# Patient Record
Sex: MALE | Race: WHITE | Employment: OTHER | URBAN - METROPOLITAN AREA
[De-identification: names, ages, dates, MRNs, and addresses within clinical notes are randomized per-mention and may not be internally consistent; named-entity substitution may affect disease eponyms.]

---

## 2017-05-14 ENCOUNTER — HOSPITAL ENCOUNTER (INPATIENT)
Age: 67
LOS: 2 days | Discharge: HOME | DRG: 181 | End: 2017-05-16
Attending: EMERGENCY MEDICINE | Admitting: INTERNAL MEDICINE
Payer: MEDICARE

## 2017-05-14 ENCOUNTER — APPOINTMENT (OUTPATIENT)
Dept: CT IMAGING | Age: 67
DRG: 181 | End: 2017-05-14
Attending: EMERGENCY MEDICINE
Payer: MEDICARE

## 2017-05-14 DIAGNOSIS — C79.51 BONE METASTASIS (HCC): ICD-10-CM

## 2017-05-14 DIAGNOSIS — C34.90 NON-SMALL CELL LUNG CANCER (NSCLC) (HCC): ICD-10-CM

## 2017-05-14 DIAGNOSIS — R91.1 LESION OF RIGHT LUNG: ICD-10-CM

## 2017-05-14 DIAGNOSIS — R91.8 MASS OF RIGHT LUNG: Primary | ICD-10-CM

## 2017-05-14 DIAGNOSIS — J90 PLEURAL EFFUSION: ICD-10-CM

## 2017-05-14 DIAGNOSIS — J90 PLEURAL EFFUSION, RIGHT: ICD-10-CM

## 2017-05-14 DIAGNOSIS — M89.9 RIB LESION: ICD-10-CM

## 2017-05-14 LAB
ALBUMIN SERPL BCP-MCNC: 4 G/DL (ref 3.5–5)
ALBUMIN/GLOB SERPL: 0.9 {RATIO} (ref 1.1–2.2)
ALP SERPL-CCNC: 125 U/L (ref 45–117)
ALT SERPL-CCNC: 30 U/L (ref 12–78)
ANION GAP BLD CALC-SCNC: 5 MMOL/L (ref 5–15)
AST SERPL W P-5'-P-CCNC: 27 U/L (ref 15–37)
BILIRUB SERPL-MCNC: 0.9 MG/DL (ref 0.2–1)
BUN SERPL-MCNC: 18 MG/DL (ref 6–20)
BUN/CREAT SERPL: 22 (ref 12–20)
CALCIUM SERPL-MCNC: 9.7 MG/DL (ref 8.5–10.1)
CHLORIDE SERPL-SCNC: 101 MMOL/L (ref 97–108)
CO2 SERPL-SCNC: 33 MMOL/L (ref 21–32)
CREAT SERPL-MCNC: 0.82 MG/DL (ref 0.7–1.3)
GLOBULIN SER CALC-MCNC: 4.3 G/DL (ref 2–4)
GLUCOSE SERPL-MCNC: 86 MG/DL (ref 65–100)
POTASSIUM SERPL-SCNC: 3.7 MMOL/L (ref 3.5–5.1)
PROT SERPL-MCNC: 8.3 G/DL (ref 6.4–8.2)
SODIUM SERPL-SCNC: 139 MMOL/L (ref 136–145)

## 2017-05-14 PROCEDURE — 74011000258 HC RX REV CODE- 258: Performed by: EMERGENCY MEDICINE

## 2017-05-14 PROCEDURE — 71260 CT THORAX DX C+: CPT

## 2017-05-14 PROCEDURE — 36415 COLL VENOUS BLD VENIPUNCTURE: CPT | Performed by: EMERGENCY MEDICINE

## 2017-05-14 PROCEDURE — 99284 EMERGENCY DEPT VISIT MOD MDM: CPT

## 2017-05-14 PROCEDURE — 93005 ELECTROCARDIOGRAM TRACING: CPT

## 2017-05-14 PROCEDURE — 74011636320 HC RX REV CODE- 636/320: Performed by: EMERGENCY MEDICINE

## 2017-05-14 PROCEDURE — 80053 COMPREHEN METABOLIC PANEL: CPT | Performed by: EMERGENCY MEDICINE

## 2017-05-14 PROCEDURE — 74011250636 HC RX REV CODE- 250/636: Performed by: INTERNAL MEDICINE

## 2017-05-14 PROCEDURE — 65660000000 HC RM CCU STEPDOWN

## 2017-05-14 RX ORDER — MELATONIN
1000 DAILY
COMMUNITY
End: 2019-01-01

## 2017-05-14 RX ORDER — IPRATROPIUM BROMIDE AND ALBUTEROL SULFATE 2.5; .5 MG/3ML; MG/3ML
3 SOLUTION RESPIRATORY (INHALATION)
Status: DISCONTINUED | OUTPATIENT
Start: 2017-05-14 | End: 2017-05-16 | Stop reason: HOSPADM

## 2017-05-14 RX ORDER — HEPARIN SODIUM 5000 [USP'U]/ML
5000 INJECTION, SOLUTION INTRAVENOUS; SUBCUTANEOUS EVERY 8 HOURS
Status: DISCONTINUED | OUTPATIENT
Start: 2017-05-14 | End: 2017-05-16 | Stop reason: HOSPADM

## 2017-05-14 RX ORDER — DOCUSATE SODIUM 100 MG/1
100 CAPSULE, LIQUID FILLED ORAL 2 TIMES DAILY
Status: DISCONTINUED | OUTPATIENT
Start: 2017-05-14 | End: 2017-05-16 | Stop reason: HOSPADM

## 2017-05-14 RX ORDER — LEVOTHYROXINE SODIUM 25 UG/1
25 TABLET ORAL
Status: DISCONTINUED | OUTPATIENT
Start: 2017-05-15 | End: 2017-05-15

## 2017-05-14 RX ORDER — LOSARTAN POTASSIUM 50 MG/1
50 TABLET ORAL DAILY
Status: DISCONTINUED | OUTPATIENT
Start: 2017-05-15 | End: 2017-05-16 | Stop reason: HOSPADM

## 2017-05-14 RX ORDER — ACETAMINOPHEN 325 MG/1
650 TABLET ORAL
Status: DISCONTINUED | OUTPATIENT
Start: 2017-05-14 | End: 2017-05-16 | Stop reason: HOSPADM

## 2017-05-14 RX ORDER — HYDROMORPHONE HYDROCHLORIDE 1 MG/ML
1 INJECTION, SOLUTION INTRAMUSCULAR; INTRAVENOUS; SUBCUTANEOUS
Status: DISCONTINUED | OUTPATIENT
Start: 2017-05-14 | End: 2017-05-16 | Stop reason: HOSPADM

## 2017-05-14 RX ORDER — SODIUM CHLORIDE 0.9 % (FLUSH) 0.9 %
10 SYRINGE (ML) INJECTION
Status: COMPLETED | OUTPATIENT
Start: 2017-05-14 | End: 2017-05-14

## 2017-05-14 RX ORDER — SODIUM CHLORIDE 9 MG/ML
75 INJECTION, SOLUTION INTRAVENOUS CONTINUOUS
Status: DISCONTINUED | OUTPATIENT
Start: 2017-05-14 | End: 2017-05-16 | Stop reason: HOSPADM

## 2017-05-14 RX ORDER — HYDROCODONE BITARTRATE AND ACETAMINOPHEN 5; 325 MG/1; MG/1
1 TABLET ORAL
Status: DISCONTINUED | OUTPATIENT
Start: 2017-05-14 | End: 2017-05-16 | Stop reason: HOSPADM

## 2017-05-14 RX ORDER — ONDANSETRON 2 MG/ML
4 INJECTION INTRAMUSCULAR; INTRAVENOUS
Status: DISCONTINUED | OUTPATIENT
Start: 2017-05-14 | End: 2017-05-16 | Stop reason: HOSPADM

## 2017-05-14 RX ADMIN — SODIUM CHLORIDE 75 ML/HR: 900 INJECTION, SOLUTION INTRAVENOUS at 22:29

## 2017-05-14 RX ADMIN — SODIUM CHLORIDE 100 ML: 900 INJECTION, SOLUTION INTRAVENOUS at 13:12

## 2017-05-14 RX ADMIN — Medication 10 ML: at 13:12

## 2017-05-14 RX ADMIN — IOPAMIDOL 100 ML: 612 INJECTION, SOLUTION INTRAVENOUS at 13:12

## 2017-05-14 NOTE — ED PROVIDER NOTES
HPI Comments: 78 y.o. male with past medical history significant for HTN who presents from [facility] with chief complaint of cough. Pt reports he had a CXR at [facility] which showed \"neoplasm of uncertain behavior of trachea, bronchus, and lung\", and he was recommended ED evaluation. He states he has been having an ongoing cough for 2 months accompanied by chest tightness. In addition, he states he had a fever this morning which has since resolved. Pt notes he was dx w/ bronchitis 2 months ago and was prescribed a 5 day course of antibiotics, but his cough from that time never completely resolved. Pt's WBC count from [facility] today was 6.6, and his last thyroid was checked 5 months ago. Pt denies previous hx of kidney problems and FmHx of blood clots. Pt denies weight change, chest pain, SOB, hemoptysis, and leg swelling. There are no other acute medical concerns at this time. Social hx: denies smoking    Significant FMHx: HTN    PCP: Luca Aleman DO    Note written by Roopa Tinoco, as dictated by Zamzam Anaya MD 11:35 AM    The history is provided by the patient. Past Medical History:   Diagnosis Date    Hypertension        Past Surgical History:   Procedure Laterality Date    HX ORTHOPAEDIC      reconstruction of left little finger         Family History:   Problem Relation Age of Onset    Cancer Mother      leukemia    Stroke Father     Cancer Brother      bladder       Social History     Social History    Marital status:      Spouse name: N/A    Number of children: N/A    Years of education: N/A     Occupational History    Not on file.      Social History Main Topics    Smoking status: Never Smoker    Smokeless tobacco: Never Used    Alcohol use 6.0 oz/week     10 Glasses of wine per week      Comment: regularly    Drug use: No    Sexual activity: Yes     Partners: Female     Other Topics Concern    Not on file     Social History Narrative ALLERGIES: Review of patient's allergies indicates no known allergies. Review of Systems   Constitutional: Positive for fever. Negative for appetite change, chills and unexpected weight change. HENT: Negative for congestion. Eyes: Negative for visual disturbance. Respiratory: Positive for cough and chest tightness. Negative for shortness of breath and wheezing. Cardiovascular: Negative for chest pain and leg swelling. Gastrointestinal: Negative for abdominal pain, diarrhea and vomiting. Genitourinary: Negative for dysuria and frequency. Musculoskeletal: Negative for joint swelling. Skin: Negative for rash. Neurological: Negative for speech difficulty and headaches. All other systems reviewed and are negative. Vitals:    05/14/17 0955 05/14/17 1230 05/14/17 1300   BP: (!) 185/114 (!) 187/91 (!) 170/92   Pulse: 70 60 65   Resp: 19 22 20   Temp: 97.5 °F (36.4 °C)     SpO2: 97% 97% 95%   Weight: 81 kg (178 lb 8 oz)     Height: 6' 2\" (1.88 m)              Physical Exam   Constitutional: He is oriented to person, place, and time. He appears well-developed and well-nourished. No distress. HENT:   Head: Normocephalic and atraumatic. Nose: Nose normal.   Eyes: Conjunctivae are normal. Pupils are equal, round, and reactive to light. No scleral icterus. Neck: Normal range of motion. Neck supple. No JVD present. No tracheal deviation present. No thyromegaly present. Cardiovascular: Normal rate, regular rhythm and normal heart sounds. No murmur heard. Pulmonary/Chest: Effort normal. No respiratory distress. He has decreased breath sounds (on R). He has no wheezes. He has no rales. Mild squeaks similar to early wheezes heard. Abdominal: Soft. Bowel sounds are normal. He exhibits no mass. There is no tenderness. There is no rebound and no guarding. Musculoskeletal: Normal range of motion. He exhibits no edema.    Neurological: He is alert and oriented to person, place, and time. No cranial nerve deficit. Coordination normal.   Skin: Skin is warm and dry. No rash noted. He is not diaphoretic. No erythema. Psychiatric: He has a normal mood and affect. His behavior is normal.   Nursing note and vitals reviewed. Note written by Roopa Brownlee, as dictated by Natalya Kathleen MD 11:35 AM    MDM  Number of Diagnoses or Management Options  Mass of right lung: new and requires workup  Pleural effusion, right: new and requires workup  Rib lesion: new and requires workup     Amount and/or Complexity of Data Reviewed  Clinical lab tests: ordered and reviewed  Tests in the radiology section of CPT®: ordered and reviewed  Tests in the medicine section of CPT®: ordered and reviewed  Discussion of test results with the performing providers: yes  Decide to obtain previous medical records or to obtain history from someone other than the patient: yes  Obtain history from someone other than the patient: yes ([facility])  Review and summarize past medical records: yes  Discuss the patient with other providers: yes  Independent visualization of images, tracings, or specimens: yes    Risk of Complications, Morbidity, and/or Mortality  Presenting problems: high  Diagnostic procedures: high  Management options: high  General comments:  Total critical care time was 35 min    Critical Care  Total time providing critical care: 30-74 minutes    Patient Progress  Patient progress: stable    ED Course       Procedures

## 2017-05-14 NOTE — ED TRIAGE NOTES
Pt sent from [facility] for further evaluation of cough and chest congestion that has been going on for several months. Pt reports dry cough along with \"slight fever this morning. \"

## 2017-05-14 NOTE — PROGRESS NOTES
Admission Medication Reconciliation:    Information obtained from:  Rx Query + patient report    Comments/Recommendations:  Mr. Jake Herring last took his home medications this morning. He reports taking \"more than 1000 units of Vit D daily, but 1000 is what was prescribed\". Prior to Admission Medications:   Prior to Admission Medications   Prescriptions Last Dose Informant Patient Reported? Taking? cholecalciferol (VITAMIN D3) 1,000 unit tablet 5/14/2017 at am  Yes Yes   Sig: Take 1,000 Units by mouth daily. levothyroxine (SYNTHROID) 25 mcg tablet 5/14/2017 at am  No Yes   Sig: TAKE 1 TABLET BY MOUTH EVERY DAY BEFORE BREAKFAST   losartan (COZAAR) 50 mg tablet 5/14/2017 at am  No Yes   Sig: Take 1 Tab by mouth daily.       Facility-Administered Medications: None

## 2017-05-14 NOTE — IP AVS SNAPSHOT
Current Discharge Medication List  
  
START taking these medications Dose & Instructions Dispensing Information Comments Morning Noon Evening Bedtime HYDROcodone-acetaminophen 5-325 mg per tablet Commonly known as:  Talita Vallecillo Your last dose was: Your next dose is:    
   
   
 Dose:  1 Tab Take 1 Tab by mouth every four (4) hours as needed. Max Daily Amount: 6 Tabs. Quantity:  10 Tab Refills:  0 CONTINUE these medications which have CHANGED Dose & Instructions Dispensing Information Comments Morning Noon Evening Bedtime  
 cholecalciferol 1,000 unit tablet Commonly known as:  VITAMIN D3 What changed:  Another medication with the same name was removed. Continue taking this medication, and follow the directions you see here. Your last dose was: Your next dose is:    
   
   
 Dose:  1000 Units Take 1,000 Units by mouth daily. Refills:  0  
     
   
   
   
  
 levothyroxine 75 mcg tablet Commonly known as:  SYNTHROID What changed:   
- medication strength 
- how much to take 
- how to take this - when to take this 
- additional instructions Your last dose was: Your next dose is:    
   
   
 Dose:  37.5 mcg Take 0.5 Tabs by mouth Daily (before breakfast). Quantity:  30 Tab Refills:  1 CONTINUE these medications which have NOT CHANGED Dose & Instructions Dispensing Information Comments Morning Noon Evening Bedtime  
 losartan 50 mg tablet Commonly known as:  COZAAR Your last dose was: Your next dose is:    
   
   
 Dose:  50 mg Take 1 Tab by mouth daily. Quantity:  30 Tab Refills:  5 Where to Get Your Medications Information on where to get these meds will be given to you by the nurse or doctor. ! Ask your nurse or doctor about these medications HYDROcodone-acetaminophen 5-325 mg per tablet levothyroxine 75 mcg tablet

## 2017-05-14 NOTE — IP AVS SNAPSHOT
[address] [facility] [address] [place] [address] 
[phone] Patient: Tessy Miramontes MRN: [id number] :1950 You are allergic to the following No active allergies Recent Documentation Height Weight BMI Smoking Status 1.88 m 77.7 kg 21.99 kg/m2 Never Smoker Unresulted Labs Order Current Status AFB CULTURE + SMEAR W/RFLX ID FROM CULTURE In process CHYLOMICRON SCREEN, FLUID In process CULTURE, ANAEROBIC In process CULTURE, FUNGUS In process CULTURE, BODY FLUID W GRAM STAIN Preliminary result Emergency Contacts Name Discharge Info Relation Home Work Mobile Gustabo Cline CAREGIVER [3] Spouse [3] [phone] About your hospitalization You were admitted on:  May 14, 2017 You last received care in the:  [facility] 4 SURG/BARIATRICS You were discharged on:  May 16, 2017 Unit phone number:  [phone] Why you were hospitalized Your primary diagnosis was:  Pleural Effusion, Right Your diagnoses also included:  Pleural Effusion Providers Seen During Your Hospitalizations Provider Role Specialty Primary office phone Ryland Baron MD Attending Provider Emergency Medicine [phone] Fili Luna MD Attending Provider Internal Medicine [phone] Ever Lee MD Attending Provider [facility] [phone] Fitz Hartman MD Attending Provider Internal Medicine [phone] Your Primary Care Physician (PCP) Primary Care Physician Office Phone Office Fax Lara Waddell [phone] Follow-up Information Follow up With Details Comments Contact Info Karmen Johnson DO In 1 week  [address] [facility] [address] 
[phone] Marcela Abebe MD In 3 days Our office will call you to coordinate [address] [facility] [address] 
[phone] Your Appointments Thursday May 18, 2017  1:30 PM EDT New Patient with Valerie Mcrae MD  
[facility] Oncology at [facility] [address] [facility]) [address] [facility] [address]  
[phone] Friday May 19, 2017  9:00 AM EDT ROUTINE CARE with Ruthann Berg DO [facility] Internal Medicine ([address]) [address] [facility] [address] [address] [facility] [address]  
[phone] Current Discharge Medication List  
  
START taking these medications Dose & Instructions Dispensing Information Comments Morning Noon Evening Bedtime HYDROcodone-acetaminophen 5-325 mg per tablet Commonly known as:  Talita Avel Your last dose was: Your next dose is:    
   
   
 Dose:  1 Tab Take 1 Tab by mouth every four (4) hours as needed. Max Daily Amount: 6 Tabs. Quantity:  10 Tab Refills:  0 CONTINUE these medications which have CHANGED Dose & Instructions Dispensing Information Comments Morning Noon Evening Bedtime  
 cholecalciferol 1,000 unit tablet Commonly known as:  VITAMIN D3 What changed:  Another medication with the same name was removed. Continue taking this medication, and follow the directions you see here. Your last dose was: Your next dose is:    
   
   
 Dose:  1000 Units Take 1,000 Units by mouth daily. Refills:  0  
     
   
   
   
  
 levothyroxine 75 mcg tablet Commonly known as:  SYNTHROID What changed:   
- medication strength 
- how much to take 
- how to take this - when to take this 
- additional instructions Your last dose was: Your next dose is:    
   
   
 Dose:  37.5 mcg Take 0.5 Tabs by mouth Daily (before breakfast). Quantity:  30 Tab Refills:  1 CONTINUE these medications which have NOT CHANGED Dose & Instructions Dispensing Information Comments Morning Noon Evening Bedtime losartan 50 mg tablet Commonly known as:  COZAAR Your last dose was: Your next dose is:    
   
   
 Dose:  50 mg Take 1 Tab by mouth daily. Quantity:  30 Tab Refills:  5 Where to Get Your Medications Information on where to get these meds will be given to you by the nurse or doctor. ! Ask your nurse or doctor about these medications HYDROcodone-acetaminophen 5-325 mg per tablet  
 levothyroxine 75 mcg tablet Discharge Instructions Discharge Instructions PATIENT ID: Luis Alberto Aragon MRN: [id number] YOB: 1950 DATE OF ADMISSION: 5/14/2017 11:46 AM   
DATE OF DISCHARGE: 5/16/2017 PRIMARY CARE PROVIDER: Amber Owens DO  
 
ATTENDING PHYSICIAN: Raudel Card MD 
DISCHARGING PROVIDER: Raudel Card MD   
To contact this individual call [phone] and ask the  to page. If unavailable ask to be transferred the Adult Hospitalist Department. DISCHARGE DIAGNOSES Lung mass Pleural effusion CONSULTATIONS: IP CONSULT TO HOSPITALIST 
IP CONSULT TO PULMONOLOGY 
IP CONSULT TO ONCOLOGY PROCEDURES/SURGERIES: * No surgery found * PENDING TEST RESULTS:  
At the time of discharge the following test results are still pending: pathology results FOLLOW UP APPOINTMENTS:  
Follow-up Information Follow up With Details Comments Contact Info Amber Owens DO In 1 week  [address] [facility] [address] [place] [address] 
[phone] Autumn Fowler MD In 3 days  [address] [facility] [address] 
[phone] ADDITIONAL CARE RECOMMENDATIONS:  
Follow up with Dr Clara Bernal in 3-4 days to discuss the pathology results DIET: Regular Diet ACTIVITY: Activity as tolerated DISCHARGE MEDICATIONS: 
 See Medication Reconciliation Form · It is important that you take the medication exactly as they are prescribed. · Keep your medication in the bottles provided by the pharmacist and keep a list of the medication names, dosages, and times to be taken in your wallet. · Do not take other medications without consulting your doctor. NOTIFY YOUR PHYSICIAN FOR ANY OF THE FOLLOWING:  
Fever over 101 degrees for 24 hours. Chest pain, shortness of breath, fever, chills, nausea, vomiting, diarrhea, change in mentation, falling, weakness, bleeding. Severe pain or pain not relieved by medications. Or, any other signs or symptoms that you may have questions about. DISPOSITION: 
x  Home With: 
 OT  PT  [place]  RN  
  
 SNF/Inpatient Rehab/LTAC Independent/assisted living Hospice Other: CDMP Checked:  
Yes x PROBLEM LIST Updated: 
Yes x Signed:  
Christine Bazzi MD 
5/16/2017 
12:16 PM 
 
Discharge Orders None ACO Transitions of Care Introducing [employer] [address] Suzanne Monroy offers a voluntary care coordination program to provide high quality service and care to [facility] fee-for-service beneficiaries. Nuno Ceja was designed to help you enhance your health and well-being through the following services: ? Transitions of Care  support for individuals who are transitioning from one care setting to another (example: Hospital to home). ? Chronic and Complex Care Coordination  support for individuals and caregivers of those with serious or chronic illnesses or with more than one chronic (ongoing) condition and those who take a number of different medications. If you meet specific medical criteria, a [address] [place] [address] Rd may call you directly to coordinate your care with your primary care physician and your other care providers. For questions about the [facility] programs, please, contact your physicians office. For general questions or additional information about Accountable Care Organizations: Please visit www.medicare.gov/acos. html or call 1-800-MEDICARE (2-917.829.5153) TTY users should call 7-972.181.4605. i[employer] Announcement We are excited to announce that we are making your provider's discharge notes available to you in iWeebo. You will see these notes when they are completed and signed by the physician that discharged you from your recent hospital stay. If you have any questions or concerns about any information you see in iWeebo, please call the Health Information Department where you were seen or reach out to your Primary Care Provider for more information about your plan of care. Introducing \[facility] [place] [facility]\"" & HEALTH SERVICES! Dear Paresh Browning: 
Thank you for requesting a iWeebo account. Our records indicate that you already have an active iWeebo account. You can access your account anytime at [email or web address] [email or web address]/[email or web address] Did you know that you can access your hospital and ER discharge instructions at any time in iWeebo? You can also review all of your test results from your hospital stay or ER visit. Additional Information If you have questions, please visit the Frequently Asked Questions section of the iWeebo website at [email or web address]/. Remember, iWeebo is NOT to be used for urgent needs. For medical emergencies, dial 911. Now available from your iPhone and Android! General Information Please provide this summary of care documentation to your next provider. Patient Signature:  ____________________________________________________________ Date:  ____________________________________________________________  
  
Christiana Burn Provider Signature:  ____________________________________________________________ Date:  ____________________________________________________________

## 2017-05-15 ENCOUNTER — APPOINTMENT (OUTPATIENT)
Dept: ULTRASOUND IMAGING | Age: 67
DRG: 181 | End: 2017-05-15
Attending: INTERNAL MEDICINE
Payer: MEDICARE

## 2017-05-15 ENCOUNTER — APPOINTMENT (OUTPATIENT)
Dept: GENERAL RADIOLOGY | Age: 67
DRG: 181 | End: 2017-05-15
Attending: RADIOLOGY
Payer: MEDICARE

## 2017-05-15 LAB
ALBUMIN SERPL BCP-MCNC: 2.9 G/DL (ref 3.5–5)
ALBUMIN/GLOB SERPL: 0.9 {RATIO} (ref 1.1–2.2)
ALP SERPL-CCNC: 99 U/L (ref 45–117)
ALT SERPL-CCNC: 22 U/L (ref 12–78)
ANION GAP BLD CALC-SCNC: 6 MMOL/L (ref 5–15)
AST SERPL W P-5'-P-CCNC: 17 U/L (ref 15–37)
ATRIAL RATE: 59 BPM
BASOPHILS # BLD AUTO: 0 K/UL (ref 0–0.1)
BASOPHILS # BLD: 0 % (ref 0–1)
BILIRUB SERPL-MCNC: 0.7 MG/DL (ref 0.2–1)
BODY FLD TYPE: NORMAL
BUN SERPL-MCNC: 14 MG/DL (ref 6–20)
BUN/CREAT SERPL: 19 (ref 12–20)
CALCIUM SERPL-MCNC: 8.8 MG/DL (ref 8.5–10.1)
CALCULATED P AXIS, ECG09: 51 DEGREES
CALCULATED R AXIS, ECG10: 49 DEGREES
CALCULATED T AXIS, ECG11: 51 DEGREES
CHLORIDE SERPL-SCNC: 104 MMOL/L (ref 97–108)
CO2 SERPL-SCNC: 29 MMOL/L (ref 21–32)
CREAT SERPL-MCNC: 0.73 MG/DL (ref 0.7–1.3)
DIAGNOSIS, 93000: NORMAL
EOSINOPHIL # BLD: 0.3 K/UL (ref 0–0.4)
EOSINOPHIL NFR BLD: 4 % (ref 0–7)
ERYTHROCYTE [DISTWIDTH] IN BLOOD BY AUTOMATED COUNT: 13.8 % (ref 11.5–14.5)
GLOBULIN SER CALC-MCNC: 3.4 G/DL (ref 2–4)
GLUCOSE BLD STRIP.AUTO-MCNC: 178 MG/DL (ref 65–100)
GLUCOSE FLD-MCNC: 80 MG/DL
GLUCOSE SERPL-MCNC: 88 MG/DL (ref 65–100)
HCT VFR BLD AUTO: 34.7 % (ref 36.6–50.3)
HGB BLD-MCNC: 11.1 G/DL (ref 12.1–17)
LDH FLD L TO P-CCNC: 311 U/L
LYMPHOCYTES # BLD AUTO: 23 % (ref 12–49)
LYMPHOCYTES # BLD: 1.6 K/UL (ref 0.8–3.5)
MAGNESIUM SERPL-MCNC: 2 MG/DL (ref 1.6–2.4)
MCH RBC QN AUTO: 28 PG (ref 26–34)
MCHC RBC AUTO-ENTMCNC: 32 G/DL (ref 30–36.5)
MCV RBC AUTO: 87.4 FL (ref 80–99)
MONOCYTES # BLD: 0.5 K/UL (ref 0–1)
MONOCYTES NFR BLD AUTO: 7 % (ref 5–13)
NEUTS SEG # BLD: 4.6 K/UL (ref 1.8–8)
NEUTS SEG NFR BLD AUTO: 66 % (ref 32–75)
P-R INTERVAL, ECG05: 148 MS
PH FLD: 7 [PH]
PHOSPHATE SERPL-MCNC: 2.8 MG/DL (ref 2.6–4.7)
PLATELET # BLD AUTO: 204 K/UL (ref 150–400)
POTASSIUM SERPL-SCNC: 3.8 MMOL/L (ref 3.5–5.1)
PROT FLD-MCNC: 4.2 G/DL
PROT SERPL-MCNC: 6.3 G/DL (ref 6.4–8.2)
Q-T INTERVAL, ECG07: 422 MS
QRS DURATION, ECG06: 96 MS
QTC CALCULATION (BEZET), ECG08: 417 MS
RBC # BLD AUTO: 3.97 M/UL (ref 4.1–5.7)
SERVICE CMNT-IMP: ABNORMAL
SODIUM SERPL-SCNC: 139 MMOL/L (ref 136–145)
SPECIMEN SOURCE FLD: NORMAL
TSH SERPL DL<=0.05 MIU/L-ACNC: 4.07 UIU/ML (ref 0.36–3.74)
VENTRICULAR RATE, ECG03: 59 BPM
WBC # BLD AUTO: 6.9 K/UL (ref 4.1–11.1)

## 2017-05-15 PROCEDURE — 80053 COMPREHEN METABOLIC PANEL: CPT | Performed by: INTERNAL MEDICINE

## 2017-05-15 PROCEDURE — 84443 ASSAY THYROID STIM HORMONE: CPT | Performed by: INTERNAL MEDICINE

## 2017-05-15 PROCEDURE — 82962 GLUCOSE BLOOD TEST: CPT

## 2017-05-15 PROCEDURE — 36415 COLL VENOUS BLD VENIPUNCTURE: CPT | Performed by: INTERNAL MEDICINE

## 2017-05-15 PROCEDURE — 65660000000 HC RM CCU STEPDOWN

## 2017-05-15 PROCEDURE — 82945 GLUCOSE OTHER FLUID: CPT | Performed by: INTERNAL MEDICINE

## 2017-05-15 PROCEDURE — 84100 ASSAY OF PHOSPHORUS: CPT | Performed by: INTERNAL MEDICINE

## 2017-05-15 PROCEDURE — 82664 ELECTROPHORETIC TEST: CPT | Performed by: INTERNAL MEDICINE

## 2017-05-15 PROCEDURE — 89050 BODY FLUID CELL COUNT: CPT | Performed by: INTERNAL MEDICINE

## 2017-05-15 PROCEDURE — 84157 ASSAY OF PROTEIN OTHER: CPT | Performed by: INTERNAL MEDICINE

## 2017-05-15 PROCEDURE — 87102 FUNGUS ISOLATION CULTURE: CPT | Performed by: INTERNAL MEDICINE

## 2017-05-15 PROCEDURE — 88112 CYTOPATH CELL ENHANCE TECH: CPT | Performed by: INTERNAL MEDICINE

## 2017-05-15 PROCEDURE — 83615 LACTATE (LD) (LDH) ENZYME: CPT | Performed by: INTERNAL MEDICINE

## 2017-05-15 PROCEDURE — 83735 ASSAY OF MAGNESIUM: CPT | Performed by: INTERNAL MEDICINE

## 2017-05-15 PROCEDURE — 87075 CULTR BACTERIA EXCEPT BLOOD: CPT | Performed by: INTERNAL MEDICINE

## 2017-05-15 PROCEDURE — 74011250636 HC RX REV CODE- 250/636: Performed by: INTERNAL MEDICINE

## 2017-05-15 PROCEDURE — 32555 ASPIRATE PLEURA W/ IMAGING: CPT

## 2017-05-15 PROCEDURE — 74011250636 HC RX REV CODE- 250/636: Performed by: FAMILY MEDICINE

## 2017-05-15 PROCEDURE — 87116 MYCOBACTERIA CULTURE: CPT | Performed by: INTERNAL MEDICINE

## 2017-05-15 PROCEDURE — 74011250637 HC RX REV CODE- 250/637: Performed by: INTERNAL MEDICINE

## 2017-05-15 PROCEDURE — 85025 COMPLETE CBC W/AUTO DIFF WBC: CPT | Performed by: INTERNAL MEDICINE

## 2017-05-15 PROCEDURE — 0W993ZX DRAINAGE OF RIGHT PLEURAL CAVITY, PERCUTANEOUS APPROACH, DIAGNOSTIC: ICD-10-PCS | Performed by: RADIOLOGY

## 2017-05-15 PROCEDURE — 71010 XR CHEST PORT: CPT

## 2017-05-15 PROCEDURE — 83986 ASSAY PH BODY FLUID NOS: CPT | Performed by: INTERNAL MEDICINE

## 2017-05-15 PROCEDURE — 87205 SMEAR GRAM STAIN: CPT | Performed by: INTERNAL MEDICINE

## 2017-05-15 RX ORDER — LEVOTHYROXINE SODIUM 75 UG/1
37.5 TABLET ORAL
Status: DISCONTINUED | OUTPATIENT
Start: 2017-05-16 | End: 2017-05-16 | Stop reason: HOSPADM

## 2017-05-15 RX ORDER — SODIUM CHLORIDE 0.9 % (FLUSH) 0.9 %
SYRINGE (ML) INJECTION
Status: COMPLETED
Start: 2017-05-15 | End: 2017-05-15

## 2017-05-15 RX ORDER — HYDRALAZINE HYDROCHLORIDE 20 MG/ML
10 INJECTION INTRAMUSCULAR; INTRAVENOUS
Status: DISCONTINUED | OUTPATIENT
Start: 2017-05-15 | End: 2017-05-16 | Stop reason: HOSPADM

## 2017-05-15 RX ADMIN — Medication 10 ML: at 16:04

## 2017-05-15 RX ADMIN — HYDRALAZINE HYDROCHLORIDE 10 MG: 20 INJECTION INTRAMUSCULAR; INTRAVENOUS at 09:37

## 2017-05-15 RX ADMIN — SODIUM CHLORIDE 75 ML/HR: 900 INJECTION, SOLUTION INTRAVENOUS at 11:17

## 2017-05-15 RX ADMIN — LEVOTHYROXINE SODIUM 25 MCG: 25 TABLET ORAL at 06:59

## 2017-05-15 RX ADMIN — LOSARTAN POTASSIUM 50 MG: 50 TABLET ORAL at 09:03

## 2017-05-15 NOTE — PROGRESS NOTES
Problem: Falls - Risk of  Goal: *Absence of falls  Outcome: Progressing Towards Goal  Pt encouraged to call for assistance. Pt reminded of safety concerns with equipment. Call bell, tray table, and telephone kept within reach while in bed. Problem: Pressure Injury - Risk of  Goal: *Prevention of pressure ulcer  Outcome: Progressing Towards Goal  Pt encouraged to be out of bed and to change position while in bed. Problem: General Medical Care Plan  Goal: *Vital signs within specified parameters  Outcome: Progressing Towards Goal  Vital signs stable. Goal: *Optimal pain control at patients stated goal  Outcome: Progressing Towards Goal  Pt without complaints of pain. Goal: *Optimize nutritional status  Outcome: Progressing Towards Goal  Pt tolerating regular cardiac diet.

## 2017-05-15 NOTE — ROUTINE PROCESS
TRANSFER - OUT REPORT:    Verbal report given to April (name) on Artie Ghotra  being transferred to [facility] (unit) for routine progression of care       Report consisted of patients Situation, Background, Assessment and   Recommendations(SBAR). Information from the following report(s) Procedure Summary was reviewed with the receiving nurse. Lines:   Peripheral IV 05/14/17 Left Antecubital (Active)   Site Assessment Clean, dry, & intact 5/15/2017  8:30 AM   Phlebitis Assessment 0 5/15/2017  8:30 AM   Infiltration Assessment 0 5/15/2017  8:30 AM   Dressing Status Clean, dry, & intact 5/15/2017  8:30 AM   Dressing Type Transparent;Tape 5/15/2017  8:30 AM   Hub Color/Line Status Pink; Infusing 5/15/2017  8:30 AM   Action Taken Open ports on tubing capped 5/15/2017  8:30 AM   Alcohol Cap Used Yes 5/15/2017  8:30 AM        Opportunity for questions and clarification was provided post thoracentesis in ER ultrasound.     Patient transported with:   O2 @ RA liters

## 2017-05-15 NOTE — PROGRESS NOTES
VISIT TYPE: INPATIENT PET-CT                                   PET-CT PREPARATION    DATE:  05/16/2017   TIME:  14:00    Materials for this procedure are ordered in advance and are time-sensitive. If this procedure needs to be canceled or rescheduled, you must call [phone] or [phone] at least 24 hours prior to the appointment time. DIET RESTRICTIONS:  Eat a low carb/high protein diet the evening before the procedure. Avoid food and drink that contains sugars the night before and ESPECIALLY the day of the test.   No food 4 hours prior to the procedure. Patient may drink all the water up until the time of their appointment. Coffee is ok, as long as an artificial sweetener is used instead of sugar. Hydrate well the night before the exam.  MEDS & IVs:  If the patient takes insulin, it must be taken at least 4 hours before the scheduled time of the procedure.  If the blood sugar reaches levels that would require administration of insulin within 4 hours of the scheduled procedure, please notify the PET/CT department at [phone], [address] [phone] staff should obtain blood glucose results on patient one hour prior to procedure and call the results to [phone]. The serum glucose must be less than 200 mg/dL at the time of the exam.  For patient requiring medications for pain, anxiety, and claustrophobia, please consult with physician to determine what may be appropriate. NO IV fluid or oral meds containing glucose or dextrose for 6 hours prior to the exam.  PO medications may be taken with water only. Type 2 diabetic patients taking oral medications only may take these medications with a low carb/high protein meal 4 hours prior to their scheduled PET-CT scan. AVOID:  No procedures requiring exercise for 24 hours prior to scheduled PET-CT scan, and other nuclear medicine procedures may not be scheduled on the same day.

## 2017-05-15 NOTE — PROGRESS NOTES
Primary Nurse Deidra Moyer RN and Oma Powers RN performed a dual skin assessment on this patient No impairment noted  Suresh score is 22

## 2017-05-15 NOTE — PROGRESS NOTES
Hospitalist Progress Note          Britany Morrow MD  Please call  and page for questions. Call physician on-call through the  7pm-7am    Daily Progress Note: 5/15/2017    Primary care provider:Declan Wilburn DO    Date of admission: 5/14/2017 11:46 AM    Admission summery and hospital course:  78-year-old man with a past medical history significant for hypertension, hypothyroidism, who was in his usual state of health until about 2 months ago, and patient developed a cough. The cough is nonproductive, sometime associated   with chest tightness. The CT scan done at the ED showed right pleural effusion as well as a lung mass and evidence of metastatic disease. Subjective:   Patient said she/he is feeling better today. Assessment/Plan:   Lung mass:   Probably malignancy. We will await further recommendation from the pulmonologist. Right pleural effusion probably due to malignant process. Hypertension: BP is elevated. Will add Hydralazine as PRN in addition to his current medicines. Hypothyroidism: Continue with Synthroid. See orders for other plans. VTE prophylaxis: Heparin  Code status: Full  Discussed plan of care with Patient/Family and Nurse. Pre-admission lived at home. Discharge planning: pending.         Review of Systems:     Review of Systems:  Symptom  Y/N  Comments   Symptom  Y/N  Comments    Fever/Chills  n    Chest Pain  n    Poor Appetite  n    Edema  n     Cough  y   Abdominal Pain  n     Sputum  y   Joint Pain      SOB/AVENDANO  n   Pruritis/Rash      Nausea/vomit  n   Tolerating PT/OT      Diarrhea     Tolerating Diet      Constipation     Other      Could not obtain due to:         Objective:   Physical Exam:     Visit Vitals    BP (!) 170/95 (BP 1 Location: Right arm, BP Patient Position: At rest)    Pulse 64    Temp 98.3 °F (36.8 °C)    Resp 18    Ht 6' 2\" (1.88 m)    Wt 80.3 kg (177 lb 0.5 oz)    SpO2 94%    BMI 22.73 kg/m2      O2 Device: Room air    Temp (24hrs), Av.2 °F (36.8 °C), Min:97.5 °F (36.4 °C), Max:98.6 °F (37 °C)         190 - 05/15 0700  In: 387.5 [I.V.:387.5]  Out: 600 [Urine:600]      General:  Alert, cooperative, no distress, appears stated age. Lungs:   Clear to auscultation bilaterally. Chest wall:  No tenderness or deformity. Heart:  Regular rate and rhythm, S1, S2 normal, no murmur, click, rub or gallop. Abdomen:   Soft, non-tender. Bowel sounds normal.    Extremities: Extremities normal, atraumatic, no cyanosis or edema. Skin: Skin color, texture, turgor normal. No rashes or lesions   Neurologic: CNII-XII intact. Data Review:       Recent Days:  Recent Labs      05/15/17   0346   WBC  6.9   HGB  11.1*   HCT  34.7*   PLT  204     Recent Labs      05/15/17   0346  17   1202   NA  139  139   K  3.8  3.7   CL  104  101   CO2  29  33*   GLU  88  86   BUN  14  18   CREA  0.73  0.82   CA  8.8  9.7   MG  2.0   --    PHOS  2.8   --    ALB  2.9*  4.0   SGOT  17  27   ALT  22  30     No results for input(s): PH, PCO2, PO2, HCO3, FIO2 in the last 72 hours. 24 Hour Results:  Recent Results (from the past 24 hour(s))   METABOLIC PANEL, COMPREHENSIVE    Collection Time: 17 12:02 PM   Result Value Ref Range    Sodium 139 136 - 145 mmol/L    Potassium 3.7 3.5 - 5.1 mmol/L    Chloride 101 97 - 108 mmol/L    CO2 33 (H) 21 - 32 mmol/L    Anion gap 5 5 - 15 mmol/L    Glucose 86 65 - 100 mg/dL    BUN 18 6 - 20 MG/DL    Creatinine 0.82 0.70 - 1.30 MG/DL    BUN/Creatinine ratio 22 (H) 12 - 20      GFR est AA >60 >60 ml/min/1.73m2    GFR est non-AA >60 >60 ml/min/1.73m2    Calcium 9.7 8.5 - 10.1 MG/DL    Bilirubin, total 0.9 0.2 - 1.0 MG/DL    ALT (SGPT) 30 12 - 78 U/L    AST (SGOT) 27 15 - 37 U/L    Alk.  phosphatase 125 (H) 45 - 117 U/L    Protein, total 8.3 (H) 6.4 - 8.2 g/dL    Albumin 4.0 3.5 - 5.0 g/dL    Globulin 4.3 (H) 2.0 - 4.0 g/dL    A-G Ratio 0.9 (L) 1.1 - 2.2     EKG, 12 LEAD, INITIAL    Collection Time: 05/14/17  7:36 PM   Result Value Ref Range    Ventricular Rate 59 BPM    Atrial Rate 59 BPM    P-R Interval 148 ms    QRS Duration 96 ms    Q-T Interval 422 ms    QTC Calculation (Bezet) 417 ms    Calculated P Axis 51 degrees    Calculated R Axis 49 degrees    Calculated T Axis 51 degrees    Diagnosis       Sinus bradycardia  Voltage criteria for left ventricular hypertrophy  No previous ECGs available  Confirmed by Lance Alberto M.D., [facility] ([id number]) on 5/15/2017 1:43:28 AM     METABOLIC PANEL, COMPREHENSIVE    Collection Time: 05/15/17  3:46 AM   Result Value Ref Range    Sodium 139 136 - 145 mmol/L    Potassium 3.8 3.5 - 5.1 mmol/L    Chloride 104 97 - 108 mmol/L    CO2 29 21 - 32 mmol/L    Anion gap 6 5 - 15 mmol/L    Glucose 88 65 - 100 mg/dL    BUN 14 6 - 20 MG/DL    Creatinine 0.73 0.70 - 1.30 MG/DL    BUN/Creatinine ratio 19 12 - 20      GFR est AA >60 >60 ml/min/1.73m2    GFR est non-AA >60 >60 ml/min/1.73m2    Calcium 8.8 8.5 - 10.1 MG/DL    Bilirubin, total 0.7 0.2 - 1.0 MG/DL    ALT (SGPT) 22 12 - 78 U/L    AST (SGOT) 17 15 - 37 U/L    Alk. phosphatase 99 45 - 117 U/L    Protein, total 6.3 (L) 6.4 - 8.2 g/dL    Albumin 2.9 (L) 3.5 - 5.0 g/dL    Globulin 3.4 2.0 - 4.0 g/dL    A-G Ratio 0.9 (L) 1.1 - 2.2     CBC WITH AUTOMATED DIFF    Collection Time: 05/15/17  3:46 AM   Result Value Ref Range    WBC 6.9 4.1 - 11.1 K/uL    RBC 3.97 (L) 4.10 - 5.70 M/uL    HGB 11.1 (L) 12.1 - 17.0 g/dL    HCT 34.7 (L) 36.6 - 50.3 %    MCV 87.4 80.0 - 99.0 FL    MCH 28.0 26.0 - 34.0 PG    MCHC 32.0 30.0 - 36.5 g/dL    RDW 13.8 11.5 - 14.5 %    PLATELET 346 958 - 491 K/uL    NEUTROPHILS 66 32 - 75 %    LYMPHOCYTES 23 12 - 49 %    MONOCYTES 7 5 - 13 %    EOSINOPHILS 4 0 - 7 %    BASOPHILS 0 0 - 1 %    ABS. NEUTROPHILS 4.6 1.8 - 8.0 K/UL    ABS. LYMPHOCYTES 1.6 0.8 - 3.5 K/UL    ABS. MONOCYTES 0.5 0.0 - 1.0 K/UL    ABS. EOSINOPHILS 0.3 0.0 - 0.4 K/UL    ABS.  BASOPHILS 0.0 0.0 - 0.1 K/UL PHOSPHORUS    Collection Time: 05/15/17  3:46 AM   Result Value Ref Range    Phosphorus 2.8 2.6 - 4.7 MG/DL   MAGNESIUM    Collection Time: 05/15/17  3:46 AM   Result Value Ref Range    Magnesium 2.0 1.6 - 2.4 mg/dL   TSH 3RD GENERATION    Collection Time: 05/15/17  3:46 AM   Result Value Ref Range    TSH 4.07 (H) 0.36 - 3.74 uIU/mL       Problem List:  Problem List as of 5/15/2017  Date Reviewed: 5/14/2017          Codes Class Noted - Resolved    * (Principal)Pleural effusion, right ICD-10-CM: J90  ICD-9-CM: 511.9  5/14/2017 - Present        Pleural effusion ICD-10-CM: J90  ICD-9-CM: 511.9  5/14/2017 - Present        [facility] ICD-10-CM: C61  ICD-9-CM: 185  3/11/2016 - Present        Acquired hypothyroidism ICD-10-CM: E03.9  ICD-9-CM: 244.9  1/19/2016 - Present        Vitamin D deficiency ICD-10-CM: E55.9  ICD-9-CM: 268.9  1/19/2016 - Present        Elevated PSA ICD-10-CM: R97.20  ICD-9-CM: 790.93  1/19/2016 - Present        Lung nodules ICD-10-CM: R91.8  ICD-9-CM: 793.19  12/22/2015 - Present        Essential hypertension ICD-10-CM: I10  ICD-9-CM: 401.9  12/22/2015 - Present        Thoracic scoliosis ICD-10-CM: M41.9  ICD-9-CM: 737.30  12/22/2015 - Present        Lesion of right lung ICD-10-CM: R91.1  ICD-9-CM: 518.89  3/17/2015 - Present              Medications reviewed  Current Facility-Administered Medications   Medication Dose Route Frequency    hydrALAZINE (APRESOLINE) 20 mg/mL injection 10 mg  10 mg IntraVENous Q4H PRN    losartan (COZAAR) tablet 50 mg  50 mg Oral DAILY    levothyroxine (SYNTHROID) tablet 25 mcg  25 mcg Oral ACB    0.9% sodium chloride infusion  75 mL/hr IntraVENous CONTINUOUS    acetaminophen (TYLENOL) tablet 650 mg  650 mg Oral Q4H PRN    HYDROcodone-acetaminophen (NORCO) 5-325 mg per tablet 1 Tab  1 Tab Oral Q4H PRN    HYDROmorphone (PF) (DILAUDID) injection 1 mg  1 mg IntraVENous Q4H PRN    ondansetron (ZOFRAN) injection 4 mg  4 mg IntraVENous Q4H PRN    docusate sodium (COLACE) capsule 100 mg  100 mg Oral BID    heparin (porcine) injection 5,000 Units  5,000 Units SubCUTAneous Q8H    albuterol-ipratropium (DUO-NEB) 2.5 MG-0.5 MG/3 ML  3 mL Nebulization Q4H PRN       Care Plan discussed with: Patient/Family and Nurse    Total time spent with patient: 30 minutes.     Ever Lee MD

## 2017-05-15 NOTE — ROUTINE PROCESS
TRANSFER - OUT REPORT:    Verbal report given to Stella Morrow RN(name) on Harrington Boast  being transferred to [facility](unit) for routine progression of care       Report consisted of patients Situation, Background, Assessment and   Recommendations(SBAR). Information from the following report(s) SBAR was reviewed with the receiving nurse. Lines:   Peripheral IV 05/14/17 Left Antecubital (Active)   Site Assessment Clean, dry, & intact 5/14/2017 12:04 PM   Phlebitis Assessment 0 5/14/2017 12:04 PM   Infiltration Assessment 0 5/14/2017 12:04 PM   Dressing Status Clean, dry, & intact 5/14/2017 12:04 PM   Dressing Type Transparent 5/14/2017 12:04 PM   Hub Color/Line Status Pink;Flushed;Patent 5/14/2017 12:04 PM   Action Taken Blood drawn 5/14/2017 12:04 PM        Opportunity for questions and clarification was provided.       Patient transported with:   Monitor   RN

## 2017-05-15 NOTE — PROGRESS NOTES
Bedside shift change report given to Tobin Islas (oncoming nurse) by Chidi Lepe (offgoing nurse). Report included the following information SBAR, Kardex, Procedure Summary, Intake/Output, MAR and Recent Results.

## 2017-05-15 NOTE — PROGRESS NOTES
TRANSFER - IN REPORT:    Verbal report received from Ariane Kim (name) on Mckayla Hastings  being received from ED (unit) for routine progression of care      Report consisted of patients Situation, Background, Assessment and   Recommendations(SBAR). Information from the following report(s) SBAR, Kardex, Procedure Summary, Intake/Output, MAR and Recent Results was reviewed with the receiving nurse. Opportunity for questions and clarification was provided. Assessment completed upon patients arrival to unit and care assumed.

## 2017-05-15 NOTE — H&P
[address] [facility] [place] [address]   HISTORY AND PHYSICAL       Name:  Navneet Daniels   MR#:  [id number]   :  1950   Account #:  [de-identified]        Date of Adm:  2017       PRIMARY CARE PHYSICIAN: Graciela Crockett DO.      SOURCE OF INFORMATION: The patient. CHIEF COMPLAINT: Cough. HISTORY OF PRESENT ILLNESS: This is a 21-year-old man with a   past medical history significant for hypertension, hypothyroidism, who   was in his usual state of health until about 2 months ago, and patient   developed a cough. The cough is nonproductive, sometime associated   with chest tightness. He was seen [facility] at the onset of these   symptoms. The patient was diagnosed with bronchitis. He was   prescribed antibiotics. The patient completed the course of the   antibiotics without any significant improvement in his cough. The   patient also admitted to intermittent fever, went back to the [facility] today because of worsening symptoms. Chest x-ray was done   during this visit, which shows evidence of neoplasm. The patient was   sent to the emergency room at [facility] for further   evaluation. When the patient arrived at the emergency room, a CT   scan of the chest was obtained. The CT scan shows right pleural   effusion as well as a lung mass and evidence of metastatic disease. The patient was subsequently referred to the hospitalist service for   evaluation for admission. PAST MEDICAL HISTORY: Hypertension, hypothyroidism. ALLERGIES: NO KNOWN DRUG ALLERGIES. MEDICATIONS   1. Synthroid 25 mcg daily. 2. Losartan 50 mg daily. FAMILY HISTORY: This was reviewed. His mother had leukemia. His   father had a stroke, and his brother had bladder cancer. PAST SURGICAL HISTORY: Not significant. SOCIAL HISTORY: No history of tobacco abuse. The patient admits to   social consumption of alcohol.       REVIEW OF SYSTEMS   HEAD, EYES, EARS, NOSE AND THROAT: No headache, no   dizziness, no blurring of vision, no photophobia. RESPIRATORY: This is positive for cough and shortness of breath. No   hemoptysis. CARDIOVASCULAR: No chest pain, no orthopnea, no palpitation. GASTROINTESTINAL: No nausea and vomiting. No diarrhea, no   constipation. GENITOURINARY: No dysuria, no urgency, and no frequency. All other systems are reviewed risks and they are negative. PHYSICAL EXAMINATION   GENERAL APPEARANCE: The patient appeared ill, in moderate   distress. VITAL SIGNS: On arrival at the emergency room, temperature 97.5,   pulse 70, respiratory rate 19, blood pressure 185-114, oxygen   saturation 97% on room air. HEAD: Normocephalic, atraumatic. EYES: Normal eye movements. No redness, no drainage, no   discharge. EARS: Normal external ears with the obvious drainage. NOSE: No deformity and no drainage. MOUTH AND THROAT: No visible oral lesions. NECK: Supple. No JVD, no thyromegaly. CHEST: Reduced breath sounds, right lung field. Clear breath sounds,   left lung field. HEART: Normal S1 and S2, regular. No clinically appreciable murmur. ABDOMEN: Soft, nontender, normal bowel sounds. CENTRAL NERVOUS SYSTEM: Alert, oriented x3. No gross focal   neurological deficits. EXTREMITIES: No edema. Pulses 2+ bilaterally. MUSCULOSKELETAL: No obvious joint deformity or swelling. SKIN: No active skin lesions seen in the exposed parts of the body. PSYCHIATRIC: Normal mood and affect. LYMPHATIC: No cervical lymphadenopathy. DIAGNOSTIC DATA: CT scan of the chest shows new large right   pleural effusion with right middle lobe and right lower lobe collapse,   mass in the right lower lobe, new right posterior second rib lytic lesion   and new right hepatic hypodensity, compatible with metastatic disease.     LABORATORY DATA: Chemistry: Sodium 139, potassium 3.7,   chloride 101, CO2 of 33, glucose 86, BUN 18, creatinine 0.82, calcium   9.7, bilirubin total 0.9, ALT 30, AST 27, alkaline phosphatase 125, total   protein 8.3, albumin level 4.0. Hematology not available. ASSESSMENT   1. New right pleural effusion. 2. Lung mass. 3. Hypertension. 4. Hypothyroidism. PLAN   1. Right pleural effusion: Will admit the patient for further evaluation   and treatment. This could be a malignant pleural effusion. Pulmonary   consult will be requested to assist in evaluation and treatment of   pleural effusion. 2. Lung mass: This is worrisome for malignancy. We will await further   recommendation from the pulmonologist.   3. Hypertension: We will resume home medications and monitor the   patient's blood pressure closely. 4. Hypothyroidism: We will continue with Synthroid. We will check a   TSH level. OTHER ISSUES   1. CODE STATUS: The patient is a FULL CODE. 2. We will place the patient on heparin for DVT prophylaxis.         MD JL Walsh / VADIM   D:  05/14/2017   18:06   T:  05/14/2017   22:04   Job #:  [id number]

## 2017-05-15 NOTE — CONSULTS
[facility]  Pulmonary, Critical Care, and Sleep Medicine    Initial Patient Consult    Name: Bob Ridley MRN: [id number]   : 1950 Hospital: [place] [address]   Date: 5/15/2017        IMPRESSION:   · RLL mass and large right effusion with right rib lytic lesion- worrisome for maligancy  · Cough  · Hypothyroid  · HTN      RECOMMENDATIONS:   · Start with right thoracentesis  · Get PET  · Oncology consult  · SUP  · DVT proph     Subjective: This patient has been seen and evaluated at the request of Dr. Constance Saenz for abnl CT chest. Patient is a 78 y.o. male   With h/o RLL mass dx'd  for which he underwent a navigational bronch and bx showed non malignant cells. Given suspicion for neoplasia despite negative biopsy he was referred to Dr. Anisha Khalil with thoracic surgery who he saw in 3/15 . It was reccommended he have surgical resection by Dr. Anisha Khalil. Pt had next CT chest  that showed unchanged size RLL mass but it showed right basilar pleural thickening. Pt [facility] to [facility] with cough and large right effusion and persistent 3.8 cm RLL mass and new right rib lytic lesion. P denies fever chills or sweats. No weight loss. Past Medical History:   Diagnosis Date    Hypertension       Past Surgical History:   Procedure Laterality Date    HX ORTHOPAEDIC      reconstruction of left little finger      Prior to Admission medications    Medication Sig Start Date End Date Taking? Authorizing Provider   cholecalciferol (VITAMIN D3) 1,000 unit tablet Take 1,000 Units by mouth daily. Yes Historical Provider   levothyroxine (SYNTHROID) 25 mcg tablet TAKE 1 TABLET BY MOUTH EVERY DAY BEFORE BREAKFAST 10/31/16  Yes Declan Kinney DO   losartan (COZAAR) 50 mg tablet Take 1 Tab by mouth daily.  10/31/16  Yes Becky Hanley, DO     No Known Allergies   Social History   Substance Use Topics    Smoking status: Never Smoker    Smokeless tobacco: Never Used    Alcohol use 6.0 oz/week     10 Glasses of wine per week      Comment: regularly      Family History   Problem Relation Age of Onset    Cancer Mother      leukemia    Stroke Father     Cancer Brother      bladder        Current Facility-Administered Medications   Medication Dose Route Frequency    losartan (COZAAR) tablet 50 mg  50 mg Oral DAILY    levothyroxine (SYNTHROID) tablet 25 mcg  25 mcg Oral ACB    0.9% sodium chloride infusion  75 mL/hr IntraVENous CONTINUOUS    docusate sodium (COLACE) capsule 100 mg  100 mg Oral BID    heparin (porcine) injection 5,000 Units  5,000 Units SubCUTAneous Q8H       Review of Systems:  A comprehensive review of systems was negative except for that written in the HPI. Objective:   Vital Signs:    Visit Vitals    /83    Pulse 67    Temp 98.3 °F (36.8 °C)    Resp 18    Ht 6' 2\" (1.88 m)    Wt 80.3 kg (177 lb 0.5 oz)    SpO2 94%    BMI 22.73 kg/m2       O2 Device: Room air       Temp (24hrs), Av.3 °F (36.8 °C), Min:98.1 °F (36.7 °C), Max:98.6 °F (37 °C)       Intake/Output:   Last shift:         Last 3 shifts:  1901 - 05/15 0700  In: 387.5 [I.V.:387.5]  Out: 600 [Urine:600]    Intake/Output Summary (Last 24 hours) at 05/15/17 1104  Last data filed at 05/15/17 0630   Gross per 24 hour   Intake            387.5 ml   Output              600 ml   Net           -212.5 ml      Physical Exam:   General:  Alert, cooperative, no distress, appears stated age. Head:  Normocephalic, without obvious abnormality, atraumatic. Eyes:  Conjunctivae/corneas clear. PERRL, EOMs intact. Nose: Nares normal. Septum midline. Mucosa normal. No drainage or sinus tenderness. Throat: Lips, mucosa, and tongue normal. Teeth and gums normal.   Neck: Supple, symmetrical, trachea midline,        Lungs:   Clear to auscultation bilaterally. Heart:  Regular rate and rhythm, S1, S2 normal, no murmur, click, rub or gallop. Abdomen:   Soft, non-tender. Bowel sounds normal. No masses,  No organomegaly. Extremities: Extremities normal, atraumatic, no cyanosis or edema. Pulses: 2+ and symmetric all extremities. Skin: Skin color, texture, turgor normal. No rashes or lesions             Data review:     Recent Results (from the past 24 hour(s))   METABOLIC PANEL, COMPREHENSIVE    Collection Time: 05/14/17 12:02 PM   Result Value Ref Range    Sodium 139 136 - 145 mmol/L    Potassium 3.7 3.5 - 5.1 mmol/L    Chloride 101 97 - 108 mmol/L    CO2 33 (H) 21 - 32 mmol/L    Anion gap 5 5 - 15 mmol/L    Glucose 86 65 - 100 mg/dL    BUN 18 6 - 20 MG/DL    Creatinine 0.82 0.70 - 1.30 MG/DL    BUN/Creatinine ratio 22 (H) 12 - 20      GFR est AA >60 >60 ml/min/1.73m2    GFR est non-AA >60 >60 ml/min/1.73m2    Calcium 9.7 8.5 - 10.1 MG/DL    Bilirubin, total 0.9 0.2 - 1.0 MG/DL    ALT (SGPT) 30 12 - 78 U/L    AST (SGOT) 27 15 - 37 U/L    Alk.  phosphatase 125 (H) 45 - 117 U/L    Protein, total 8.3 (H) 6.4 - 8.2 g/dL    Albumin 4.0 3.5 - 5.0 g/dL    Globulin 4.3 (H) 2.0 - 4.0 g/dL    A-G Ratio 0.9 (L) 1.1 - 2.2     EKG, 12 LEAD, INITIAL    Collection Time: 05/14/17  7:36 PM   Result Value Ref Range    Ventricular Rate 59 BPM    Atrial Rate 59 BPM    P-R Interval 148 ms    QRS Duration 96 ms    Q-T Interval 422 ms    QTC Calculation (Bezet) 417 ms    Calculated P Axis 51 degrees    Calculated R Axis 49 degrees    Calculated T Axis 51 degrees    Diagnosis       Sinus bradycardia  Voltage criteria for left ventricular hypertrophy  No previous ECGs available  Confirmed by Anita Mcneill M.D., [facility] ([id number]) on 5/15/2017 7:54:52 AM     METABOLIC PANEL, COMPREHENSIVE    Collection Time: 05/15/17  3:46 AM   Result Value Ref Range    Sodium 139 136 - 145 mmol/L    Potassium 3.8 3.5 - 5.1 mmol/L    Chloride 104 97 - 108 mmol/L    CO2 29 21 - 32 mmol/L    Anion gap 6 5 - 15 mmol/L    Glucose 88 65 - 100 mg/dL    BUN 14 6 - 20 MG/DL    Creatinine 0.73 0.70 - 1.30 MG/DL    BUN/Creatinine ratio 19 12 - 20      GFR est AA >60 >60 ml/min/1.73m2 GFR est non-AA >60 >60 ml/min/1.73m2    Calcium 8.8 8.5 - 10.1 MG/DL    Bilirubin, total 0.7 0.2 - 1.0 MG/DL    ALT (SGPT) 22 12 - 78 U/L    AST (SGOT) 17 15 - 37 U/L    Alk. phosphatase 99 45 - 117 U/L    Protein, total 6.3 (L) 6.4 - 8.2 g/dL    Albumin 2.9 (L) 3.5 - 5.0 g/dL    Globulin 3.4 2.0 - 4.0 g/dL    A-G Ratio 0.9 (L) 1.1 - 2.2     CBC WITH AUTOMATED DIFF    Collection Time: 05/15/17  3:46 AM   Result Value Ref Range    WBC 6.9 4.1 - 11.1 K/uL    RBC 3.97 (L) 4.10 - 5.70 M/uL    HGB 11.1 (L) 12.1 - 17.0 g/dL    HCT 34.7 (L) 36.6 - 50.3 %    MCV 87.4 80.0 - 99.0 FL    MCH 28.0 26.0 - 34.0 PG    MCHC 32.0 30.0 - 36.5 g/dL    RDW 13.8 11.5 - 14.5 %    PLATELET 999 773 - 022 K/uL    NEUTROPHILS 66 32 - 75 %    LYMPHOCYTES 23 12 - 49 %    MONOCYTES 7 5 - 13 %    EOSINOPHILS 4 0 - 7 %    BASOPHILS 0 0 - 1 %    ABS. NEUTROPHILS 4.6 1.8 - 8.0 K/UL    ABS. LYMPHOCYTES 1.6 0.8 - 3.5 K/UL    ABS. MONOCYTES 0.5 0.0 - 1.0 K/UL    ABS. EOSINOPHILS 0.3 0.0 - 0.4 K/UL    ABS.  BASOPHILS 0.0 0.0 - 0.1 K/UL   PHOSPHORUS    Collection Time: 05/15/17  3:46 AM   Result Value Ref Range    Phosphorus 2.8 2.6 - 4.7 MG/DL   MAGNESIUM    Collection Time: 05/15/17  3:46 AM   Result Value Ref Range    Magnesium 2.0 1.6 - 2.4 mg/dL   TSH 3RD GENERATION    Collection Time: 05/15/17  3:46 AM   Result Value Ref Range    TSH 4.07 (H) 0.36 - 3.74 uIU/mL       Imaging:  I have personally reviewed the patients radiographs and have reviewed the reports:  CT chest with large right effusion 3.8 cm RLL mass and new right rib lytic lesion        Bonilla Celeste MD

## 2017-05-15 NOTE — CONSULTS
Hematology/Oncology Consult    REASON FOR CONSULT: Stage IV NSCLC  REQUESTED BY: Dr. Delilah Noyola: Mr. Claude Correa is a 78 y.o. male who we are asked to see in consultation for probable new advanced lung cancer. Mr. Claude Correa noticed a new cough and fevers back in March of this year. He went to Urgent Care and received antibiotics and cough medication. He states this worked for a while, but he began to notice his cough return. This was intermittent. He had some tightness across his anterior chest which he related to the infection. Denies any hemoptysis. He returned to Urgent Care yesterday for what he presumed was another lung infection. They performed a chest x-ray and he was told to proceed to the Emergency Department. Denies any shortness of breath, AVENDANO, unintentional weight loss, fevers since March, or night sweats. Dr. Alvin Mcduffie with Pulmonology was consulted yesterday. Mr. Claude Correa had been under surveillance for right lower lobe mass that was initially noted in 2015. Bronch at that time was negative for malignancy. He was evaluated by Dr. Jordin Serrano with Thoracic Surgery in 2015 for possible surgical resection. CT chest in November of 2016 with mass size unchanged but some right basilar pleural thickening. CT chest obtained yesterday with new large right pleural effusion with right middle lobe and right lower lobe collapse. Irregular spiculated right lower lobe mass 3.8cm and stable precarinal enlarged lymph nodes noted. New right posterior second rib lytic lesion and new right hepatic hypodensity consistent with mets. Mr. Claude Correa with no personal history of cancer. He does state that he had a prostate biopsy in February of 2016, merle score 6 and was under PSA monitoring with Urologist. His mother passed away from Leukemia at age 80 and his younger brother was diagnosed with bladder cancer. He drinks alcohol moderately. He does not smoke cigarettes and denies any past history of smoking. He denies illicit drug use. He lives in [place] with his wife. They have two daughters who are no longer local and three grandchildren. Retired writer, worked in [employer] for about 18 months back in [id number]. Today: Underwent thoracentesis. Breathing is better and so is the chest pain. Has no other pain. Cough unchanged.      Past Medical History:   Diagnosis Date    Hypertension        Past Surgical History:   Procedure Laterality Date    HX ORTHOPAEDIC      reconstruction of left little finger       No Known Allergies    Current Facility-Administered Medications   Medication Dose Route Frequency Provider Last Rate Last Dose    hydrALAZINE (APRESOLINE) 20 mg/mL injection 10 mg  10 mg IntraVENous Q4H PRN Jose Escobar MD   10 mg at 05/15/17 0937    [START ON 5/16/2017] levothyroxine (SYNTHROID) tablet 37.5 mcg  37.5 mcg Oral ACB Jose Escobar MD        losartan (COZAAR) tablet 50 mg  50 mg Oral DAILY Marlon Chaidez MD   50 mg at 05/15/17 6686    0.9% sodium chloride infusion  75 mL/hr IntraVENous CONTINUOUS Marlon Chaidez MD 75 mL/hr at 05/15/17 1117 75 mL/hr at 05/15/17 1117    acetaminophen (TYLENOL) tablet 650 mg  650 mg Oral Q4H PRN Marlon Chaidez MD        HYDROcodone-acetaminophen (NORCO) 5-325 mg per tablet 1 Tab  1 Tab Oral Q4H PRN Marlon Chaidez MD        HYDROmorphone (PF) (DILAUDID) injection 1 mg  1 mg IntraVENous Q4H PRN Marlon Chaidez MD        ondansetron (ZOFRAN) injection 4 mg  4 mg IntraVENous Q4H PRN Marlon Chaidez MD        docusate sodium (COLACE) capsule 100 mg  100 mg Oral BID Salome Antunez MD        heparin (porcine) injection 5,000 Units  5,000 Units SubCUTAneous Q8H Salome Kauffman MD        albuterol-ipratropium (DUO-NEB) 2.5 MG-0.5 MG/3 ML  3 mL Nebulization Q4H PRN Marlon Chaidez MD           Social History     Social History    Marital status:      Spouse name: N/A    Number of children: N/A    Years of education: N/A     Social History Main Topics    Smoking status: Never Smoker    Smokeless tobacco: Never Used    Alcohol use 6.0 oz/week     10 Glasses of wine per week      Comment: regularly    Drug use: No    Sexual activity: Yes     Partners: Female     Other Topics Concern    None     Social History Narrative       Family History   Problem Relation Age of Onset    Cancer Mother      leukemia    Stroke Father     Cancer Brother      bladder       ROS  As per the HPI, otherwise a comprehensive ROS is negative. ECOG PS is 0. Emotional well being addressed and patient is coping appropriately to new diagnosis. Physical Examination:   Visit Vitals    BP (!) 170/92 (BP 1 Location: Right arm, BP Patient Position: At rest)    Pulse 72    Temp 98.4 °F (36.9 °C)    Resp 18    Ht 6' 2\" (1.88 m)    Wt 177 lb 0.5 oz (80.3 kg)    SpO2 96%    BMI 22.73 kg/m2     General appearance - alert, pleasant, conversant, no physical distress  Mental status - oriented to person, place, and time  Mouth - mucous membranes moist, pharynx normal without lesions  Neck - supple, no significant adenopathy  Lymphatics - no palpable lymphadenopathy, no hepatosplenomegaly  Chest - clear to auscultation, no wheezes, rales or rhonchi, symmetric air entry  Heart - normal rate, regular rhythm, normal S1, S2, no murmurs, rubs, clicks or gallops  Abdomen - soft, nontender, nondistended, no masses or organomegaly, bowel sounds present  Neurological - normal speech, no focal findings or movement disorder noted  Extremities - peripheral pulses normal, no pedal edema, no clubbing or cyanosis  Skin - normal coloration and turgor, no rashes, no suspicious skin lesions noted    LABS  Lab Results   Component Value Date/Time    WBC 6.9 05/15/2017 03:46 AM    HGB 11.1 05/15/2017 03:46 AM    HCT 34.7 05/15/2017 03:46 AM    PLATELET 022 49/09/8426 03:46 AM    MCV 87.4 05/15/2017 03:46 AM    ABS.  NEUTROPHILS 4.6 05/15/2017 03:46 AM     Lab Results   Component Value Date/Time    Sodium 139 05/15/2017 03:46 AM    Potassium 3.8 05/15/2017 03:46 AM    Chloride 104 05/15/2017 03:46 AM    CO2 29 05/15/2017 03:46 AM    Glucose 88 05/15/2017 03:46 AM    BUN 14 05/15/2017 03:46 AM    Creatinine 0.73 05/15/2017 03:46 AM    GFR est AA >60 05/15/2017 03:46 AM    GFR est non-AA >60 05/15/2017 03:46 AM    Calcium 8.8 05/15/2017 03:46 AM     Lab Results   Component Value Date/Time    AST (SGOT) 17 05/15/2017 03:46 AM    Alk. phosphatase 99 05/15/2017 03:46 AM    Protein, total 6.3 05/15/2017 03:46 AM    Albumin 2.9 05/15/2017 03:46 AM    Globulin 3.4 05/15/2017 03:46 AM    A-G Ratio 0.9 05/15/2017 03:46 AM       PATHOLOGY Pleural fluid  Adenocarcinoma      IMAGING    PET 5/16/17 pending    CT Chest 5/24/17  IMPRESSION:  New large right pleural effusion with right middle lobe and right lower lobe  collapse  Persists an irregular spiculated right lower lobe 3.8 cm mass and stable  precarinal enlarged lymph nodes node  New right posterior second rib lytic lesion and new right hepatic hypodensity,  compatible with metastatic disease. Incidental mild left atrial enlargement    ASSESSMENT  Mr. Gillian Mondragon is a 78 y.o. male with history of a known right lower lobe lesion under surveillance who presented to the Emergency Department with persistent cough. CT revealed 3.8 cm spiculated mass in right lower lobe with new posterior second rib lytic lesion , new right hepatic hypodensity and a large pleural effusion. Thoracentesis with cytology positive for Adenocarcinoma. DISCUSSION/PLAN  1. Stage IV NSCLC with pleural effusion and osseous metastasis. :   Pathology from pleural fluid is positive. PET scan today. Discussed that this is stage IV NSCLC, unfortunately incurable. Palliative treatments may prolong survival and improve QOL. We would need to obtain additional tissue for molecular testing (EGFR, ALK, ROS-1, PD-L1).  Unless PET CT reveals another , easier site amenable to biopsy would likely obtain a CT guided biopsy of the R lung mass. If molecular tests negative, would discuss Carbo+ Pemetrexed + Pembrolizumab which is now approved for first line palliative management of NSCLC regardless of PD-L1 expression. 2. Anemia. Mild. Continue to monitor with CBC. 3. Cough. Stable. 4. Hypothyroidism. Continue synthroid. Appreciate consultation and care of Mr. Kristie Marrero. Please call with any questions. Seen in conjunction with Gary Cross NP. Pathology reviewed. Stage IV NSCLC. Will need additional tissue and MRI brain. Await PET CT to decide on the appropriate site to biopsy. Will arrange outpatient.  He will see me in clinic on 5/18/17 at 1.30 pm          Signed by: Keith Romo MD                     May 16, 2017

## 2017-05-16 ENCOUNTER — APPOINTMENT (OUTPATIENT)
Dept: PET IMAGING | Age: 67
DRG: 181 | End: 2017-05-16
Attending: INTERNAL MEDICINE
Payer: MEDICARE

## 2017-05-16 VITALS
DIASTOLIC BLOOD PRESSURE: 99 MMHG | OXYGEN SATURATION: 97 % | HEIGHT: 74 IN | BODY MASS INDEX: 21.98 KG/M2 | TEMPERATURE: 98.5 F | HEART RATE: 65 BPM | RESPIRATION RATE: 18 BRPM | SYSTOLIC BLOOD PRESSURE: 183 MMHG | WEIGHT: 171.3 LBS

## 2017-05-16 LAB
APPEARANCE FLD: ABNORMAL
COLOR FLD: ABNORMAL
EOSINOPHIL # FLD: 5 %
GLUCOSE BLD STRIP.AUTO-MCNC: 96 MG/DL (ref 65–100)
LYMPHOCYTES NFR FLD: 79 %
MONOS+MACROS NFR FLD: 7 %
NUC CELL # FLD: 941 /CU MM (ref 0–5)
OTHER CELL,FOTHC: 9 %
PATH REV BLD -IMP: ABNORMAL
RBC # FLD: >100 /CU MM
SERVICE CMNT-IMP: NORMAL
SPECIMEN SOURCE FLD: ABNORMAL

## 2017-05-16 PROCEDURE — 74011250637 HC RX REV CODE- 250/637: Performed by: INTERNAL MEDICINE

## 2017-05-16 PROCEDURE — 82962 GLUCOSE BLOOD TEST: CPT

## 2017-05-16 PROCEDURE — 74011250637 HC RX REV CODE- 250/637: Performed by: FAMILY MEDICINE

## 2017-05-16 PROCEDURE — 78815 PET IMAGE W/CT SKULL-THIGH: CPT

## 2017-05-16 RX ORDER — LEVOTHYROXINE SODIUM 75 UG/1
37.5 TABLET ORAL
Qty: 30 TAB | Refills: 1 | Status: SHIPPED | OUTPATIENT
Start: 2017-05-16 | End: 2017-09-28 | Stop reason: SDUPTHER

## 2017-05-16 RX ORDER — HYDROCODONE BITARTRATE AND ACETAMINOPHEN 5; 325 MG/1; MG/1
1 TABLET ORAL
Qty: 10 TAB | Refills: 0 | Status: SHIPPED | OUTPATIENT
Start: 2017-05-16 | End: 2017-11-17 | Stop reason: ALTCHOICE

## 2017-05-16 RX ORDER — SODIUM CHLORIDE 0.9 % (FLUSH) 0.9 %
10 SYRINGE (ML) INJECTION
Status: COMPLETED | OUTPATIENT
Start: 2017-05-16 | End: 2017-05-16

## 2017-05-16 RX ADMIN — LOSARTAN POTASSIUM 50 MG: 50 TABLET ORAL at 08:53

## 2017-05-16 RX ADMIN — LEVOTHYROXINE SODIUM 37.5 MCG: 75 TABLET ORAL at 06:28

## 2017-05-16 RX ADMIN — Medication 10 ML: at 13:05

## 2017-05-16 RX ADMIN — LEVOTHYROXINE SODIUM 37.5 MCG: 75 TABLET ORAL at 07:30

## 2017-05-16 NOTE — ROUTINE PROCESS
Bedside shift change report given to Mario Alberto Marcano RN (oncoming nurse) by Kike Mclain RN (offgoing nurse). Report included the following information SBAR, Kardex, Intake/Output, MAR, Recent Results and Cardiac Rhythm NSR.        Diego Schwab, RN  7:10 AM

## 2017-05-16 NOTE — PROGRESS NOTES
Hospitalist Progress Note          Dr Nunu Whitney  Cell: [phone]  Please call  and page for questions. Call physician on-call through the  7pm-7am    Daily Progress Note: 5/16/2017    Primary care provider:Declan Wilburn DO    Date of admission: 5/14/2017 11:46 AM    Admission summery and hospital course:  79-year-old man with a past medical history significant for hypertension, hypothyroidism, who was in his usual state of health until about 2 months ago, and patient developed a cough. The cough is nonproductive, sometime associated with chest tightness. The CT scan done at the ED showed right pleural effusion as well as a lung mass and evidence of metastatic disease. Subjective:   Patient said she/he is feeling better today. Assessment/Plan:   Right lower lobe Lung mass with right pleural effusion with new lytic right rib lesion and new right hepatic density:   -likely malignancy with mets. -CT chest 5/15 New large right pleural effusion with right middle lobe and right lower lobe  Collapse. Persists an irregular spiculated right lower lobe 3.8 cm mass and stable. Precarinal enlarged lymph nodes node. New right posterior second rib lytic lesion and new right hepatic hypodensity, compatible with metastatic disease  -Appreciate Pulmonology/Oncology  -Diagnostic and therapeutic thoracentesis done 5/15, follow  -PET scan today, will consider discharging the patient today after it  -? MRI brain inpatient vs outpatient, can be done as an outpatient  -Spoke to Dr Nadege Lopez, the patient can be discharged with outpatient follow up    Hypertension: BP is elevated.   -On Losartan, will add HCTZ     Hypothyroidism:   -TSH slightly elevated, outpatient follow up  -Continue with Synthroid. Cardiac diet    Plan: Will talk to Oncology. ? Discharge after PET scan    See orders for other plans.    VTE prophylaxis: Heparin  Code status: Full  Discussed plan of care with Patient/Family and Nurse. Pre-admission lived at home. Discharge planning: pending. Review of Systems:     Review of Systems:  Symptom  Y/N  Comments   Symptom  Y/N  Comments    Fever/Chills  n    Chest Pain  n    Poor Appetite  n    Edema  n     Cough  y   Abdominal Pain  n     Sputum  y   Joint Pain      SOB/AVENDANO  n   Pruritis/Rash      Nausea/vomit  n   Tolerating PT/OT      Diarrhea     Tolerating Diet      Constipation     Other      Could not obtain due to:         Objective:   Physical Exam:     Visit Vitals    /89 (BP 1 Location: Right arm, BP Patient Position: Sitting)    Pulse 62    Temp 98.1 °F (36.7 °C)    Resp 18    Ht 6' 2\" (1.88 m)    Wt 77.7 kg (171 lb 4.8 oz)    SpO2 96%    BMI 21.99 kg/m2      O2 Device: Room air    Temp (24hrs), Av.6 °F (37 °C), Min:98.1 °F (36.7 °C), Max:99.2 °F (37.3 °C)         1901 -  0700  In: 960 [I.V.:960]  Out: 2100 [Urine:600]      General:  Alert, cooperative, no distress, appears stated age. Lungs:   Clear to auscultation bilaterally. Chest wall:  No tenderness or deformity. Heart:  Regular rate and rhythm, S1, S2 normal, no murmur, click, rub or gallop. Abdomen:   Soft, non-tender. Bowel sounds normal.    Extremities: Extremities normal, atraumatic, no cyanosis or edema. Skin: Skin color, texture, turgor normal. No rashes or lesions   Neurologic: CNII-XII intact. Data Review:       Recent Days:  Recent Labs      05/15/17   0346   WBC  6.9   HGB  11.1*   HCT  34.7*   PLT  204     Recent Labs      05/15/17   0346  17   1202   NA  139  139   K  3.8  3.7   CL  104  101   CO2  29  33*   GLU  88  86   BUN  14  18   CREA  0.73  0.82   CA  8.8  9.7   MG  2.0   --    PHOS  2.8   --    ALB  2.9*  4.0   SGOT  17  27   ALT  22  30     No results for input(s): PH, PCO2, PO2, HCO3, FIO2 in the last 72 hours.     24 Hour Results:  Recent Results (from the past 24 hour(s))   GLUCOSE, FLUID    Collection Time: 05/15/17 2:32 PM   Result Value Ref Range    Fluid Type: PLEURAL FLUID      Glucose, body fld. 80 MG/DL   PH, FLUID    Collection Time: 05/15/17  2:32 PM   Result Value Ref Range    FLUID TYPE(15) PLEURAL FLUID      FLUID PH 7.0     CULTURE, BODY FLUID W GRAM STAIN    Collection Time: 05/15/17  2:32 PM   Result Value Ref Range    Special Requests: NO SPECIAL REQUESTS      GRAM STAIN FEW  WBCS SEEN        GRAM STAIN NO ORGANISMS SEEN      Culture result: PENDING    CELL COUNT, BODY FLUID    Collection Time: 05/15/17  2:32 PM   Result Value Ref Range    BODY FLUID TYPE PLEURAL FLUID      FLUID COLOR BHUPENDRA      FLUID APPEARANCE CLOUDY      FLUID RBC COUNT >100 /cu mm    FLD NUCLEATED CELLS 941 (H) 0 - 5 /cu mm   LDH, BODY FLUID    Collection Time: 05/15/17  2:32 PM   Result Value Ref Range    Fluid Type: PLEURAL FLUID      LD, body fld. 311 U/L   PROTEIN TOTAL, FLUID    Collection Time: 05/15/17  2:32 PM   Result Value Ref Range    Fluid Type: PLEURAL FLUID      Protein total, body fld.  4.2 g/dL   GLUCOSE, POC    Collection Time: 05/15/17  5:34 PM   Result Value Ref Range    Glucose (POC) 178 (H) 65 - 100 mg/dL    Performed by Franklin Leone        Problem List:  Problem List as of 5/16/2017  Date Reviewed: 5/14/2017          Codes Class Noted - Resolved    * (Principal)Pleural effusion, right ICD-10-CM: J90  ICD-9-CM: 511.9  5/14/2017 - Present        Pleural effusion ICD-10-CM: J90  ICD-9-CM: 511.9  5/14/2017 - Present        [facility] ([facility] ICD-10-CM: C61  ICD-9-CM: 368  3/11/2016 - Present        Acquired hypothyroidism ICD-10-CM: E03.9  ICD-9-CM: 244.9  1/19/2016 - Present        Vitamin D deficiency ICD-10-CM: E55.9  ICD-9-CM: 268.9  1/19/2016 - Present        Elevated PSA ICD-10-CM: R97.20  ICD-9-CM: 790.93  1/19/2016 - Present        Lung nodules ICD-10-CM: R91.8  ICD-9-CM: 793.19  12/22/2015 - Present        Essential hypertension ICD-10-CM: I10  ICD-9-CM: 401.9  12/22/2015 - Present        Thoracic scoliosis ICD-10-CM: M41.9  ICD-9-CM: 737.30  12/22/2015 - Present        Lesion of right lung ICD-10-CM: R91.1  ICD-9-CM: 518.89  3/17/2015 - Present              Medications reviewed  Current Facility-Administered Medications   Medication Dose Route Frequency    hydrALAZINE (APRESOLINE) 20 mg/mL injection 10 mg  10 mg IntraVENous Q4H PRN    levothyroxine (SYNTHROID) tablet 37.5 mcg  37.5 mcg Oral ACB    losartan (COZAAR) tablet 50 mg  50 mg Oral DAILY    0.9% sodium chloride infusion  75 mL/hr IntraVENous CONTINUOUS    acetaminophen (TYLENOL) tablet 650 mg  650 mg Oral Q4H PRN    HYDROcodone-acetaminophen (NORCO) 5-325 mg per tablet 1 Tab  1 Tab Oral Q4H PRN    HYDROmorphone (PF) (DILAUDID) injection 1 mg  1 mg IntraVENous Q4H PRN    ondansetron (ZOFRAN) injection 4 mg  4 mg IntraVENous Q4H PRN    docusate sodium (COLACE) capsule 100 mg  100 mg Oral BID    heparin (porcine) injection 5,000 Units  5,000 Units SubCUTAneous Q8H    albuterol-ipratropium (DUO-NEB) 2.5 MG-0.5 MG/3 ML  3 mL Nebulization Q4H PRN       Care Plan discussed with: Patient/Family and Nurse    Total time spent with patient: 30 minutes.     Marivel Burton MD

## 2017-05-16 NOTE — DISCHARGE INSTRUCTIONS
Discharge Instructions       PATIENT ID: Marcy Delgado  MRN: [id number]   YOB: 1950    DATE OF ADMISSION: 5/14/2017 11:46 AM    DATE OF DISCHARGE: 5/16/2017    PRIMARY CARE PROVIDER: Graciela Crockett DO     ATTENDING PHYSICIAN: Christy Gutierrez MD  DISCHARGING PROVIDER: Christy Gutierrez MD    To contact this individual call [phone] and ask the  to page. If unavailable ask to be transferred the Adult Hospitalist Department. DISCHARGE DIAGNOSES   Lung mass  Pleural effusion    CONSULTATIONS: IP CONSULT TO HOSPITALIST  IP CONSULT TO PULMONOLOGY  IP CONSULT TO ONCOLOGY    PROCEDURES/SURGERIES: * No surgery found *    PENDING TEST RESULTS:   At the time of discharge the following test results are still pending: pathology results    FOLLOW UP APPOINTMENTS:   Follow-up Information     Follow up With Details Comments Contact Info    Graciela Crockett DO In 1 week  [address] [facility]  [address] [facility] [address]      Ahsan Chaney MD In 3 days  [address] [facility]  [phone]             ADDITIONAL CARE RECOMMENDATIONS:   Follow up with Dr Thu Muller in 3-4 days to discuss the pathology results    DIET: Regular Diet    ACTIVITY: Activity as tolerated      DISCHARGE MEDICATIONS:   See Medication Reconciliation Form    · It is important that you take the medication exactly as they are prescribed. · Keep your medication in the bottles provided by the pharmacist and keep a list of the medication names, dosages, and times to be taken in your wallet. · Do not take other medications without consulting your doctor. NOTIFY YOUR PHYSICIAN FOR ANY OF THE FOLLOWING:   Fever over 101 degrees for 24 hours. Chest pain, shortness of breath, fever, chills, nausea, vomiting, diarrhea, change in mentation, falling, weakness, bleeding. Severe pain or pain not relieved by medications. Or, any other signs or symptoms that you may have questions about.       DISPOSITION:  x  Home With:   OT  PT    RN       SNF/Inpatient Rehab/LTAC    Independent/assisted living    Hospice    Other:     CDMP Checked:   Yes x     PROBLEM LIST Updated:  Yes x       Signed:   Chris Lesches, MD  5/16/2017  12:16 PM

## 2017-05-16 NOTE — PROGRESS NOTES
[facility] PROGRESS NOTE  Pulmonary, Critical Care, and Sleep Medicine    Name: Bob Ridley MRN: [id number]   : 1950 Hospital: [facility] [place]    Date: 2017  Admission Date: 2017     Chart and notes reviewed. Data reviewed. I review the patient's current medications in the medical record at each encounter. I have evaluated and examined the patient. Overnight events reviewed: Tolerated thoracentesis 5/15/17. Sats stable 96% on room air. ROS: Reports minimal chest tightness relieved after thoracentesis. Denies dyspnea, shortness of breath. Has minimal dry cough. Vital Signs:  Visit Vitals    BP (!) 175/94 (BP 1 Location: Right arm, BP Patient Position: At rest)    Pulse 60    Temp 97.6 °F (36.4 °C)    Resp (!) 1    Ht 6' 2\" (1.88 m)    Wt 77.7 kg (171 lb 4.8 oz)    SpO2 97%    BMI 21.99 kg/m2       O2 Device: Room air       Temp (24hrs), Av.5 °F (36.9 °C), Min:97.6 °F (36.4 °C), Max:99.2 °F (37.3 °C)       Intake/Output:   Last shift:         Last 3 shifts:  1901 -  0700  In: 960 [I.V.:960]  Out: 2100 [Urine:600]    Intake/Output Summary (Last 24 hours) at 17 1218  Last data filed at 05/15/17 1452   Gross per 24 hour   Intake                0 ml   Output             1500 ml   Net            -1500 ml        Telemetry:     Physical Exam:   General:  Alert, cooperative, no distress, appears stated age. Head:  Normocephalic, without obvious abnormality, atraumatic. Eyes:  Conjunctivae/corneas clear. Nose: Nares normal. Septum midline. Mucosa normal.   Throat: Lips, mucosa, and tongue normal.    Neck: Supple, symmetrical, trachea midline, no adenopathy. Lungs:   Clear to auscultation bilaterally. Chest wall:  No tenderness or deformity. Heart:  Regular rate and rhythm, S1, S2 normal, no murmur, click, rub or gallop. Abdomen:   Soft, non-tender. Bowel sounds normal. No masses,  No organomegaly.    Extremities: Extremities normal, atraumatic, no cyanosis or edema. Pulses: 2+ and symmetric all extremities. Skin: Skin color, texture, turgor normal. No rashes or lesions   Lymph nodes: Cervical, supraclavicular nodes normal.   Neurologic: Grossly nonfocal     DATA:  MAR reviewed and pertinent medications noted or modified as needed    Labs:  Recent Labs      05/15/17   0346   WBC  6.9   HGB  11.1*   HCT  34.7*   PLT  204     Recent Labs      05/15/17   0346  05/14/17   1202   NA  139  139   K  3.8  3.7   CL  104  101   CO2  29  33*   GLU  88  86   BUN  14  18   CREA  0.73  0.82   CA  8.8  9.7   MG  2.0   --    PHOS  2.8   --    ALB  2.9*  4.0   TBILI  0.7  0.9   SGOT  17  27   ALT  22  30       Imaging:  I have personally reviewed the patients radiographs and reports. IMPRESSION:   · RLL mass and large right effusion with right rib lytic lesion- worrisome for maligancy  · Status post thoracentesis 5/15/17, exudative per criteria  · Cough  · Hypothyroid  · HTN      PLAN:   · Follow up pleural studies  · Follow up PET scan results, scheduled for 1400  · Oncology consult noted  · SUP  · DVT ppx         Gadiel Chow, NP     Dr. Roosevelt Huynh available for consultation.

## 2017-05-16 NOTE — PROGRESS NOTES
Patient given discharge instructions. Verbalized understanding. IV and telemetry discontinued. All belongings gathered by patient and sent home. Patient refused to be taken to front entrance in wheelchair. Ambulatory and without problems at this time.

## 2017-05-16 NOTE — PROGRESS NOTES
CM reviewed chart. Patient was admitted with right pleural effusion as well as a lung mass and evidence of metastatic disease. He has a past medical history significant for hypertension, hypothyroidism. Met with patient, explained role and discussed discharge planning. Patient is independent without any assistive devices. He lives with his wife in their private residence. Patient saw his PCP in October 2016. His wife will provide transportation home and he did not voice any discharge needs. Patient will be discharged home in care of his wife. CM will continue to follow for any discharge needs. Quinten Joyner MSA, RN, CRM. Care Management Interventions  PCP Verified by CM: Yes (Dr. Josep Larry)  Mode of Transport at Discharge:  Other (see comment)  Transition of Care Consult (CM Consult): Discharge Planning  MyChart Signup: No  Discharge Durable Medical Equipment: No  Health Maintenance Reviewed: Yes  Physical Therapy Consult: No  Occupational Therapy Consult: No  Speech Therapy Consult: No  Current Support Network: Lives with Spouse  Confirm Follow Up Transport: Family  Plan discussed with Pt/Family/Caregiver: Yes  Discharge Location  Discharge Placement: Home with family assistance

## 2017-05-16 NOTE — PROGRESS NOTES
TELEMETRY ORDERS  TODAY-PLEASE ADVISE.  WE NEED TELEMETRY SURGICAL BEDS FOR 3 SURGERIES TODAY ON [facility]

## 2017-05-16 NOTE — DISCHARGE SUMMARY
Discharge Summary       PATIENT ID: Harrington Boast  MRN: [id number]   YOB: 1950    DATE OF ADMISSION: 5/14/2017 11:46 AM    DATE OF DISCHARGE: 5/16/2017   PRIMARY CARE PROVIDER: Burnadette Lennox, DO     ATTENDING PHYSICIAN: Dr Stuart Salas  DISCHARGING PROVIDER: Stuart Salas MD    To contact this individual call [phone] and ask the  to page. If unavailable ask to be transferred the Adult Hospitalist Department. CONSULTATIONS: IP CONSULT TO HOSPITALIST  IP CONSULT TO PULMONOLOGY  IP CONSULT TO ONCOLOGY    PROCEDURES/SURGERIES: * No surgery found *    ADMITTING DIAGNOSES & HOSPITAL COURSE:   Right lower lobe Lung mass with right pleural effusion with new lytic right rib lesion and new right hepatic density:   -likely malignancy with mets. -CT chest 5/15 New large right pleural effusion with right middle lobe and right lower lobe  Collapse. Persists an irregular spiculated right lower lobe 3.8 cm mass and stable. Precarinal enlarged lymph nodes node. New right posterior second rib lytic lesion and new right hepatic hypodensity, compatible with metastatic disease  -Appreciate Pulmonology/Oncology  -Diagnostic and therapeutic thoracentesis done 5/15, follow  -PET scan today, will consider discharging the patient today after it  -Spoke to Dr Sam Campos, the patient can be discharged with outpatient follow up  -Spoke to Oncology NP, ok to discharge after PET scan    Hypertension: BP is elevated.   -On Losartan, will add HCTZ     Hypothyroidism:   -TSH slightly elevated, outpatient follow up  -Continue with Synthroid. Cardiac diet        DISCHARGE DIAGNOSES / PLAN:      1. Right lower lobe lung mass  2.  Pleural effusion       PENDING TEST RESULTS:   At the time of discharge the following test results are still pending: Pathology results    FOLLOW UP APPOINTMENTS:    Follow-up Information     Follow up With Details Comments Contact Info    Burnadette Lennox, DO In 1 week  [address] [facility] [place]      Keith Romo MD In 3 days  [address] [place] [address]  [phone]             ADDITIONAL CARE RECOMMENDATIONS:   Follow up with Oncology in 3-4 days    DIET: Regular Diet    ACTIVITY: Activity as tolerated      DISCHARGE MEDICATIONS:  Current Discharge Medication List      START taking these medications    Details   HYDROcodone-acetaminophen (NORCO) 5-325 mg per tablet Take 1 Tab by mouth every four (4) hours as needed. Max Daily Amount: 6 Tabs. Qty: 10 Tab, Refills: 0         CONTINUE these medications which have CHANGED    Details   levothyroxine (SYNTHROID) 75 mcg tablet Take 0.5 Tabs by mouth Daily (before breakfast). Qty: 30 Tab, Refills: 1         CONTINUE these medications which have NOT CHANGED    Details   cholecalciferol (VITAMIN D3) 1,000 unit tablet Take 1,000 Units by mouth daily. losartan (COZAAR) 50 mg tablet Take 1 Tab by mouth daily. Qty: 30 Tab, Refills: 5               NOTIFY YOUR PHYSICIAN FOR ANY OF THE FOLLOWING:   Fever over 101 degrees for 24 hours. Chest pain, shortness of breath, fever, chills, nausea, vomiting, diarrhea, change in mentation, falling, weakness, bleeding. Severe pain or pain not relieved by medications. Or, any other signs or symptoms that you may have questions about.     DISPOSITION:   xx Home With:   OT  PT  HH  RN       Long term SNF/Inpatient Rehab    Independent/assisted living    Hospice    Other:       PATIENT CONDITION AT DISCHARGE:     Functional status    Poor     Deconditioned    x Independent      Cognition   x  Lucid     Forgetful     Dementia      Catheters/lines (plus indication)    Grijalva     PICC     PEG    x None      Code status    x Full code     DNR      PHYSICAL EXAMINATION AT DISCHARGE:  Please see progress note      CHRONIC MEDICAL DIAGNOSES:  Problem List as of 5/16/2017  Date Reviewed: 5/14/2017          Codes Class Noted - Resolved    * (Principal)Pleural effusion, right ICD-10-CM: J90  ICD-9-CM: 511.9  5/14/2017 - Present        Pleural effusion ICD-10-CM: J90  ICD-9-CM: 511.9  5/14/2017 - Present        Prostate cancer ([address] ICD-10-CM: C61  ICD-9-CM: 820  3/11/2016 - Present        Acquired hypothyroidism ICD-10-CM: E03.9  ICD-9-CM: 244.9  1/19/2016 - Present        Vitamin D deficiency ICD-10-CM: E55.9  ICD-9-CM: 268.9  1/19/2016 - Present        Elevated PSA ICD-10-CM: R97.20  ICD-9-CM: 790.93  1/19/2016 - Present        Lung nodules ICD-10-CM: R91.8  ICD-9-CM: 793.19  12/22/2015 - Present        Essential hypertension ICD-10-CM: I10  ICD-9-CM: 401.9  12/22/2015 - Present        Thoracic scoliosis ICD-10-CM: M41.9  ICD-9-CM: 737.30  12/22/2015 - Present        Lesion of right lung ICD-10-CM: R91.1  ICD-9-CM: 518.89  3/17/2015 - Present              Greater than 34 minutes were spent with the patient on counseling and coordination of care    Signed:   Tommy Eldridge MD  5/16/2017  12:17 PM

## 2017-05-17 ENCOUNTER — PATIENT OUTREACH (OUTPATIENT)
Dept: INTERNAL MEDICINE CLINIC | Age: 67
End: 2017-05-17

## 2017-05-17 NOTE — PROGRESS NOTES
Spiritual Care Partner Volunteer visited patient in [address] [place] [address] on 5/16/17. Documented by:  Rev. Deepali Chino M.Div, [facility]

## 2017-05-17 NOTE — PROGRESS NOTES
Patient noted on the [facility] report discharged from [facility] 5/14-5/16/2017 dx pleural effusion. Per patient's EMR patient past medical history includes HTN and hypothyroidism. Outgoing call placed spoke with patient identified utilizing 2 identifiers. Introduced self, role of ambulatory navigators and reason for the call. Patient verbalizes understanding. Patient reports he is feeling okay denies any pain of SOB. Medication reconciliation reviewed patient states he will fill his prescription for hydrocodone. Patient verbalizes understanding discharge instructions and red flags. Patient confirms he will be attending appointments with Dr. Ruba Lorenzo (oncology) new patient on 5/18/2017 and Anita Arredondo on 5/19/2017. Red flags-Fever over 101 degrees for 24 hours. Chest pain, shortness of breath, fever, chills, nausea, vomiting, diarrhea, change in mentation, falling, weakness, bleeding. Severe pain or pain not relieved by medications. Or, any other signs or symptoms that you may have questions about. RRAT score-15 (moderate)     ACP- not addressed at this encounter. Code status Full Code. Goals      Attends follow-up appointments as directed.  Prevent complications post hospitalization. Plan meet patient at office visit with PCP.  Will notify oncology nurse navigator if available

## 2017-05-17 NOTE — Clinical Note
Mr Vanessa Underwood was discharged from [facility] 5/14-5/16/2017 dx pleural effusion. Per discharge instructions patient will follow up with Dr. Ariana Garcia oncologist as new patient on Thursday 5/18/17. Patient can be a ROSA MARIA with you at Friday appointment.

## 2017-05-18 ENCOUNTER — PATIENT OUTREACH (OUTPATIENT)
Dept: ONCOLOGY | Age: 67
End: 2017-05-18

## 2017-05-18 ENCOUNTER — OFFICE VISIT (OUTPATIENT)
Dept: ONCOLOGY | Age: 67
End: 2017-05-18

## 2017-05-18 VITALS
HEART RATE: 77 BPM | DIASTOLIC BLOOD PRESSURE: 83 MMHG | WEIGHT: 172.2 LBS | RESPIRATION RATE: 20 BRPM | HEIGHT: 74 IN | TEMPERATURE: 98.4 F | SYSTOLIC BLOOD PRESSURE: 154 MMHG | OXYGEN SATURATION: 97 % | BODY MASS INDEX: 22.1 KG/M2

## 2017-05-18 DIAGNOSIS — C34.90 NON-SMALL CELL LUNG CANCER (NSCLC) (HCC): ICD-10-CM

## 2017-05-18 DIAGNOSIS — J90 PLEURAL EFFUSION, RIGHT: ICD-10-CM

## 2017-05-18 DIAGNOSIS — C34.91 MALIGNANT NEOPLASM OF RIGHT LUNG, UNSPECIFIED PART OF LUNG (HCC): Primary | ICD-10-CM

## 2017-05-18 DIAGNOSIS — C79.51 BONE METASTASES (HCC): ICD-10-CM

## 2017-05-18 RX ORDER — LEVOTHYROXINE SODIUM 25 UG/1
TABLET ORAL
Refills: 5 | COMMUNITY
Start: 2017-05-08 | End: 2017-09-28 | Stop reason: SDUPTHER

## 2017-05-18 RX ORDER — AZITHROMYCIN 250 MG/1
TABLET, FILM COATED ORAL
COMMUNITY
Start: 2017-03-12 | End: 2017-05-18 | Stop reason: ALTCHOICE

## 2017-05-18 NOTE — PROGRESS NOTES
[facility]  Medical Oncology at [facility]   Nurse Navigator Note      Met with patient for short time after OV with Dr. Giovani Oconnor. Patient is at his appointment alone. He is ---provider shared patient's wife is non-English speaking. Patient reports he is not working. He has 2 adult children who do no live in the area. NN introduced self and role. Contact information provided. New patient folder reviewed and short discussion surrounding supportive therapies available through [facility]. Patient is quiet and reserved during conversation. NN will follow up with patient at next appointment. Patient has a vacation scheduled 5/28/17-6/3/17. He will need an MRI of the brain (complete staging) and a CT guided biopsy to obtain more tissue for molecular testing. Once testing and biopsy resulted he return to discuss treatment options. AVS reviewed. MyChart uses discussed and encouraged activation. Patient provided orders and contact information for . Patient verbalized understanding and agrees to contact us with questions or concerns.

## 2017-05-18 NOTE — MR AVS SNAPSHOT
Visit Information Date & Time Provider Department Dept. Phone Encounter #  
 5/18/2017  1:30 PM Davide Malave  [facility] Oncology at [facility] [phone] [id number] Your Appointments 5/19/2017  9:00 AM  
ROUTINE CARE with Nimo Calles DO [facility] Internal Medicine ([address]) Appt Note: follow up [address] [facility] [address]  
[phone]  
  
   
 [address] Upcoming Health Maintenance Date Due Hepatitis C Screening 1950 DTaP/Tdap/Td series (1 - Tdap) 12/2/1970 ZOSTER VACCINE AGE 60> 12/2/2009 GLAUCOMA SCREENING Q2Y 12/2/2014 Pneumococcal 65+ High/Highest Risk (1 of 2 - PCV13) 12/2/2014 MEDICARE YEARLY EXAM 1/19/2017 INFLUENZA AGE 9 TO ADULT 8/1/2017 FOBT Q 1 YEAR AGE 50-75 10/31/2017 Allergies as of 5/18/2017  Review Complete On: 5/18/2017 By: Ernie Murphy No Known Allergies Current Immunizations  Reviewed on 10/31/2016 Name Date Influenza High Dose Vaccine PF 10/31/2016 Not reviewed this visit You Were Diagnosed With   
  
 Codes Comments Malignant neoplasm of right lung, unspecified part of lung ([facility] [address]    -  Primary ICD-10-CM: C34.91 
ICD-9-CM: 162.9 Vitals BP Pulse Temp Resp Height(growth percentile) Weight(growth percentile) 154/83 77 98.4 °F (36.9 °C) 20 6' 2\" (1.88 m) 172 lb 3.2 oz (78.1 kg) SpO2 BMI Smoking Status 97% 22.11 kg/m2 Never Smoker Vitals History BMI and BSA Data Body Mass Index Body Surface Area  
 22.11 kg/m 2 2.02 m 2 Preferred Pharmacy Pharmacy Name Phone [address] [place] [address] AT [facility]. [phone] Your Updated Medication List  
  
   
This list is accurate as of: 5/18/17  2:48 PM.  Always use your most recent med list.  
  
  
  
  
 cholecalciferol 1,000 unit tablet Commonly known as:  VITAMIN D3 Take 1,000 Units by mouth daily. HYDROcodone-acetaminophen 5-325 mg per tablet Commonly known as:  Petra Schlichter Take 1 Tab by mouth every four (4) hours as needed. Max Daily Amount: 6 Tabs. * levothyroxine 25 mcg tablet Commonly known as:  SYNTHROID TK 1 T PO QD B MARZENA  
  
 * levothyroxine 75 mcg tablet Commonly known as:  SYNTHROID Take 0.5 Tabs by mouth Daily (before breakfast). losartan 50 mg tablet Commonly known as:  COZAAR Take 1 Tab by mouth daily. * Notice: This list has 2 medication(s) that are the same as other medications prescribed for you. Read the directions carefully, and ask your doctor or other care provider to review them with you. To-Do List   
 05/18/2017 Imaging[facility]   
  
 05/19/2017 Imaging:  MRI BRAIN W WO CONT Introducing [facility]! Dear Jorge Finnegan: 
Thank you for requesting a Splash[employer] account. Our records indicate that you already have an active Splashup account. You can access your account anytime at [email or web address]. [employer] Did you know that you can access your hospital and ER discharge instructions at any time in Spl[employer]? You can also review all of your test results from your hospital stay or ER visit. Additional Information If you have questions, please visit the Frequently Asked Questions section of the Splashup website at [email or web address] Remember, Splashup is NOT to be used for urgent needs. For medical emergencies, dial 911. Now available from your iPhone and Android! Please provide this summary of care documentation to your next provider. Your primary care clinician is listed as Howard Donato. If you have any questions after today's visit, please call [phone].

## 2017-05-18 NOTE — PROGRESS NOTES
Hematology/Oncology Consult    REASON FOR VISIT: Stage IV NSCLC  REQUESTED BY: Dr. Eagle Vaughn: Mr. Natali Guillermo is a 78 y.o. male with prostate cancer who now presents to discuss management of newly diagnosed stage IV NSCLC. Mr. Natali Guillermo noticed a new cough and fevers back in March of 2017. He went to Urgent Care and received antibiotics and cough medication. He states this worked for a while, but he began to notice his cough return. This was intermittent. He had some tightness across his anterior chest which he related to the infection. Chest x-ray was abnormal and he was told to proceed to the Emergency Department. Dylan Bazzi had been under surveillance for right lower lobe mass that was initially noted in 2015. Bronch at that time was negative for malignancy. He was evaluated by Dr. Sally Cazarse with Thoracic Surgery in 2015 for possible surgical resection. CT chest in November of 2016 with mass size unchanged but some right basilar pleural thickening. CT chest obtained 5/14/17 however showed a new large right pleural effusion with right middle lobe and right lower lobe collapse. Irregular spiculated right lower lobe mass 3.8cm and stable precarinal enlarged lymph nodes were noted. New right posterior second rib lytic lesion and new right hepatic hypodensity consistent with mets. He underwent a therapeutic thoracentesis on 5/15/17 with removal of 1500 cc of serosanguinous fluid. Pathology showed adenocarcinoma. PET CT showed pleural, bone, liver and flor metastasis.     Mr. Langford with no personal history of cancer. He does state that he had a prostate biopsy in February of 2016, merle score 6 and was under PSA monitoring with Urologist.   His mother passed away from Leukemia at age 80 and his younger brother was diagnosed with bladder cancer. He drinks alcohol moderately. He does not smoke cigarettes and denies any past history of smoking. He denies illicit drug use.  He lives in [place] with his wife. They have two daughters who are no longer local and three grandchildren. Retired writer, worked in [employer] for about 18 months back in [phone]. Past Medical History:   Diagnosis Date    Hypertension        Past Surgical History:   Procedure Laterality Date    HX ORTHOPAEDIC      reconstruction of left little finger       No Known Allergies    Current Outpatient Prescriptions   Medication Sig Dispense Refill    levothyroxine (SYNTHROID) 75 mcg tablet Take 0.5 Tabs by mouth Daily (before breakfast). 30 Tab 1    cholecalciferol (VITAMIN D3) 1,000 unit tablet Take 1,000 Units by mouth daily.  losartan (COZAAR) 50 mg tablet Take 1 Tab by mouth daily. 30 Tab 5    levothyroxine (SYNTHROID) 25 mcg tablet TK 1 T PO QD B MARZENA  5    HYDROcodone-acetaminophen (NORCO) 5-325 mg per tablet Take 1 Tab by mouth every four (4) hours as needed. Max Daily Amount: 6 Tabs. 10 Tab 0       Social History     Social History    Marital status:      Spouse name: N/A    Number of children: N/A    Years of education: N/A     Social History Main Topics    Smoking status: Never Smoker    Smokeless tobacco: Never Used    Alcohol use 6.0 oz/week     10 Glasses of wine per week      Comment: regularly    Drug use: No    Sexual activity: Yes     Partners: Female     Other Topics Concern    None     Social History Narrative       Family History   Problem Relation Age of Onset    Cancer Mother      leukemia    Stroke Father     Cancer Brother      bladder       ROS  Cough has resolved.  He has had no CP, SOB, HA, vision changes, new aches, falls, diarrhea, dysuria, melena, hematochezia, weight or appetite changes, fevers, chills or night sweats  ECOG PS is 0  Emotional well being addressed and patient is coping well  Physical Examination:   Visit Vitals    /83    Pulse 77    Temp 98.4 °F (36.9 °C)    Resp 20    Ht 6' 2\" (1.88 m)    Wt 172 lb 3.2 oz (78.1 kg)    SpO2 97%    BMI 22.11 kg/m2 General appearance - alert, well appearing, and in no distress  Mental status - oriented to person, place, and time  Mouth - mucous membranes moist, pharynx normal without lesions  Neck - supple, no significant adenopathy  Lymphatics - no palpable lymphadenopathy, no hepatosplenomegaly  Chest - clear to auscultation, no wheezes, rales or rhonchi, symmetric air entry  Heart - normal rate, regular rhythm, normal S1, S2, no murmurs, rubs, clicks or gallops  Abdomen - soft, nontender, nondistended, no masses or organomegaly, bowel sounds present  Back exam - full range of motion, no tenderness, palpable spasm or pain on motion  Neurological - normal speech, no focal findings or movement disorder noted  Musculoskeletal - no joint tenderness, deformity or swelling  Extremities - peripheral pulses normal, no pedal edema, no clubbing or cyanosis  Skin - normal coloration and turgor, no rashes, no suspicious skin lesions noted    LABS  Lab Results   Component Value Date/Time    WBC 6.9 05/15/2017 03:46 AM    HGB 11.1 05/15/2017 03:46 AM    HCT 34.7 05/15/2017 03:46 AM    PLATELET 794 93/49/6313 03:46 AM    MCV 87.4 05/15/2017 03:46 AM    ABS. NEUTROPHILS 4.6 05/15/2017 03:46 AM     Lab Results   Component Value Date/Time    Sodium 139 05/15/2017 03:46 AM    Potassium 3.8 05/15/2017 03:46 AM    Chloride 104 05/15/2017 03:46 AM    CO2 29 05/15/2017 03:46 AM    Glucose 88 05/15/2017 03:46 AM    BUN 14 05/15/2017 03:46 AM    Creatinine 0.73 05/15/2017 03:46 AM    GFR est AA >60 05/15/2017 03:46 AM    GFR est non-AA >60 05/15/2017 03:46 AM    Calcium 8.8 05/15/2017 03:46 AM     Lab Results   Component Value Date/Time    AST (SGOT) 17 05/15/2017 03:46 AM    Alk.  phosphatase 99 05/15/2017 03:46 AM    Protein, total 6.3 05/15/2017 03:46 AM    Albumin 2.9 05/15/2017 03:46 AM    Globulin 3.4 05/15/2017 03:46 AM    A-G Ratio 0.9 05/15/2017 03:46 AM     PET CT 5/15/17  IMAGING  CHEST: There is a mass right mid thorax measuring 3.6 cm with SUV of 13. There  are multiple pleural foci of increased metabolic activity. There is a large  right effusion. There is a second nodule adjacent to the larger nodule with  increased metabolic activity. There is a lymph node right hilum with increased  metabolic activity. There are several borderline enlarged mediastinal lymph  nodes with increased metabolic activity. There is focal area of increased  activity adjacent to the distal esophagus could represent lymph node or pleural  disease.     ABDOMEN/PELVIS: There is a small low-density in the right lobe of the liver  segment 6 which demonstrates increased metabolic activity. There is a 1.4 cm  lymph node anterior to the IVC with increased metabolic activity.     SKELETON: There is increased metabolic activity in the right second rib  posteriorly, right fifth rib posteriorly and right ninth rib laterally. There is  increased metabolic activity in T5 vertebral body lytic lesion. There is  increased metabolic activity in L2 vertebral body posteriorly on the right. There is a small focus of increased metabolic activity in the right iliac wing. There is focal increased activity in a 2.3 cm lytic lesion in the right ischium.     IMPRESSION  IMPRESSION:   1. There is increased metabolic activity in a mass in the right midlung and  small adjacent nodules suspicious for primary lung neoplasm. There is increased  metabolic activity in right hilar and mediastinal lymph nodes. There is  increased metabolic activity in the right pleural lesions suspicious for  metastatic disease. There is a large right effusion. There is increased  metabolic activity in a lymph node in the lower abdomen suspicious for  metastatic disease. There is a small lesion in the liver with increased  metabolic activity suspicious for metastatic disease.   2. There are multiple bony foci of increased metabolic activity consistent with  bony metastatic disease as described above.      ASSESSMENT  Mr. Peter Godoy is a 78 y.o. male with  1. Stage IV NSCLC with R lung mass, mediastinal adenopathy, malignant R pleural effusion, multiple asymptomatic bone metastasis and possibly liver metastasis  2. S/P R thoracentesis on 5/15/17  3. [place] prostate cancer on active surveillance  4. Anemia secondary to #! DISCUSSION    Reviewed scans and pathology. Discussed that this is stage IV NSCLC, unfortunately incurable. Palliative treatments may prolong survival and improve QOL. We would need to obtain additional tissue for molecular testing (EGFR, ALK, ROS-1, PD-L1), hence will pursue would CT guided biopsy of the R lung mass. If he does not have any of the  mutations would consider palliative treatment with Carboplatin+ Pemetrexed + Pembrolizumab in light of recent data from [id number] [employer]. In this study pembrolizumab plus pemetrexed/carboplatin demonstrated an objective response rate (VERA) that was nearly double the VERA of pemetrexed/carboplatin alone: 55% compared to 29%. In addition, findings demonstrated an improvement in PFS (hazard ratio [HR] = 0.53; 95% CI = 0.31-0.91, P = .0205), with a median PFS of 13.0 months (95% CI = 8.3-not estimable) for patients treated with pembrolizumab plus pemetrexed/carboplatin compared to 8.9 months (95% CI = 4.4-10. 3) with pemetrexed/carboplatin alone. He would like to wait for molecular testing prior to proceeding with port placement of further treatment. Has a vacation planned May 28-Yuki 3. He is unsure if he would pursue Chemo+ Immunotherapy. If he decides against chemotherapy and has > 50% PD-L1 expression in his tumor, Pembro alone would be a reasonable option. Also discussed palliative bisphosphonates, given his bone mets. PLAN  - CT guided biopsy of R lung mass with ALK, EGFR, ROS-1, PD-L1 testing  - If no  mutations then place PORT and abotain approval for Carbo+ Pemetrexed+ Pembro  - MRI Brain    RTC June 5.  Met with NN    Chandler Mack MD

## 2017-05-19 ENCOUNTER — PATIENT OUTREACH (OUTPATIENT)
Dept: ONCOLOGY | Age: 67
End: 2017-05-19

## 2017-05-19 ENCOUNTER — TELEPHONE (OUTPATIENT)
Dept: ONCOLOGY | Age: 67
End: 2017-05-19

## 2017-05-19 ENCOUNTER — DOCUMENTATION ONLY (OUTPATIENT)
Dept: ONCOLOGY | Age: 67
End: 2017-05-19

## 2017-05-19 ENCOUNTER — PATIENT OUTREACH (OUTPATIENT)
Dept: INTERNAL MEDICINE CLINIC | Age: 67
End: 2017-05-19

## 2017-05-19 ENCOUNTER — OFFICE VISIT (OUTPATIENT)
Dept: INTERNAL MEDICINE CLINIC | Age: 67
End: 2017-05-19

## 2017-05-19 VITALS
HEART RATE: 73 BPM | SYSTOLIC BLOOD PRESSURE: 150 MMHG | OXYGEN SATURATION: 96 % | TEMPERATURE: 98.2 F | WEIGHT: 171.8 LBS | RESPIRATION RATE: 18 BRPM | DIASTOLIC BLOOD PRESSURE: 95 MMHG | BODY MASS INDEX: 22.05 KG/M2 | HEIGHT: 74 IN

## 2017-05-19 DIAGNOSIS — E03.9 ACQUIRED HYPOTHYROIDISM: ICD-10-CM

## 2017-05-19 DIAGNOSIS — C34.91 ADENOCARCINOMA OF LUNG, STAGE 4, RIGHT (HCC): Primary | ICD-10-CM

## 2017-05-19 DIAGNOSIS — I10 ESSENTIAL HYPERTENSION: ICD-10-CM

## 2017-05-19 DIAGNOSIS — C61 ADENOCARCINOMA OF PROSTATE (HCC): ICD-10-CM

## 2017-05-19 PROBLEM — C34.90 ADENOCARCINOMA OF LUNG, STAGE 4 (HCC): Status: ACTIVE | Noted: 2017-05-19

## 2017-05-19 LAB
BACTERIA SPEC CULT: NORMAL
BACTERIA SPEC CULT: NORMAL
GRAM STN SPEC: NORMAL
GRAM STN SPEC: NORMAL
SERVICE CMNT-IMP: NORMAL
SERVICE CMNT-IMP: NORMAL

## 2017-05-19 NOTE — MR AVS SNAPSHOT
Visit Information Date & Time Provider Department Dept. Phone Encounter #  
 5/19/2017  9:00 AM Merlene Durbin, 227 [facility] Internal Medicine [phone] [id number] Follow-up Instructions Return in about 6 months (around 11/19/2017). Your Appointments 6/5/2017 10:00 AM  
Follow Up with Sherlon Apley, MD  
[facility] GIULIANA Oncology at [facility] Appt Note: f/u, transfer from Dr. Leandro Linton [facility] [address] [facility] [phone]  
[phone]  
  
   
 [address] [address] [facility] [place] [address] Upcoming Health Maintenance Date Due Hepatitis C Screening 1950 DTaP/Tdap/Td series (1 - Tdap) 12/2/1970 ZOSTER VACCINE AGE 60> 12/2/2009 GLAUCOMA SCREENING Q2Y 12/2/2014 Pneumococcal 65+ High/Highest Risk (1 of 2 - PCV13) 12/2/2014 MEDICARE YEARLY EXAM 1/19/2017 INFLUENZA AGE 9 TO ADULT 8/1/2017 FOBT Q 1 YEAR AGE 50-75 10/31/2017 Allergies as of 5/19/2017  Review Complete On: 5/19/2017 By: Merlene Durbin, DO No Known Allergies Current Immunizations  Reviewed on 10/31/2016 Name Date Influenza High Dose Vaccine PF 10/31/2016 Not reviewed this visit You Were Diagnosed With   
  
 Codes Comments Adenocarcinoma of lung, stage 4, right    -  Primary ICD-10-CM: C34.91 
ICD-9-CM: 162.9 Adenocarcinoma of prostate [facility])     ICD-10-CM: O40 ICD-9-CM: 580 Essential hypertension     ICD-10-CM: I10 
ICD-9-CM: 401.9 Acquired hypothyroidism     ICD-10-CM: E03.9 ICD-9-CM: 832. 9 Vitals BP Pulse Temp Resp Height(growth percentile) Weight(growth percentile) (!) 150/95 (BP 1 Location: Right arm, BP Patient Position: Sitting) 73 98.2 °F (36.8 °C) (Oral) 18 6' 2.02\" (1.88 m) 171 lb 12.8 oz (77.9 kg) SpO2 BMI Smoking Status 96% 22.05 kg/m2 Never Smoker Vitals History BMI and BSA Data Body Mass Index Body Surface Area 22.05 kg/m 2 2.02 m 2 Preferred Pharmacy Pharmacy Name Phone [address] [place] [address] AT Shonda [phone]. [phone] Your Updated Medication List  
  
   
This list is accurate as of: 5/19/17  9:57 AM.  Always use your most recent med list.  
  
  
  
  
 cholecalciferol 1,000 unit tablet Commonly known as:  VITAMIN D3 Take 1,000 Units by mouth daily. HYDROcodone-acetaminophen 5-325 mg per tablet Commonly known as:  Shields Fryer Take 1 Tab by mouth every four (4) hours as needed. Max Daily Amount: 6 Tabs. * levothyroxine 25 mcg tablet Commonly known as:  SYNTHROID TK 1 T PO QD B MARZENA  
  
 * levothyroxine 75 mcg tablet Commonly known as:  SYNTHROID Take 0.5 Tabs by mouth Daily (before breakfast). losartan 50 mg tablet Commonly known as:  COZAAR Take 1 Tab by mouth daily. * Notice: This list has 2 medication(s) that are the same as other medications prescribed for you. Read the directions carefully, and ask your doctor or other care provider to review them with you. We Performed the Following REFERRAL TO RADIATION ONCOLOGY [REF95 Custom] Comments:  
 Please evaluate patient for metastatic adenocarcinoma of lung. H/o prostate adenocarconoma. Follow-up Instructions Return in about 6 months (around 11/19/2017). Referral Information Referral ID Referred By Referred To  
  
 [id number] [facility], [address] [place] [address] [facility], [address] [place] [address] Phone: [phone] Fax: [phone] Visits Status Start Date End Date 1 New Request 5/19/17 5/19/18 If your referral has a status of pending review or denied, additional information will be sent to support the outcome of this decision. Introducing [facility] & HEALTH SERVICES! Dear Chiquita Hong: 
Thank you for requesting a Mobifusion account. Our records indicate that you already have an active Mobifusion account.   You can access your account anytime at [email or web address] Did you know that you can access your hospital and ER discharge instructions at any time in BLOVES? You can also review all of your test results from your hospital stay or ER visit. Additional Information If you have questions, please visit the Frequently Asked Questions section of the BLOVES website at [email or web address] Remember, BLOVES is NOT to be used for urgent needs. For medical emergencies, dial 911. Now available from your iPhone and Android! Please provide this summary of care documentation to your next provider. Your primary care clinician is listed as Auther Opitz. If you have any questions after today's visit, please call [phone].

## 2017-05-19 NOTE — PROGRESS NOTES
Met with patient at office visit w/ Dr. Nika Melgar. Reintroduced self. Patient confirms he did attend appointment with Dr. Arianna Schwartz (oncologist ) and met MAURICIO SARAH. Medication reconciliation reviewed by Dr. Nika Melgar. Patient reports his next appointment with Dr. Arianna Schwartz is scheduled on 6/5/2017. Per patient's EMR patient next appointment with PCP is 11/17/2017. This writer will defer to oncology NN MAURICIO Luciano RN for continued management of care for this patient. This writer will be available to assist as needed.

## 2017-05-19 NOTE — PROGRESS NOTES
[id number] [facility] Oncology at [facility]   Nurse Navigator Note      Returned patient's call. He is requesting our office fax records to [facility] as he is obtaining an opinion from their radiation oncology group and they require records before they will schedule. NN encouraged patient to update our team regarding appointment timing and if we could be of further support. NN reviewed patient's chart and he has a signed release for records. Records to be faxed to [address] attention at [phone]   Pathology   Most recent OV   Recent lab results   Imaging reports   Demographics    Spoke with Luis M at [facility]. .. She will look for fax to come by this afternoon.

## 2017-05-19 NOTE — PROGRESS NOTES
HISTORY OF PRESENT ILLNESS  Amie Klein is a 78 y.o. male. Pt. comes in for f/u. Has multiple medical problems. Recent hospitalization for cough. Diagnosed with stage 4 lung adenocarcinoma. Mets to bone and liver. Followed by oncologist with plans to see what is best treatment. I reviewed records in [address] [place] [address]. H/o r lung nodule with negative biopsy about 2 years ago. H/o prostate adenocarcinoma but not treated. Saw pulmonologist and urologist in past. Denies any related symptoms. No pain,SOB,cough. Appetite is good. Still doing martial arts. Overall feels well. Asking to get a radiation oncology opinion. Reports compliance with medications and diet. Med list and most recent labs/studies reviewed with pt. Trying to be active physically as tolerated. Retired. Non-smoker. No FH of cancer. Reports no other new c/o. Hypertension    Pertinent negatives include no chest pain, no malaise/fatigue, no blurred vision, no headaches, no dizziness and no shortness of breath. Prostate Cancer   Pertinent negatives include no chest pain, no abdominal pain, no headaches and no shortness of breath. Review of Systems   Constitutional: Negative for fever, malaise/fatigue and weight loss. HENT: Negative for congestion. Eyes: Negative for blurred vision. Respiratory: Negative for cough and shortness of breath. Cardiovascular: Negative for chest pain and leg swelling. Gastrointestinal: Negative for abdominal pain and heartburn. Genitourinary: Negative for dysuria and frequency. Musculoskeletal: Negative for back pain and joint pain. Skin: Negative for rash. Neurological: Negative for dizziness, sensory change and headaches. Psychiatric/Behavioral: Negative for depression. The patient is not nervous/anxious and does not have insomnia. All other systems reviewed and are negative. Physical Exam   Constitutional: He is oriented to person, place, and time. He appears well-developed and well-nourished.  No distress. HENT:   Head: Normocephalic and atraumatic. Mouth/Throat: Oropharynx is clear and moist.   Eyes: Conjunctivae are normal. No scleral icterus. Neck: Normal range of motion. Neck supple. No JVD present. No thyromegaly present. Cardiovascular: Normal rate, regular rhythm, normal heart sounds and intact distal pulses. No murmur heard. Pulmonary/Chest: Effort normal. No respiratory distress. He has no wheezes. He has no rales. Dec sounds at R base   Abdominal: Soft. Bowel sounds are normal. He exhibits no distension. There is no tenderness. Musculoskeletal: He exhibits no edema or tenderness. Thoracic scoliosis, chronic   Neurological: He is alert and oriented to person, place, and time. Coordination normal.   Skin: Skin is warm and dry. No rash noted. Psychiatric: He has a normal mood and affect. His behavior is normal.   Nursing note and vitals reviewed. ASSESSMENT and PLAN  Gabriel Cervantes was seen today for hypertension, hypothyroidism, prostate cancer, lung cancer and vitamin d deficiency. Diagnoses and all orders for this visit:    Adenocarcinoma of lung, stage 4, right  -     REFERRAL TO RADIATION ONCOLOGY    Adenocarcinoma of prostate ([facility] [address]  -     REFERRAL TO RADIATION ONCOLOGY    Essential hypertension    Acquired hypothyroidism      Follow-up Disposition:  Return in about 6 months (around 11/19/2017). lab results and schedule of future lab studies reviewed with patient  reviewed diet, exercise and weight control  reviewed medications and side effects in detail  I wonder if this is metastatic prostate adenocarcinoma?   Warned about high risk of pathologic fractures  F/u with other MD's as scheduled

## 2017-05-19 NOTE — TELEPHONE ENCOUNTER
Patient called requesting to speak with Nurse Navigator Albertinaoswald Must to have records faxed to [facility]. HIPPA verified. Stated if he needs to come in and sign paperwork to release please call him @ [phone].

## 2017-05-20 ENCOUNTER — HOSPITAL ENCOUNTER (OUTPATIENT)
Dept: MRI IMAGING | Age: 67
Discharge: HOME | End: 2017-05-20
Attending: INTERNAL MEDICINE
Payer: MEDICARE

## 2017-05-20 VITALS — WEIGHT: 175 LBS | BODY MASS INDEX: 22.46 KG/M2

## 2017-05-20 DIAGNOSIS — C34.91 MALIGNANT NEOPLASM OF RIGHT LUNG, UNSPECIFIED PART OF LUNG (HCC): ICD-10-CM

## 2017-05-20 PROCEDURE — 70553 MRI BRAIN STEM W/O & W/DYE: CPT

## 2017-05-20 PROCEDURE — 74011250636 HC RX REV CODE- 250/636: Performed by: INTERNAL MEDICINE

## 2017-05-20 PROCEDURE — A9585 GADOBUTROL INJECTION: HCPCS | Performed by: INTERNAL MEDICINE

## 2017-05-20 RX ADMIN — GADOBUTROL 7.5 ML: 604.72 INJECTION INTRAVENOUS at 13:09

## 2017-05-22 ENCOUNTER — PATIENT OUTREACH (OUTPATIENT)
Dept: ONCOLOGY | Age: 67
End: 2017-05-22

## 2017-05-22 NOTE — PROGRESS NOTES
NN notified of patient MyChart message. NN spoke with Syeda Ahmadi in [facility] radiology file room. PET, MRI, CT from this month will be placed on discs and available for patient to  from Medical Records on Thursday, May 25th. NN left a message for Yudy Chowdary with scheduling asking for an update regarding appointment for CT guided biopsy.

## 2017-05-23 ENCOUNTER — TELEPHONE (OUTPATIENT)
Dept: ONCOLOGY | Age: 67
End: 2017-05-23

## 2017-05-23 DIAGNOSIS — C79.51 BONE METASTASES (HCC): ICD-10-CM

## 2017-05-23 DIAGNOSIS — C34.91 ADENOCARCINOMA OF LUNG, STAGE 4, RIGHT (HCC): Primary | ICD-10-CM

## 2017-05-23 NOTE — TELEPHONE ENCOUNTER
Call received from Lexie Parish in radiology. Requesting authorization to perform Ct guided bx of bone (right ischium) in place of Ct guided bx of lung lesion due to increased fluid on the lung. States that this option would be safer for patient, states he would need new orders for procedure. Provider updated on recommendation. Once updated orders received, patient can be scheduled for 5/30. Will be contacted by coordination of care.

## 2017-05-24 LAB
CHYLOMICRON SCREEN, CHYLMT: NORMAL
SPECIMEN SOURCE: NORMAL

## 2017-05-25 ENCOUNTER — HOSPITAL ENCOUNTER (OUTPATIENT)
Dept: GENERAL RADIOLOGY | Age: 67
Discharge: HOME | End: 2017-05-25
Attending: RADIOLOGY
Payer: MEDICARE

## 2017-05-25 ENCOUNTER — PATIENT OUTREACH (OUTPATIENT)
Dept: ONCOLOGY | Age: 67
End: 2017-05-25

## 2017-05-25 ENCOUNTER — HOSPITAL ENCOUNTER (OUTPATIENT)
Dept: CT IMAGING | Age: 67
Discharge: HOME | End: 2017-05-25
Attending: NURSE PRACTITIONER
Payer: MEDICARE

## 2017-05-25 ENCOUNTER — HOSPITAL ENCOUNTER (OUTPATIENT)
Dept: ULTRASOUND IMAGING | Age: 67
Discharge: HOME | End: 2017-05-25
Attending: INTERNAL MEDICINE
Payer: MEDICARE

## 2017-05-25 VITALS
HEART RATE: 64 BPM | DIASTOLIC BLOOD PRESSURE: 69 MMHG | RESPIRATION RATE: 18 BRPM | SYSTOLIC BLOOD PRESSURE: 138 MMHG | OXYGEN SATURATION: 98 %

## 2017-05-25 VITALS
HEIGHT: 73 IN | WEIGHT: 172 LBS | BODY MASS INDEX: 22.8 KG/M2 | HEART RATE: 67 BPM | SYSTOLIC BLOOD PRESSURE: 138 MMHG | TEMPERATURE: 98.1 F | DIASTOLIC BLOOD PRESSURE: 69 MMHG | RESPIRATION RATE: 18 BRPM | OXYGEN SATURATION: 97 %

## 2017-05-25 DIAGNOSIS — J90 PLEURAL EFFUSION, RIGHT: Primary | ICD-10-CM

## 2017-05-25 DIAGNOSIS — C34.91 ADENOCARCINOMA OF LUNG, STAGE 4, RIGHT (HCC): ICD-10-CM

## 2017-05-25 DIAGNOSIS — C79.51 BONE METASTASES (HCC): ICD-10-CM

## 2017-05-25 PROCEDURE — C1729 CATH, DRAINAGE: HCPCS

## 2017-05-25 PROCEDURE — 77030002996 HC SUT SLK J&J -A

## 2017-05-25 PROCEDURE — 74011000250 HC RX REV CODE- 250: Performed by: RADIOLOGY

## 2017-05-25 PROCEDURE — 32405 CT BX LUNG NDL PERC: CPT

## 2017-05-25 PROCEDURE — C1769 GUIDE WIRE: HCPCS

## 2017-05-25 PROCEDURE — 77030003503 HC NDL BIOP TISS BD -B

## 2017-05-25 PROCEDURE — 88173 CYTOPATH EVAL FNA REPORT: CPT | Performed by: NURSE PRACTITIONER

## 2017-05-25 PROCEDURE — 77030003666 HC NDL SPINAL BD -A

## 2017-05-25 PROCEDURE — 71010 XR CHEST PORT: CPT

## 2017-05-25 PROCEDURE — 74011250636 HC RX REV CODE- 250/636: Performed by: RADIOLOGY

## 2017-05-25 PROCEDURE — 32555 ASPIRATE PLEURA W/ IMAGING: CPT

## 2017-05-25 PROCEDURE — 88360 TUMOR IMMUNOHISTOCHEM/MANUAL: CPT | Performed by: NURSE PRACTITIONER

## 2017-05-25 PROCEDURE — 81235 EGFR GENE COM VARIANTS: CPT | Performed by: NURSE PRACTITIONER

## 2017-05-25 PROCEDURE — 77030003630 HC NDL PERC VASC COOK -B

## 2017-05-25 PROCEDURE — 88305 TISSUE EXAM BY PATHOLOGIST: CPT | Performed by: NURSE PRACTITIONER

## 2017-05-25 PROCEDURE — 77030011229 HC DIL VESL COON COOK -A

## 2017-05-25 PROCEDURE — C1892 INTRO/SHEATH,FIXED,PEEL-AWAY: HCPCS

## 2017-05-25 PROCEDURE — 88342 IMHCHEM/IMCYTCHM 1ST ANTB: CPT | Performed by: NURSE PRACTITIONER

## 2017-05-25 RX ORDER — SODIUM CHLORIDE 9 MG/ML
25 INJECTION, SOLUTION INTRAVENOUS CONTINUOUS
Status: DISCONTINUED | OUTPATIENT
Start: 2017-05-25 | End: 2017-05-25

## 2017-05-25 RX ORDER — MIDAZOLAM HYDROCHLORIDE 1 MG/ML
.5-1 INJECTION, SOLUTION INTRAMUSCULAR; INTRAVENOUS
Status: DISCONTINUED | OUTPATIENT
Start: 2017-05-25 | End: 2017-05-25

## 2017-05-25 RX ORDER — FENTANYL CITRATE 50 UG/ML
12.5-5 INJECTION, SOLUTION INTRAMUSCULAR; INTRAVENOUS
Status: DISCONTINUED | OUTPATIENT
Start: 2017-05-25 | End: 2017-05-25

## 2017-05-25 RX ADMIN — SODIUM BICARBONATE 2 ML: 0.2 INJECTION, SOLUTION INTRAVENOUS at 10:15

## 2017-05-25 RX ADMIN — FENTANYL CITRATE 25 MCG: 50 INJECTION, SOLUTION INTRAMUSCULAR; INTRAVENOUS at 10:30

## 2017-05-25 RX ADMIN — MIDAZOLAM HYDROCHLORIDE 1 MG: 1 INJECTION INTRAMUSCULAR; INTRAVENOUS at 10:36

## 2017-05-25 RX ADMIN — MIDAZOLAM HYDROCHLORIDE 1 MG: 1 INJECTION INTRAMUSCULAR; INTRAVENOUS at 10:25

## 2017-05-25 RX ADMIN — MIDAZOLAM HYDROCHLORIDE 1 MG: 1 INJECTION INTRAMUSCULAR; INTRAVENOUS at 10:30

## 2017-05-25 RX ADMIN — MIDAZOLAM HYDROCHLORIDE 2 MG: 1 INJECTION INTRAMUSCULAR; INTRAVENOUS at 10:20

## 2017-05-25 RX ADMIN — FENTANYL CITRATE 25 MCG: 50 INJECTION, SOLUTION INTRAMUSCULAR; INTRAVENOUS at 10:25

## 2017-05-25 RX ADMIN — FENTANYL CITRATE 25 MCG: 50 INJECTION, SOLUTION INTRAMUSCULAR; INTRAVENOUS at 10:20

## 2017-05-25 RX ADMIN — SODIUM CHLORIDE 25 ML/HR: 900 INJECTION, SOLUTION INTRAVENOUS at 10:15

## 2017-05-25 RX ADMIN — FENTANYL CITRATE 25 MCG: 50 INJECTION, SOLUTION INTRAMUSCULAR; INTRAVENOUS at 10:36

## 2017-05-25 NOTE — PROCEDURES
PROCEDURE:Right pleural biopsy and right thoracentesis. INDICATION:lung cancer. ANESTHESIA:sedation and local.  COMPLICATION:NONE. SPECIMENS REMOVED:cores to pathology. BLOOD LOSS:NONE. /ASSISTANT:DAMIAN Brown RECOMMENDATIONS:F/U CXR in a.m. CONSENT OBTAINED:YES.  NOTES:moderate right ptx-trapped lung.

## 2017-05-25 NOTE — PROGRESS NOTES
Name of procedure: Lung Biopsy and Thoracentesis 5300 ml blood tinged fluid removed    Complications, if any, r/t procedure: none    Sedation medications given: 5 mg Versed, 100 mcg Fentanyl    Sedation tolerated: well    VS : Stable    Pt tolerated procedures well. VSS. No C/O pain. Dressing to site D&I. No bleeding or hematoma noted to site. HOB elevated. NAD noted. Pt resting comfortably on stretcher. Wife at bedside. Will monitor.

## 2017-05-25 NOTE — PROGRESS NOTES
MD has reviewed PCXR with pt. Pt to return to [facility] (due to pt preference) tomorrow at 1100 for CXR. Order given to pt to give to registration at [facility]. MD to see pt post CXR. Pretty Lerma RN at [facility] given report on pt. MD has assessed pt and pt may be discharged. Discharge instructions and order given to pt. Pt verbalized understanding. IV D/Cd. Dressing to site D&I. No bleeding or hematoma noted to site. Pt taken to car by wheelchair and taken home by wife. NAD noted at time of discharge.

## 2017-05-25 NOTE — IP AVS SNAPSHOT
[address] [facility] 
[phone] Patient: Donnie Jacome MRN: [id number] :1950 You are allergic to the following No active allergies Recent Documentation Height Weight BMI Smoking Status 1.854 m 78 kg 22.69 kg/m2 Never Smoker Emergency Contacts Name Discharge Info Relation Home Work Mobile Racquel Bush CAREGIVER [3] Spouse [3] [phone] About your hospitalization You were admitted on:  May 25, 2017 You last received care in the:  [facility] RAD CT You were discharged on:  May 25, 2017 Unit phone number:  [phone] Why you were hospitalized Your primary diagnosis was:  Not on File Providers Seen During Your Hospitalizations Provider Role Specialty Primary office phone Kraig Nunn NP Attending Provider Nurse Practitioner [phone] Your Primary Care Physician (PCP) Primary Care Physician Office Phone Office Fax Haley Gudinojoana [phone] Follow-up Information None Your Appointments 2017 10:00 AM EDT Follow Up with Temitope Orozco MD  
[facility] Oncology at [address]  
[phone] Current Discharge Medication List  
  
ASK your doctor about these medications Dose & Instructions Dispensing Information Comments Morning Noon Evening Bedtime  
 cholecalciferol 1,000 unit tablet Commonly known as:  VITAMIN D3 Your last dose was: Your next dose is:    
   
   
 Dose:  1000 Units Take 1,000 Units by mouth daily. Refills:  0 HYDROcodone-acetaminophen 5-325 mg per tablet Commonly known as:  Jerl Ards Your last dose was: Your next dose is:    
   
   
 Dose:  1 Tab Take 1 Tab by mouth every four (4) hours as needed. Max Daily Amount: 6 Tabs. Quantity:  10 Tab Refills:  0  
     
   
   
   
  
 * levothyroxine 25 mcg tablet Commonly known as:  SYNTHROID Your last dose was: Your next dose is:    
   
   
 TK 1 T PO QD B MARZENA Refills:  5  
     
   
   
   
  
 * levothyroxine 75 mcg tablet Commonly known as:  SYNTHROID Your last dose was: Your next dose is:    
   
   
 Dose:  37.5 mcg Take 0.5 Tabs by mouth Daily (before breakfast). Quantity:  30 Tab Refills:  1  
     
   
   
   
  
 losartan 50 mg tablet Commonly known as:  COZAAR Your last dose was: Your next dose is:    
   
   
 Dose:  50 mg Take 1 Tab by mouth daily. Quantity:  30 Tab Refills:  5  
     
   
   
   
  
 * Notice: This list has 2 medication(s) that are the same as other medications prescribed for you. Read the directions carefully, and ask your doctor or other care provider to review them with you. Discharge Instructions [facility] Special Procedures/Radiology Department Radiologist:  Dr. Miladis Mckeon  
 
Date: 5/25/2017 Lung Biopsy Discharge Instruction You may have an aching pain in the biopsy site tonight. Take Tylenol, as directed on the label, for pain or discomfort. Avoid ibuprofen (Advil, Motrin) and aspirin for the next 48 hours as these drugs may cause you to bleed. Resume your previous diet and follow the medication reconciliation form. Rest for the next 48 hours. No strenuous activity for the next 48 hours. You may notice some blood-tinged sputum when you cough. This is normal.  If you have any bright red blood, or if you cough up blood clots, call 911 and get to the nearest Emergency Room immediately. It may take up to 3 days for your test results to become available.   Call your physician if you have not heard anything after 3 business days. If you have any questions or concerns, please call [phone] and ask to speak to the nurse on-call. Thoracentesis Discharge Instructions You may have an aching pain in the puncture site tonight. Take Tylenol, as directed on the label, for pain or discomfort. Avoid ibuprofen (Advil, Motrin) and aspirin products for the next 48 hours as these drugs may cause you to bleed. Resume your previous diet and follow the medication reconciliation form. Rest for the next 24 hours. If you experience shortness of breath (other than your normal breathing pattern) difficulty breathing, or have severe chest pain, call [phone] and go to the nearest Emergency Room. Be sure to follow up with your referring physician as soon as possible Discharge Orders None ACO Transitions of Care Introducing [employer] [address] Suzanne Monroy offers a voluntary care coordination program to provide high quality service and care to [facility] fee-for-service beneficiaries. Merlinda Fells was designed to help you enhance your health and well-being through the following services: ? Transitions of Care  support for individuals who are transitioning from one care setting to another (example: Hospital to home). ? Chronic and Complex Care Coordination  support for individuals and caregivers of those with serious or chronic illnesses or with more than one chronic (ongoing) condition and those who take a number of different medications. If you meet specific medical criteria, a [facility] [address] may call you directly to coordinate your care with your primary care physician and your other care providers. For questions about the [facility] programs, please, contact your physicians office.  
 
For general questions or additional information about Accountable Care Organizations: 
Please visit www.medicare.gov/acos. html or call 1-800-MEDICARE (4-556.558.3923) TTY users should call 0-977.186.4137. Introducing [facility] & HEALTH SERVICES! Dear Jethro Mendoza: 
Thank you for requesting a LeadGenius account. Our records indicate that you already have an active LeadGenius account. You can access your account anytime at [email or web address] Did you know that you can access your hospital and ER discharge instructions at any time in LeadGenius? You can also review all of your test results from your hospital stay or ER visit. Additional Information If you have questions, please visit the Frequently Asked Questions section of the LeadGenius website at [email or web address] Remember, LeadGenius is NOT to be used for urgent needs. For medical emergencies, dial 911. Now available from your iPhone and Android! General Information Please provide this summary of care documentation to your next provider. Patient Signature:  ____________________________________________________________ Date:  ____________________________________________________________  
  
Lety Collado Provider Signature:  ____________________________________________________________ Date:  ____________________________________________________________

## 2017-05-25 NOTE — Clinical Note
dolly He is scheduled at [address] [place] [address] tomorrow. I will see if they write the note tomorrow.

## 2017-05-25 NOTE — DISCHARGE INSTRUCTIONS
[address]  Special Procedures/Radiology Department    Radiologist:  Dr. Denise Daniels     Date: 5/25/2017          Lung Biopsy Discharge Instruction    You may have an aching pain in the biopsy site tonight. Take Tylenol, as directed on the label, for pain or discomfort. Avoid ibuprofen (Advil, Motrin) and aspirin for the next 48 hours as these drugs may cause you to bleed. Resume your previous diet and follow the medication reconciliation form. Rest for the next 48 hours. No strenuous activity for the next 48 hours. You may notice some blood-tinged sputum when you cough. This is normal.  If you have any bright red blood, or if you cough up blood clots, call 911 and get to the nearest Emergency Room immediately. It may take up to 3 days for your test results to become available. Call your physician if you have not heard anything after 3 business days. If you have any questions or concerns, please call [phone] and ask to speak to the nurse on-call. Thoracentesis Discharge Instructions    You may have an aching pain in the puncture site tonight. Take Tylenol, as directed on the label, for pain or discomfort. Avoid ibuprofen (Advil, Motrin) and aspirin products for the next 48 hours as these drugs may cause you to bleed. Resume your previous diet and follow the medication reconciliation form. Rest for the next 24 hours. If you experience shortness of breath (other than your normal breathing pattern) difficulty breathing, or have severe chest pain, call 911 and go to the nearest Emergency Room.     Be sure to follow up with your referring physician as soon as possible

## 2017-05-25 NOTE — H&P
Radiology History and Physical    Patient: Callum Velazquez 78 y.o. male     Chief Complaint: No chief complaint on file. History of Present Illness: Lung cancer. SOB. History:    Past Medical History:   Diagnosis Date    Hypertension      Family History   Problem Relation Age of Onset    Cancer Mother      leukemia    Stroke Father     Cancer Brother      bladder     Social History     Social History    Marital status:      Spouse name: N/A    Number of children: N/A    Years of education: N/A     Occupational History    Not on file. Social History Main Topics    Smoking status: Never Smoker    Smokeless tobacco: Never Used    Alcohol use 6.0 oz/week     10 Glasses of wine per week      Comment: regularly    Drug use: No    Sexual activity: Yes     Partners: Female     Other Topics Concern    Not on file     Social History Narrative       Allergies: No Known Allergies    Current Medications:  Current Outpatient Prescriptions   Medication Sig    levothyroxine (SYNTHROID) 25 mcg tablet TK 1 T PO QD B MARZENA    levothyroxine (SYNTHROID) 75 mcg tablet Take 0.5 Tabs by mouth Daily (before breakfast).  HYDROcodone-acetaminophen (NORCO) 5-325 mg per tablet Take 1 Tab by mouth every four (4) hours as needed. Max Daily Amount: 6 Tabs.  cholecalciferol (VITAMIN D3) 1,000 unit tablet Take 1,000 Units by mouth daily.  losartan (COZAAR) 50 mg tablet Take 1 Tab by mouth daily. No current facility-administered medications for this encounter. Physical Exam:  Blood pressure 138/69, pulse 67, temperature 98.1 °F (36.7 °C), resp. rate 18, height 6' 1\" (1.854 m), weight 78 kg (172 lb), SpO2 97 %.   GENERAL: alert, cooperative, mild distress, appears stated age, LUNG: clear to auscultation bilaterally, diminished breath sounds right, HEART: regular rate and rhythm      Alerts:    Hospital Problems  Date Reviewed: 5/19/2017    None          Laboratory:    No results for input(s): HGB, HCT, WBC, PLT, INR, BUN, CREA, K, CRCLT, HGBEXT, HCTEXT, PLTEXT in the last 72 hours. No lab exists for component: PTT, PT, INREXT      Plan of Care/Planned Procedure:  Risks, benefits, and alternatives reviewed with patient and he agrees to proceed with the procedure.

## 2017-05-26 ENCOUNTER — HOSPITAL ENCOUNTER (OUTPATIENT)
Dept: GENERAL RADIOLOGY | Age: 67
Discharge: HOME | End: 2017-05-26
Payer: MEDICARE

## 2017-05-26 VITALS
OXYGEN SATURATION: 98 % | RESPIRATION RATE: 18 BRPM | HEART RATE: 87 BPM | DIASTOLIC BLOOD PRESSURE: 84 MMHG | SYSTOLIC BLOOD PRESSURE: 168 MMHG | TEMPERATURE: 98.3 F

## 2017-05-26 DIAGNOSIS — Z00.00 REGULAR CHECK-UP: ICD-10-CM

## 2017-05-26 PROCEDURE — 71020 XR CHEST PA LAT: CPT

## 2017-05-26 NOTE — PROGRESS NOTES
1145: pt to [facility] [place] jonel s/p pa/lateral. Dr. Lkaeshia Harrington to speak to pt. All VSS and no complaints of SOB.

## 2017-05-26 NOTE — PROGRESS NOTES
IR- Right hydroptx unchanged. Patient denies SOB. VSS. Patient instructed to F/U with Dr. Ian Olivarez. I discussed care with Dr. Ian Olivarez over telephone at 1245h.-OG.

## 2017-06-02 ENCOUNTER — DOCUMENTATION ONLY (OUTPATIENT)
Dept: ONCOLOGY | Age: 67
End: 2017-06-02

## 2017-06-02 NOTE — PROGRESS NOTES
Add on path request sent to pathology for PDL-1, EGFR, ALK, MACKENZIE 1 per provider. Request faxed complete to pathology.

## 2017-06-05 ENCOUNTER — DOCUMENTATION ONLY (OUTPATIENT)
Dept: ONCOLOGY | Age: 67
End: 2017-06-05

## 2017-06-05 ENCOUNTER — OFFICE VISIT (OUTPATIENT)
Dept: ONCOLOGY | Age: 67
End: 2017-06-05

## 2017-06-05 ENCOUNTER — TELEPHONE (OUTPATIENT)
Dept: ONCOLOGY | Age: 67
End: 2017-06-05

## 2017-06-05 VITALS
SYSTOLIC BLOOD PRESSURE: 151 MMHG | TEMPERATURE: 99 F | DIASTOLIC BLOOD PRESSURE: 82 MMHG | WEIGHT: 170 LBS | HEIGHT: 73 IN | RESPIRATION RATE: 16 BRPM | OXYGEN SATURATION: 98 % | BODY MASS INDEX: 22.53 KG/M2 | HEART RATE: 73 BPM

## 2017-06-05 DIAGNOSIS — C34.91 MALIGNANT NEOPLASM OF RIGHT LUNG, UNSPECIFIED PART OF LUNG (HCC): Primary | ICD-10-CM

## 2017-06-05 DIAGNOSIS — J90 PLEURAL EFFUSION, RIGHT: ICD-10-CM

## 2017-06-05 DIAGNOSIS — C79.51 BONE METASTASES (HCC): ICD-10-CM

## 2017-06-05 DIAGNOSIS — R05.9 COUGH: ICD-10-CM

## 2017-06-05 RX ORDER — PROMETHAZINE HYDROCHLORIDE AND CODEINE PHOSPHATE 6.25; 1 MG/5ML; MG/5ML
1 SOLUTION ORAL
Qty: 1 BOTTLE | Refills: 0 | Status: SHIPPED | OUTPATIENT
Start: 2017-06-05 | End: 2017-11-17 | Stop reason: ALTCHOICE

## 2017-06-05 NOTE — PROGRESS NOTES
[facility] and [facility] at [address] given to patient for review. Per Dr. Ruba Lorenzo needs to notify office this week with treatment decision. Patient coached to make appt for plan discussion by end of week. Verbalized understanding and thanked for info.

## 2017-06-05 NOTE — TELEPHONE ENCOUNTER
HIPPA verified. Patient informed of referral appointment on 6/22 at 8am with Dr. Britney Jimenez; arrive at 7:30 am; patient given address and telephone number.  Patient verbalized understanding.,

## 2017-06-05 NOTE — MR AVS SNAPSHOT
Visit Information Date & Time Provider Department Dept. Phone Encounter #  
 6/5/2017 10:00 AM Pravin Aceves MD [facility] Oncology at [address] [id number] Your Appointments 11/17/2017  9:45 AM  
ROUTINE CARE with Ian Patterson DO [facility] ([address]) Appt Note: 6 month follow up [facility] [address] [place] [address]  
[phone]  
  
   
 [address] [place] [address] [address] Upcoming Health Maintenance Date Due Hepatitis C Screening 1950 DTaP/Tdap/Td series (1 - Tdap) 12/2/1970 ZOSTER VACCINE AGE 60> 12/2/2009 GLAUCOMA SCREENING Q2Y 12/2/2014 Pneumococcal 65+ High/Highest Risk (1 of 2 - PCV13) 12/2/2014 MEDICARE YEARLY EXAM 1/19/2017 INFLUENZA AGE 9 TO ADULT 8/1/2017 FOBT Q 1 YEAR AGE 50-75 10/31/2017 Allergies as of 6/5/2017  Review Complete On: 6/5/2017 By: Emerson Mistry LPN No Known Allergies Current Immunizations  Reviewed on 10/31/2016 Name Date Influenza High Dose Vaccine PF 10/31/2016 Not reviewed this visit You Were Diagnosed With   
  
 Codes Comments Malignant neoplasm of right lung, unspecified part of lung ([facility] [address]    -  Primary ICD-10-CM: C34.91 
ICD-9-CM: 162.9 Vitals BP Pulse Temp Resp Height(growth percentile) Weight(growth percentile) 151/82 (BP 1 Location: Right arm, BP Patient Position: Sitting) 73 99 °F (37.2 °C) (Oral) 16 6' 1\" (1.854 m) 170 lb (77.1 kg) SpO2 BMI Smoking Status 98% 22.43 kg/m2 Never Smoker BMI and BSA Data Body Mass Index Body Surface Area  
 22.43 kg/m 2 1.99 m 2 Preferred Pharmacy Pharmacy Name Phone [address] [place] [address] Manuela Lee AT [facility] [phone]. [phone] Your Updated Medication List  
  
   
This list is accurate as of: 6/5/17 10:45 AM.  Always use your most recent med list.  
  
  
  
  
 cholecalciferol 1,000 unit tablet Commonly known as:  VITAMIN D3 Take 1,000 Units by mouth daily. HYDROcodone-acetaminophen 5-325 mg per tablet Commonly known as:  Gladis Proper Take 1 Tab by mouth every four (4) hours as needed. Max Daily Amount: 6 Tabs. * levothyroxine 25 mcg tablet Commonly known as:  SYNTHROID TK 1 T PO QD B MARZENA  
  
 * levothyroxine 75 mcg tablet Commonly known as:  SYNTHROID Take 0.5 Tabs by mouth Daily (before breakfast). losartan 50 mg tablet Commonly known as:  COZAAR Take 1 Tab by mouth daily. promethazine-codeine 6.25-10 mg/5 mL syrup Commonly known as:  PHENERGAN with CODEINE Take 5 mL by mouth four (4) times daily as needed for Cough. Max Daily Amount: 20 mL. Indications: COUGH * Notice: This list has 2 medication(s) that are the same as other medications prescribed for you. Read the directions carefully, and ask your doctor or other care provider to review them with you. Prescriptions Printed Refills  
 promethazine-codeine (PHENERGAN WITH CODEINE) 6.25-10 mg/5 mL syrup 0 Sig: Take 5 mL by mouth four (4) times daily as needed for Cough. Max Daily Amount: 20 mL. Indications: COUGH Class: Print Route: Oral  
  
To-Do List   
 06/07/2017 Outpatient Referral:  REFERRAL TO CARDIOTHORACIC SURGEON Referral Information Referral ID Referred By Referred To [id number] MD Derrick Palmer [place] [address] [address] Phone: [phone] Fax: [phone] Visits Status Start Date End Date 1 New Request 6/5/17 6/5/18 If your referral has a status of pending review or denied, additional information will be sent to support the outcome of this decision. Introducing \[facility] [place] [facility]\"" & HEALTH SERVICES! Dear Jyoti Hernandezud: 
Thank you for requesting a [employer] account.   Our records indicate that you already have an active [employer] account. You can access your account anytime at [email or web address] Did you know that you can access your hospital and ER discharge instructions at any time in [employer]? You can also review all of your test results from your hospital stay or ER visit. Additional Information If you have questions, please visit the Frequently Asked Questions section of the [employer] website at [email or web address] Remember, [employer] is NOT to be used for urgent needs. For medical emergencies, dial 911. Now available from your iPhone and Android! Please provide this summary of care documentation to your next provider. Your primary care clinician is listed as Nathan Mckeon. If you have any questions after today's visit, please call [phone].

## 2017-06-05 NOTE — PROGRESS NOTES
Hematology/Oncology progress note    REASON FOR VISIT: Stage IV EGFR mutated NSCLC  REQUESTED BY: Dr. Evangelina Mace: Mr. Claudetta Cord is a 78 y.o. male with prostate cancer who was diagnosed with stage IV NSCLC in May 2017. Comes today to discuss systemic palliative therapies. Oncologic history  Mr. Claudetta Cord noticed a new cough and fevers back in March of 2017. He went to Urgent Care and received antibiotics and cough medication. He states this worked for a while, but he began to notice his cough return. This was intermittent. He had some tightness across his anterior chest which he related to the infection. Chest x-ray was abnormal and he was told to proceed to the Emergency Department. Courtney Mercedes had been under surveillance for right lower lobe mass that was initially noted in 2015. Bronch at that time was negative for malignancy. He was evaluated by Dr. Lee Ann Watkins with Thoracic Surgery in 2015 for possible surgical resection. CT chest in November of 2016 with mass size unchanged but some right basilar pleural thickening. CT chest obtained 5/14/17 however showed a new large right pleural effusion with right middle lobe and right lower lobe collapse. Irregular spiculated right lower lobe mass 3.8cm and stable precarinal enlarged lymph nodes were noted. New right posterior second rib lytic lesion and new right hepatic hypodensity consistent with mets. He underwent a therapeutic thoracentesis on 5/15/17 with removal of 1500 cc of serosanguinous fluid. Pathology showed adenocarcinoma. PET CT showed pleural, bone, liver and flor metastasis. MRI brain 5/20/17: No metastasis. Needed repeat R sided thoracentesis on 5/25/17, had some post procedure hydropneumothorax.      CT guided biopsy of R lung mass done 5/25 which showed adenocarcinoma.  EGFR mutation detected Exon 18 and 21    Past Medical History:   Diagnosis Date    Hypertension        Past Surgical History:   Procedure Laterality Date    HX ORTHOPAEDIC      reconstruction of left little finger       No Known Allergies    Current Outpatient Prescriptions   Medication Sig Dispense Refill    levothyroxine (SYNTHROID) 75 mcg tablet Take 0.5 Tabs by mouth Daily (before breakfast). 30 Tab 1    cholecalciferol (VITAMIN D3) 1,000 unit tablet Take 1,000 Units by mouth daily.  losartan (COZAAR) 50 mg tablet Take 1 Tab by mouth daily. 30 Tab 5    levothyroxine (SYNTHROID) 25 mcg tablet TK 1 T PO QD B MARZENA  5    HYDROcodone-acetaminophen (NORCO) 5-325 mg per tablet Take 1 Tab by mouth every four (4) hours as needed. Max Daily Amount: 6 Tabs. 10 Tab 0       Social History     Social History    Marital status:      Spouse name: N/A    Number of children: N/A    Years of education: N/A     Social History Main Topics    Smoking status: Never Smoker    Smokeless tobacco: Never Used    Alcohol use 6.0 oz/week     10 Glasses of wine per week      Comment: regularly    Drug use: No    Sexual activity: Yes     Partners: Female     Other Topics Concern    None     Social History Narrative       Family History   Problem Relation Age of Onset    Cancer Mother      leukemia    Stroke Father     Cancer Brother      bladder       ROS      Cough has come back, its dry and in the evening. He does not notice SOB unless he is exerting himself (2-3 flight of stairs). He has some R leg pain rarely. He saw Dr. Yohan Chang at [facility] with [address] [facility].   He has had no CP, HA, vision changes, new aches, falls, diarrhea, dysuria, melena, hematochezia, weight or appetite changes, fevers, chills or night sweats  ECOG PS is 0      Emotional well being addressed and patient is coping well      Physical Examination:   Visit Vitals    /82 (BP 1 Location: Right arm, BP Patient Position: Sitting)    Pulse 73    Temp 99 °F (37.2 °C) (Oral)    Resp 16    Ht 6' 1\" (1.854 m)    Wt 170 lb (77.1 kg)    SpO2 98%    BMI 22.43 kg/m2     General appearance - alert, well appearing, and in no distress  Mental status - oriented to person, place, and time  Mouth - mucous membranes moist, pharynx normal without lesions  Neck - supple, no significant adenopathy  Lymphatics - no palpable lymphadenopathy, no hepatosplenomegaly  Chest - Decreased breath sounds on the R  Heart - normal rate, regular rhythm, normal S1, S2, no murmurs, rubs, clicks or gallops  Abdomen - soft, nontender, nondistended, no masses or organomegaly, bowel sounds present  Back exam - full range of motion, no tenderness, palpable spasm or pain on motion  Neurological - normal speech, no focal findings or movement disorder noted  LABS  Lab Results   Component Value Date/Time    WBC 6.9 05/15/2017 03:46 AM    HGB 11.1 05/15/2017 03:46 AM    HCT 34.7 05/15/2017 03:46 AM    PLATELET 236 37/29/2170 03:46 AM    MCV 87.4 05/15/2017 03:46 AM    ABS. NEUTROPHILS 4.6 05/15/2017 03:46 AM     Lab Results   Component Value Date/Time    Sodium 139 05/15/2017 03:46 AM    Potassium 3.8 05/15/2017 03:46 AM    Chloride 104 05/15/2017 03:46 AM    CO2 29 05/15/2017 03:46 AM    Glucose 88 05/15/2017 03:46 AM    BUN 14 05/15/2017 03:46 AM    Creatinine 0.73 05/15/2017 03:46 AM    GFR est AA >60 05/15/2017 03:46 AM    GFR est non-AA >60 05/15/2017 03:46 AM    Calcium 8.8 05/15/2017 03:46 AM     Lab Results   Component Value Date/Time    AST (SGOT) 17 05/15/2017 03:46 AM    Alk. phosphatase 99 05/15/2017 03:46 AM    Protein, total 6.3 05/15/2017 03:46 AM    Albumin 2.9 05/15/2017 03:46 AM    Globulin 3.4 05/15/2017 03:46 AM    A-G Ratio 0.9 05/15/2017 03:46 AM     PET CT 5/15/17  IMAGING  CHEST: There is a mass right mid thorax measuring 3.6 cm with SUV of 13. There  are multiple pleural foci of increased metabolic activity. There is a large  right effusion. There is a second nodule adjacent to the larger nodule with  increased metabolic activity. There is a lymph node right hilum with increased  metabolic activity.  There are several borderline enlarged mediastinal lymph  nodes with increased metabolic activity. There is focal area of increased  activity adjacent to the distal esophagus could represent lymph node or pleural  disease.     ABDOMEN/PELVIS: There is a small low-density in the right lobe of the liver  segment 6 which demonstrates increased metabolic activity. There is a 1.4 cm  lymph node anterior to the IVC with increased metabolic activity.     SKELETON: There is increased metabolic activity in the right second rib  posteriorly, right fifth rib posteriorly and right ninth rib laterally. There is  increased metabolic activity in T5 vertebral body lytic lesion. There is  increased metabolic activity in L2 vertebral body posteriorly on the right. There is a small focus of increased metabolic activity in the right iliac wing. There is focal increased activity in a 2.3 cm lytic lesion in the right ischium.     IMPRESSION  IMPRESSION:   1. There is increased metabolic activity in a mass in the right midlung and  small adjacent nodules suspicious for primary lung neoplasm. There is increased  metabolic activity in right hilar and mediastinal lymph nodes. There is  increased metabolic activity in the right pleural lesions suspicious for  metastatic disease. There is a large right effusion. There is increased  metabolic activity in a lymph node in the lower abdomen suspicious for  metastatic disease. There is a small lesion in the liver with increased  metabolic activity suspicious for metastatic disease. 2. There are multiple bony foci of increased metabolic activity consistent with  bony metastatic disease as described above. MRI brain reviewed- no metastasis    ASSESSMENT  Mr. Sol Callejas is a 78 y.o. male with  1. Stage IV NSCLC with R lung mass, mediastinal adenopathy, malignant R pleural effusion, multiple asymptomatic bone metastasis and possibly liver metastasis. EGFR mutated, PD-L1 5%  2.  S/P R thoracentesis on 5/15/17 and again on 5/25  3. Kanwal 6 prostate cancer on active surveillance  4. Anemia secondary to 1    DISCUSSION    Reviewed scans and pathology. Discussed that this is stage IV NSCLC, unfortunately incurable. Palliative treatments may prolong survival and improve QOL. He does have a sensitizing EGFR mutation. Reviewed that multiple trials Tarceva has been found to improve PFS compared to [facility] for EGFR mutated NSCLC. Side effects were reviewed and includes rash, diarrhea, EKG changes, nausea. I also am concerned about rapidly accumulating and now possibly loculated R malignant effusion. Will need a pleural catheter but may need to be done by Cardiothoracic surgery. Discussed palliative bisphosphonates, given his bone mets. Tarceva teaching provided. PLAN    - Patient wants to think about Tarceva.  Urged to start systemic therapy timely  - Will start approval process once he consents  - Codeine Promethazine for cough, secondary to effusion  - Will need orders for Zometa  - Refer to [facility] at [facility]- Dr. Sangeetha Beal    RTC this week    Kyleigh Cordova MD

## 2017-06-05 NOTE — TELEPHONE ENCOUNTER
GRANTA verified.  Call paced to Dr. Britney Jimenez office to schedule patient for an appoint per provider's referral. Appointment scheduled for 6/22 at 8am.

## 2017-06-06 ENCOUNTER — TELEPHONE (OUTPATIENT)
Dept: ONCOLOGY | Age: 67
End: 2017-06-06

## 2017-06-06 DIAGNOSIS — C34.90 ADENOCARCINOMA OF LUNG, STAGE 4, UNSPECIFIED LATERALITY (HCC): Primary | ICD-10-CM

## 2017-06-06 RX ORDER — ERLOTINIB HYDROCHLORIDE 150 MG/1
150 TABLET, FILM COATED ORAL DAILY
Qty: 30 TAB | Refills: 11 | Status: SHIPPED | OUTPATIENT
Start: 2017-06-06 | End: 2017-07-06

## 2017-06-06 NOTE — TELEPHONE ENCOUNTER
Front - please call with appointment. Thanks. See below. Appointment Request From: Amie Klein     With Provider: Servando Thomas MD [facility]     Preferred Date Range: From 6/6/2017 To 6/12/2017     Preferred Times: Any     Reason for visit: Lab/Medication Follow Up     Comments: Follow-up regarding proposed course of treatment.

## 2017-06-07 ENCOUNTER — DOCUMENTATION ONLY (OUTPATIENT)
Dept: ONCOLOGY | Age: 67
End: 2017-06-07

## 2017-06-09 ENCOUNTER — DOCUMENTATION ONLY (OUTPATIENT)
Dept: ONCOLOGY | Age: 67
End: 2017-06-09

## 2017-06-12 ENCOUNTER — OFFICE VISIT (OUTPATIENT)
Dept: ONCOLOGY | Age: 67
End: 2017-06-12

## 2017-06-12 ENCOUNTER — DOCUMENTATION ONLY (OUTPATIENT)
Dept: ONCOLOGY | Age: 67
End: 2017-06-12

## 2017-06-12 VITALS
HEART RATE: 76 BPM | RESPIRATION RATE: 16 BRPM | WEIGHT: 168.8 LBS | OXYGEN SATURATION: 96 % | TEMPERATURE: 97 F | BODY MASS INDEX: 22.37 KG/M2 | SYSTOLIC BLOOD PRESSURE: 165 MMHG | DIASTOLIC BLOOD PRESSURE: 91 MMHG | HEIGHT: 73 IN

## 2017-06-12 DIAGNOSIS — J90 PLEURAL EFFUSION, RIGHT: ICD-10-CM

## 2017-06-12 DIAGNOSIS — C34.91 MALIGNANT NEOPLASM OF RIGHT LUNG, UNSPECIFIED PART OF LUNG (HCC): Primary | ICD-10-CM

## 2017-06-12 DIAGNOSIS — R05.9 COUGH: ICD-10-CM

## 2017-06-12 DIAGNOSIS — C79.51 BONE METASTASES (HCC): ICD-10-CM

## 2017-06-12 LAB
BACTERIA SPEC CULT: NORMAL
SERVICE CMNT-IMP: NORMAL

## 2017-06-12 NOTE — PROGRESS NOTES
Lisa Greene is a 78 y.o. male here today for Stage IV EGFR mutated NSCLC f/u. Patient's B/P elevated. Patient stated prior to B/P being taken that he forgot to take his B/P medication today. When given his B/P by nurse patient stated he must have taken his B/P medication today because his B/P would be higher.

## 2017-06-12 NOTE — MR AVS SNAPSHOT
Visit Information Date & Time Provider Department Dept. Phone Encounter #  
 6/12/2017  2:00 PM Hema Kinney  [facility] Oncology at [facility] [phone] [id number] Follow-up Instructions Return in about 2 weeks (around 6/26/2017). Routing History Follow-up and Disposition History Your Appointments 11/17/2017  9:45 AM  
ROUTINE CARE with Luca Aleman DO [facility] ([address]) Appt Note: 6 month follow up [address] [facility] [address]  
[phone]  
  
   
 [address] [facility] [address] Upcoming Health Maintenance Date Due Hepatitis C Screening 1950 DTaP/Tdap/Td series (1 - Tdap) 12/2/1970 ZOSTER VACCINE AGE 60> 12/2/2009 GLAUCOMA SCREENING Q2Y 12/2/2014 Pneumococcal 65+ High/Highest Risk (1 of 2 - PCV13) 12/2/2014 MEDICARE YEARLY EXAM 1/19/2017 INFLUENZA AGE 9 TO ADULT 8/1/2017 FOBT Q 1 YEAR AGE 50-75 10/31/2017 Allergies as of 6/12/2017  Review Complete On: 6/12/2017 By: Hema Kinney MD  
 No Known Allergies Current Immunizations  Reviewed on 10/31/2016 Name Date Influenza High Dose Vaccine PF 10/31/2016 Not reviewed this visit You Were Diagnosed With   
  
 Codes Comments Malignant neoplasm of right lung, unspecified part of lung ([facility] [address]    -  Primary ICD-10-CM: C34.91 
ICD-9-CM: 162.9 Bone metastases ([facility] [address]     ICD-10-CM: C79.51 
ICD-9-CM: 198.5 Pleural effusion, right     ICD-10-CM: J90 ICD-9-CM: 511.9 Cough     ICD-10-CM: R05 ICD-9-CM: 511. 2 Vitals BP Pulse Temp Resp Height(growth percentile) Weight(growth percentile) (!) 165/91 (BP 1 Location: Left arm, BP Patient Position: Sitting) 76 97 °F (36.1 °C) (Oral) 16 6' 1\" (1.854 m) 168 lb 12.8 oz (76.6 kg) SpO2 BMI Smoking Status 96% 22.27 kg/m2 Never Smoker BMI and BSA Data Body Mass Index Body Surface Area 22.27 kg/m 2 1.99 m 2 Preferred Pharmacy Pharmacy Name Phone [address] [place] [address] Latesha Carrizales AT [address]. [phone] Your Updated Medication List  
  
   
This list is accurate as of: 6/12/17  2:58 PM.  Always use your most recent med list.  
  
  
  
  
 cholecalciferol 1,000 unit tablet Commonly known as:  VITAMIN D3 Take 1,000 Units by mouth daily. erlotinib 150 mg tablet Commonly known as:  Paco Nones Take 1 Tab by mouth daily for 30 days. 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 Max Daily Amount: 6 Tabs. * levothyroxine 25 mcg tablet Commonly known as:  SYNTHROID TK 1 T PO QD B MARZENA  
  
 * levothyroxine 75 mcg tablet Commonly known as:  SYNTHROID Take 0.5 Tabs by mouth Daily (before breakfast). losartan 50 mg tablet Commonly known as:  COZAAR Take 1 Tab by mouth daily. promethazine-codeine 6.25-10 mg/5 mL syrup Commonly known as:  PHENERGAN with CODEINE Take 5 mL by mouth four (4) times daily as needed for Cough. Max Daily Amount: 20 mL. Indications: COUGH * Notice: This list has 2 medication(s) that are the same as other medications prescribed for you. Read the directions carefully, and ask your doctor or other care provider to review them with you. Follow-up Instructions Return in about 2 weeks (around 6/26/2017). Introducing [facility] & HEALTH SERVICES! Dear Hudson Naidu: 
Thank you for requesting a [employer] account. Our records indicate that you already have an active [employer] account. You can access your account anytime at [email or web address] Did you know that you can access your hospital and ER discharge instructions at any time in [employer]?   You can also review all of your test results from your hospital stay or ER visit. Additional Information If you have questions, please visit the Frequently Asked Questions section of the [employer] website at [email or web address] Remember, [employer] is NOT to be used for urgent needs. For medical emergencies, dial 911. Now available from your iPhone and Android! Please provide this summary of care documentation to your next provider. Your primary care clinician is listed as Nicholas Castro. If you have any questions after today's visit, please call [phone].

## 2017-06-12 NOTE — PROGRESS NOTES
Hematology/Oncology progress note    REASON FOR VISIT: Stage IV EGFR mutated NSCLC  REQUESTED BY: Dr. Clifton Aschoff: Mr. Parvin Angulo is a 78 y.o. male with prostate cancer who was diagnosed with stage IV NSCLC in May 2017. Comes today to discuss systemic palliative therapies. Oncologic history  Mr. Parvin Angulo noticed a new cough and fevers back in March of 2017. He went to Urgent Care and received antibiotics and cough medication. He states this worked for a while, but he began to notice his cough return. This was intermittent. He had some tightness across his anterior chest which he related to the infection. Chest x-ray was abnormal and he was told to proceed to the Emergency Department. Randy Heller had been under surveillance for right lower lobe mass that was initially noted in 2015. Bronch at that time was negative for malignancy. He was evaluated by Dr. Mary Lou Medrano with Thoracic Surgery in 2015 for possible surgical resection. CT chest in November of 2016 with mass size unchanged but some right basilar pleural thickening. CT chest obtained 5/14/17 however showed a new large right pleural effusion with right middle lobe and right lower lobe collapse. Irregular spiculated right lower lobe mass 3.8cm and stable precarinal enlarged lymph nodes were noted. New right posterior second rib lytic lesion and new right hepatic hypodensity consistent with mets. He underwent a therapeutic thoracentesis on 5/15/17 with removal of 1500 cc of serosanguinous fluid. Pathology showed adenocarcinoma. PET CT showed pleural, bone, liver and flor metastasis. MRI brain 5/20/17: No metastasis. Needed repeat R sided thoracentesis on 5/25/17, had some post procedure hydropneumothorax.      CT guided biopsy of R lung mass done 5/25 which showed adenocarcinoma.  EGFR mutation detected Exon 18 and 21    Past Medical History:   Diagnosis Date    Hypertension        Past Surgical History:   Procedure Laterality Date    HX ORTHOPAEDIC      reconstruction of left little finger       No Known Allergies    Current Outpatient Prescriptions   Medication Sig Dispense Refill    levothyroxine (SYNTHROID) 75 mcg tablet Take 0.5 Tabs by mouth Daily (before breakfast). 30 Tab 1    cholecalciferol (VITAMIN D3) 1,000 unit tablet Take 1,000 Units by mouth daily.  losartan (COZAAR) 50 mg tablet Take 1 Tab by mouth daily. 30 Tab 5    erlotinib (TARCEVA) 150 mg tablet Take 1 Tab by mouth daily for 30 days. Indications: NSCLC with EGFR exon 21 ([id number]) substitution mutation 30 Tab 11    promethazine-codeine (PHENERGAN WITH CODEINE) 6.25-10 mg/5 mL syrup Take 5 mL by mouth four (4) times daily as needed for Cough. Max Daily Amount: 20 mL. Indications: COUGH 1 Bottle 0    levothyroxine (SYNTHROID) 25 mcg tablet TK 1 T PO QD B MARZENA  5    HYDROcodone-acetaminophen (NORCO) 5-325 mg per tablet Take 1 Tab by mouth every four (4) hours as needed. Max Daily Amount: 6 Tabs. 10 Tab 0       Social History     Social History    Marital status:      Spouse name: N/A    Number of children: N/A    Years of education: N/A     Social History Main Topics    Smoking status: Never Smoker    Smokeless tobacco: Never Used    Alcohol use 6.0 oz/week     10 Glasses of wine per week      Comment: regularly    Drug use: No    Sexual activity: Yes     Partners: Female     Other Topics Concern    None     Social History Narrative       Family History   Problem Relation Age of Onset    Cancer Mother      leukemia    Stroke Father     Cancer Brother      bladder       ROS      Cough better today. He does not notice SOB unless he is exerting himself (2-3 flight of stairs). He has some R leg pain rarely. He has seen Dr. Tanja Valiente at [facility] with [address] [facility].   He has had no CP, HA, vision changes, new aches, falls, diarrhea, dysuria, melena, hematochezia, weight or appetite changes, fevers, chills or night sweats  ECOG PS is 0      Emotional well being addressed and patient is coping well      Physical Examination:   Visit Vitals    BP (!) 165/91 (BP 1 Location: Left arm, BP Patient Position: Sitting)    Pulse 76    Temp 97 °F (36.1 °C) (Oral)    Resp 16    Ht 6' 1\" (1.854 m)    Wt 168 lb 12.8 oz (76.6 kg)    SpO2 96%    BMI 22.27 kg/m2     General appearance - alert, well appearing, and in no distress  Mental status - oriented to person, place, and time  Mouth - mucous membranes moist, pharynx normal without lesions  Neck - supple, no significant adenopathy  Lymphatics - no palpable lymphadenopathy, no hepatosplenomegaly  Chest - Decreased breath sounds on the R  Heart - normal rate, regular rhythm, normal S1, S2, no murmurs, rubs, clicks or gallops  Abdomen - soft, nontender, nondistended, no masses or organomegaly, bowel sounds present  Back exam - full range of motion, no tenderness, palpable spasm or pain on motion  Neurological - normal speech, no focal findings or movement disorder noted  LABS  Lab Results   Component Value Date/Time    WBC 6.9 05/15/2017 03:46 AM    HGB 11.1 05/15/2017 03:46 AM    HCT 34.7 05/15/2017 03:46 AM    PLATELET 794 99/65/6175 03:46 AM    MCV 87.4 05/15/2017 03:46 AM    ABS. NEUTROPHILS 4.6 05/15/2017 03:46 AM     Lab Results   Component Value Date/Time    Sodium 139 05/15/2017 03:46 AM    Potassium 3.8 05/15/2017 03:46 AM    Chloride 104 05/15/2017 03:46 AM    CO2 29 05/15/2017 03:46 AM    Glucose 88 05/15/2017 03:46 AM    BUN 14 05/15/2017 03:46 AM    Creatinine 0.73 05/15/2017 03:46 AM    GFR est AA >60 05/15/2017 03:46 AM    GFR est non-AA >60 05/15/2017 03:46 AM    Calcium 8.8 05/15/2017 03:46 AM     Lab Results   Component Value Date/Time    AST (SGOT) 17 05/15/2017 03:46 AM    Alk.  phosphatase 99 05/15/2017 03:46 AM    Protein, total 6.3 05/15/2017 03:46 AM    Albumin 2.9 05/15/2017 03:46 AM    Globulin 3.4 05/15/2017 03:46 AM    A-G Ratio 0.9 05/15/2017 03:46 AM     PET CT 5/15/17  IMAGING  CHEST: There is a mass right mid thorax measuring 3.6 cm with SUV of 13. There  are multiple pleural foci of increased metabolic activity. There is a large  right effusion. There is a second nodule adjacent to the larger nodule with  increased metabolic activity. There is a lymph node right hilum with increased  metabolic activity. There are several borderline enlarged mediastinal lymph  nodes with increased metabolic activity. There is focal area of increased  activity adjacent to the distal esophagus could represent lymph node or pleural  disease.     ABDOMEN/PELVIS: There is a small low-density in the right lobe of the liver  segment 6 which demonstrates increased metabolic activity. There is a 1.4 cm  lymph node anterior to the IVC with increased metabolic activity.     SKELETON: There is increased metabolic activity in the right second rib  posteriorly, right fifth rib posteriorly and right ninth rib laterally. There is  increased metabolic activity in T5 vertebral body lytic lesion. There is  increased metabolic activity in L2 vertebral body posteriorly on the right. There is a small focus of increased metabolic activity in the right iliac wing. There is focal increased activity in a 2.3 cm lytic lesion in the right ischium.     IMPRESSION  IMPRESSION:   1. There is increased metabolic activity in a mass in the right midlung and  small adjacent nodules suspicious for primary lung neoplasm. There is increased  metabolic activity in right hilar and mediastinal lymph nodes. There is  increased metabolic activity in the right pleural lesions suspicious for  metastatic disease. There is a large right effusion. There is increased  metabolic activity in a lymph node in the lower abdomen suspicious for  metastatic disease. There is a small lesion in the liver with increased  metabolic activity suspicious for metastatic disease.   2. There are multiple bony foci of increased metabolic activity consistent with  bony metastatic disease as described above.    MRI brain reviewed- no metastasis    ASSESSMENT  Mr. Katty Smart is a 78 y.o. male with  1. Stage IV NSCLC with R lung mass, mediastinal adenopathy, malignant R pleural effusion, multiple asymptomatic bone metastasis and possibly liver metastasis. EGFR mutated, PD-L1 5%  2. S/P R thoracentesis on 5/15/17 and again on 5/25  3. [place] 6 prostate cancer on active surveillance  4. Anemia secondary to 1    DISCUSSION    Reviewed scans and pathology. Discussed that this is stage IV NSCLC, unfortunately incurable. Palliative treatments may prolong survival and improve QOL. He does have a sensitizing EGFR mutation. Reviewed that multiple trials Tarceva has been found to improve PFS compared to [facility] for EGFR mutated NSCLC. Side effects were reviewed and includes rash, diarrhea, EKG changes, nausea. I also am concerned about rapidly accumulating and now possibly loculated R malignant effusion. Will need a pleural catheter but may need to be done by Cardiothoracic surgery. I have reviewed this with Dr. Lauren Dos Santos at [facility] who would kindly see him on 6/21/17. Patient is interested in obtaining a foundation one test, would consider when he progresses on Tarceva    Discussed palliative bisphosphonates, given his bone mets. Tarceva teaching provided. Consent obtained      PLAN    - Proceed with palliative Tarceva 150 mg daily. To hold day before, day of and day after ? Pleurodesis at [facility]  - Codeine Promethazine for cough, secondary to effusion  - Will need orders for Zometa  - Follow up with Dr. Lauren Dos Santos at [facility] for malignant pleural effusion  RTC this week    RTC 2 weeks after therapy.     Montserrat Stallings MD

## 2017-06-12 NOTE — PROGRESS NOTES
[facility] info on Tarceva/Chemo Safe Handling reviewed with patient. Oral chemo journal explained. Questions answered. Consent obtained for palliative therapy. Advised of specialty pharmacy contact # and refill process. Requested that patient call office when medication recd to confirm directions. Advised of need for standing labs. Requested labs done at [address] will fax when order generated and mail copy to patient. Verbalized understanding of plan and is aware to hold med in conjunction with surgery as pre Dr. Nevaeh Sofia direction. Plan:  Follow here two weeks after starting Tarceva.

## 2017-06-13 RX ORDER — ZOLEDRONIC ACID 4 MG/100ML
4 SOLUTION INTRAVENOUS ONCE
Status: COMPLETED | OUTPATIENT
Start: 2017-06-15 | End: 2017-06-15

## 2017-06-14 ENCOUNTER — TELEPHONE (OUTPATIENT)
Dept: ONCOLOGY | Age: 67
End: 2017-06-14

## 2017-06-14 NOTE — TELEPHONE ENCOUNTER
Patient called to see if we received a fax from [place] [id number] with documents for patient to sign.  He can be reached at [phone] when fax is received/

## 2017-06-15 ENCOUNTER — HOSPITAL ENCOUNTER (OUTPATIENT)
Dept: INFUSION THERAPY | Age: 67
Discharge: HOME | End: 2017-06-15
Payer: MEDICARE

## 2017-06-15 ENCOUNTER — TELEPHONE (OUTPATIENT)
Dept: ONCOLOGY | Age: 67
End: 2017-06-15

## 2017-06-15 VITALS
SYSTOLIC BLOOD PRESSURE: 155 MMHG | WEIGHT: 169 LBS | BODY MASS INDEX: 22.3 KG/M2 | DIASTOLIC BLOOD PRESSURE: 82 MMHG | RESPIRATION RATE: 18 BRPM | HEART RATE: 56 BPM | TEMPERATURE: 97.7 F

## 2017-06-15 LAB
ALBUMIN SERPL BCP-MCNC: 3 G/DL (ref 3.5–5)
ANION GAP BLD CALC-SCNC: 8 MMOL/L (ref 5–15)
BASOPHILS # BLD AUTO: 0 K/UL (ref 0–0.1)
BASOPHILS # BLD: 0 % (ref 0–1)
BUN SERPL-MCNC: 21 MG/DL (ref 6–20)
BUN/CREAT SERPL: 28 (ref 12–20)
CALCIUM SERPL-MCNC: 8.7 MG/DL (ref 8.5–10.1)
CHLORIDE SERPL-SCNC: 103 MMOL/L (ref 97–108)
CO2 SERPL-SCNC: 27 MMOL/L (ref 21–32)
CREAT SERPL-MCNC: 0.74 MG/DL (ref 0.7–1.3)
EOSINOPHIL # BLD: 0.2 K/UL (ref 0–0.4)
EOSINOPHIL NFR BLD: 2 % (ref 0–7)
ERYTHROCYTE [DISTWIDTH] IN BLOOD BY AUTOMATED COUNT: 14 % (ref 11.5–14.5)
GLUCOSE SERPL-MCNC: 78 MG/DL (ref 65–100)
HCT VFR BLD AUTO: 34.3 % (ref 36.6–50.3)
HGB BLD-MCNC: 10.8 G/DL (ref 12.1–17)
LYMPHOCYTES # BLD AUTO: 19 % (ref 12–49)
LYMPHOCYTES # BLD: 1.4 K/UL (ref 0.8–3.5)
MCH RBC QN AUTO: 26.9 PG (ref 26–34)
MCHC RBC AUTO-ENTMCNC: 31.5 G/DL (ref 30–36.5)
MCV RBC AUTO: 85.3 FL (ref 80–99)
MONOCYTES # BLD: 0.6 K/UL (ref 0–1)
MONOCYTES NFR BLD AUTO: 8 % (ref 5–13)
NEUTS SEG # BLD: 5.4 K/UL (ref 1.8–8)
NEUTS SEG NFR BLD AUTO: 71 % (ref 32–75)
PHOSPHATE SERPL-MCNC: 2.9 MG/DL (ref 2.6–4.7)
PLATELET # BLD AUTO: 203 K/UL (ref 150–400)
POTASSIUM SERPL-SCNC: 4.2 MMOL/L (ref 3.5–5.1)
RBC # BLD AUTO: 4.02 M/UL (ref 4.1–5.7)
SODIUM SERPL-SCNC: 138 MMOL/L (ref 136–145)
WBC # BLD AUTO: 7.5 K/UL (ref 4.1–11.1)

## 2017-06-15 PROCEDURE — 36415 COLL VENOUS BLD VENIPUNCTURE: CPT | Performed by: NURSE PRACTITIONER

## 2017-06-15 PROCEDURE — 80069 RENAL FUNCTION PANEL: CPT | Performed by: NURSE PRACTITIONER

## 2017-06-15 PROCEDURE — 96365 THER/PROPH/DIAG IV INF INIT: CPT

## 2017-06-15 PROCEDURE — 74011250636 HC RX REV CODE- 250/636: Performed by: NURSE PRACTITIONER

## 2017-06-15 PROCEDURE — 85025 COMPLETE CBC W/AUTO DIFF WBC: CPT | Performed by: NURSE PRACTITIONER

## 2017-06-15 RX ORDER — SODIUM CHLORIDE 0.9 % (FLUSH) 0.9 %
5-10 SYRINGE (ML) INJECTION AS NEEDED
Status: ACTIVE | OUTPATIENT
Start: 2017-06-15 | End: 2017-06-16

## 2017-06-15 RX ORDER — SODIUM CHLORIDE 9 MG/ML
25 INJECTION, SOLUTION INTRAVENOUS AS NEEDED
Status: DISPENSED | OUTPATIENT
Start: 2017-06-15 | End: 2017-06-16

## 2017-06-15 RX ADMIN — ZOLEDRONIC ACID 4 MG: 0.04 INJECTION, SOLUTION INTRAVENOUS at 16:06

## 2017-06-15 RX ADMIN — Medication 10 ML: at 16:05

## 2017-06-15 RX ADMIN — SODIUM CHLORIDE 25 ML/HR: 900 INJECTION, SOLUTION INTRAVENOUS at 16:07

## 2017-06-15 NOTE — PROGRESS NOTES
Outpatient Infusion Center Progress Note    9446 Pt admit to [facility] for Zometa ambulatory in stable condition. Assessment completed. Low grade fever noted of 100.1 prior to receiving the medication, Nain De Los Santos NP made aware and orders given to draw a CBC prior to treatment. Carenoted provided and reviewed for Zometa. IV established in the right [facility] [place] with positive blood return. CBC and renal function panel drawn and sent for processing. IV attached to NS at [address] /86 (BP 1 Location: Left arm, BP Patient Position: At rest)    Pulse 61    Temp 100.1 °F (37.8 °C)    Resp 18    Wt 76.7 kg (169 lb)    BMI 22.3 kg/m2       Medications:  Zometa 4mg IVPB    1655 Pt tolerated treatment well. Pt stayed for 30 minutes post infusion without incidence. D/c home ambulatory in no distress. Encouraged him to call should his fever spike at home.   Pt aware of next appointment scheduled for 7/13/17    Recent Results (from the past 12 hour(s))   RENAL FUNCTION PANEL    Collection Time: 06/15/17  3:26 PM   Result Value Ref Range    Sodium 138 136 - 145 mmol/L    Potassium 4.2 3.5 - 5.1 mmol/L    Chloride 103 97 - 108 mmol/L    CO2 27 21 - 32 mmol/L    Anion gap 8 5 - 15 mmol/L    Glucose 78 65 - 100 mg/dL    BUN 21 (H) 6 - 20 MG/DL    Creatinine 0.74 0.70 - 1.30 MG/DL    BUN/Creatinine ratio 28 (H) 12 - 20      GFR est AA >60 >60 ml/min/1.73m2    GFR est non-AA >60 >60 ml/min/1.73m2    Calcium 8.7 8.5 - 10.1 MG/DL    Phosphorus 2.9 2.6 - 4.7 MG/DL    Albumin 3.0 (L) 3.5 - 5.0 g/dL   CBC WITH AUTOMATED DIFF    Collection Time: 06/15/17  3:26 PM   Result Value Ref Range    WBC 7.5 4.1 - 11.1 K/uL    RBC 4.02 (L) 4.10 - 5.70 M/uL    HGB 10.8 (L) 12.1 - 17.0 g/dL    HCT 34.3 (L) 36.6 - 50.3 %    MCV 85.3 80.0 - 99.0 FL    MCH 26.9 26.0 - 34.0 PG    MCHC 31.5 30.0 - 36.5 g/dL    RDW 14.0 11.5 - 14.5 %    PLATELET 229 581 - 035 K/uL    NEUTROPHILS 71 32 - 75 %    LYMPHOCYTES 19 12 - 49 %    MONOCYTES 8 5 - 13 % EOSINOPHILS 2 0 - 7 %    BASOPHILS 0 0 - 1 %    ABS. NEUTROPHILS 5.4 1.8 - 8.0 K/UL    ABS. LYMPHOCYTES 1.4 0.8 - 3.5 K/UL    ABS. MONOCYTES 0.6 0.0 - 1.0 K/UL    ABS. EOSINOPHILS 0.2 0.0 - 0.4 K/UL    ABS.  BASOPHILS 0.0 0.0 - 0.1 K/UL

## 2017-06-15 NOTE — PROGRESS NOTES
Problem: Knowledge Deficit  Goal: *Verbalizes understanding of procedures and medications  Outcome: Progressing Towards Goal  carenotes on zometa provided and reviewed

## 2017-06-15 NOTE — TELEPHONE ENCOUNTER
Call from infusion. Patient there for first Zometa. Temp 100.1, feels fine. No complaints. Will add CBC today to make sure WBC stable. Call if temp elevated >101. Okay to take Tylenol PRN. May have temp elevation following Zometa, would still like him to call.

## 2017-06-20 ENCOUNTER — TELEPHONE (OUTPATIENT)
Dept: ONCOLOGY | Age: 67
End: 2017-06-20

## 2017-06-20 NOTE — TELEPHONE ENCOUNTER
Call to \[facility] [place] [facility]\"". Case number [id number]  Spoke with  Anjel Hernandez. Approval letter to follow. APPROVED for Tarva until 6/20/18. Scheduled delivery with patient for Friday. 6/23/17. Additional lab orders to provider for signature.

## 2017-06-20 NOTE — TELEPHONE ENCOUNTER
Representative from [employer] called to check on status of application for financial services completed by the patient.      Please call [phone], case [id number]

## 2017-06-26 DIAGNOSIS — C79.51 BONE METASTASES (HCC): ICD-10-CM

## 2017-06-26 DIAGNOSIS — C34.91 MALIGNANT NEOPLASM OF RIGHT LUNG, UNSPECIFIED PART OF LUNG (HCC): Primary | ICD-10-CM

## 2017-06-29 LAB
ACID FAST STN SPEC: NEGATIVE
MYCOBACTERIUM SPEC QL CULT: NEGATIVE
SPECIMEN PREPARATION: NORMAL
SPECIMEN SOURCE: NORMAL

## 2017-06-30 ENCOUNTER — HOSPITAL ENCOUNTER (OUTPATIENT)
Dept: RADIATION THERAPY | Age: 67
Discharge: HOME | End: 2017-06-30

## 2017-07-05 ENCOUNTER — HOSPITAL ENCOUNTER (OUTPATIENT)
Dept: RADIATION THERAPY | Age: 67
Discharge: HOME | End: 2017-07-05
Payer: MEDICARE

## 2017-07-05 PROCEDURE — 77307 TELETHX ISODOSE PLAN CPLX: CPT

## 2017-07-05 PROCEDURE — 77334 RADIATION TREATMENT AID(S): CPT

## 2017-07-10 ENCOUNTER — HOSPITAL ENCOUNTER (OUTPATIENT)
Dept: RADIATION THERAPY | Age: 67
Discharge: HOME | End: 2017-07-10
Payer: MEDICARE

## 2017-07-10 ENCOUNTER — HOSPITAL ENCOUNTER (OUTPATIENT)
Dept: LAB | Age: 67
Discharge: HOME | End: 2017-07-10
Payer: MEDICARE

## 2017-07-10 PROCEDURE — 77412 RADIATION TX DELIVERY LVL 3: CPT

## 2017-07-10 PROCEDURE — 77417 THER RADIOLOGY PORT IMAGE(S): CPT

## 2017-07-11 ENCOUNTER — HOSPITAL ENCOUNTER (OUTPATIENT)
Dept: RADIATION THERAPY | Age: 67
Discharge: HOME | End: 2017-07-11
Payer: MEDICARE

## 2017-07-11 LAB
ALBUMIN SERPL-MCNC: 3.9 G/DL (ref 3.6–4.8)
ALBUMIN/GLOB SERPL: 1.3 {RATIO} (ref 1.2–2.2)
ALP SERPL-CCNC: 114 IU/L (ref 39–117)
ALT SERPL-CCNC: 10 IU/L (ref 0–44)
AST SERPL-CCNC: 16 IU/L (ref 0–40)
BASOPHILS # BLD AUTO: 0 X10E3/UL (ref 0–0.2)
BASOPHILS NFR BLD AUTO: 0 %
BILIRUB DIRECT SERPL-MCNC: 0.28 MG/DL (ref 0–0.4)
BILIRUB SERPL-MCNC: 1 MG/DL (ref 0–1.2)
BUN SERPL-MCNC: 21 MG/DL (ref 8–27)
BUN/CREAT SERPL: 24 (ref 10–24)
CALCIUM SERPL-MCNC: 9.3 MG/DL (ref 8.6–10.2)
CHLORIDE SERPL-SCNC: 101 MMOL/L (ref 96–106)
CO2 SERPL-SCNC: 28 MMOL/L (ref 18–29)
CREAT SERPL-MCNC: 0.88 MG/DL (ref 0.76–1.27)
EOSINOPHIL # BLD AUTO: 0.2 X10E3/UL (ref 0–0.4)
EOSINOPHIL NFR BLD AUTO: 3 %
ERYTHROCYTE [DISTWIDTH] IN BLOOD BY AUTOMATED COUNT: 14.6 % (ref 12.3–15.4)
GLOBULIN SER CALC-MCNC: 2.9 G/DL (ref 1.5–4.5)
GLUCOSE SERPL-MCNC: 96 MG/DL (ref 65–99)
HCT VFR BLD AUTO: 40.2 % (ref 37.5–51)
HGB BLD-MCNC: 12.2 G/DL (ref 12.6–17.7)
IMM GRANULOCYTES # BLD: 0 X10E3/UL (ref 0–0.1)
IMM GRANULOCYTES NFR BLD: 0 %
LYMPHOCYTES # BLD AUTO: 1.5 X10E3/UL (ref 0.7–3.1)
LYMPHOCYTES NFR BLD AUTO: 21 %
MCH RBC QN AUTO: 25.7 PG (ref 26.6–33)
MCHC RBC AUTO-ENTMCNC: 30.3 G/DL (ref 31.5–35.7)
MCV RBC AUTO: 85 FL (ref 79–97)
MONOCYTES # BLD AUTO: 0.5 X10E3/UL (ref 0.1–0.9)
MONOCYTES NFR BLD AUTO: 6 %
NEUTROPHILS # BLD AUTO: 4.9 X10E3/UL (ref 1.4–7)
NEUTROPHILS NFR BLD AUTO: 70 %
PLATELET # BLD AUTO: 259 X10E3/UL (ref 150–379)
POTASSIUM SERPL-SCNC: 4.9 MMOL/L (ref 3.5–5.2)
PROT SERPL-MCNC: 6.8 G/DL (ref 6–8.5)
RBC # BLD AUTO: 4.75 X10E6/UL (ref 4.14–5.8)
SODIUM SERPL-SCNC: 140 MMOL/L (ref 134–144)
WBC # BLD AUTO: 7 X10E3/UL (ref 3.4–10.8)

## 2017-07-11 PROCEDURE — 77412 RADIATION TX DELIVERY LVL 3: CPT

## 2017-07-11 RX ORDER — ZOLEDRONIC ACID 4 MG/100ML
4 SOLUTION INTRAVENOUS ONCE
Status: COMPLETED | OUTPATIENT
Start: 2017-07-13 | End: 2017-07-13

## 2017-07-12 ENCOUNTER — HOSPITAL ENCOUNTER (OUTPATIENT)
Dept: RADIATION THERAPY | Age: 67
Discharge: HOME | End: 2017-07-12
Payer: MEDICARE

## 2017-07-12 PROCEDURE — 77412 RADIATION TX DELIVERY LVL 3: CPT

## 2017-07-13 ENCOUNTER — HOSPITAL ENCOUNTER (OUTPATIENT)
Dept: RADIATION THERAPY | Age: 67
Discharge: HOME | End: 2017-07-13
Payer: MEDICARE

## 2017-07-13 ENCOUNTER — HOSPITAL ENCOUNTER (OUTPATIENT)
Dept: INFUSION THERAPY | Age: 67
Discharge: HOME | End: 2017-07-13
Payer: MEDICARE

## 2017-07-13 VITALS
DIASTOLIC BLOOD PRESSURE: 72 MMHG | SYSTOLIC BLOOD PRESSURE: 123 MMHG | RESPIRATION RATE: 18 BRPM | HEART RATE: 54 BPM | TEMPERATURE: 98 F

## 2017-07-13 LAB
ALBUMIN SERPL BCP-MCNC: 3 G/DL (ref 3.5–5)
ALBUMIN/GLOB SERPL: 0.9 {RATIO} (ref 1.1–2.2)
ALP SERPL-CCNC: 108 U/L (ref 45–117)
ALT SERPL-CCNC: 16 U/L (ref 12–78)
ANION GAP BLD CALC-SCNC: 5 MMOL/L (ref 5–15)
AST SERPL W P-5'-P-CCNC: 16 U/L (ref 15–37)
BILIRUB DIRECT SERPL-MCNC: 0.2 MG/DL (ref 0–0.2)
BILIRUB SERPL-MCNC: 1 MG/DL (ref 0.2–1)
BUN SERPL-MCNC: 27 MG/DL (ref 6–20)
BUN/CREAT SERPL: 33 (ref 12–20)
CALCIUM SERPL-MCNC: 8.7 MG/DL (ref 8.5–10.1)
CHLORIDE SERPL-SCNC: 105 MMOL/L (ref 97–108)
CO2 SERPL-SCNC: 28 MMOL/L (ref 21–32)
CREAT SERPL-MCNC: 0.83 MG/DL (ref 0.7–1.3)
GLOBULIN SER CALC-MCNC: 3.5 G/DL (ref 2–4)
GLUCOSE SERPL-MCNC: 109 MG/DL (ref 65–100)
MAGNESIUM SERPL-MCNC: 2.1 MG/DL (ref 1.6–2.4)
PHOSPHATE SERPL-MCNC: 2.3 MG/DL (ref 2.6–4.7)
POTASSIUM SERPL-SCNC: 4 MMOL/L (ref 3.5–5.1)
PROT SERPL-MCNC: 6.5 G/DL (ref 6.4–8.2)
SODIUM SERPL-SCNC: 138 MMOL/L (ref 136–145)

## 2017-07-13 PROCEDURE — 96374 THER/PROPH/DIAG INJ IV PUSH: CPT

## 2017-07-13 PROCEDURE — 77412 RADIATION TX DELIVERY LVL 3: CPT

## 2017-07-13 PROCEDURE — 84100 ASSAY OF PHOSPHORUS: CPT | Performed by: INTERNAL MEDICINE

## 2017-07-13 PROCEDURE — 80076 HEPATIC FUNCTION PANEL: CPT | Performed by: INTERNAL MEDICINE

## 2017-07-13 PROCEDURE — 83735 ASSAY OF MAGNESIUM: CPT | Performed by: INTERNAL MEDICINE

## 2017-07-13 PROCEDURE — 36415 COLL VENOUS BLD VENIPUNCTURE: CPT | Performed by: INTERNAL MEDICINE

## 2017-07-13 PROCEDURE — 74011250636 HC RX REV CODE- 250/636: Performed by: NURSE PRACTITIONER

## 2017-07-13 PROCEDURE — 80048 BASIC METABOLIC PNL TOTAL CA: CPT | Performed by: INTERNAL MEDICINE

## 2017-07-13 RX ADMIN — ZOLEDRONIC ACID 4 MG: 0.04 INJECTION, SOLUTION INTRAVENOUS at 16:27

## 2017-07-13 NOTE — PROGRESS NOTES
Outpatient Infusion Center Short Visit Progress Note    1500 Pt admit to [facility] for Zometa ambulatory in stable condition. Assessment completed. No new concerns voiced. Please review pending lab results in [address] [place] [address]. Patient Vitals for the past 12 hrs:   Temp Pulse Resp BP   07/13/17 1508 98 °F (36.7 °C) (!) 54 18 123/72       PIV with positive blood return. Lab drawn, flushed and de-accessed per protocol. Medications:  Zometa     1700 Pt tolerated treatment well. D/c home ambulatory in no distress. Pt aware of next appointment scheduled for 8/10/17. Recent Results (from the past 12 hour(s))   METABOLIC PANEL, BASIC    Collection Time: 07/13/17  3:12 PM   Result Value Ref Range    Sodium 138 136 - 145 mmol/L    Potassium 4.0 3.5 - 5.1 mmol/L    Chloride 105 97 - 108 mmol/L    CO2 28 21 - 32 mmol/L    Anion gap 5 5 - 15 mmol/L    Glucose 109 (H) 65 - 100 mg/dL    BUN 27 (H) 6 - 20 MG/DL    Creatinine 0.83 0.70 - 1.30 MG/DL    BUN/Creatinine ratio 33 (H) 12 - 20      GFR est AA >60 >60 ml/min/1.73m2    GFR est non-AA >60 >60 ml/min/1.73m2    Calcium 8.7 8.5 - 10.1 MG/DL   MAGNESIUM    Collection Time: 07/13/17  3:12 PM   Result Value Ref Range    Magnesium 2.1 1.6 - 2.4 mg/dL   PHOSPHORUS    Collection Time: 07/13/17  3:12 PM   Result Value Ref Range    Phosphorus 2.3 (L) 2.6 - 4.7 MG/DL   HEPATIC FUNCTION PANEL    Collection Time: 07/13/17  3:12 PM   Result Value Ref Range    Protein, total 6.5 6.4 - 8.2 g/dL    Albumin 3.0 (L) 3.5 - 5.0 g/dL    Globulin 3.5 2.0 - 4.0 g/dL    A-G Ratio 0.9 (L) 1.1 - 2.2      Bilirubin, total 1.0 0.2 - 1.0 MG/DL    Bilirubin, direct 0.2 0.0 - 0.2 MG/DL    Alk.  phosphatase 108 45 - 117 U/L    AST (SGOT) 16 15 - 37 U/L    ALT (SGPT) 16 12 - 78 U/L

## 2017-07-14 ENCOUNTER — HOSPITAL ENCOUNTER (OUTPATIENT)
Dept: RADIATION THERAPY | Age: 67
Discharge: HOME | End: 2017-07-14
Payer: MEDICARE

## 2017-07-14 PROCEDURE — 77336 RADIATION PHYSICS CONSULT: CPT

## 2017-07-14 PROCEDURE — 77412 RADIATION TX DELIVERY LVL 3: CPT

## 2017-07-18 ENCOUNTER — OFFICE VISIT (OUTPATIENT)
Dept: ONCOLOGY | Age: 67
End: 2017-07-18

## 2017-07-18 VITALS
WEIGHT: 157 LBS | TEMPERATURE: 97.1 F | RESPIRATION RATE: 20 BRPM | BODY MASS INDEX: 20.81 KG/M2 | HEIGHT: 73 IN | OXYGEN SATURATION: 95 % | DIASTOLIC BLOOD PRESSURE: 83 MMHG | HEART RATE: 56 BPM | SYSTOLIC BLOOD PRESSURE: 154 MMHG

## 2017-07-18 DIAGNOSIS — J90 PLEURAL EFFUSION, RIGHT: ICD-10-CM

## 2017-07-18 DIAGNOSIS — R21 RASH AND NONSPECIFIC SKIN ERUPTION: ICD-10-CM

## 2017-07-18 DIAGNOSIS — C34.91 MALIGNANT NEOPLASM OF RIGHT LUNG, UNSPECIFIED PART OF LUNG (HCC): Primary | ICD-10-CM

## 2017-07-18 DIAGNOSIS — C79.51 BONE METASTASES (HCC): ICD-10-CM

## 2017-07-18 NOTE — MR AVS SNAPSHOT
Visit Information Date & Time Provider Department Dept. Phone Encounter #  
 7/18/2017  9:00 AM Carmen Cordova MD [facility] Oncology at [address] [id number] Your Appointments 11/17/2017  9:45 AM  
ROUTINE CARE with Blossom Basilio DO [facility] Appt Note: 6 month follow up [address] [facility] [address] [place] [address]  
[phone]  
  
   
 [address] [facility] [address] Upcoming Health Maintenance Date Due Hepatitis C Screening 1950 DTaP/Tdap/Td series (1 - Tdap) 12/2/1970 ZOSTER VACCINE AGE 60> 12/2/2009 GLAUCOMA SCREENING Q2Y 12/2/2014 Pneumococcal 65+ High/Highest Risk (1 of 2 - PCV13) 12/2/2014 MEDICARE YEARLY EXAM 1/19/2017 INFLUENZA AGE 9 TO ADULT 8/1/2017 FOBT Q 1 YEAR AGE 50-75 10/31/2017 Allergies as of 7/18/2017  Review Complete On: 7/18/2017 By: Yuliana Pineda No Known Allergies Current Immunizations  Reviewed on 6/15/2017 Name Date Influenza High Dose Vaccine PF 10/31/2016 Not reviewed this visit You Were Diagnosed With   
  
 Codes Comments Malignant neoplasm of right lung, unspecified part of lung ([facility] [address]    -  Primary ICD-10-CM: C34.91 
ICD-9-CM: 162.9 Vitals BP Pulse Temp Resp Height(growth percentile) Weight(growth percentile) 154/83 (!) 56 97.1 °F (36.2 °C) 20 6' 1\" (1.854 m) 157 lb (71.2 kg) SpO2 BMI Smoking Status 95% 20.71 kg/m2 Never Smoker Vitals History BMI and BSA Data Body Mass Index Body Surface Area 20.71 kg/m 2 1.91 m 2 Preferred Pharmacy Pharmacy Name Phone [address] [facility] [address] AT [phone] Your Updated Medication List  
  
   
This list is accurate as of: 7/18/17  9:31 AM.  Always use your most recent med list.  
  
  
  
  
 cholecalciferol 1,000 unit tablet Commonly known as:  VITAMIN D3 Take 1,000 Units by mouth daily. HYDROcodone-acetaminophen 5-325 mg per tablet Commonly known as:  Florecita Nevarez Take 1 Tab by mouth every four (4) hours as needed. Max Daily Amount: 6 Tabs. * levothyroxine 25 mcg tablet Commonly known as:  SYNTHROID TK 1 T PO QD B MARZENA  
  
 * levothyroxine 75 mcg tablet Commonly known as:  SYNTHROID Take 0.5 Tabs by mouth Daily (before breakfast). losartan 50 mg tablet Commonly known as:  COZAAR Take 1 Tab by mouth daily. promethazine-codeine 6.25-10 mg/5 mL syrup Commonly known as:  PHENERGAN with CODEINE Take 5 mL by mouth four (4) times daily as needed for Cough. Max Daily Amount: 20 mL. Indications: COUGH * Notice: This list has 2 medication(s) that are the same as other medications prescribed for you. Read the directions carefully, and ask your doctor or other care provider to review them with you. To-Do List   
 08/10/2017 3:00 PM  
  Appointment with [address] [place] [address] ([phone])  
  
 08/16/2017 Lab:  CBC WITH AUTOMATED DIFF   
  
 08/16/2017 Lab:  METABOLIC PANEL, COMPREHENSIVE   
  
 09/07/2017 3:00 PM  
  Appointment with [address] [place] [address] ([phone]) 10/05/2017 3:00 PM  
  Appointment with [address] [place] [address] ([phone])  
  
 11/02/2017 3:00 PM  
  Appointment with [address] [place] [address] ([phone])  
  
 11/30/2017 3:00 PM  
  Appointment with [address] [place] [address] ([phone]) Introducing [facility] [place] [facility]! Dear Nia Rowe: 
Thank you for requesting a [employer] account. Our records indicate that you already have an active [employer] account. You can access your account anytime at [email or web address] Did you know that you can access your hospital and ER discharge instructions at any time in [employer]? You can also review all of your test results from your hospital stay or ER visit. Additional Information If you have questions, please visit the Frequently Asked Questions section of the [employer] website at [email or web address] Remember, [employer] is NOT to be used for urgent needs. For medical emergencies, dial 911. Now available from your iPhone and Android! Please provide this summary of care documentation to your next provider. Your primary care clinician is listed as Prema Eng. If you have any questions after today's visit, please call [phone].

## 2017-07-18 NOTE — PROGRESS NOTES
Hematology/Oncology progress note    REASON FOR VISIT: Stage IV EGFR mutated NSCLC  REQUESTED BY: Dr. Edyta Ayala: Mr. Kate Isaac is a 78 y.o. male with prostate cancer who was diagnosed with stage IV NSCLC- adenocarcinoma ( EGFR mutated , PD-L1 low) in May 2017. Follow up on palliative Tarceva- Initiated 6/26/17    Oncologic history  Mr. Kate Isaac noticed a new cough and fevers back in March of 2017. He went to Urgent Care and received antibiotics and cough medication. He states this worked for a while, but he began to notice his cough return. This was intermittent. He had some tightness across his anterior chest which he related to the infection. Chest x-ray was abnormal and he was told to proceed to the Emergency Department. Sandy Brennan had been under surveillance for right lower lobe mass that was initially noted in 2015. Bronch at that time was negative for malignancy. He was evaluated by Dr. Zachery Main with Thoracic Surgery in 2015 for possible surgical resection. CT chest in November of 2016 with mass size unchanged but some right basilar pleural thickening. CT chest obtained 5/14/17 however showed a new large right pleural effusion with right middle lobe and right lower lobe collapse. Irregular spiculated right lower lobe mass 3.8cm and stable precarinal enlarged lymph nodes were noted. New right posterior second rib lytic lesion and new right hepatic hypodensity consistent with mets. He underwent a therapeutic thoracentesis on 5/15/17 with removal of 1500 cc of serosanguinous fluid. Pathology showed adenocarcinoma. PET CT showed pleural, bone, liver and flor metastasis. MRI brain 5/20/17: No metastasis. Needed repeat R sided thoracentesis on 5/25/17, had some post procedure hydropneumothorax. He was seen by Dr. Yamila Felix at [facility] for Pleurx catheter.      CT guided biopsy of R lung mass done 5/25 which showed adenocarcinoma.  EGFR mutation detected Exon 18 and 21      Treatment  6/26/17: Tarceva. Monthly Xgeva. Palliative XRT to R hip    Past Medical History:   Diagnosis Date    Hypertension        Past Surgical History:   Procedure Laterality Date    HX ORTHOPAEDIC      reconstruction of left little finger       No Known Allergies    Current Outpatient Prescriptions   Medication Sig Dispense Refill    levothyroxine (SYNTHROID) 75 mcg tablet Take 0.5 Tabs by mouth Daily (before breakfast). 30 Tab 1    cholecalciferol (VITAMIN D3) 1,000 unit tablet Take 1,000 Units by mouth daily.  promethazine-codeine (PHENERGAN WITH CODEINE) 6.25-10 mg/5 mL syrup Take 5 mL by mouth four (4) times daily as needed for Cough. Max Daily Amount: 20 mL. Indications: COUGH 1 Bottle 0    levothyroxine (SYNTHROID) 25 mcg tablet TK 1 T PO QD B MARZENA  5    HYDROcodone-acetaminophen (NORCO) 5-325 mg per tablet Take 1 Tab by mouth every four (4) hours as needed. Max Daily Amount: 6 Tabs. 10 Tab 0    losartan (COZAAR) 50 mg tablet Take 1 Tab by mouth daily. 27 Tab 5       Social History     Social History    Marital status:      Spouse name: N/A    Number of children: N/A    Years of education: N/A     Social History Main Topics    Smoking status: Never Smoker    Smokeless tobacco: Never Used    Alcohol use 6.0 oz/week     10 Glasses of wine per week      Comment: regularly    Drug use: No    Sexual activity: Yes     Partners: Female     Other Topics Concern    None     Social History Narrative       Family History   Problem Relation Age of Onset    Cancer Mother      leukemia    Stroke Father     Cancer Brother      bladder       ROS    He has been tolerating therapy. He had R hip pain from known bone metastasis. He had XRT to that which was completed last week. Did improve initially but pain returned. No other pain. No CP, No SOB, no cough. He has a minor rash on his nose and temple. He has altered smell. No HA, vision issues or diarrhea. He has been eating well and maintaining his weight.    ECOG PS is 0      Emotional well being addressed and patient is coping well      Physical Examination:   Visit Vitals    /83    Pulse (!) 56    Temp 97.1 °F (36.2 °C)    Resp 20    Ht 6' 1\" (1.854 m)    Wt 157 lb (71.2 kg)    SpO2 95%    BMI 20.71 kg/m2     General appearance - alert, well appearing, and in no distress  Mental status - oriented to person, place, and time  Mouth - mucous membranes moist, pharynx normal without lesions. Aceniform rash on the bridge of nose  Neck - supple, no significant adenopathy  Lymphatics - no palpable lymphadenopathy, no hepatosplenomegaly  Chest - R pleurx. R basilar crackles  Heart - normal rate, regular rhythm, normal S1, S2, no murmurs, rubs, clicks or gallops  Abdomen - soft, nontender, nondistended, no masses or organomegaly, bowel sounds present  Neurological - normal speech, no focal findings or movement disorder noted    LABS  Lab Results   Component Value Date/Time    WBC 7.0 07/10/2017 08:22 AM    HGB 12.2 07/10/2017 08:22 AM    HCT 40.2 07/10/2017 08:22 AM    PLATELET 873 64/18/9605 08:22 AM    MCV 85 07/10/2017 08:22 AM    ABS. NEUTROPHILS 4.9 07/10/2017 08:22 AM     Lab Results   Component Value Date/Time    Sodium 138 07/13/2017 03:12 PM    Potassium 4.0 07/13/2017 03:12 PM    Chloride 105 07/13/2017 03:12 PM    CO2 28 07/13/2017 03:12 PM    Glucose 109 07/13/2017 03:12 PM    BUN 27 07/13/2017 03:12 PM    Creatinine 0.83 07/13/2017 03:12 PM    GFR est AA >60 07/13/2017 03:12 PM    GFR est non-AA >60 07/13/2017 03:12 PM    Calcium 8.7 07/13/2017 03:12 PM     Lab Results   Component Value Date/Time    AST (SGOT) 16 07/13/2017 03:12 PM    Alk. phosphatase 108 07/13/2017 03:12 PM    Protein, total 6.5 07/13/2017 03:12 PM    Albumin 3.0 07/13/2017 03:12 PM    Globulin 3.5 07/13/2017 03:12 PM    A-G Ratio 0.9 07/13/2017 03:12 PM     PET CT 5/15/17  IMAGING  CHEST: There is a mass right mid thorax measuring 3.6 cm with SUV of 13.  There  are multiple pleural foci of increased metabolic activity. There is a large  right effusion. There is a second nodule adjacent to the larger nodule with  increased metabolic activity. There is a lymph node right hilum with increased  metabolic activity. There are several borderline enlarged mediastinal lymph  nodes with increased metabolic activity. There is focal area of increased  activity adjacent to the distal esophagus could represent lymph node or pleural  disease.     ABDOMEN/PELVIS: There is a small low-density in the right lobe of the liver  segment 6 which demonstrates increased metabolic activity. There is a 1.4 cm  lymph node anterior to the IVC with increased metabolic activity.     SKELETON: There is increased metabolic activity in the right second rib  posteriorly, right fifth rib posteriorly and right ninth rib laterally. There is  increased metabolic activity in T5 vertebral body lytic lesion. There is  increased metabolic activity in L2 vertebral body posteriorly on the right. There is a small focus of increased metabolic activity in the right iliac wing. There is focal increased activity in a 2.3 cm lytic lesion in the right ischium.     IMPRESSION  IMPRESSION:   1. There is increased metabolic activity in a mass in the right midlung and  small adjacent nodules suspicious for primary lung neoplasm. There is increased  metabolic activity in right hilar and mediastinal lymph nodes. There is  increased metabolic activity in the right pleural lesions suspicious for  metastatic disease. There is a large right effusion. There is increased  metabolic activity in a lymph node in the lower abdomen suspicious for  metastatic disease. There is a small lesion in the liver with increased  metabolic activity suspicious for metastatic disease. 2. There are multiple bony foci of increased metabolic activity consistent with  bony metastatic disease as described above.     MRI brain reviewed- no metastasis    ASSESSMENT  Mr. Radha Hernandez is a 78 y.o. male with  1. Stage IV NSCLC with R lung mass, mediastinal adenopathy, malignant R pleural effusion, multiple asymptomatic bone metastasis and possibly liver metastasis. EGFR mutated, PD-L1 5%  2. S/P R thoracentesis on 5/15/17 and again on 5/25- s/p Pleurx by Dr. Hortensia Celeste  3. Kanwal 6 prostate cancer on active surveillance  4. Anemia secondary to 1-improved  5. R hip pain secondary to osseous metastasis s/p XRT  6. Tarceva induced grade 1 rash      On Palliative Tarceva       DISCUSSION      Discussed that this is stage IV NSCLC, unfortunately incurable. Palliative treatments may prolong survival and improve QOL. He does have a sensitizing EGFR mutation. Reviewed that multiple trials Tarceva has been found to improve PFS compared to [facility] for EGFR mutated NSCLC. Side effects were reviewed and includes rash, diarrhea, EKG changes, nausea. Started Tarceva 3 weeks go and tolerating it well. Patient is interested in obtaining a foundation one test, would consider when he progresses on Tarceva        PLAN    - Continue Tarceva 150 mg daily. To hold day of pleurx removal by Dr Hortensia Celeste  - Clindamycin for rash  - Declines any analgesics for R hip pain  - RTC 1 month with cbc, cmp.  Scans prior to cycle 3      Mary Lynn MD

## 2017-08-07 RX ORDER — ZOLEDRONIC ACID 4 MG/100ML
4 SOLUTION INTRAVENOUS ONCE
Status: COMPLETED | OUTPATIENT
Start: 2017-08-10 | End: 2017-08-10

## 2017-08-10 ENCOUNTER — HOSPITAL ENCOUNTER (OUTPATIENT)
Dept: INFUSION THERAPY | Age: 67
Discharge: HOME | End: 2017-08-10
Payer: MEDICARE

## 2017-08-10 VITALS
HEIGHT: 73 IN | BODY MASS INDEX: 21.1 KG/M2 | SYSTOLIC BLOOD PRESSURE: 166 MMHG | HEART RATE: 51 BPM | RESPIRATION RATE: 18 BRPM | WEIGHT: 159.2 LBS | OXYGEN SATURATION: 96 % | DIASTOLIC BLOOD PRESSURE: 84 MMHG | TEMPERATURE: 97.7 F

## 2017-08-10 LAB
ALBUMIN SERPL BCP-MCNC: 3.2 G/DL (ref 3.5–5)
ANION GAP BLD CALC-SCNC: 2 MMOL/L (ref 5–15)
BUN SERPL-MCNC: 21 MG/DL (ref 6–20)
BUN/CREAT SERPL: 33 (ref 12–20)
CALCIUM SERPL-MCNC: 8.2 MG/DL (ref 8.5–10.1)
CHLORIDE SERPL-SCNC: 103 MMOL/L (ref 97–108)
CO2 SERPL-SCNC: 35 MMOL/L (ref 21–32)
CREAT SERPL-MCNC: 0.63 MG/DL (ref 0.7–1.3)
GLUCOSE SERPL-MCNC: 79 MG/DL (ref 65–100)
PHOSPHATE SERPL-MCNC: 2.6 MG/DL (ref 2.6–4.7)
POTASSIUM SERPL-SCNC: 3.9 MMOL/L (ref 3.5–5.1)
SODIUM SERPL-SCNC: 140 MMOL/L (ref 136–145)

## 2017-08-10 PROCEDURE — 74011250636 HC RX REV CODE- 250/636: Performed by: NURSE PRACTITIONER

## 2017-08-10 PROCEDURE — 80069 RENAL FUNCTION PANEL: CPT | Performed by: NURSE PRACTITIONER

## 2017-08-10 PROCEDURE — 36415 COLL VENOUS BLD VENIPUNCTURE: CPT | Performed by: NURSE PRACTITIONER

## 2017-08-10 PROCEDURE — 96374 THER/PROPH/DIAG INJ IV PUSH: CPT

## 2017-08-10 RX ORDER — SODIUM CHLORIDE 0.9 % (FLUSH) 0.9 %
5-10 SYRINGE (ML) INJECTION AS NEEDED
Status: ACTIVE | OUTPATIENT
Start: 2017-08-10 | End: 2017-08-11

## 2017-08-10 RX ORDER — SODIUM CHLORIDE 9 MG/ML
25 INJECTION, SOLUTION INTRAVENOUS AS NEEDED
Status: DISPENSED | OUTPATIENT
Start: 2017-08-10 | End: 2017-08-11

## 2017-08-10 RX ADMIN — SODIUM CHLORIDE 25 ML/HR: 900 INJECTION, SOLUTION INTRAVENOUS at 15:20

## 2017-08-10 RX ADMIN — ZOLEDRONIC ACID 4 MG: 0.04 INJECTION, SOLUTION INTRAVENOUS at 16:56

## 2017-08-10 RX ADMIN — Medication 10 ML: at 15:15

## 2017-08-10 NOTE — PROGRESS NOTES
1500 Pt admit to [facility] for Q 4 week labs, Zometa ambulatory in stable condition. Assessment completed. No new concerns voiced. Peripheral IV access established with positive blood return. Labs drawn per order and sent. Normal Saline at [facility]. Visit Vitals    /85    Pulse (!) 48    Temp 97.7 °F (36.5 °C)    Resp 18    Ht 6' 1\" (1.854 m)    Wt 72.2 kg (159 lb 3.2 oz)    SpO2 96%    BMI 21 kg/m2       Medications:  Normal Saline  Zometa 4 MG    1720 Pt tolerated treatment well. Peripheral IV removed at discharge. D/c home ambulatory in no distress. Pt aware of next appointment scheduled for 9/7/17.     Recent Results (from the past 12 hour(s))   RENAL FUNCTION PANEL    Collection Time: 08/10/17  3:10 PM   Result Value Ref Range    Sodium 140 136 - 145 mmol/L    Potassium 3.9 3.5 - 5.1 mmol/L    Chloride 103 97 - 108 mmol/L    CO2 35 (H) 21 - 32 mmol/L    Anion gap 2 (L) 5 - 15 mmol/L    Glucose 79 65 - 100 mg/dL    BUN 21 (H) 6 - 20 MG/DL    Creatinine 0.63 (L) 0.70 - 1.30 MG/DL    BUN/Creatinine ratio 33 (H) 12 - 20      GFR est AA >60 >60 ml/min/1.73m2    GFR est non-AA >60 >60 ml/min/1.73m2    Calcium 8.2 (L) 8.5 - 10.1 MG/DL    Phosphorus 2.6 2.6 - 4.7 MG/DL    Albumin 3.2 (L) 3.5 - 5.0 g/dL

## 2017-08-16 ENCOUNTER — HOSPITAL ENCOUNTER (OUTPATIENT)
Dept: LAB | Age: 67
Discharge: HOME | End: 2017-08-16
Payer: MEDICARE

## 2017-08-16 DIAGNOSIS — C34.91 MALIGNANT NEOPLASM OF RIGHT LUNG, UNSPECIFIED PART OF LUNG (HCC): ICD-10-CM

## 2017-08-16 PROCEDURE — 85025 COMPLETE CBC W/AUTO DIFF WBC: CPT

## 2017-08-16 PROCEDURE — 36415 COLL VENOUS BLD VENIPUNCTURE: CPT

## 2017-08-16 PROCEDURE — 80053 COMPREHEN METABOLIC PANEL: CPT

## 2017-08-16 RX ORDER — LOSARTAN POTASSIUM 50 MG/1
TABLET ORAL
Qty: 30 TAB | Refills: 0 | Status: SHIPPED | OUTPATIENT
Start: 2017-08-16 | End: 2017-09-07 | Stop reason: SDUPTHER

## 2017-08-17 LAB
ALBUMIN SERPL-MCNC: 3.5 G/DL (ref 3.6–4.8)
ALBUMIN/GLOB SERPL: 1.3 {RATIO} (ref 1.2–2.2)
ALP SERPL-CCNC: 80 IU/L (ref 39–117)
ALT SERPL-CCNC: 17 IU/L (ref 0–44)
AST SERPL-CCNC: 20 IU/L (ref 0–40)
BASOPHILS # BLD AUTO: 0 X10E3/UL (ref 0–0.2)
BASOPHILS NFR BLD AUTO: 0 %
BILIRUB SERPL-MCNC: 0.9 MG/DL (ref 0–1.2)
BUN SERPL-MCNC: 18 MG/DL (ref 8–27)
BUN/CREAT SERPL: 23 (ref 10–24)
CALCIUM SERPL-MCNC: 8.7 MG/DL (ref 8.6–10.2)
CHLORIDE SERPL-SCNC: 100 MMOL/L (ref 96–106)
CO2 SERPL-SCNC: 26 MMOL/L (ref 18–29)
CREAT SERPL-MCNC: 0.8 MG/DL (ref 0.76–1.27)
EOSINOPHIL # BLD AUTO: 0.3 X10E3/UL (ref 0–0.4)
EOSINOPHIL NFR BLD AUTO: 5 %
ERYTHROCYTE [DISTWIDTH] IN BLOOD BY AUTOMATED COUNT: 16.9 % (ref 12.3–15.4)
GLOBULIN SER CALC-MCNC: 2.8 G/DL (ref 1.5–4.5)
GLUCOSE SERPL-MCNC: 77 MG/DL (ref 65–99)
HCT VFR BLD AUTO: 38 % (ref 37.5–51)
HGB BLD-MCNC: 11.8 G/DL (ref 12.6–17.7)
IMM GRANULOCYTES # BLD: 0 X10E3/UL (ref 0–0.1)
IMM GRANULOCYTES NFR BLD: 0 %
LYMPHOCYTES # BLD AUTO: 1.2 X10E3/UL (ref 0.7–3.1)
LYMPHOCYTES NFR BLD AUTO: 19 %
MCH RBC QN AUTO: 25.7 PG (ref 26.6–33)
MCHC RBC AUTO-ENTMCNC: 31.1 G/DL (ref 31.5–35.7)
MCV RBC AUTO: 83 FL (ref 79–97)
MONOCYTES # BLD AUTO: 0.4 X10E3/UL (ref 0.1–0.9)
MONOCYTES NFR BLD AUTO: 7 %
NEUTROPHILS # BLD AUTO: 4.2 X10E3/UL (ref 1.4–7)
NEUTROPHILS NFR BLD AUTO: 69 %
PLATELET # BLD AUTO: 190 X10E3/UL (ref 150–379)
POTASSIUM SERPL-SCNC: 4 MMOL/L (ref 3.5–5.2)
PROT SERPL-MCNC: 6.3 G/DL (ref 6–8.5)
RBC # BLD AUTO: 4.6 X10E6/UL (ref 4.14–5.8)
SODIUM SERPL-SCNC: 144 MMOL/L (ref 134–144)
WBC # BLD AUTO: 6.1 X10E3/UL (ref 3.4–10.8)

## 2017-08-18 ENCOUNTER — OFFICE VISIT (OUTPATIENT)
Dept: ONCOLOGY | Age: 67
End: 2017-08-18

## 2017-08-18 VITALS
HEART RATE: 59 BPM | SYSTOLIC BLOOD PRESSURE: 184 MMHG | WEIGHT: 157.2 LBS | DIASTOLIC BLOOD PRESSURE: 85 MMHG | OXYGEN SATURATION: 98 % | BODY MASS INDEX: 20.83 KG/M2 | HEIGHT: 73 IN | TEMPERATURE: 97.8 F | RESPIRATION RATE: 16 BRPM

## 2017-08-18 DIAGNOSIS — C79.51 BONE METASTASES (HCC): ICD-10-CM

## 2017-08-18 DIAGNOSIS — J90 PLEURAL EFFUSION, RIGHT: ICD-10-CM

## 2017-08-18 DIAGNOSIS — C34.91 MALIGNANT NEOPLASM OF RIGHT LUNG, UNSPECIFIED PART OF LUNG (HCC): Primary | ICD-10-CM

## 2017-08-18 NOTE — PROGRESS NOTES
Hematology/Oncology progress note    REASON FOR VISIT: Stage IV EGFR mutated NSCLC  REQUESTED BY: Dr. Lizeth Nevarez: Mr. Michelle Domínguez is a 78 y.o. male with prostate cancer who was diagnosed with stage IV NSCLC- adenocarcinoma ( EGFR mutated , PD-L1 low) in May 2017. Follow up on palliative Tarceva- Initiated 6/26/17    Oncologic history   Mr. Michelle Domínguez noticed a new cough and fevers back in March of 2017. He went to Urgent Care and received antibiotics and cough medication. He states this worked for a while, but he began to notice his cough return. This was intermittent. He had some tightness across his anterior chest which he related to the infection. Chest x-ray was abnormal and he was told to proceed to the Emergency Department. Yamila Mendez had been under surveillance for right lower lobe mass that was initially noted in 2015. Bronch at that time was negative for malignancy. He was evaluated by Dr. Magdaleno Angelo with Thoracic Surgery in 2015 for possible surgical resection. CT chest in November of 2016 with mass size unchanged but some right basilar pleural thickening. CT chest obtained 5/14/17 however showed a new large right pleural effusion with right middle lobe and right lower lobe collapse. Irregular spiculated right lower lobe mass 3.8cm and stable precarinal enlarged lymph nodes were noted. New right posterior second rib lytic lesion and new right hepatic hypodensity consistent with mets. He underwent a therapeutic thoracentesis on 5/15/17 with removal of 1500 cc of serosanguinous fluid. Pathology showed adenocarcinoma. PET CT showed pleural, bone, liver and flor metastasis. MRI brain 5/20/17: No metastasis. Needed repeat R sided thoracentesis on 5/25/17, had some post procedure hydropneumothorax. He was seen by Dr. Jay Mancilla at [facility] for Pleurx catheter.      CT guided biopsy of R lung mass done 5/25 which showed adenocarcinoma.  EGFR mutation detected Exon 18 and 21      Treatment  6/26/17: Tarceva. Monthly Xgeva. Palliative XRT to R hip    Past Medical History:   Diagnosis Date    Hypertension        Past Surgical History:   Procedure Laterality Date    HX ORTHOPAEDIC      reconstruction of left little finger       No Known Allergies    Current Outpatient Prescriptions   Medication Sig Dispense Refill    losartan (COZAAR) 50 mg tablet TAKE 1 TABLET BY MOUTH DAILY 30 Tab 0    promethazine-codeine (PHENERGAN WITH CODEINE) 6.25-10 mg/5 mL syrup Take 5 mL by mouth four (4) times daily as needed for Cough. Max Daily Amount: 20 mL. Indications: COUGH 1 Bottle 0    levothyroxine (SYNTHROID) 25 mcg tablet TK 1 T PO QD B MARZENA  5    levothyroxine (SYNTHROID) 75 mcg tablet Take 0.5 Tabs by mouth Daily (before breakfast). 30 Tab 1    HYDROcodone-acetaminophen (NORCO) 5-325 mg per tablet Take 1 Tab by mouth every four (4) hours as needed. Max Daily Amount: 6 Tabs. 10 Tab 0    cholecalciferol (VITAMIN D3) 1,000 unit tablet Take 1,000 Units by mouth daily. Social History     Social History    Marital status:      Spouse name: N/A    Number of children: N/A    Years of education: N/A     Social History Main Topics    Smoking status: Never Smoker    Smokeless tobacco: Never Used    Alcohol use 6.0 oz/week     10 Glasses of wine per week      Comment: regularly    Drug use: No    Sexual activity: Yes     Partners: Female     Other Topics Concern    Not on file     Social History Narrative       Family History   Problem Relation Age of Onset    Cancer Mother      leukemia    Stroke Father     Cancer Brother      bladder       ROS    He has been tolerating therapy. He had R hip pain from known bone metastasis. He had XRT , helped with pain some, but pain occurs when he is climbing stairs. No other pain. No CP, No SOB, no cough. He has a minor rash on his nose and templethat is stable. He has altered smell which he dislikes. No HA, vision issues or diarrhea. Has soft stools.  He has been eating well and maintaining his weight. He had a spell of 2 weeks when BP was in the 120s- He was asymptomatic so he stopped his Cozaar. His BP has since been climbing, restarted 1 week ago. Today his BP is high. ECOG PS is 0      Emotional well being addressed and patient is coping well      Physical Examination:   Visit Vitals    /85    Pulse (!) 59    Temp 97.8 °F (36.6 °C)    Resp 16    Ht 6' 1\" (1.854 m)    Wt 157 lb 3.2 oz (71.3 kg)    SpO2 98%    BMI 20.74 kg/m2     General appearance - alert, well appearing, and in no distress  Mental status - oriented to person, place, and time  Mouth - mucous membranes moist, pharynx normal without lesions. Aceniform rash on the bridge of nose better  Neck - supple, no significant adenopathy  Lymphatics - no palpable lymphadenopathy, no hepatosplenomegaly  Chest - R pleurx. R basilar crackles  Heart - normal rate, regular rhythm, normal S1, S2, no murmurs, rubs, clicks or gallops  Abdomen - soft, nontender, nondistended, no masses or organomegaly, bowel sounds present  Neurological - normal speech, no focal findings or movement disorder noted    LABS  Lab Results   Component Value Date/Time    WBC 6.1 08/16/2017 07:43 AM    HGB 11.8 08/16/2017 07:43 AM    HCT 38.0 08/16/2017 07:43 AM    PLATELET 696 59/91/5617 07:43 AM    MCV 83 08/16/2017 07:43 AM    ABS. NEUTROPHILS 4.2 08/16/2017 07:43 AM     Lab Results   Component Value Date/Time    Sodium 144 08/16/2017 07:43 AM    Potassium 4.0 08/16/2017 07:43 AM    Chloride 100 08/16/2017 07:43 AM    CO2 26 08/16/2017 07:43 AM    Glucose 77 08/16/2017 07:43 AM    BUN 18 08/16/2017 07:43 AM    Creatinine 0.80 08/16/2017 07:43 AM    GFR est  08/16/2017 07:43 AM    GFR est non-AA 92 08/16/2017 07:43 AM    Calcium 8.7 08/16/2017 07:43 AM     Lab Results   Component Value Date/Time    AST (SGOT) 20 08/16/2017 07:43 AM    Alk.  phosphatase 80 08/16/2017 07:43 AM    Protein, total 6.3 08/16/2017 07:43 AM    Albumin 3.5 08/16/2017 07:43 AM    Globulin 3.5 07/13/2017 03:12 PM    A-G Ratio 1.3 08/16/2017 07:43 AM     PET CT 5/15/17  IMAGING  CHEST: There is a mass right mid thorax measuring 3.6 cm with SUV of 13. There  are multiple pleural foci of increased metabolic activity. There is a large  right effusion. There is a second nodule adjacent to the larger nodule with  increased metabolic activity. There is a lymph node right hilum with increased  metabolic activity. There are several borderline enlarged mediastinal lymph  nodes with increased metabolic activity. There is focal area of increased  activity adjacent to the distal esophagus could represent lymph node or pleural  disease.     ABDOMEN/PELVIS: There is a small low-density in the right lobe of the liver  segment 6 which demonstrates increased metabolic activity. There is a 1.4 cm  lymph node anterior to the IVC with increased metabolic activity.     SKELETON: There is increased metabolic activity in the right second rib  posteriorly, right fifth rib posteriorly and right ninth rib laterally. There is  increased metabolic activity in T5 vertebral body lytic lesion. There is  increased metabolic activity in L2 vertebral body posteriorly on the right. There is a small focus of increased metabolic activity in the right iliac wing. There is focal increased activity in a 2.3 cm lytic lesion in the right ischium.     IMPRESSION  IMPRESSION:   1. There is increased metabolic activity in a mass in the right midlung and  small adjacent nodules suspicious for primary lung neoplasm. There is increased  metabolic activity in right hilar and mediastinal lymph nodes. There is  increased metabolic activity in the right pleural lesions suspicious for  metastatic disease. There is a large right effusion. There is increased  metabolic activity in a lymph node in the lower abdomen suspicious for  metastatic disease.  There is a small lesion in the liver with increased  metabolic activity suspicious for metastatic disease. 2. There are multiple bony foci of increased metabolic activity consistent with  bony metastatic disease as described above. MRI brain reviewed- no metastasis    ASSESSMENT  Mr. Camelia Martinez is a 78 y.o. male with  1. Stage IV NSCLC with R lung mass, mediastinal adenopathy, malignant R pleural effusion, multiple asymptomatic bone metastasis and possibly liver metastasis. EGFR mutated, PD-L1 5%  2. S/P R thoracentesis on 5/15/17 and again on 5/25- s/p Pleurx by Dr. Chuck Sapp  3. Kanwal 6 prostate cancer on active surveillance  4. Anemia secondary to 1-improved  5. R hip pain secondary to osseous metastasis s/p XRT  6. Tarceva induced grade 1 rash  7. HTN- Not likely related to Tarceva      On Palliative Tarceva since 6/26/17    DISCUSSION      Discussed that this is stage IV NSCLC, unfortunately incurable. Palliative treatments may prolong survival and improve QOL. He does have a sensitizing EGFR mutation. Reviewed that multiple trials Tarceva has been found to improve PFS compared to [facility] for EGFR mutated NSCLC. Side effects were reviewed and includes rash, diarrhea, EKG changes, nausea. Tolerating Tarceva. HTN likely unrelated. Discussed increasing dose of Losartan to 75 mg if SBP stays over 160 mm Hg in the next 2 days, if still uncontrolled will need to discuss addition of a diuretic with his PCP. Patient is interested in obtaining a foundation one test, would consider when he progresses on Tarceva        PLAN    - Continue Tarceva 150 mg daily.    - Clindamycin for rash  - Declines any analgesics for R hip pain  - RTC 6 weeks to Accommodate travels till Oct 2     He will have scans and labs prior to his visit        Renny Ivory MD

## 2017-09-01 RX ORDER — ZOLEDRONIC ACID 4 MG/100ML
4 SOLUTION INTRAVENOUS ONCE
Status: COMPLETED | OUTPATIENT
Start: 2017-09-07 | End: 2017-09-07

## 2017-09-06 RX ORDER — AMLODIPINE BESYLATE 10 MG/1
10 TABLET ORAL DAILY
Qty: 30 TAB | Refills: 3 | Status: SHIPPED | OUTPATIENT
Start: 2017-09-06 | End: 2017-11-17 | Stop reason: DRUGHIGH

## 2017-09-07 ENCOUNTER — HOSPITAL ENCOUNTER (OUTPATIENT)
Dept: INFUSION THERAPY | Age: 67
Discharge: HOME | End: 2017-09-07
Payer: MEDICARE

## 2017-09-07 VITALS
HEART RATE: 59 BPM | SYSTOLIC BLOOD PRESSURE: 155 MMHG | DIASTOLIC BLOOD PRESSURE: 80 MMHG | RESPIRATION RATE: 16 BRPM | TEMPERATURE: 98.1 F

## 2017-09-07 LAB
ALBUMIN SERPL-MCNC: 3.4 G/DL (ref 3.5–5)
ALBUMIN SERPL-MCNC: 3.6 G/DL (ref 3.5–5)
ALBUMIN/GLOB SERPL: 0.9 {RATIO} (ref 1.1–2.2)
ALP SERPL-CCNC: 86 U/L (ref 45–117)
ALT SERPL-CCNC: 28 U/L (ref 12–78)
ANION GAP SERPL CALC-SCNC: 5 MMOL/L (ref 5–15)
ANION GAP SERPL CALC-SCNC: 9 MMOL/L (ref 5–15)
AST SERPL-CCNC: 31 U/L (ref 15–37)
BILIRUB DIRECT SERPL-MCNC: 0.3 MG/DL (ref 0–0.2)
BILIRUB SERPL-MCNC: 1.3 MG/DL (ref 0.2–1)
BUN SERPL-MCNC: 17 MG/DL (ref 6–20)
BUN SERPL-MCNC: 18 MG/DL (ref 6–20)
BUN/CREAT SERPL: 25 (ref 12–20)
BUN/CREAT SERPL: 26 (ref 12–20)
CALCIUM SERPL-MCNC: 8.7 MG/DL (ref 8.5–10.1)
CALCIUM SERPL-MCNC: 8.8 MG/DL (ref 8.5–10.1)
CHLORIDE SERPL-SCNC: 104 MMOL/L (ref 97–108)
CHLORIDE SERPL-SCNC: 105 MMOL/L (ref 97–108)
CO2 SERPL-SCNC: 31 MMOL/L (ref 21–32)
CO2 SERPL-SCNC: 32 MMOL/L (ref 21–32)
CREAT SERPL-MCNC: 0.66 MG/DL (ref 0.7–1.3)
CREAT SERPL-MCNC: 0.71 MG/DL (ref 0.7–1.3)
GLOBULIN SER CALC-MCNC: 4 G/DL (ref 2–4)
GLUCOSE SERPL-MCNC: 74 MG/DL (ref 65–100)
GLUCOSE SERPL-MCNC: 76 MG/DL (ref 65–100)
MAGNESIUM SERPL-MCNC: 1.9 MG/DL (ref 1.6–2.4)
PHOSPHATE SERPL-MCNC: 1.9 MG/DL (ref 2.6–4.7)
PHOSPHATE SERPL-MCNC: 2 MG/DL (ref 2.6–4.7)
POTASSIUM SERPL-SCNC: 3.4 MMOL/L (ref 3.5–5.1)
POTASSIUM SERPL-SCNC: 3.5 MMOL/L (ref 3.5–5.1)
PROT SERPL-MCNC: 7.4 G/DL (ref 6.4–8.2)
SODIUM SERPL-SCNC: 142 MMOL/L (ref 136–145)
SODIUM SERPL-SCNC: 144 MMOL/L (ref 136–145)

## 2017-09-07 PROCEDURE — 74011250636 HC RX REV CODE- 250/636: Performed by: NURSE PRACTITIONER

## 2017-09-07 PROCEDURE — 80069 RENAL FUNCTION PANEL: CPT | Performed by: NURSE PRACTITIONER

## 2017-09-07 PROCEDURE — 84100 ASSAY OF PHOSPHORUS: CPT | Performed by: NURSE PRACTITIONER

## 2017-09-07 PROCEDURE — 36415 COLL VENOUS BLD VENIPUNCTURE: CPT | Performed by: NURSE PRACTITIONER

## 2017-09-07 PROCEDURE — 83735 ASSAY OF MAGNESIUM: CPT | Performed by: NURSE PRACTITIONER

## 2017-09-07 PROCEDURE — 80076 HEPATIC FUNCTION PANEL: CPT | Performed by: NURSE PRACTITIONER

## 2017-09-07 PROCEDURE — 96374 THER/PROPH/DIAG INJ IV PUSH: CPT

## 2017-09-07 PROCEDURE — 80048 BASIC METABOLIC PNL TOTAL CA: CPT | Performed by: NURSE PRACTITIONER

## 2017-09-07 RX ORDER — LOSARTAN POTASSIUM 100 MG/1
100 TABLET ORAL DAILY
COMMUNITY
End: 2017-09-25 | Stop reason: SDUPTHER

## 2017-09-07 RX ORDER — LOSARTAN POTASSIUM 50 MG/1
TABLET ORAL
Qty: 30 TAB | Refills: 0 | Status: SHIPPED | OUTPATIENT
Start: 2017-09-07 | End: 2017-09-25 | Stop reason: SDUPTHER

## 2017-09-07 RX ORDER — SODIUM CHLORIDE 0.9 % (FLUSH) 0.9 %
10-40 SYRINGE (ML) INJECTION AS NEEDED
Status: DISCONTINUED | OUTPATIENT
Start: 2017-09-07 | End: 2017-09-11 | Stop reason: HOSPADM

## 2017-09-07 RX ADMIN — Medication 10 ML: at 15:45

## 2017-09-07 RX ADMIN — ZOLEDRONIC ACID 4 MG: 0.04 INJECTION, SOLUTION INTRAVENOUS at 16:36

## 2017-09-07 NOTE — PROGRESS NOTES
[phone] Pt admit to [facility] for Zometa  ambulatory in stable condition. Assessment completed. No new concerns voiced. Peripheral IV established right forearm with positive blood return. Labs drawn per order and sent for processing. Normal Saline started at [facility]. Visit Vitals    /87    Pulse 61    Temp 97 °F (36.1 °C)    Resp 16       Medications:  Normal Saline KVO  Zometa    1700 Pt tolerated treatment well. Peripheral iV maintained positive blood return throughout treatment, flushed with positive blood return and removed at conclusion. D/c home ambulatory in no distress. Pt aware of next appointment scheduled for 10/5/17.     Recent Results (from the past 12 hour(s))   RENAL FUNCTION PANEL    Collection Time: 09/07/17  3:41 PM   Result Value Ref Range    Sodium 144 136 - 145 mmol/L    Potassium 3.5 3.5 - 5.1 mmol/L    Chloride 104 97 - 108 mmol/L    CO2 31 21 - 32 mmol/L    Anion gap 9 5 - 15 mmol/L    Glucose 76 65 - 100 mg/dL    BUN 17 6 - 20 MG/DL    Creatinine 0.66 (L) 0.70 - 1.30 MG/DL    BUN/Creatinine ratio 26 (H) 12 - 20      GFR est AA >60 >60 ml/min/1.73m2    GFR est non-AA >60 >60 ml/min/1.73m2    Calcium 8.7 8.5 - 10.1 MG/DL    Phosphorus 2.0 (L) 2.6 - 4.7 MG/DL    Albumin 3.6 3.5 - 5.0 g/dL   METABOLIC PANEL, BASIC    Collection Time: 09/07/17  3:41 PM   Result Value Ref Range    Sodium 142 136 - 145 mmol/L    Potassium 3.4 (L) 3.5 - 5.1 mmol/L    Chloride 105 97 - 108 mmol/L    CO2 32 21 - 32 mmol/L    Anion gap 5 5 - 15 mmol/L    Glucose 74 65 - 100 mg/dL    BUN 18 6 - 20 MG/DL    Creatinine 0.71 0.70 - 1.30 MG/DL    BUN/Creatinine ratio 25 (H) 12 - 20      GFR est AA >60 >60 ml/min/1.73m2    GFR est non-AA >60 >60 ml/min/1.73m2    Calcium 8.8 8.5 - 10.1 MG/DL   MAGNESIUM    Collection Time: 09/07/17  3:41 PM   Result Value Ref Range    Magnesium 1.9 1.6 - 2.4 mg/dL   PHOSPHORUS    Collection Time: 09/07/17  3:41 PM   Result Value Ref Range    Phosphorus 1.9 (L) 2.6 - 4.7 MG/DL   HEPATIC FUNCTION PANEL    Collection Time: 09/07/17  3:41 PM   Result Value Ref Range    Protein, total 7.4 6.4 - 8.2 g/dL    Albumin 3.4 (L) 3.5 - 5.0 g/dL    Globulin 4.0 2.0 - 4.0 g/dL    A-G Ratio 0.9 (L) 1.1 - 2.2      Bilirubin, total 1.3 (H) 0.2 - 1.0 MG/DL    Bilirubin, direct 0.3 (H) 0.0 - 0.2 MG/DL    Alk.  phosphatase 86 45 - 117 U/L    AST (SGOT) 31 15 - 37 U/L    ALT (SGPT) 28 12 - 78 U/L

## 2017-09-08 DIAGNOSIS — E83.39 LOW BLOOD PHOSPHATE: Primary | ICD-10-CM

## 2017-09-08 DIAGNOSIS — C34.90 NON-SMALL CELL LUNG CANCER (NSCLC) (HCC): ICD-10-CM

## 2017-09-08 RX ORDER — SODIUM,POTASSIUM PHOSPHATES 280-250MG
2 POWDER IN PACKET (EA) ORAL ONCE
Qty: 2 PACKET | Refills: 0 | Status: SHIPPED | OUTPATIENT
Start: 2017-09-08 | End: 2017-09-08

## 2017-09-08 NOTE — PROGRESS NOTES
Potassium a little low today at 3.4. Phos also down to 1.9. Will send in Kphos to his pharmacy. Nursing, please let him know. Note that total bili is up. Will recheck with next set of labs.

## 2017-09-11 NOTE — PROGRESS NOTES
HIPAA verified. Stated is taking losratan 50mg am and pm.  Started the amlodipine 10mg on 9/6/17 (6 days). Advised would forward to provider for direction.

## 2017-09-11 NOTE — PROGRESS NOTES
HIPAA verified. Advised of provider note. Verbalized understanding. Stated BP elevated at 173/100 despite changes to BP meds. Advised would forward to provider. Denied headache or vision changes. Remains on Tarceva.

## 2017-09-28 RX ORDER — LEVOTHYROXINE SODIUM 75 UG/1
37.5 TABLET ORAL
Qty: 30 TAB | Refills: 2 | Status: SHIPPED | OUTPATIENT
Start: 2017-09-28 | End: 2018-03-22 | Stop reason: SDUPTHER

## 2017-10-02 ENCOUNTER — HOSPITAL ENCOUNTER (OUTPATIENT)
Dept: CT IMAGING | Age: 67
Discharge: HOME | End: 2017-10-02
Attending: INTERNAL MEDICINE
Payer: MEDICARE

## 2017-10-02 ENCOUNTER — HOSPITAL ENCOUNTER (OUTPATIENT)
Dept: LAB | Age: 67
Discharge: HOME | End: 2017-10-02
Payer: MEDICARE

## 2017-10-02 DIAGNOSIS — C34.91 MALIGNANT NEOPLASM OF RIGHT LUNG, UNSPECIFIED PART OF LUNG (HCC): ICD-10-CM

## 2017-10-02 PROCEDURE — 74177 CT ABD & PELVIS W/CONTRAST: CPT

## 2017-10-02 PROCEDURE — 74011636320 HC RX REV CODE- 636/320: Performed by: INTERNAL MEDICINE

## 2017-10-02 PROCEDURE — 71260 CT THORAX DX C+: CPT

## 2017-10-02 PROCEDURE — 74011000258 HC RX REV CODE- 258: Performed by: INTERNAL MEDICINE

## 2017-10-02 RX ORDER — SODIUM CHLORIDE 0.9 % (FLUSH) 0.9 %
10 SYRINGE (ML) INJECTION
Status: COMPLETED | OUTPATIENT
Start: 2017-10-02 | End: 2017-10-02

## 2017-10-02 RX ORDER — ZOLEDRONIC ACID 4 MG/100ML
4 SOLUTION INTRAVENOUS ONCE
Status: COMPLETED | OUTPATIENT
Start: 2017-10-05 | End: 2017-10-05

## 2017-10-02 RX ADMIN — IOPAMIDOL 100 ML: 755 INJECTION, SOLUTION INTRAVENOUS at 10:15

## 2017-10-02 RX ADMIN — Medication 10 ML: at 10:15

## 2017-10-02 RX ADMIN — SODIUM CHLORIDE 100 ML: 900 INJECTION, SOLUTION INTRAVENOUS at 10:15

## 2017-10-02 RX ADMIN — IOHEXOL 50 ML: 240 INJECTION, SOLUTION INTRATHECAL; INTRAVASCULAR; INTRAVENOUS; ORAL at 08:49

## 2017-10-03 DIAGNOSIS — C34.91 MALIGNANT NEOPLASM OF RIGHT LUNG, UNSPECIFIED PART OF LUNG (HCC): ICD-10-CM

## 2017-10-03 LAB
ALBUMIN SERPL-MCNC: 3.7 G/DL (ref 3.6–4.8)
ALBUMIN/GLOB SERPL: 1.3 {RATIO} (ref 1.2–2.2)
ALP SERPL-CCNC: 87 IU/L (ref 39–117)
ALT SERPL-CCNC: 33 IU/L (ref 0–44)
AST SERPL-CCNC: 44 IU/L (ref 0–40)
BASOPHILS # BLD AUTO: 0 X10E3/UL (ref 0–0.2)
BASOPHILS NFR BLD AUTO: 0 %
BILIRUB SERPL-MCNC: 0.7 MG/DL (ref 0–1.2)
BUN SERPL-MCNC: 16 MG/DL (ref 8–27)
BUN/CREAT SERPL: 24 (ref 10–24)
CALCIUM SERPL-MCNC: 9 MG/DL (ref 8.6–10.2)
CHLORIDE SERPL-SCNC: 102 MMOL/L (ref 96–106)
CO2 SERPL-SCNC: 29 MMOL/L (ref 18–29)
CREAT SERPL-MCNC: 0.67 MG/DL (ref 0.76–1.27)
EOSINOPHIL # BLD AUTO: 0.2 X10E3/UL (ref 0–0.4)
EOSINOPHIL NFR BLD AUTO: 4 %
ERYTHROCYTE [DISTWIDTH] IN BLOOD BY AUTOMATED COUNT: 19.7 % (ref 12.3–15.4)
GLOBULIN SER CALC-MCNC: 2.9 G/DL (ref 1.5–4.5)
GLUCOSE SERPL-MCNC: 84 MG/DL (ref 65–99)
HCT VFR BLD AUTO: 37.5 % (ref 37.5–51)
HGB BLD-MCNC: 11.9 G/DL (ref 12.6–17.7)
IMM GRANULOCYTES # BLD: 0 X10E3/UL (ref 0–0.1)
IMM GRANULOCYTES NFR BLD: 0 %
LYMPHOCYTES # BLD AUTO: 1.2 X10E3/UL (ref 0.7–3.1)
LYMPHOCYTES NFR BLD AUTO: 19 %
MCH RBC QN AUTO: 25.8 PG (ref 26.6–33)
MCHC RBC AUTO-ENTMCNC: 31.7 G/DL (ref 31.5–35.7)
MCV RBC AUTO: 81 FL (ref 79–97)
MONOCYTES # BLD AUTO: 0.5 X10E3/UL (ref 0.1–0.9)
MONOCYTES NFR BLD AUTO: 8 %
NEUTROPHILS # BLD AUTO: 4.2 X10E3/UL (ref 1.4–7)
NEUTROPHILS NFR BLD AUTO: 69 %
PLATELET # BLD AUTO: 229 X10E3/UL (ref 150–379)
POTASSIUM SERPL-SCNC: 3.7 MMOL/L (ref 3.5–5.2)
PROT SERPL-MCNC: 6.6 G/DL (ref 6–8.5)
RBC # BLD AUTO: 4.62 X10E6/UL (ref 4.14–5.8)
SODIUM SERPL-SCNC: 145 MMOL/L (ref 134–144)
WBC # BLD AUTO: 6.1 X10E3/UL (ref 3.4–10.8)

## 2017-10-04 ENCOUNTER — OFFICE VISIT (OUTPATIENT)
Dept: ONCOLOGY | Age: 67
End: 2017-10-04

## 2017-10-04 VITALS
HEART RATE: 80 BPM | SYSTOLIC BLOOD PRESSURE: 173 MMHG | BODY MASS INDEX: 20.46 KG/M2 | WEIGHT: 154.4 LBS | TEMPERATURE: 97.7 F | DIASTOLIC BLOOD PRESSURE: 90 MMHG | RESPIRATION RATE: 16 BRPM | HEIGHT: 73 IN

## 2017-10-04 DIAGNOSIS — R05.9 COUGH: ICD-10-CM

## 2017-10-04 DIAGNOSIS — J90 PLEURAL EFFUSION, RIGHT: ICD-10-CM

## 2017-10-04 DIAGNOSIS — C34.91 MALIGNANT NEOPLASM OF RIGHT LUNG, UNSPECIFIED PART OF LUNG (HCC): Primary | ICD-10-CM

## 2017-10-04 DIAGNOSIS — C79.51 BONE METASTASES (HCC): ICD-10-CM

## 2017-10-04 NOTE — PROGRESS NOTES
Manas Caballero is a 78 y.o. male here today for Stage IV EGFR mutated NSCLC f/u. Patient denies pain. Elevated B/P noted.

## 2017-10-04 NOTE — PROGRESS NOTES
Hematology/Oncology progress note    REASON FOR VISIT: Stage IV EGFR mutated NSCLC  REQUESTED BY: Dr. Otoniel Chung: Mr. Bela Bunch is a 78 y.o. male with prostate cancer who was diagnosed with stage IV NSCLC- adenocarcinoma ( EGFR mutated , PD-L1 low) in May 2017. Follow up on palliative Tarceva- Initiated 6/26/17. Oncologic history   Mr. Bela Bunch noticed a new cough and fevers back in March of 2017. He went to Urgent Care and received antibiotics and cough medication. He states this worked for a while, but he began to notice his cough return. This was intermittent. He had some tightness across his anterior chest which he related to the infection. Chest x-ray was abnormal and he was told to proceed to the Emergency Department. Nolberto Seo had been under surveillance for right lower lobe mass that was initially noted in 2015. Bronch at that time was negative for malignancy. He was evaluated by Dr. Madhu Nguyen with Thoracic Surgery in 2015 for possible surgical resection. CT chest in November of 2016 with mass size unchanged but some right basilar pleural thickening. CT chest obtained 5/14/17 however showed a new large right pleural effusion with right middle lobe and right lower lobe collapse. Irregular spiculated right lower lobe mass 3.8cm and stable precarinal enlarged lymph nodes were noted. New right posterior second rib lytic lesion and new right hepatic hypodensity consistent with mets. He underwent a therapeutic thoracentesis on 5/15/17 with removal of 1500 cc of serosanguinous fluid. Pathology showed adenocarcinoma. PET CT showed pleural, bone, liver and flor metastasis. MRI brain 5/20/17: No metastasis. Needed repeat R sided thoracentesis on 5/25/17, had some post procedure hydropneumothorax. He was seen by Dr. Kat Smith at [address] [place] [address] for Pleurx catheter.      CT guided biopsy of R lung mass done 5/25 which showed adenocarcinoma.  EGFR mutation detected Exon 18 and 21      Treatment  6/26/17: Tarceva. Monthly Xgeva. Palliative XRT to R hip   CT CAP 10/2/17: Response    Past Medical History:   Diagnosis Date    Hypertension        Past Surgical History:   Procedure Laterality Date    HX ORTHOPAEDIC      reconstruction of left little finger       No Known Allergies    Current Outpatient Prescriptions   Medication Sig Dispense Refill    ERLOTINIB HCL (TARCEVA PO) Take  by mouth daily.  levothyroxine (SYNTHROID) 75 mcg tablet Take 0.5 Tabs by mouth Daily (before breakfast). 30 Tab 2    losartan (COZAAR) 50 mg tablet TAKE 1 TABLET BY MOUTH DAILY. STOP HYDROCHLORATHIAZIDE 30 Tab 2    amLODIPine (NORVASC) 10 mg tablet Take 1 Tab by mouth daily. Indications: hypertension 30 Tab 3    cholecalciferol (VITAMIN D3) 1,000 unit tablet Take 1,000 Units by mouth daily.  promethazine-codeine (PHENERGAN WITH CODEINE) 6.25-10 mg/5 mL syrup Take 5 mL by mouth four (4) times daily as needed for Cough. Max Daily Amount: 20 mL. Indications: COUGH 1 Bottle 0    HYDROcodone-acetaminophen (NORCO) 5-325 mg per tablet Take 1 Tab by mouth every four (4) hours as needed. Max Daily Amount: 6 Tabs.  10 Tab 0     Facility-Administered Medications Ordered in Other Visits   Medication Dose Route Frequency Provider Last Rate Last Dose    [START ON 10/5/2017] zoledronic acid (ZOMETA) 4 mg/100 mL infusion  4 mg IntraVENous ONCE Jluis Villarreal NP           Social History     Social History    Marital status:      Spouse name: N/A    Number of children: N/A    Years of education: N/A     Social History Main Topics    Smoking status: Never Smoker    Smokeless tobacco: Never Used    Alcohol use 6.0 oz/week     10 Glasses of wine per week      Comment: regularly    Drug use: No    Sexual activity: Yes     Partners: Female     Other Topics Concern    Not on file     Social History Narrative       Family History   Problem Relation Age of Onset    Cancer Mother      leukemia    Stroke Father     Cancer Brother bladder       ROS  He was struggling with HTN and went off of Tarceva from 9/17/17-9/23/17. However that did not see a difference in his BP so was started back on Tarceva. His BP however normalized to 135/70. He is now on Losartan alone at 50 mg daily and normotensive usually apart from today. He feels well otherwise. Has intermittent cough - dry  No CP, No SOB, Has some persistent R hip stiffness, but not much pain. Rash has resolved. No HA, vision issues or diarrhea. Has soft stools. He has been eating well and maintaining his weight. ECOG PS is 0      Emotional well being addressed and patient is coping well      Physical Examination:   Visit Vitals    /90 (BP 1 Location: Right arm, BP Patient Position: Sitting)    Pulse 80    Temp 97.7 °F (36.5 °C) (Oral)    Resp 16    Ht 6' 1\" (1.854 m)    Wt 154 lb 6.4 oz (70 kg)    BMI 20.37 kg/m2     General appearance - alert, well appearing, and in no distress  Mental status - oriented to person, place, and time  Mouth - mucous membranes moist, pharynx normal without lesions. Aceniform rash on the bridge of nose better  Neck - supple, no significant adenopathy  Lymphatics - no palpable lymphadenopathy, no hepatosplenomegaly  Chest - R pleurx. R basilar crackles  Heart - normal rate, regular rhythm, normal S1, S2, no murmurs, rubs, clicks or gallops  Abdomen - soft, nontender, nondistended, no masses or organomegaly, bowel sounds present  Neurological - normal speech, no focal findings or movement disorder noted    LABS  Lab Results   Component Value Date/Time    WBC 6.1 10/02/2017 12:00 AM    HGB 11.9 10/02/2017 12:00 AM    HCT 37.5 10/02/2017 12:00 AM    PLATELET 397 84/44/3543 12:00 AM    MCV 81 10/02/2017 12:00 AM    ABS.  NEUTROPHILS 4.2 10/02/2017 12:00 AM     Lab Results   Component Value Date/Time    Sodium 145 10/02/2017 12:00 AM    Potassium 3.7 10/02/2017 12:00 AM    Chloride 102 10/02/2017 12:00 AM    CO2 29 10/02/2017 12:00 AM Glucose 84 10/02/2017 12:00 AM    BUN 16 10/02/2017 12:00 AM    Creatinine 0.67 10/02/2017 12:00 AM    GFR est  10/02/2017 12:00 AM    GFR est non-AA 99 10/02/2017 12:00 AM    Calcium 9.0 10/02/2017 12:00 AM     Lab Results   Component Value Date/Time    AST (SGOT) 44 10/02/2017 12:00 AM    Alk. phosphatase 87 10/02/2017 12:00 AM    Protein, total 6.6 10/02/2017 12:00 AM    Albumin 3.7 10/02/2017 12:00 AM    Globulin 4.0 09/07/2017 03:41 PM    A-G Ratio 1.3 10/02/2017 12:00 AM     CT Chest- abdomen - pelvis 10/2/17    Decreased right pleural thickening and size of right lower lobe  pulmonary mass with interval sclerosis of multiple osseous metastases as above  compatible with response to therapy    ASSESSMENT  Mr. Chacha Heath is a 78 y.o. male with  1. Stage IV NSCLC with R lung mass, mediastinal adenopathy, malignant R pleural effusion, multiple asymptomatic bone metastasis and possibly liver metastasis. EGFR mutated, PD-L1 5%  2. S/P R thoracentesis on 5/15/17 and again on 5/25- s/p Pleurx by Dr. Juan Mcmahon  3. [place] 6 prostate cancer on active surveillance  4. Anemia secondary to 1-improved  5. R hip pain secondary to osseous metastasis s/p XRT  6. HTN- Not likely related to Tarceva      On Palliative Tarceva since 6/26/17    DISCUSSION    Tolerating Tarceva. HTN likely unrelated and now well controlled on Losartan alone. CT scans reviewed personally and discussed with the patient. There is evidence of response.     Patient is interested in obtaining a foundation one test, would consider when he progresses on Tarceva        PLAN    - Continue Tarceva 150 mg daily.   - Declines any analgesics for R hip pain  - Monthly Zometa    RTC 1 month, scans in 3 months        Mulugeta Still MD

## 2017-10-05 ENCOUNTER — HOSPITAL ENCOUNTER (OUTPATIENT)
Dept: INFUSION THERAPY | Age: 67
Discharge: HOME | End: 2017-10-05
Payer: MEDICARE

## 2017-10-05 VITALS
SYSTOLIC BLOOD PRESSURE: 153 MMHG | HEIGHT: 73 IN | DIASTOLIC BLOOD PRESSURE: 89 MMHG | TEMPERATURE: 97.5 F | WEIGHT: 158 LBS | BODY MASS INDEX: 20.94 KG/M2

## 2017-10-05 LAB
ALBUMIN SERPL-MCNC: 3.3 G/DL (ref 3.5–5)
ALBUMIN SERPL-MCNC: 3.5 G/DL (ref 3.5–5)
ALBUMIN/GLOB SERPL: 1.1 {RATIO} (ref 1.1–2.2)
ALP SERPL-CCNC: 91 U/L (ref 45–117)
ALT SERPL-CCNC: 42 U/L (ref 12–78)
ANION GAP SERPL CALC-SCNC: 5 MMOL/L (ref 5–15)
ANION GAP SERPL CALC-SCNC: 5 MMOL/L (ref 5–15)
AST SERPL-CCNC: 46 U/L (ref 15–37)
BILIRUB DIRECT SERPL-MCNC: 0.3 MG/DL (ref 0–0.2)
BILIRUB SERPL-MCNC: 1.3 MG/DL (ref 0.2–1)
BUN SERPL-MCNC: 17 MG/DL (ref 6–20)
BUN SERPL-MCNC: 17 MG/DL (ref 6–20)
BUN/CREAT SERPL: 24 (ref 12–20)
BUN/CREAT SERPL: 24 (ref 12–20)
CALCIUM SERPL-MCNC: 8.2 MG/DL (ref 8.5–10.1)
CALCIUM SERPL-MCNC: 8.7 MG/DL (ref 8.5–10.1)
CHLORIDE SERPL-SCNC: 103 MMOL/L (ref 97–108)
CHLORIDE SERPL-SCNC: 103 MMOL/L (ref 97–108)
CO2 SERPL-SCNC: 33 MMOL/L (ref 21–32)
CO2 SERPL-SCNC: 34 MMOL/L (ref 21–32)
CREAT SERPL-MCNC: 0.71 MG/DL (ref 0.7–1.3)
CREAT SERPL-MCNC: 0.71 MG/DL (ref 0.7–1.3)
GLOBULIN SER CALC-MCNC: 3.1 G/DL (ref 2–4)
GLUCOSE SERPL-MCNC: 102 MG/DL (ref 65–100)
GLUCOSE SERPL-MCNC: 103 MG/DL (ref 65–100)
MAGNESIUM SERPL-MCNC: 2.1 MG/DL (ref 1.6–2.4)
PHOSPHATE SERPL-MCNC: 2.5 MG/DL (ref 2.6–4.7)
PHOSPHATE SERPL-MCNC: 2.8 MG/DL (ref 2.6–4.7)
POTASSIUM SERPL-SCNC: 3.6 MMOL/L (ref 3.5–5.1)
POTASSIUM SERPL-SCNC: 3.9 MMOL/L (ref 3.5–5.1)
PROT SERPL-MCNC: 6.6 G/DL (ref 6.4–8.2)
SODIUM SERPL-SCNC: 141 MMOL/L (ref 136–145)
SODIUM SERPL-SCNC: 142 MMOL/L (ref 136–145)

## 2017-10-05 PROCEDURE — 84100 ASSAY OF PHOSPHORUS: CPT | Performed by: NURSE PRACTITIONER

## 2017-10-05 PROCEDURE — 80069 RENAL FUNCTION PANEL: CPT | Performed by: NURSE PRACTITIONER

## 2017-10-05 PROCEDURE — 74011250636 HC RX REV CODE- 250/636: Performed by: NURSE PRACTITIONER

## 2017-10-05 PROCEDURE — 36415 COLL VENOUS BLD VENIPUNCTURE: CPT | Performed by: NURSE PRACTITIONER

## 2017-10-05 PROCEDURE — 80048 BASIC METABOLIC PNL TOTAL CA: CPT | Performed by: NURSE PRACTITIONER

## 2017-10-05 PROCEDURE — 96374 THER/PROPH/DIAG INJ IV PUSH: CPT

## 2017-10-05 PROCEDURE — 96372 THER/PROPH/DIAG INJ SC/IM: CPT

## 2017-10-05 PROCEDURE — 80076 HEPATIC FUNCTION PANEL: CPT | Performed by: NURSE PRACTITIONER

## 2017-10-05 PROCEDURE — 83735 ASSAY OF MAGNESIUM: CPT | Performed by: NURSE PRACTITIONER

## 2017-10-05 RX ADMIN — ZOLEDRONIC ACID 4 MG: 0.04 INJECTION, SOLUTION INTRAVENOUS at 16:08

## 2017-10-05 NOTE — PROGRESS NOTES
1455 Pt admit to [facility] for Zometa  ambulatory in stable condition. Assessment completed. No new concerns voiced. Peripheral IV established left forearm with positive blood return. Labs drawn per order and sent for processing. Normal Saline started at [facility]. Visit Vitals    /89    Temp 97.5 °F (36.4 °C)    Ht 6' 1\" (1.854 m)    Wt 71.7 kg (158 lb)    BMI 20.85 kg/m2       Medications:  Normal Saline KVO  Zometa    1635 Pt tolerated treatment well. Peripheral iV maintained positive blood return throughout treatment, flushed with positive blood return and removed at conclusion. D/c home ambulatory in no distress.  Pt aware of next appointment scheduled for 11/7 @ 10    Recent Results (from the past 12 hour(s))   RENAL FUNCTION PANEL    Collection Time: 10/05/17  2:58 PM   Result Value Ref Range    Sodium 141 136 - 145 mmol/L    Potassium 3.6 3.5 - 5.1 mmol/L    Chloride 103 97 - 108 mmol/L    CO2 33 (H) 21 - 32 mmol/L    Anion gap 5 5 - 15 mmol/L    Glucose 102 (H) 65 - 100 mg/dL    BUN 17 6 - 20 MG/DL    Creatinine 0.71 0.70 - 1.30 MG/DL    BUN/Creatinine ratio 24 (H) 12 - 20      GFR est AA >60 >60 ml/min/1.73m2    GFR est non-AA >60 >60 ml/min/1.73m2    Calcium 8.2 (L) 8.5 - 10.1 MG/DL    Phosphorus 2.5 (L) 2.6 - 4.7 MG/DL    Albumin 3.3 (L) 3.5 - 5.0 g/dL   METABOLIC PANEL, BASIC    Collection Time: 10/05/17  2:58 PM   Result Value Ref Range    Sodium 142 136 - 145 mmol/L    Potassium 3.9 3.5 - 5.1 mmol/L    Chloride 103 97 - 108 mmol/L    CO2 34 (H) 21 - 32 mmol/L    Anion gap 5 5 - 15 mmol/L    Glucose 103 (H) 65 - 100 mg/dL    BUN 17 6 - 20 MG/DL    Creatinine 0.71 0.70 - 1.30 MG/DL    BUN/Creatinine ratio 24 (H) 12 - 20      GFR est AA >60 >60 ml/min/1.73m2    GFR est non-AA >60 >60 ml/min/1.73m2    Calcium 8.7 8.5 - 10.1 MG/DL   MAGNESIUM    Collection Time: 10/05/17  2:58 PM   Result Value Ref Range    Magnesium 2.1 1.6 - 2.4 mg/dL   PHOSPHORUS    Collection Time: 10/05/17  2:58 PM   Result Value Ref Range    Phosphorus 2.8 2.6 - 4.7 MG/DL   HEPATIC FUNCTION PANEL    Collection Time: 10/05/17  2:58 PM   Result Value Ref Range    Protein, total 6.6 6.4 - 8.2 g/dL    Albumin 3.5 3.5 - 5.0 g/dL    Globulin 3.1 2.0 - 4.0 g/dL    A-G Ratio 1.1 1.1 - 2.2      Bilirubin, total 1.3 (H) 0.2 - 1.0 MG/DL    Bilirubin, direct 0.3 (H) 0.0 - 0.2 MG/DL    Alk.  phosphatase 91 45 - 117 U/L    AST (SGOT) 46 (H) 15 - 37 U/L    ALT (SGPT) 42 12 - 78 U/L

## 2017-11-01 RX ORDER — ZOLEDRONIC ACID 4 MG/100ML
4 SOLUTION INTRAVENOUS ONCE
Status: COMPLETED | OUTPATIENT
Start: 2017-11-07 | End: 2017-11-07

## 2017-11-02 ENCOUNTER — APPOINTMENT (OUTPATIENT)
Dept: INFUSION THERAPY | Age: 67
End: 2017-11-02
Payer: MEDICARE

## 2017-11-07 ENCOUNTER — HOSPITAL ENCOUNTER (OUTPATIENT)
Dept: INFUSION THERAPY | Age: 67
Discharge: HOME | End: 2017-11-07
Payer: MEDICARE

## 2017-11-07 VITALS
SYSTOLIC BLOOD PRESSURE: 174 MMHG | TEMPERATURE: 98 F | WEIGHT: 158 LBS | RESPIRATION RATE: 16 BRPM | HEART RATE: 50 BPM | DIASTOLIC BLOOD PRESSURE: 89 MMHG | BODY MASS INDEX: 20.85 KG/M2

## 2017-11-07 LAB
ALBUMIN SERPL-MCNC: 3.6 G/DL (ref 3.5–5)
ALBUMIN SERPL-MCNC: 3.7 G/DL (ref 3.5–5)
ALBUMIN/GLOB SERPL: 1.2 {RATIO} (ref 1.1–2.2)
ALP SERPL-CCNC: 95 U/L (ref 45–117)
ALT SERPL-CCNC: 37 U/L (ref 12–78)
ANION GAP SERPL CALC-SCNC: 5 MMOL/L (ref 5–15)
ANION GAP SERPL CALC-SCNC: 6 MMOL/L (ref 5–15)
AST SERPL-CCNC: 33 U/L (ref 15–37)
BILIRUB DIRECT SERPL-MCNC: 0.4 MG/DL (ref 0–0.2)
BILIRUB SERPL-MCNC: 1.6 MG/DL (ref 0.2–1)
BUN SERPL-MCNC: 16 MG/DL (ref 6–20)
BUN SERPL-MCNC: 17 MG/DL (ref 6–20)
BUN/CREAT SERPL: 20 (ref 12–20)
BUN/CREAT SERPL: 22 (ref 12–20)
CALCIUM SERPL-MCNC: 8.6 MG/DL (ref 8.5–10.1)
CALCIUM SERPL-MCNC: 8.6 MG/DL (ref 8.5–10.1)
CHLORIDE SERPL-SCNC: 102 MMOL/L (ref 97–108)
CHLORIDE SERPL-SCNC: 102 MMOL/L (ref 97–108)
CO2 SERPL-SCNC: 33 MMOL/L (ref 21–32)
CO2 SERPL-SCNC: 33 MMOL/L (ref 21–32)
CREAT SERPL-MCNC: 0.77 MG/DL (ref 0.7–1.3)
CREAT SERPL-MCNC: 0.81 MG/DL (ref 0.7–1.3)
GLOBULIN SER CALC-MCNC: 3.2 G/DL (ref 2–4)
GLUCOSE SERPL-MCNC: 97 MG/DL (ref 65–100)
GLUCOSE SERPL-MCNC: 99 MG/DL (ref 65–100)
MAGNESIUM SERPL-MCNC: 2.1 MG/DL (ref 1.6–2.4)
PHOSPHATE SERPL-MCNC: 2.8 MG/DL (ref 2.6–4.7)
PHOSPHATE SERPL-MCNC: 2.8 MG/DL (ref 2.6–4.7)
POTASSIUM SERPL-SCNC: 3.6 MMOL/L (ref 3.5–5.1)
POTASSIUM SERPL-SCNC: 3.7 MMOL/L (ref 3.5–5.1)
PROT SERPL-MCNC: 6.9 G/DL (ref 6.4–8.2)
SODIUM SERPL-SCNC: 140 MMOL/L (ref 136–145)
SODIUM SERPL-SCNC: 141 MMOL/L (ref 136–145)

## 2017-11-07 PROCEDURE — 80048 BASIC METABOLIC PNL TOTAL CA: CPT | Performed by: NURSE PRACTITIONER

## 2017-11-07 PROCEDURE — 80069 RENAL FUNCTION PANEL: CPT | Performed by: NURSE PRACTITIONER

## 2017-11-07 PROCEDURE — 80076 HEPATIC FUNCTION PANEL: CPT | Performed by: NURSE PRACTITIONER

## 2017-11-07 PROCEDURE — 74011250636 HC RX REV CODE- 250/636: Performed by: NURSE PRACTITIONER

## 2017-11-07 PROCEDURE — 36415 COLL VENOUS BLD VENIPUNCTURE: CPT | Performed by: NURSE PRACTITIONER

## 2017-11-07 PROCEDURE — 96374 THER/PROPH/DIAG INJ IV PUSH: CPT

## 2017-11-07 PROCEDURE — 83735 ASSAY OF MAGNESIUM: CPT | Performed by: NURSE PRACTITIONER

## 2017-11-07 PROCEDURE — 84100 ASSAY OF PHOSPHORUS: CPT | Performed by: NURSE PRACTITIONER

## 2017-11-07 RX ADMIN — ZOLEDRONIC ACID 4 MG: 0.04 INJECTION, SOLUTION INTRAVENOUS at 11:30

## 2017-11-07 NOTE — PROGRESS NOTES
[facility] - Chemotherapy Progress Note    1000 Pt admit to [facility] for labs/Zometa ambulatory in stable condition. Assessment completed. No new concerns voiced. PIV access established in L arm with positive blood return. Labs drawn per order and sent. Line flushed, NS infusing. Visit Vitals    /89    Pulse (!) 50    Temp 98 °F (36.7 °C)    Resp 16       Medications:  NS  Zometa    1200 Pt tolerated treatment well. PIV removed at discharge. D/c home ambulatory in no distress. Pt aware of next [facility] appointment scheduled for 12/07/2017.     Recent Results (from the past 12 hour(s))   RENAL FUNCTION PANEL    Collection Time: 11/07/17 10:07 AM   Result Value Ref Range    Sodium 140 136 - 145 mmol/L    Potassium 3.7 3.5 - 5.1 mmol/L    Chloride 102 97 - 108 mmol/L    CO2 33 (H) 21 - 32 mmol/L    Anion gap 5 5 - 15 mmol/L    Glucose 97 65 - 100 mg/dL    BUN 17 6 - 20 MG/DL    Creatinine 0.77 0.70 - 1.30 MG/DL    BUN/Creatinine ratio 22 (H) 12 - 20      GFR est AA >60 >60 ml/min/1.73m2    GFR est non-AA >60 >60 ml/min/1.73m2    Calcium 8.6 8.5 - 10.1 MG/DL    Phosphorus 2.8 2.6 - 4.7 MG/DL    Albumin 3.6 3.5 - 5.0 g/dL   METABOLIC PANEL, BASIC    Collection Time: 11/07/17 10:07 AM   Result Value Ref Range    Sodium 141 136 - 145 mmol/L    Potassium 3.6 3.5 - 5.1 mmol/L    Chloride 102 97 - 108 mmol/L    CO2 33 (H) 21 - 32 mmol/L    Anion gap 6 5 - 15 mmol/L    Glucose 99 65 - 100 mg/dL    BUN 16 6 - 20 MG/DL    Creatinine 0.81 0.70 - 1.30 MG/DL    BUN/Creatinine ratio 20 12 - 20      GFR est AA >60 >60 ml/min/1.73m2    GFR est non-AA >60 >60 ml/min/1.73m2    Calcium 8.6 8.5 - 10.1 MG/DL   MAGNESIUM    Collection Time: 11/07/17 10:07 AM   Result Value Ref Range    Magnesium 2.1 1.6 - 2.4 mg/dL   PHOSPHORUS    Collection Time: 11/07/17 10:07 AM   Result Value Ref Range    Phosphorus 2.8 2.6 - 4.7 MG/DL   HEPATIC FUNCTION PANEL    Collection Time: 11/07/17 10:07 AM   Result Value Ref Range Protein, total 6.9 6.4 - 8.2 g/dL    Albumin 3.7 3.5 - 5.0 g/dL    Globulin 3.2 2.0 - 4.0 g/dL    A-G Ratio 1.2 1.1 - 2.2      Bilirubin, total 1.6 (H) 0.2 - 1.0 MG/DL    Bilirubin, direct 0.4 (H) 0.0 - 0.2 MG/DL    Alk.  phosphatase 95 45 - 117 U/L    AST (SGOT) 33 15 - 37 U/L    ALT (SGPT) 37 12 - 78 U/L

## 2017-11-08 ENCOUNTER — OFFICE VISIT (OUTPATIENT)
Dept: ONCOLOGY | Age: 67
End: 2017-11-08

## 2017-11-08 VITALS
WEIGHT: 154.8 LBS | BODY MASS INDEX: 20.52 KG/M2 | DIASTOLIC BLOOD PRESSURE: 88 MMHG | OXYGEN SATURATION: 98 % | RESPIRATION RATE: 20 BRPM | HEART RATE: 53 BPM | HEIGHT: 73 IN | SYSTOLIC BLOOD PRESSURE: 187 MMHG | TEMPERATURE: 97 F

## 2017-11-08 DIAGNOSIS — J90 PLEURAL EFFUSION, RIGHT: ICD-10-CM

## 2017-11-08 DIAGNOSIS — C79.51 BONE METASTASES (HCC): ICD-10-CM

## 2017-11-08 DIAGNOSIS — R79.89 ABNORMAL LFTS: ICD-10-CM

## 2017-11-08 DIAGNOSIS — C34.91 ADENOCARCINOMA OF LUNG, STAGE 4, RIGHT (HCC): Primary | ICD-10-CM

## 2017-11-08 RX ORDER — POTASSIUM & SODIUM PHOSPHATES POWDER PACK 280-160-250 MG 280-160-250 MG
PACK ORAL
Refills: 0 | COMMUNITY
Start: 2017-09-13 | End: 2018-06-07

## 2017-11-08 NOTE — PROGRESS NOTES
Hematology/Oncology progress note    REASON FOR VISIT: Stage IV EGFR mutated NSCLC  REQUESTED BY: Dr. Zulma Diaz: Mr. Madi Lawson is a 78 y.o. male with prostate cancer who was diagnosed with stage IV NSCLC- adenocarcinoma ( EGFR mutated , PD-L1 low) in May 2017. Follow up on palliative Tarceva- Initiated 6/26/17. Oncologic history   Mr. Madi Lawson noticed a new cough and fevers back in March of 2017. He went to Urgent Care and received antibiotics and cough medication. He states this worked for a while, but he began to notice his cough return. This was intermittent. He had some tightness across his anterior chest which he related to the infection. Chest x-ray was abnormal and he was told to proceed to the Emergency Department. Blas Troy had been under surveillance for right lower lobe mass that was initially noted in 2015. Bronch at that time was negative for malignancy. He was evaluated by Dr. Noah Arredondo with Thoracic Surgery in 2015 for possible surgical resection. CT chest in November of 2016 with mass size unchanged but some right basilar pleural thickening. CT chest obtained 5/14/17 however showed a new large right pleural effusion with right middle lobe and right lower lobe collapse. Irregular spiculated right lower lobe mass 3.8cm and stable precarinal enlarged lymph nodes were noted. New right posterior second rib lytic lesion and new right hepatic hypodensity consistent with mets. He underwent a therapeutic thoracentesis on 5/15/17 with removal of 1500 cc of serosanguinous fluid. Pathology showed adenocarcinoma. PET CT showed pleural, bone, liver and flor metastasis. MRI brain 5/20/17: No metastasis. Needed repeat R sided thoracentesis on 5/25/17, had some post procedure hydropneumothorax. He was seen by Dr. Ana Styles at [facility] for Pleurx catheter.      CT guided biopsy of R lung mass done 5/25 which showed adenocarcinoma.  EGFR mutation detected Exon 18 and 21      Treatment  6/26/17: Tarceva. Monthly Xgeva. Palliative XRT to R hip   CT CAP 10/2/17: Response    Past Medical History:   Diagnosis Date    Hypertension        Past Surgical History:   Procedure Laterality Date    HX ORTHOPAEDIC      reconstruction of left little finger       No Known Allergies    Current Outpatient Prescriptions   Medication Sig Dispense Refill    ERLOTINIB HCL (TARCEVA PO) Take  by mouth daily.  levothyroxine (SYNTHROID) 75 mcg tablet Take 0.5 Tabs by mouth Daily (before breakfast). 30 Tab 2    losartan (COZAAR) 50 mg tablet TAKE 1 TABLET BY MOUTH DAILY. STOP HYDROCHLORATHIAZIDE 30 Tab 2    cholecalciferol (VITAMIN D3) 1,000 unit tablet Take 1,000 Units by mouth daily.  PHOS--160-250 mg packet TK 2 PACKETS PO AS ONE DOSE  0    amLODIPine (NORVASC) 10 mg tablet Take 1 Tab by mouth daily. Indications: hypertension 30 Tab 3    promethazine-codeine (PHENERGAN WITH CODEINE) 6.25-10 mg/5 mL syrup Take 5 mL by mouth four (4) times daily as needed for Cough. Max Daily Amount: 20 mL. Indications: COUGH 1 Bottle 0    HYDROcodone-acetaminophen (NORCO) 5-325 mg per tablet Take 1 Tab by mouth every four (4) hours as needed. Max Daily Amount: 6 Tabs. 10 Tab 0       Social History     Social History    Marital status:      Spouse name: N/A    Number of children: N/A    Years of education: N/A     Social History Main Topics    Smoking status: Never Smoker    Smokeless tobacco: Never Used    Alcohol use 6.0 oz/week     10 Glasses of wine per week      Comment: regularly    Drug use: No    Sexual activity: Yes     Partners: Female     Other Topics Concern    None     Social History Narrative       Family History   Problem Relation Age of Onset    Cancer Mother      leukemia    Stroke Father     Cancer Brother      bladder       ROS  He was struggling with HTN still Taking Losartan 100 mg daily. He is no longer taking the amlodipine.  BP is high at home as well- but this fluctuates from 135-170 mm Hg. He feels well otherwise. Has intermittent cough - dry  No CP, No SOB, Has some persistent R hip stiffness, but not much pain. Rash has resolved. No HA, vision issues or diarrhea. Has soft stools. He has been eating well and maintaining his weight. No new medications     ECOG PS is 0      Emotional well being addressed and patient is coping well      Physical Examination:   Visit Vitals    /88  Comment: 178/80 manual recheck    Pulse (!) 53    Temp 97 °F (36.1 °C)    Resp 20    Ht 6' 1\" (1.854 m)    Wt 154 lb 12.8 oz (70.2 kg)    SpO2 98%    BMI 20.42 kg/m2     General appearance - alert, well appearing, and in no distress  Mental status - oriented to person, place, and time  Mouth - mucous membranes moist, pharynx normal without lesions. Aceniform rash on the bridge of nose better  Neck - supple, no significant adenopathy  Lymphatics - no palpable lymphadenopathy, no hepatosplenomegaly  Chest - R pleurx. R basilar crackles  Heart - normal rate, regular rhythm, normal S1, S2, no murmurs, rubs, clicks or gallops  Abdomen - soft, nontender, nondistended, no masses or organomegaly, bowel sounds present  Neurological - normal speech, no focal findings or movement disorder noted    LABS  Lab Results   Component Value Date/Time    WBC 6.1 10/02/2017 12:00 AM    HGB 11.9 10/02/2017 12:00 AM    HCT 37.5 10/02/2017 12:00 AM    PLATELET 988 65/27/6203 12:00 AM    MCV 81 10/02/2017 12:00 AM    ABS.  NEUTROPHILS 4.2 10/02/2017 12:00 AM     Lab Results   Component Value Date/Time    Sodium 140 11/07/2017 10:07 AM    Sodium 141 11/07/2017 10:07 AM    Potassium 3.7 11/07/2017 10:07 AM    Potassium 3.6 11/07/2017 10:07 AM    Chloride 102 11/07/2017 10:07 AM    Chloride 102 11/07/2017 10:07 AM    CO2 33 11/07/2017 10:07 AM    CO2 33 11/07/2017 10:07 AM    Glucose 97 11/07/2017 10:07 AM    Glucose 99 11/07/2017 10:07 AM    BUN 17 11/07/2017 10:07 AM    BUN 16 11/07/2017 10:07 AM    Creatinine 0.77 11/07/2017 10:07 AM    Creatinine 0.81 11/07/2017 10:07 AM    GFR est AA >60 11/07/2017 10:07 AM    GFR est AA >60 11/07/2017 10:07 AM    GFR est non-AA >60 11/07/2017 10:07 AM    GFR est non-AA >60 11/07/2017 10:07 AM    Calcium 8.6 11/07/2017 10:07 AM    Calcium 8.6 11/07/2017 10:07 AM     Lab Results   Component Value Date/Time    AST (SGOT) 33 11/07/2017 10:07 AM    Alk. phosphatase 95 11/07/2017 10:07 AM    Protein, total 6.9 11/07/2017 10:07 AM    Albumin 3.6 11/07/2017 10:07 AM    Albumin 3.7 11/07/2017 10:07 AM    Globulin 3.2 11/07/2017 10:07 AM    A-G Ratio 1.2 11/07/2017 10:07 AM     CT Chest- abdomen - pelvis 10/2/17    Decreased right pleural thickening and size of right lower lobe  pulmonary mass with interval sclerosis of multiple osseous metastases as above  compatible with response to therapy    ASSESSMENT  Mr. Disha Purcell is a 78 y.o. male with  1. Stage IV NSCLC with R lung mass, mediastinal adenopathy, malignant R pleural effusion, multiple asymptomatic bone metastasis and possibly liver metastasis. EGFR mutated, PD-L1 5%  2. S/P R thoracentesis on 5/15/17 and again on 5/25- s/p Pleurx by Dr. Alvarado Eli  3. [place] 6 prostate cancer on active surveillance  4. Anemia secondary to 1-improved  5. R hip pain secondary to osseous metastasis s/p XRT  6. HTN- Not likely related to Tarceva  7. Elevated Bb secondary to Tarceva      On Palliative Tarceva since 6/26/17    DISCUSSION    Tolerating Tarceva. HTN likely unrelated , not well controlled on Losartan alone and will need to start another antihypertensive- meeting with Dr. January Rosa later this month. Disease is well controlled. Today however Bb has increased to 1.6 from 0.7 (his baseline). Grade 2 from Tarceva. Will hold for a week and resume if grade 1 or below.     Patient is interested in obtaining a foundation one test, would consider when he progresses on [address] for a week-   - Recheck 1 week and if grade 1 or better then resume at current dose. - Monthly Zometa    Plan to see him in a month with repeat LFTs if all is well.  Plan on scans Jan first week        Edouard Link MD

## 2017-11-14 DIAGNOSIS — R79.89 ABNORMAL LFTS: ICD-10-CM

## 2017-11-16 ENCOUNTER — HOSPITAL ENCOUNTER (OUTPATIENT)
Dept: LAB | Age: 67
Discharge: HOME | End: 2017-11-16
Payer: MEDICARE

## 2017-11-16 PROCEDURE — 36415 COLL VENOUS BLD VENIPUNCTURE: CPT

## 2017-11-16 PROCEDURE — 80076 HEPATIC FUNCTION PANEL: CPT

## 2017-11-17 ENCOUNTER — TELEPHONE (OUTPATIENT)
Dept: ONCOLOGY | Age: 67
End: 2017-11-17

## 2017-11-17 ENCOUNTER — OFFICE VISIT (OUTPATIENT)
Dept: INTERNAL MEDICINE CLINIC | Age: 67
End: 2017-11-17

## 2017-11-17 VITALS
HEIGHT: 73 IN | HEART RATE: 59 BPM | BODY MASS INDEX: 20.15 KG/M2 | DIASTOLIC BLOOD PRESSURE: 90 MMHG | OXYGEN SATURATION: 98 % | WEIGHT: 152 LBS | SYSTOLIC BLOOD PRESSURE: 170 MMHG | RESPIRATION RATE: 18 BRPM

## 2017-11-17 DIAGNOSIS — C34.91 NON-SMALL CELL CANCER OF RIGHT LUNG (HCC): ICD-10-CM

## 2017-11-17 DIAGNOSIS — I10 ESSENTIAL HYPERTENSION: Primary | ICD-10-CM

## 2017-11-17 DIAGNOSIS — Z23 ENCOUNTER FOR IMMUNIZATION: ICD-10-CM

## 2017-11-17 DIAGNOSIS — E03.9 ACQUIRED HYPOTHYROIDISM: ICD-10-CM

## 2017-11-17 DIAGNOSIS — C79.51 BONE METASTASES (HCC): ICD-10-CM

## 2017-11-17 LAB
ALBUMIN SERPL-MCNC: 3.9 G/DL (ref 3.6–4.8)
ALP SERPL-CCNC: 94 IU/L (ref 39–117)
ALT SERPL-CCNC: 24 IU/L (ref 0–44)
AST SERPL-CCNC: 35 IU/L (ref 0–40)
BILIRUB DIRECT SERPL-MCNC: 0.23 MG/DL (ref 0–0.4)
BILIRUB SERPL-MCNC: 0.7 MG/DL (ref 0–1.2)
PROT SERPL-MCNC: 6.8 G/DL (ref 6–8.5)

## 2017-11-17 RX ORDER — LOSARTAN POTASSIUM 100 MG/1
100 TABLET ORAL DAILY
Qty: 30 TAB | Refills: 5 | Status: SHIPPED | OUTPATIENT
Start: 2017-11-17 | End: 2018-05-18 | Stop reason: ALTCHOICE

## 2017-11-17 NOTE — LETTER
12/11/2017 8:39 AM 
 
Mr. Gael Reed [address] [id number] Dear Gael Reed: Please find your most recent results below. Resulted Orders TSH 3RD GENERATION Result Value Ref Range TSH 2.890 0.450 - 4.500 uIU/mL Narrative Performed at:  [address]  [id number] : Kenya Koo MD, Phone:  [phone] RECOMMENDATIONS: 
Normal thyroid number Please call me if you have any questions: [phone] Sincerely, 
 
 
Rae Tobin, DO

## 2017-11-17 NOTE — PROGRESS NOTES
Health Maintenance Due   Topic Date Due    Hepatitis C Screening  1950    DTaP/Tdap/Td series (1 - Tdap) 12/02/1970    ZOSTER VACCINE AGE 60>  10/02/2009    GLAUCOMA SCREENING Q2Y  12/02/2014    Pneumococcal 65+ High/Highest Risk (1 of 2 - PCV13) 12/02/2014    MEDICARE YEARLY EXAM  01/19/2017    Influenza Age 9 to Adult  08/01/2017    FOBT Q 1 YEAR AGE 50-75  10/31/2017       Chief Complaint   Patient presents with    Hypertension    Hypothyroidism       1. Have you been to the ER, urgent care clinic since your last visit? Hospitalized since your last visit? No    2. Have you seen or consulted any other health care providers outside of the [address] [place] [address] since your last visit? Include any pap smears or colon screening. No    3) Do you have an Advance Directive on file? no    4) Are you interested in receiving information on Advance Directives? NO      Patient is accompanied by self I have received verbal consent from Angela Britton to discuss any/all medical information while they are present in the room. Angela Britton is a 78 y.o. male  who presents for routine immunizations. He denies any symptoms , reactions or allergies that would exclude them from being immunized today. Risks and adverse reactions were discussed and the VIS was given to them. All questions were addressed. He was observed for 10 min post injection. There were no reactions observed.     Mark Valerio LPN

## 2017-11-17 NOTE — MR AVS SNAPSHOT
Visit Information Date & Time Provider Department Dept. Phone Encounter #  
 11/17/2017  9:45 AM Lauren Shankar, [facility] Internal Medicine [phone] [id number] Follow-up Instructions Return in about 6 months (around 5/17/2018). Upcoming Health Maintenance Date Due Hepatitis C Screening 1950 DTaP/Tdap/Td series (1 - Tdap) 12/2/1970 ZOSTER VACCINE AGE 60> 10/2/2009 GLAUCOMA SCREENING Q2Y 12/2/2014 Pneumococcal 65+ High/Highest Risk (1 of 2 - PCV13) 12/2/2014 MEDICARE YEARLY EXAM 1/19/2017 Influenza Age 5 to Adult 8/1/2017 FOBT Q 1 YEAR AGE 50-75 10/31/2017 Allergies as of 11/17/2017  Review Complete On: 11/17/2017 By: Lauren Shankar, DO No Known Allergies Current Immunizations  Reviewed on 11/7/2017 Name Date Influenza High Dose Vaccine PF  Incomplete, 10/31/2016 Not reviewed this visit You Were Diagnosed With   
  
 Codes Comments Essential hypertension    -  Primary ICD-10-CM: I10 
ICD-9-CM: 401.9 Non-small cell cancer of right lung (HCC)     ICD-10-CM: C34.91 
ICD-9-CM: 162.9 Bone metastases ([address]     ICD-10-CM: C79.51 
ICD-9-CM: 198.5 Acquired hypothyroidism     ICD-10-CM: E03.9 ICD-9-CM: 244.9 Encounter for immunization     ICD-10-CM: U12 ICD-9-CM: V03.89 Vitals BP Pulse Resp Height(growth percentile) Weight(growth percentile) SpO2  
 170/90 (BP 1 Location: Left arm, BP Patient Position: Sitting) (!) 59 18 6' 1\" (1.854 m) 152 lb (68.9 kg) 98% BMI Smoking Status 20.05 kg/m2 Never Smoker Vitals History BMI and BSA Data Body Mass Index Body Surface Area 20.05 kg/m 2 1.88 m 2 Preferred Pharmacy Pharmacy Name Phone [facility] [phone] Your Updated Medication List  
  
   
This list is accurate as of: 11/17/17 10:41 AM.  Always use your most recent med list.  
  
  
  
  
 cholecalciferol 1,000 unit tablet Commonly known as:  VITAMIN D3 Take 1,000 Units by mouth daily. levothyroxine 75 mcg tablet Commonly known as:  SYNTHROID Take 0.5 Tabs by mouth Daily (before breakfast). losartan 100 mg tablet Commonly known as:  COZAAR Take 1 Tab by mouth daily. PHOS--160-250 mg packet Generic drug:  potassium, sodium phosphates TK 2 PACKETS PO AS ONE DOSE  
  
 TARCEVA PO Take  by mouth daily. Prescriptions Sent to Pharmacy Refills  
 losartan (COZAAR) 100 mg tablet 5 Sig: Take 1 Tab by mouth daily. Class: Normal  
 Pharmacy: [facility] [address] #: [phone] Route: Oral  
  
We Performed the Following INFLUENZA VIRUS VACCINE, HIGH DOSE SEASONAL, PRESERVATIVE FREE [87667 CPT(R)] TSH 3RD GENERATION [01844 CPT(R)] Follow-up Instructions Return in about 6 months (around 5/17/2018). To-Do List   
 12/07/2017 3:00 PM  
  Appointment with [address] [place] [address] at [address] [place] [address] ([phone]) [facility]! Dear Tianna Hutchinson: 
Thank you for requesting a [employer] account. Our records indicate that you already have an active [employer] account. You can access your account anytime at [email or web address] Did you know that you can access your hospital and ER discharge instructions at any time in [employer]? You can also review all of your test results from your hospital stay or ER visit. Additional Information If you have questions, please visit the Frequently Asked Questions section of the [employer] website at [email or web address] Remember, [employer] is NOT to be used for urgent needs. For medical emergencies, dial 911. Now available from your iPhone and Android! Please provide this summary of care documentation to your next provider. Your primary care clinician is listed as Sophia Persons. If you have any questions after today's visit, please call [phone].

## 2017-11-17 NOTE — PROGRESS NOTES
Liver numbers are normal    Please have him resume Tarceva at his last dose  He should return in 1 month with repeat labs to include LFTs

## 2017-11-30 ENCOUNTER — APPOINTMENT (OUTPATIENT)
Dept: INFUSION THERAPY | Age: 67
End: 2017-11-30
Payer: MEDICARE

## 2017-11-30 NOTE — PROGRESS NOTES
HISTORY OF PRESENT ILLNESS  Yamila Whipple is a 78 y.o. male. Pt. comes in for f/u. Has multiple medical problems. He is followed by oncology for metastatic non-small cell lung cancer and prostate cancer. He is on Tarceva. Most recent CT scan showed improvement of the mass. His BP is high. Denies any related symptoms. Tells me oncologist has been refilling medications for him. Denies tobacco use. Continues to drink alcohol. Reports compliance with medications and diet. Med list and most recent labs/studies reviewed with pt. TSH has been slightly elevated. Trying to be active physically as tolerated. Needs med refills. Due for repeat labs. Reports no other new c/o. Hypertension    Pertinent negatives include no chest pain, no malaise/fatigue, no blurred vision, no headaches, no dizziness and no shortness of breath. Prostate Cancer   Pertinent negatives include no chest pain, no abdominal pain, no headaches and no shortness of breath. Lung cancer    Review of Systems   Constitutional: Negative for fever, malaise/fatigue and weight loss. HENT: Negative for congestion. Eyes: Negative for blurred vision. Respiratory: Negative for cough and shortness of breath. Cardiovascular: Negative for chest pain and leg swelling. Gastrointestinal: Negative for abdominal pain and heartburn. Genitourinary: Negative for dysuria and frequency. Musculoskeletal: Negative for back pain and joint pain. Skin: Negative for rash. Neurological: Negative for dizziness, sensory change and headaches. Psychiatric/Behavioral: Negative for depression. The patient is not nervous/anxious and does not have insomnia. All other systems reviewed and are negative. Physical Exam   Constitutional: He is oriented to person, place, and time. He appears well-developed and well-nourished. No distress. HENT:   Head: Normocephalic and atraumatic.    Mouth/Throat: Oropharynx is clear and moist.   Eyes: Conjunctivae are normal. No scleral icterus. Neck: Normal range of motion. Neck supple. No JVD present. No thyromegaly present. Cardiovascular: Normal rate, regular rhythm, normal heart sounds and intact distal pulses. No murmur heard. Pulmonary/Chest: Effort normal. No respiratory distress. He has no wheezes. He has no rales. Dec sounds at R base   Abdominal: Soft. Bowel sounds are normal. He exhibits no distension. There is no tenderness. Musculoskeletal: He exhibits no edema or tenderness. Thoracic scoliosis, chronic   Neurological: He is alert and oriented to person, place, and time. Coordination normal.   Skin: Skin is warm and dry. No rash noted. Psychiatric: He has a normal mood and affect. His behavior is normal.   Seems chronically depressed   Nursing note and vitals reviewed. ASSESSMENT and PLAN  Diagnoses and all orders for this visit:    1. Essential hypertension    2. Non-small cell cancer of right lung ([facility]    3. Bone metastases ([facility]    4. Acquired hypothyroidism  -     TSH 3RD GENERATION    5. Encounter for immunization  -     Influenza virus vaccine (FLUZONE HIGH DOSE) 65 years and older (21711)    Other orders  -     losartan (COZAAR) 100 mg tablet; Take 1 Tab by mouth daily. Follow-up Disposition:  Return in about 6 months (around 5/17/2018).    lab results and schedule of future lab studies reviewed with patient  reviewed diet, exercise and weight control  reviewed medications and side effects in detail  F/u with other MD's as scheduled  Monitor BP at home with goal of 140/90 or less  Discussed do's and don'ts of hypertension with patient

## 2017-12-04 RX ORDER — ZOLEDRONIC ACID 4 MG/100ML
4 SOLUTION INTRAVENOUS ONCE
Status: COMPLETED | OUTPATIENT
Start: 2017-12-07 | End: 2017-12-07

## 2017-12-07 ENCOUNTER — HOSPITAL ENCOUNTER (OUTPATIENT)
Dept: LAB | Age: 67
Discharge: HOME | End: 2017-12-07
Payer: MEDICARE

## 2017-12-07 ENCOUNTER — HOSPITAL ENCOUNTER (OUTPATIENT)
Dept: INFUSION THERAPY | Age: 67
Discharge: HOME | End: 2017-12-07
Payer: MEDICARE

## 2017-12-07 VITALS
DIASTOLIC BLOOD PRESSURE: 89 MMHG | RESPIRATION RATE: 16 BRPM | HEART RATE: 50 BPM | SYSTOLIC BLOOD PRESSURE: 164 MMHG | TEMPERATURE: 97.5 F

## 2017-12-07 LAB
ALBUMIN SERPL-MCNC: 3.4 G/DL (ref 3.5–5)
ALBUMIN SERPL-MCNC: 3.4 G/DL (ref 3.5–5)
ALBUMIN/GLOB SERPL: 0.9 {RATIO} (ref 1.1–2.2)
ALP SERPL-CCNC: 99 U/L (ref 45–117)
ALT SERPL-CCNC: 33 U/L (ref 12–78)
ANION GAP SERPL CALC-SCNC: 5 MMOL/L (ref 5–15)
ANION GAP SERPL CALC-SCNC: 5 MMOL/L (ref 5–15)
AST SERPL-CCNC: 32 U/L (ref 15–37)
BILIRUB DIRECT SERPL-MCNC: 0.2 MG/DL (ref 0–0.2)
BILIRUB SERPL-MCNC: 0.9 MG/DL (ref 0.2–1)
BUN SERPL-MCNC: 27 MG/DL (ref 6–20)
BUN SERPL-MCNC: 28 MG/DL (ref 6–20)
BUN/CREAT SERPL: 31 (ref 12–20)
BUN/CREAT SERPL: 32 (ref 12–20)
CALCIUM SERPL-MCNC: 9.2 MG/DL (ref 8.5–10.1)
CALCIUM SERPL-MCNC: 9.2 MG/DL (ref 8.5–10.1)
CHLORIDE SERPL-SCNC: 103 MMOL/L (ref 97–108)
CHLORIDE SERPL-SCNC: 103 MMOL/L (ref 97–108)
CO2 SERPL-SCNC: 35 MMOL/L (ref 21–32)
CO2 SERPL-SCNC: 36 MMOL/L (ref 21–32)
CREAT SERPL-MCNC: 0.86 MG/DL (ref 0.7–1.3)
CREAT SERPL-MCNC: 0.87 MG/DL (ref 0.7–1.3)
GLOBULIN SER CALC-MCNC: 4 G/DL (ref 2–4)
GLUCOSE SERPL-MCNC: 74 MG/DL (ref 65–100)
GLUCOSE SERPL-MCNC: 75 MG/DL (ref 65–100)
MAGNESIUM SERPL-MCNC: 2.4 MG/DL (ref 1.6–2.4)
PHOSPHATE SERPL-MCNC: 2.9 MG/DL (ref 2.6–4.7)
PHOSPHATE SERPL-MCNC: 2.9 MG/DL (ref 2.6–4.7)
POTASSIUM SERPL-SCNC: 3.7 MMOL/L (ref 3.5–5.1)
POTASSIUM SERPL-SCNC: 3.8 MMOL/L (ref 3.5–5.1)
PROT SERPL-MCNC: 7.4 G/DL (ref 6.4–8.2)
SODIUM SERPL-SCNC: 143 MMOL/L (ref 136–145)
SODIUM SERPL-SCNC: 144 MMOL/L (ref 136–145)

## 2017-12-07 PROCEDURE — 96374 THER/PROPH/DIAG INJ IV PUSH: CPT

## 2017-12-07 PROCEDURE — 36415 COLL VENOUS BLD VENIPUNCTURE: CPT

## 2017-12-07 PROCEDURE — 83735 ASSAY OF MAGNESIUM: CPT | Performed by: INTERNAL MEDICINE

## 2017-12-07 PROCEDURE — 84100 ASSAY OF PHOSPHORUS: CPT | Performed by: INTERNAL MEDICINE

## 2017-12-07 PROCEDURE — 36415 COLL VENOUS BLD VENIPUNCTURE: CPT | Performed by: INTERNAL MEDICINE

## 2017-12-07 PROCEDURE — 74011250636 HC RX REV CODE- 250/636: Performed by: NURSE PRACTITIONER

## 2017-12-07 PROCEDURE — 84443 ASSAY THYROID STIM HORMONE: CPT

## 2017-12-07 PROCEDURE — 80048 BASIC METABOLIC PNL TOTAL CA: CPT | Performed by: INTERNAL MEDICINE

## 2017-12-07 PROCEDURE — 80069 RENAL FUNCTION PANEL: CPT | Performed by: INTERNAL MEDICINE

## 2017-12-07 PROCEDURE — 80076 HEPATIC FUNCTION PANEL: CPT | Performed by: INTERNAL MEDICINE

## 2017-12-07 RX ORDER — SODIUM CHLORIDE 9 MG/ML
50 INJECTION, SOLUTION INTRAVENOUS AS NEEDED
Status: DISCONTINUED | OUTPATIENT
Start: 2017-12-07 | End: 2017-12-07

## 2017-12-07 RX ORDER — SODIUM CHLORIDE 0.9 % (FLUSH) 0.9 %
5-10 SYRINGE (ML) INJECTION AS NEEDED
Status: ACTIVE | OUTPATIENT
Start: 2017-12-07 | End: 2017-12-08

## 2017-12-07 RX ADMIN — ZOLEDRONIC ACID 4 MG: 4 INJECTION, SOLUTION INTRAVENOUS at 16:37

## 2017-12-07 RX ADMIN — Medication 10 ML: at 16:53

## 2017-12-07 RX ADMIN — Medication 10 ML: at 15:23

## 2017-12-07 NOTE — PROGRESS NOTES
Outpatient Infusion Center Progress Note    1520 Pt admit to [facility] for monthly Zometa/labs. Pt ambulatory in stable condition. Assessment completed. No new concerns voiced. Peripheral IV established with positive blood return. Labs drawn peripherally and sent for processing. Recent Results (from the past 12 hour(s))   METABOLIC PANEL, BASIC    Collection Time: 12/07/17  3:25 PM   Result Value Ref Range    Sodium 143 136 - 145 mmol/L    Potassium 3.8 3.5 - 5.1 mmol/L    Chloride 103 97 - 108 mmol/L    CO2 35 (H) 21 - 32 mmol/L    Anion gap 5 5 - 15 mmol/L    Glucose 74 65 - 100 mg/dL    BUN 28 (H) 6 - 20 MG/DL    Creatinine 0.87 0.70 - 1.30 MG/DL    BUN/Creatinine ratio 32 (H) 12 - 20      GFR est AA >60 >60 ml/min/1.73m2    GFR est non-AA >60 >60 ml/min/1.73m2    Calcium 9.2 8.5 - 10.1 MG/DL   MAGNESIUM    Collection Time: 12/07/17  3:25 PM   Result Value Ref Range    Magnesium 2.4 1.6 - 2.4 mg/dL   PHOSPHORUS    Collection Time: 12/07/17  3:25 PM   Result Value Ref Range    Phosphorus 2.9 2.6 - 4.7 MG/DL   HEPATIC FUNCTION PANEL    Collection Time: 12/07/17  3:25 PM   Result Value Ref Range    Protein, total 7.4 6.4 - 8.2 g/dL    Albumin 3.4 (L) 3.5 - 5.0 g/dL    Globulin 4.0 2.0 - 4.0 g/dL    A-G Ratio 0.9 (L) 1.1 - 2.2      Bilirubin, total 0.9 0.2 - 1.0 MG/DL    Bilirubin, direct 0.2 0.0 - 0.2 MG/DL    Alk.  phosphatase 99 45 - 117 U/L    AST (SGOT) 32 15 - 37 U/L    ALT (SGPT) 33 12 - 78 U/L   RENAL FUNCTION PANEL    Collection Time: 12/07/17  3:25 PM   Result Value Ref Range    Sodium 144 136 - 145 mmol/L    Potassium 3.7 3.5 - 5.1 mmol/L    Chloride 103 97 - 108 mmol/L    CO2 36 (H) 21 - 32 mmol/L    Anion gap 5 5 - 15 mmol/L    Glucose 75 65 - 100 mg/dL    BUN 27 (H) 6 - 20 MG/DL    Creatinine 0.86 0.70 - 1.30 MG/DL    BUN/Creatinine ratio 31 (H) 12 - 20      GFR est AA >60 >60 ml/min/1.73m2    GFR est non-AA >60 >60 ml/min/1.73m2    Calcium 9.2 8.5 - 10.1 MG/DL    Phosphorus 2.9 2.6 - 4.7 MG/DL    Albumin 3.4 (L) 3.5 - 5.0 g/dL       Visit Vitals    /89    Pulse (!) 50    Temp 97.5 °F (36.4 °C)    Resp 16       Medications:  Zometa 4 MG    1655 Pt tolerated treatment well. PIV maintained positive blood return throughout treatment. Line flushed and de-accessed. D/c home ambulatory in no distress. Pt aware of next appointment scheduled for 01/11/18 at 1500.

## 2017-12-08 LAB — TSH SERPL DL<=0.005 MIU/L-ACNC: 2.89 UIU/ML (ref 0.45–4.5)

## 2017-12-13 ENCOUNTER — DOCUMENTATION ONLY (OUTPATIENT)
Dept: ONCOLOGY | Age: 67
End: 2017-12-13

## 2017-12-26 ENCOUNTER — OFFICE VISIT (OUTPATIENT)
Dept: ONCOLOGY | Age: 67
End: 2017-12-26

## 2017-12-26 VITALS
HEART RATE: 57 BPM | TEMPERATURE: 97.4 F | WEIGHT: 154.8 LBS | SYSTOLIC BLOOD PRESSURE: 188 MMHG | OXYGEN SATURATION: 97 % | RESPIRATION RATE: 20 BRPM | HEIGHT: 73 IN | DIASTOLIC BLOOD PRESSURE: 80 MMHG | BODY MASS INDEX: 20.52 KG/M2

## 2017-12-26 DIAGNOSIS — C34.91 ADENOCARCINOMA OF LUNG, STAGE 4, RIGHT (HCC): Primary | ICD-10-CM

## 2017-12-26 RX ORDER — LOSARTAN POTASSIUM 50 MG/1
TABLET ORAL
Refills: 2 | COMMUNITY
Start: 2017-11-16 | End: 2018-05-18 | Stop reason: ALTCHOICE

## 2017-12-26 RX ORDER — AMLODIPINE BESYLATE 10 MG/1
TABLET ORAL
Refills: 3 | COMMUNITY
Start: 2017-12-18 | End: 2018-03-01 | Stop reason: SDUPTHER

## 2017-12-26 NOTE — PROGRESS NOTES
Hematology/Oncology progress note    REASON FOR VISIT: Stage IV EGFR mutated NSCLC    HISTORY OF PRESENT ILLNESS: Mr. Disha Purcell is a 75 y.o. male with prostate cancer who was diagnosed with stage IV NSCLC- adenocarcinoma ( EGFR mutated , PD-L1 low) in May 2017. Follow up on palliative Tarceva- Initiated 6/26/17. Oncologic history   Mr. Disha Purcell noticed a new cough and fevers back in March of 2017. He went to Urgent Care and received antibiotics and cough medication. He states this worked for a while, but he began to notice his cough return. This was intermittent. He had some tightness across his anterior chest which he related to the infection. Chest x-ray was abnormal and he was told to proceed to the Emergency Department. Vinny Rosario had been under surveillance for right lower lobe mass that was initially noted in 2015. Bronch at that time was negative for malignancy. He was evaluated by Dr. Rik Amor with Thoracic Surgery in 2015 for possible surgical resection. CT chest in November of 2016 with mass size unchanged but some right basilar pleural thickening. CT chest obtained 5/14/17 however showed a new large right pleural effusion with right middle lobe and right lower lobe collapse. Irregular spiculated right lower lobe mass 3.8cm and stable precarinal enlarged lymph nodes were noted. New right posterior second rib lytic lesion and new right hepatic hypodensity consistent with mets. He underwent a therapeutic thoracentesis on 5/15/17 with removal of 1500 cc of serosanguinous fluid. Pathology showed adenocarcinoma. PET CT showed pleural, bone, liver and flor metastasis. MRI brain 5/20/17: No metastasis. Needed repeat R sided thoracentesis on 5/25/17, had some post procedure hydropneumothorax. He was seen by Dr. Alvarado Eli at [address] [place] [address] for Pleurx catheter.      CT guided biopsy of R lung mass done 5/25 which showed adenocarcinoma. EGFR mutation detected Exon 18 and 21      Treatment  6/26/17: Tarceva. Monthly Xgeva. Palliative XRT to R hip   CT CAP 10/2/17: Response    Past Medical History:   Diagnosis Date    Hypertension        Past Surgical History:   Procedure Laterality Date    HX ORTHOPAEDIC      reconstruction of left little finger       No Known Allergies    Current Outpatient Prescriptions   Medication Sig Dispense Refill    amLODIPine (NORVASC) 10 mg tablet TK 1 T PO   QD  3    losartan (COZAAR) 100 mg tablet Take 1 Tab by mouth daily. 30 Tab 5    ERLOTINIB HCL (TARCEVA PO) Take  by mouth daily.  levothyroxine (SYNTHROID) 75 mcg tablet Take 0.5 Tabs by mouth Daily (before breakfast). 30 Tab 2    cholecalciferol (VITAMIN D3) 1,000 unit tablet Take 1,000 Units by mouth daily.  losartan (COZAAR) 50 mg tablet TK 1 T PO D. STOP HYDROCHLORATHIAZIDE  2    PHOS--160-250 mg packet TK 2 PACKETS PO AS ONE DOSE  0       Social History     Social History    Marital status:      Spouse name: N/A    Number of children: N/A    Years of education: N/A     Social History Main Topics    Smoking status: Never Smoker    Smokeless tobacco: Never Used    Alcohol use 6.0 oz/week     10 Glasses of wine per week      Comment: regularly    Drug use: No    Sexual activity: Yes     Partners: Female     Other Topics Concern    None     Social History Narrative       Family History   Problem Relation Age of Onset    Cancer Mother      leukemia    Stroke Father     Cancer Brother      bladder       ROS  He was struggling with HTN still which he has had a wide range of blood pressure. Taking Losartan 100 mg daily. He is now on Amlodipine 5 mg daily. BP  fluctuates from 140-170 mm Hg. He feels well otherwise. Has intermittent cough - dry, no chest pain. Breathing is no different. Has no R hip pain. Rash has resolved. No HA, vision issues or diarrhea. Has soft stools. He has been eating well and maintaining his weight.  No new medications     ECOG PS is 0      Emotional well being addressed and patient is coping well      Physical Examination:   Visit Vitals    /80  Comment: manual recheck    Pulse (!) 57    Temp 97.4 °F (36.3 °C)    Resp 20    Ht 6' 1\" (1.854 m)    Wt 154 lb 12.8 oz (70.2 kg)    SpO2 97%    BMI 20.42 kg/m2     General appearance - alert, well appearing, and in no distress  Mental status - oriented to person, place, and time  Mouth - mucous membranes moist, pharynx normal without lesions. Neck - supple, no significant adenopathy  Lymphatics - no palpable lymphadenopathy, no hepatosplenomegaly  Chest -Decreased BS on the R bases, no added sounds  Heart - normal rate, regular rhythm, normal S1, S2, no murmurs, rubs, clicks or gallops  Abdomen - soft, nontender, nondistended, no masses or organomegaly, bowel sounds present  Neurological - normal speech, no focal findings or movement disorder noted    LABS  Lab Results   Component Value Date/Time    WBC 6.1 10/02/2017 12:00 AM    HGB 11.9 10/02/2017 12:00 AM    HCT 37.5 10/02/2017 12:00 AM    PLATELET 537 06/34/6502 12:00 AM    MCV 81 10/02/2017 12:00 AM    ABS. NEUTROPHILS 4.2 10/02/2017 12:00 AM     Lab Results   Component Value Date/Time    Sodium 143 12/07/2017 03:25 PM    Sodium 144 12/07/2017 03:25 PM    Potassium 3.8 12/07/2017 03:25 PM    Potassium 3.7 12/07/2017 03:25 PM    Chloride 103 12/07/2017 03:25 PM    Chloride 103 12/07/2017 03:25 PM    CO2 35 12/07/2017 03:25 PM    CO2 36 12/07/2017 03:25 PM    Glucose 74 12/07/2017 03:25 PM    Glucose 75 12/07/2017 03:25 PM    BUN 28 12/07/2017 03:25 PM    BUN 27 12/07/2017 03:25 PM    Creatinine 0.87 12/07/2017 03:25 PM    Creatinine 0.86 12/07/2017 03:25 PM    GFR est AA >60 12/07/2017 03:25 PM    GFR est AA >60 12/07/2017 03:25 PM    GFR est non-AA >60 12/07/2017 03:25 PM    GFR est non-AA >60 12/07/2017 03:25 PM    Calcium 9.2 12/07/2017 03:25 PM    Calcium 9.2 12/07/2017 03:25 PM     Lab Results   Component Value Date/Time    AST (SGOT) 32 12/07/2017 03:25 PM    Alk.  phosphatase 99 12/07/2017 03:25 PM    Protein, total 7.4 12/07/2017 03:25 PM    Albumin 3.4 12/07/2017 03:25 PM    Albumin 3.4 12/07/2017 03:25 PM    Globulin 4.0 12/07/2017 03:25 PM    A-G Ratio 0.9 12/07/2017 03:25 PM     CT Chest- abdomen - pelvis 10/2/17    Decreased right pleural thickening and size of right lower lobe  pulmonary mass with interval sclerosis of multiple osseous metastases as above  compatible with response to therapy    ASSESSMENT AND PLAN  Mr. Piper Erickson is a 75 y.o. male with      1. Stage IV NSCLC with R lung mass, mediastinal adenopathy, malignant R pleural effusion, multiple asymptomatic bone metastasis and possibly liver metastasis. EGFR mutated, PD-L1 5%. He is tolerating Tarceva well. · Continue Tarceva at current dose  · CT CAP end of Jan 2018 with labs to include CMP, CBC, TSH  · Continue monthly Zometa      2. S/P R thoracentesis on 5/15/17 and again on 5/25- s/p Pleurx by Dr. Missy Yoder. No new concerns      3. Kanwal 6 prostate cancer on active surveillance      4. Anemia secondary to 1-stable      5. R hip pain secondary to osseous metastasis s/p XRT      6. HTN- Not likely related to Tarceva. Needs to discuss with Dr. Juan A Guillermo increasing Norvasc to 10 mg daily      7. Elevated Bb secondary to Tarceva. Resolved after holding for 1 week .       RTC 1 week after scans - Jan end 2018 or Feb first week    Jessika Platt MD

## 2017-12-26 NOTE — MR AVS SNAPSHOT
Visit Information Date & Time Provider Department Dept. Phone Encounter #  
 12/26/2017  9:00 AM MD Jessica Lara [facility] Oncology at [facility] [phone] [id number] Follow-up Instructions Return in about 5 weeks (around 1/30/2018). Routing History Follow-up and Disposition History Your Appointments 2/1/2018  9:00 AM  
Follow Up with MD [facility]ara [facility] at [facility] Appt Note: 1 month f/u- Lung CA  
 [address] [address]  
[phone]  
  
   
 [address] [address] 5/18/2018  9:30 AM  
ROUTINE CARE with Bao Melo DO [facility] Internal Medicine ([facility]) Appt Note: 6 month f/u  
 [address] [facility] [address] [place] [address]  
[phone]  
  
   
 [address] [facility] [address] [facility] [address] Upcoming Health Maintenance Date Due Hepatitis C Screening 1950 DTaP/Tdap/Td series (1 - Tdap) 12/2/1970 ZOSTER VACCINE AGE 60> 10/2/2009 GLAUCOMA SCREENING Q2Y 12/2/2014 Pneumococcal 65+ High/Highest Risk (1 of 2 - PCV13) 12/2/2014 MEDICARE YEARLY EXAM 1/19/2017 FOBT Q 1 YEAR AGE 50-75 10/31/2017 Allergies as of 12/26/2017  Review Complete On: 12/26/2017 By: Patrick Reich MD  
 No Known Allergies Current Immunizations  Reviewed on 12/7/2017 Name Date Influenza High Dose Vaccine PF 11/17/2017, 10/31/2016 Not reviewed this visit You Were Diagnosed With   
  
 Codes Comments Adenocarcinoma of lung, stage 4, right ([facility]    -  Primary ICD-10-CM: C34.91 
ICD-9-CM: 162.9 Vitals BP Pulse Temp Resp Height(growth percentile) Weight(growth percentile) 188/80 (!) 57 97.4 °F (36.3 °C) 20 6' 1\" (1.854 m) 154 lb 12.8 oz (70.2 kg) SpO2 BMI Smoking Status 97% 20.42 kg/m2 Never Smoker Vitals History BMI and BSA Data Body Mass Index Body Surface Area 20.42 kg/m 2 1.9 m 2 Preferred Pharmacy Pharmacy Name Phone 1200 [facility] [place] AT [address] [phone] Your Updated Medication List  
  
   
This list is accurate as of: 12/26/17  9:26 AM.  Always use your most recent med list. amLODIPine 10 mg tablet Commonly known as:  [place] TK 1 T PO   QD  
  
 cholecalciferol 1,000 unit tablet Commonly known as:  VITAMIN D3 Take 1,000 Units by mouth daily. levothyroxine 75 mcg tablet Commonly known as:  SYNTHROID Take 0.5 Tabs by mouth Daily (before breakfast). * losartan 50 mg tablet Commonly known as:  COZAAR TK 1 T PO D. STOP HYDROCHLORATHIAZIDE  
  
 * losartan 100 mg tablet Commonly known as:  COZAAR Take 1 Tab by mouth daily. PHOS--160-250 mg packet Generic drug:  potassium, sodium phosphates TK 2 PACKETS PO AS ONE DOSE  
  
 TARCEVA PO Take  by mouth daily. * Notice: This list has 2 medication(s) that are the same as other medications prescribed for you. Read the directions carefully, and ask your doctor or other care provider to review them with you. Follow-up Instructions Return in about 5 weeks (around 1/30/2018). To-Do List   
 01/11/2018 3:00 PM  
  Appointment with [address] [place] [address] ([phone])  
  
 01/26/2018 Imaging:  CT CHEST ABD PELV W CONT Introducing [facility] SERVICES! Dear Sarwat Obrien: 
Thank you for requesting a [employer] account. Our records indicate that you already have an active [employer] account. You can access your account anytime at [email or web address] Did you know that you can access your hospital and ER discharge instructions at any time in [employer]? You can also review all of your test results from your hospital stay or ER visit. Additional Information If you have questions, please visit the Frequently Asked Questions section of the [employer] website at [email or web address] Remember, [employer] is NOT to be used for urgent needs. For medical emergencies, dial 911. Now available from your iPhone and Android! Please provide this summary of care documentation to your next provider. Your primary care clinician is listed as Leslie Delgado. If you have any questions after today's visit, please call [phone].

## 2018-01-01 ENCOUNTER — HOSPITAL ENCOUNTER (OUTPATIENT)
Dept: LAB | Age: 68
Discharge: HOME | End: 2018-12-11
Payer: MEDICARE

## 2018-01-01 ENCOUNTER — OFFICE VISIT (OUTPATIENT)
Dept: INTERNAL MEDICINE CLINIC | Age: 68
End: 2018-01-01

## 2018-01-01 ENCOUNTER — OFFICE VISIT (OUTPATIENT)
Dept: ONCOLOGY | Age: 68
End: 2018-01-01

## 2018-01-01 ENCOUNTER — PATIENT MESSAGE (OUTPATIENT)
Dept: ONCOLOGY | Age: 68
End: 2018-01-01

## 2018-01-01 ENCOUNTER — HOSPITAL ENCOUNTER (OUTPATIENT)
Dept: INFUSION THERAPY | Age: 68
End: 2018-01-01
Payer: MEDICARE

## 2018-01-01 ENCOUNTER — DOCUMENTATION ONLY (OUTPATIENT)
Dept: ONCOLOGY | Age: 68
End: 2018-01-01

## 2018-01-01 ENCOUNTER — APPOINTMENT (OUTPATIENT)
Dept: INFUSION THERAPY | Age: 68
End: 2018-01-01

## 2018-01-01 ENCOUNTER — HOSPITAL ENCOUNTER (OUTPATIENT)
Dept: INFUSION THERAPY | Age: 68
Discharge: HOME | End: 2018-11-01
Payer: MEDICARE

## 2018-01-01 ENCOUNTER — HOSPITAL ENCOUNTER (OUTPATIENT)
Dept: NUCLEAR MEDICINE | Age: 68
Discharge: HOME | End: 2018-12-04
Attending: REGISTERED NURSE
Payer: MEDICARE

## 2018-01-01 ENCOUNTER — TELEPHONE (OUTPATIENT)
Dept: ONCOLOGY | Age: 68
End: 2018-01-01

## 2018-01-01 ENCOUNTER — HOSPITAL ENCOUNTER (OUTPATIENT)
Dept: NON INVASIVE DIAGNOSTICS | Age: 68
Discharge: HOME | End: 2018-12-11
Payer: MEDICARE

## 2018-01-01 ENCOUNTER — HOSPITAL ENCOUNTER (OUTPATIENT)
Dept: CT IMAGING | Age: 68
Discharge: HOME | End: 2018-12-04
Attending: REGISTERED NURSE
Payer: MEDICARE

## 2018-01-01 VITALS
BODY MASS INDEX: 20.05 KG/M2 | WEIGHT: 151.3 LBS | TEMPERATURE: 97.9 F | OXYGEN SATURATION: 98 % | HEIGHT: 73 IN | SYSTOLIC BLOOD PRESSURE: 179 MMHG | RESPIRATION RATE: 20 BRPM | HEART RATE: 50 BPM | DIASTOLIC BLOOD PRESSURE: 83 MMHG

## 2018-01-01 VITALS
SYSTOLIC BLOOD PRESSURE: 168 MMHG | OXYGEN SATURATION: 96 % | BODY MASS INDEX: 20.97 KG/M2 | HEART RATE: 54 BPM | WEIGHT: 158.2 LBS | RESPIRATION RATE: 20 BRPM | TEMPERATURE: 97.9 F | DIASTOLIC BLOOD PRESSURE: 80 MMHG | HEIGHT: 73 IN

## 2018-01-01 VITALS — DIASTOLIC BLOOD PRESSURE: 76 MMHG | SYSTOLIC BLOOD PRESSURE: 156 MMHG | TEMPERATURE: 96.7 F | HEART RATE: 53 BPM

## 2018-01-01 VITALS
SYSTOLIC BLOOD PRESSURE: 137 MMHG | HEIGHT: 73 IN | OXYGEN SATURATION: 97 % | RESPIRATION RATE: 16 BRPM | HEART RATE: 63 BPM | BODY MASS INDEX: 20.54 KG/M2 | WEIGHT: 155 LBS | TEMPERATURE: 96.8 F | DIASTOLIC BLOOD PRESSURE: 78 MMHG

## 2018-01-01 DIAGNOSIS — J90 PLEURAL EFFUSION: ICD-10-CM

## 2018-01-01 DIAGNOSIS — C79.51 BONE METASTASES (HCC): ICD-10-CM

## 2018-01-01 DIAGNOSIS — Z12.11 COLON CANCER SCREENING: ICD-10-CM

## 2018-01-01 DIAGNOSIS — C34.91 ADENOCARCINOMA OF LUNG, STAGE 4, RIGHT (HCC): ICD-10-CM

## 2018-01-01 DIAGNOSIS — I10 ESSENTIAL HYPERTENSION: Primary | ICD-10-CM

## 2018-01-01 DIAGNOSIS — C34.91 NON-SMALL CELL CANCER OF RIGHT LUNG (HCC): ICD-10-CM

## 2018-01-01 DIAGNOSIS — C34.91 ADENOCARCINOMA OF LUNG, STAGE 4, RIGHT (HCC): Primary | ICD-10-CM

## 2018-01-01 DIAGNOSIS — Z00.00 MEDICARE ANNUAL WELLNESS VISIT, SUBSEQUENT: ICD-10-CM

## 2018-01-01 DIAGNOSIS — Z23 ENCOUNTER FOR IMMUNIZATION: ICD-10-CM

## 2018-01-01 DIAGNOSIS — E03.9 ACQUIRED HYPOTHYROIDISM: ICD-10-CM

## 2018-01-01 DIAGNOSIS — R60.9 EDEMA, UNSPECIFIED TYPE: ICD-10-CM

## 2018-01-01 LAB
ALBUMIN SERPL-MCNC: 3 G/DL (ref 3.5–5)
ALBUMIN SERPL-MCNC: 3.6 G/DL (ref 3.6–4.8)
ALBUMIN/GLOB SERPL: 0.8 {RATIO} (ref 1.1–2.2)
ALBUMIN/GLOB SERPL: 1.2 {RATIO} (ref 1.2–2.2)
ALP SERPL-CCNC: 223 U/L (ref 45–117)
ALP SERPL-CCNC: 265 IU/L (ref 39–117)
ALT SERPL-CCNC: 36 IU/L (ref 0–44)
ALT SERPL-CCNC: 52 U/L (ref 12–78)
ANION GAP SERPL CALC-SCNC: 6 MMOL/L (ref 5–15)
AST SERPL-CCNC: 52 IU/L (ref 0–40)
AST SERPL-CCNC: 59 U/L (ref 15–37)
ATRIAL RATE: 48 BPM
BASOPHILS # BLD AUTO: 0 X10E3/UL (ref 0–0.2)
BASOPHILS # BLD: 0 K/UL (ref 0–0.1)
BASOPHILS NFR BLD AUTO: 0 %
BASOPHILS NFR BLD: 1 % (ref 0–1)
BILIRUB SERPL-MCNC: 1 MG/DL (ref 0.2–1)
BILIRUB SERPL-MCNC: 1 MG/DL (ref 0–1.2)
BUN SERPL-MCNC: 20 MG/DL (ref 8–27)
BUN SERPL-MCNC: 21 MG/DL (ref 6–20)
BUN/CREAT SERPL: 29 (ref 10–24)
BUN/CREAT SERPL: 32 (ref 12–20)
CALCIUM SERPL-MCNC: 8.6 MG/DL (ref 8.5–10.1)
CALCIUM SERPL-MCNC: 9 MG/DL (ref 8.6–10.2)
CALCULATED P AXIS, ECG09: 48 DEGREES
CALCULATED R AXIS, ECG10: 52 DEGREES
CALCULATED T AXIS, ECG11: 54 DEGREES
CHLORIDE SERPL-SCNC: 106 MMOL/L (ref 97–108)
CHLORIDE SERPL-SCNC: 108 MMOL/L (ref 96–106)
CO2 SERPL-SCNC: 28 MMOL/L (ref 20–29)
CO2 SERPL-SCNC: 30 MMOL/L (ref 21–32)
CREAT SERPL-MCNC: 0.66 MG/DL (ref 0.7–1.3)
CREAT SERPL-MCNC: 0.68 MG/DL (ref 0.76–1.27)
DIAGNOSIS, 93000: NORMAL
DIFFERENTIAL METHOD BLD: ABNORMAL
EOSINOPHIL # BLD AUTO: 0.1 X10E3/UL (ref 0–0.4)
EOSINOPHIL # BLD: 0.1 K/UL (ref 0–0.4)
EOSINOPHIL NFR BLD AUTO: 3 %
EOSINOPHIL NFR BLD: 2 % (ref 0–7)
ERYTHROCYTE [DISTWIDTH] IN BLOOD BY AUTOMATED COUNT: 16.1 % (ref 11.5–14.5)
ERYTHROCYTE [DISTWIDTH] IN BLOOD BY AUTOMATED COUNT: 16.4 % (ref 12.3–15.4)
GLOBULIN SER CALC-MCNC: 3.1 G/DL (ref 1.5–4.5)
GLOBULIN SER CALC-MCNC: 3.8 G/DL (ref 2–4)
GLUCOSE SERPL-MCNC: 84 MG/DL (ref 65–100)
GLUCOSE SERPL-MCNC: 94 MG/DL (ref 65–99)
HCT VFR BLD AUTO: 32 % (ref 36.6–50.3)
HCT VFR BLD AUTO: 34.8 % (ref 37.5–51)
HGB BLD-MCNC: 10.1 G/DL (ref 12.1–17)
HGB BLD-MCNC: 11 G/DL (ref 13–17.7)
IMM GRANULOCYTES # BLD: 0 K/UL (ref 0–0.04)
IMM GRANULOCYTES # BLD: 0 X10E3/UL (ref 0–0.1)
IMM GRANULOCYTES NFR BLD AUTO: 0 % (ref 0–0.5)
IMM GRANULOCYTES NFR BLD: 0 %
LYMPHOCYTES # BLD AUTO: 0.6 X10E3/UL (ref 0.7–3.1)
LYMPHOCYTES # BLD: 0.6 K/UL (ref 0.8–3.5)
LYMPHOCYTES NFR BLD AUTO: 15 %
LYMPHOCYTES NFR BLD: 17 % (ref 12–49)
MCH RBC QN AUTO: 28.6 PG (ref 26.6–33)
MCH RBC QN AUTO: 29.5 PG (ref 26–34)
MCHC RBC AUTO-ENTMCNC: 31.6 G/DL (ref 30–36.5)
MCHC RBC AUTO-ENTMCNC: 31.6 G/DL (ref 31.5–35.7)
MCV RBC AUTO: 90 FL (ref 79–97)
MCV RBC AUTO: 93.6 FL (ref 80–99)
MONOCYTES # BLD AUTO: 0.3 X10E3/UL (ref 0.1–0.9)
MONOCYTES # BLD: 0.3 K/UL (ref 0–1)
MONOCYTES NFR BLD AUTO: 8 %
MONOCYTES NFR BLD: 9 % (ref 5–13)
MORPHOLOGY BLD-IMP: ABNORMAL
NEUTROPHILS # BLD AUTO: 2.8 X10E3/UL (ref 1.4–7)
NEUTROPHILS NFR BLD AUTO: 74 %
NEUTS SEG # BLD: 2.3 K/UL (ref 1.8–8)
NEUTS SEG NFR BLD: 71 % (ref 32–75)
NRBC # BLD: 0 K/UL (ref 0–0.01)
NRBC BLD-RTO: 0 PER 100 WBC
P-R INTERVAL, ECG05: 158 MS
PLATELET # BLD AUTO: 75 K/UL (ref 150–400)
PLATELET # BLD AUTO: 77 X10E3/UL (ref 150–379)
PMV BLD AUTO: 10.9 FL (ref 8.9–12.9)
POTASSIUM SERPL-SCNC: 3.6 MMOL/L (ref 3.5–5.1)
POTASSIUM SERPL-SCNC: 4 MMOL/L (ref 3.5–5.2)
PROT SERPL-MCNC: 6.7 G/DL (ref 6–8.5)
PROT SERPL-MCNC: 6.8 G/DL (ref 6.4–8.2)
Q-T INTERVAL, ECG07: 448 MS
QRS DURATION, ECG06: 102 MS
QTC CALCULATION (BEZET), ECG08: 400 MS
RBC # BLD AUTO: 3.42 M/UL (ref 4.1–5.7)
RBC # BLD AUTO: 3.85 X10E6/UL (ref 4.14–5.8)
RBC MORPH BLD: ABNORMAL
SODIUM SERPL-SCNC: 142 MMOL/L (ref 136–145)
SODIUM SERPL-SCNC: 145 MMOL/L (ref 134–144)
TSH SERPL DL<=0.05 MIU/L-ACNC: 1.73 UIU/ML (ref 0.36–3.74)
VENTRICULAR RATE, ECG03: 48 BPM
WBC # BLD AUTO: 3.3 K/UL (ref 4.1–11.1)
WBC # BLD AUTO: 3.7 X10E3/UL (ref 3.4–10.8)

## 2018-01-01 PROCEDURE — 71260 CT THORAX DX C+: CPT

## 2018-01-01 PROCEDURE — 85025 COMPLETE CBC W/AUTO DIFF WBC: CPT | Performed by: INTERNAL MEDICINE

## 2018-01-01 PROCEDURE — 36415 COLL VENOUS BLD VENIPUNCTURE: CPT

## 2018-01-01 PROCEDURE — 36415 COLL VENOUS BLD VENIPUNCTURE: CPT | Performed by: INTERNAL MEDICINE

## 2018-01-01 PROCEDURE — 74177 CT ABD & PELVIS W/CONTRAST: CPT

## 2018-01-01 PROCEDURE — 93005 ELECTROCARDIOGRAM TRACING: CPT

## 2018-01-01 PROCEDURE — 84443 ASSAY THYROID STIM HORMONE: CPT | Performed by: INTERNAL MEDICINE

## 2018-01-01 PROCEDURE — 85025 COMPLETE CBC W/AUTO DIFF WBC: CPT

## 2018-01-01 PROCEDURE — 74011250636 HC RX REV CODE- 250/636: Performed by: NURSE PRACTITIONER

## 2018-01-01 PROCEDURE — 80053 COMPREHEN METABOLIC PANEL: CPT | Performed by: INTERNAL MEDICINE

## 2018-01-01 PROCEDURE — 74011636320 HC RX REV CODE- 636/320: Performed by: RADIOLOGY

## 2018-01-01 PROCEDURE — 80053 COMPREHEN METABOLIC PANEL: CPT

## 2018-01-01 PROCEDURE — 96374 THER/PROPH/DIAG INJ IV PUSH: CPT

## 2018-01-01 PROCEDURE — 78306 BONE IMAGING WHOLE BODY: CPT

## 2018-01-01 PROCEDURE — 74011000258 HC RX REV CODE- 258: Performed by: RADIOLOGY

## 2018-01-01 RX ORDER — SODIUM CHLORIDE 9 MG/ML
25 INJECTION, SOLUTION INTRAVENOUS CONTINUOUS
Status: DISCONTINUED | OUTPATIENT
Start: 2018-01-01 | End: 2018-01-01 | Stop reason: HOSPADM

## 2018-01-01 RX ORDER — CHLORHEXIDINE GLUCONATE 1.2 MG/ML
RINSE ORAL
Refills: 0 | COMMUNITY
Start: 2018-01-01 | End: 2019-01-01

## 2018-01-01 RX ORDER — AMLODIPINE BESYLATE 10 MG/1
TABLET ORAL
Qty: 30 TAB | Refills: 0 | Status: SHIPPED | OUTPATIENT
Start: 2018-01-01 | End: 2019-01-01 | Stop reason: SDUPTHER

## 2018-01-01 RX ORDER — SODIUM CHLORIDE 0.9 % (FLUSH) 0.9 %
10 SYRINGE (ML) INJECTION
Status: COMPLETED | OUTPATIENT
Start: 2018-01-01 | End: 2018-01-01

## 2018-01-01 RX ORDER — AMLODIPINE BESYLATE 10 MG/1
TABLET ORAL
Qty: 30 TAB | Refills: 0 | Status: SHIPPED | OUTPATIENT
Start: 2018-01-01 | End: 2018-01-01 | Stop reason: SDUPTHER

## 2018-01-01 RX ADMIN — Medication 10 ML: at 12:14

## 2018-01-01 RX ADMIN — SODIUM CHLORIDE 100 ML: 900 INJECTION, SOLUTION INTRAVENOUS at 12:14

## 2018-01-01 RX ADMIN — IOHEXOL 50 ML: 240 INJECTION, SOLUTION INTRATHECAL; INTRAVASCULAR; INTRAVENOUS; ORAL at 12:14

## 2018-01-01 RX ADMIN — IOPAMIDOL 100 ML: 755 INJECTION, SOLUTION INTRAVENOUS at 12:14

## 2018-01-01 RX ADMIN — ZOLEDRONIC ACID 4 MG: 0.04 INJECTION, SOLUTION INTRAVENOUS at 15:14

## 2018-01-04 ENCOUNTER — APPOINTMENT (OUTPATIENT)
Dept: INFUSION THERAPY | Age: 68
End: 2018-01-04
Payer: MEDICARE

## 2018-01-05 RX ORDER — ZOLEDRONIC ACID 4 MG/100ML
4 SOLUTION INTRAVENOUS ONCE
Status: COMPLETED | OUTPATIENT
Start: 2018-01-11 | End: 2018-01-11

## 2018-01-11 ENCOUNTER — HOSPITAL ENCOUNTER (OUTPATIENT)
Dept: INFUSION THERAPY | Age: 68
Discharge: HOME | End: 2018-01-11
Payer: MEDICARE

## 2018-01-11 VITALS
WEIGHT: 154.6 LBS | HEIGHT: 73 IN | BODY MASS INDEX: 20.49 KG/M2 | OXYGEN SATURATION: 98 % | SYSTOLIC BLOOD PRESSURE: 148 MMHG | DIASTOLIC BLOOD PRESSURE: 84 MMHG | RESPIRATION RATE: 16 BRPM | TEMPERATURE: 98.4 F | HEART RATE: 58 BPM

## 2018-01-11 LAB
ALBUMIN SERPL-MCNC: 3.4 G/DL (ref 3.5–5)
ALBUMIN SERPL-MCNC: 3.5 G/DL (ref 3.5–5)
ALBUMIN/GLOB SERPL: 1 {RATIO} (ref 1.1–2.2)
ALP SERPL-CCNC: 86 U/L (ref 45–117)
ALT SERPL-CCNC: 34 U/L (ref 12–78)
ANION GAP SERPL CALC-SCNC: 4 MMOL/L (ref 5–15)
ANION GAP SERPL CALC-SCNC: 5 MMOL/L (ref 5–15)
AST SERPL-CCNC: 37 U/L (ref 15–37)
BILIRUB DIRECT SERPL-MCNC: 0.3 MG/DL (ref 0–0.2)
BILIRUB SERPL-MCNC: 1.2 MG/DL (ref 0.2–1)
BUN SERPL-MCNC: 17 MG/DL (ref 6–20)
BUN SERPL-MCNC: 17 MG/DL (ref 6–20)
BUN/CREAT SERPL: 23 (ref 12–20)
BUN/CREAT SERPL: 24 (ref 12–20)
CALCIUM SERPL-MCNC: 8.6 MG/DL (ref 8.5–10.1)
CALCIUM SERPL-MCNC: 8.8 MG/DL (ref 8.5–10.1)
CHLORIDE SERPL-SCNC: 105 MMOL/L (ref 97–108)
CHLORIDE SERPL-SCNC: 105 MMOL/L (ref 97–108)
CO2 SERPL-SCNC: 32 MMOL/L (ref 21–32)
CO2 SERPL-SCNC: 32 MMOL/L (ref 21–32)
CREAT SERPL-MCNC: 0.7 MG/DL (ref 0.7–1.3)
CREAT SERPL-MCNC: 0.73 MG/DL (ref 0.7–1.3)
GLOBULIN SER CALC-MCNC: 3.6 G/DL (ref 2–4)
GLUCOSE SERPL-MCNC: 86 MG/DL (ref 65–100)
GLUCOSE SERPL-MCNC: 87 MG/DL (ref 65–100)
MAGNESIUM SERPL-MCNC: 2.2 MG/DL (ref 1.6–2.4)
PHOSPHATE SERPL-MCNC: 2.7 MG/DL (ref 2.6–4.7)
PHOSPHATE SERPL-MCNC: 2.7 MG/DL (ref 2.6–4.7)
POTASSIUM SERPL-SCNC: 3.6 MMOL/L (ref 3.5–5.1)
POTASSIUM SERPL-SCNC: 3.8 MMOL/L (ref 3.5–5.1)
PROT SERPL-MCNC: 7.1 G/DL (ref 6.4–8.2)
SODIUM SERPL-SCNC: 141 MMOL/L (ref 136–145)
SODIUM SERPL-SCNC: 142 MMOL/L (ref 136–145)

## 2018-01-11 PROCEDURE — 80048 BASIC METABOLIC PNL TOTAL CA: CPT | Performed by: NURSE PRACTITIONER

## 2018-01-11 PROCEDURE — 83735 ASSAY OF MAGNESIUM: CPT | Performed by: NURSE PRACTITIONER

## 2018-01-11 PROCEDURE — 80069 RENAL FUNCTION PANEL: CPT | Performed by: NURSE PRACTITIONER

## 2018-01-11 PROCEDURE — 36415 COLL VENOUS BLD VENIPUNCTURE: CPT | Performed by: NURSE PRACTITIONER

## 2018-01-11 PROCEDURE — 74011250636 HC RX REV CODE- 250/636: Performed by: NURSE PRACTITIONER

## 2018-01-11 PROCEDURE — 84100 ASSAY OF PHOSPHORUS: CPT | Performed by: NURSE PRACTITIONER

## 2018-01-11 PROCEDURE — 80076 HEPATIC FUNCTION PANEL: CPT | Performed by: NURSE PRACTITIONER

## 2018-01-11 PROCEDURE — 96374 THER/PROPH/DIAG INJ IV PUSH: CPT

## 2018-01-11 RX ORDER — SODIUM CHLORIDE 9 MG/ML
25 INJECTION, SOLUTION INTRAVENOUS AS NEEDED
Status: DISPENSED | OUTPATIENT
Start: 2018-01-11 | End: 2018-01-12

## 2018-01-11 RX ORDER — ZOLEDRONIC ACID 4 MG/100ML
4 SOLUTION INTRAVENOUS
COMMUNITY
Start: 2018-01-11 | End: 2019-01-01

## 2018-01-11 RX ORDER — SODIUM CHLORIDE 0.9 % (FLUSH) 0.9 %
5-10 SYRINGE (ML) INJECTION AS NEEDED
Status: ACTIVE | OUTPATIENT
Start: 2018-01-11 | End: 2018-01-12

## 2018-01-11 RX ADMIN — SODIUM CHLORIDE 25 ML/HR: 900 INJECTION, SOLUTION INTRAVENOUS at 16:37

## 2018-01-11 RX ADMIN — ZOLEDRONIC ACID 4 MG: 0.04 INJECTION, SOLUTION INTRAVENOUS at 16:43

## 2018-01-11 RX ADMIN — Medication 10 ML: at 15:25

## 2018-01-11 NOTE — PROGRESS NOTES
1520 Pt admit to [facility] for Q 28 labs, Zometa ambulatory in stable condition. Assessment completed. No new concerns voiced. Peripheral IV access established with positive blood return. Labs drawn per order and sent. Line flushed, Normal Saline at Sueann Goodell. Visit Vitals    /84 (BP 1 Location: Left arm, BP Patient Position: Sitting)    Pulse (!) 58    Temp 98.4 °F (36.9 °C)    Resp 16    Ht 6' 1\" (1.854 m)    Wt 70.1 kg (154 lb 9.6 oz)    SpO2 98%    BMI 20.4 kg/m2       Medications:  Normal Saline  Zometa    1730 Pt tolerated treatment well. Peripheral IV removed at discharge. D/c home ambulatory in no distress. Pt aware of next appointment scheduled for 2/8/18. Recent Results (from the past 12 hour(s))   METABOLIC PANEL, BASIC    Collection Time: 01/11/18  3:23 PM   Result Value Ref Range    Sodium 142 136 - 145 mmol/L    Potassium 3.6 3.5 - 5.1 mmol/L    Chloride 105 97 - 108 mmol/L    CO2 32 21 - 32 mmol/L    Anion gap 5 5 - 15 mmol/L    Glucose 86 65 - 100 mg/dL    BUN 17 6 - 20 MG/DL    Creatinine 0.73 0.70 - 1.30 MG/DL    BUN/Creatinine ratio 23 (H) 12 - 20      GFR est AA >60 >60 ml/min/1.73m2    GFR est non-AA >60 >60 ml/min/1.73m2    Calcium 8.8 8.5 - 10.1 MG/DL   MAGNESIUM    Collection Time: 01/11/18  3:23 PM   Result Value Ref Range    Magnesium 2.2 1.6 - 2.4 mg/dL   PHOSPHORUS    Collection Time: 01/11/18  3:23 PM   Result Value Ref Range    Phosphorus 2.7 2.6 - 4.7 MG/DL   HEPATIC FUNCTION PANEL    Collection Time: 01/11/18  3:23 PM   Result Value Ref Range    Protein, total 7.1 6.4 - 8.2 g/dL    Albumin 3.5 3.5 - 5.0 g/dL    Globulin 3.6 2.0 - 4.0 g/dL    A-G Ratio 1.0 (L) 1.1 - 2.2      Bilirubin, total 1.2 (H) 0.2 - 1.0 MG/DL    Bilirubin, direct 0.3 (H) 0.0 - 0.2 MG/DL    Alk.  phosphatase 86 45 - 117 U/L    AST (SGOT) 37 15 - 37 U/L    ALT (SGPT) 34 12 - 78 U/L   RENAL FUNCTION PANEL    Collection Time: 01/11/18  3:23 PM   Result Value Ref Range    Sodium 141 136 - 145 mmol/L Potassium 3.8 3.5 - 5.1 mmol/L    Chloride 105 97 - 108 mmol/L    CO2 32 21 - 32 mmol/L    Anion gap 4 (L) 5 - 15 mmol/L    Glucose 87 65 - 100 mg/dL    BUN 17 6 - 20 MG/DL    Creatinine 0.70 0.70 - 1.30 MG/DL    BUN/Creatinine ratio 24 (H) 12 - 20      GFR est AA >60 >60 ml/min/1.73m2    GFR est non-AA >60 >60 ml/min/1.73m2    Calcium 8.6 8.5 - 10.1 MG/DL    Phosphorus 2.7 2.6 - 4.7 MG/DL    Albumin 3.4 (L) 3.5 - 5.0 g/dL

## 2018-01-23 ENCOUNTER — HOSPITAL ENCOUNTER (OUTPATIENT)
Dept: CT IMAGING | Age: 68
Discharge: HOME | End: 2018-01-23
Attending: INTERNAL MEDICINE
Payer: MEDICARE

## 2018-01-23 DIAGNOSIS — C34.91 ADENOCARCINOMA OF LUNG, STAGE 4, RIGHT (HCC): ICD-10-CM

## 2018-01-23 PROCEDURE — 74011000258 HC RX REV CODE- 258: Performed by: INTERNAL MEDICINE

## 2018-01-23 PROCEDURE — 74011636320 HC RX REV CODE- 636/320: Performed by: INTERNAL MEDICINE

## 2018-01-23 PROCEDURE — 71260 CT THORAX DX C+: CPT

## 2018-01-23 PROCEDURE — 74177 CT ABD & PELVIS W/CONTRAST: CPT

## 2018-01-23 RX ORDER — SODIUM CHLORIDE 0.9 % (FLUSH) 0.9 %
10 SYRINGE (ML) INJECTION
Status: COMPLETED | OUTPATIENT
Start: 2018-01-23 | End: 2018-01-23

## 2018-01-23 RX ADMIN — Medication 10 ML: at 10:44

## 2018-01-23 RX ADMIN — IOPAMIDOL 100 ML: 755 INJECTION, SOLUTION INTRAVENOUS at 10:44

## 2018-01-23 RX ADMIN — SODIUM CHLORIDE 100 ML: 900 INJECTION, SOLUTION INTRAVENOUS at 10:44

## 2018-02-01 ENCOUNTER — APPOINTMENT (OUTPATIENT)
Dept: INFUSION THERAPY | Age: 68
End: 2018-02-01
Payer: MEDICARE

## 2018-02-01 ENCOUNTER — OFFICE VISIT (OUTPATIENT)
Dept: ONCOLOGY | Age: 68
End: 2018-02-01

## 2018-02-01 VITALS
RESPIRATION RATE: 20 BRPM | DIASTOLIC BLOOD PRESSURE: 74 MMHG | OXYGEN SATURATION: 98 % | SYSTOLIC BLOOD PRESSURE: 168 MMHG | BODY MASS INDEX: 20.46 KG/M2 | TEMPERATURE: 97.8 F | WEIGHT: 154.4 LBS | HEART RATE: 53 BPM | HEIGHT: 73 IN

## 2018-02-01 DIAGNOSIS — J90 PLEURAL EFFUSION, RIGHT: ICD-10-CM

## 2018-02-01 DIAGNOSIS — C79.51 BONE METASTASES (HCC): ICD-10-CM

## 2018-02-01 DIAGNOSIS — R79.89 ABNORMAL LFTS: ICD-10-CM

## 2018-02-01 DIAGNOSIS — C34.91 ADENOCARCINOMA OF LUNG, STAGE 4, RIGHT (HCC): Primary | ICD-10-CM

## 2018-02-01 NOTE — MR AVS SNAPSHOT
2700 [facility] 209 1400 [address] [place] [address] 
[phone] Patient: Fabio Stephens MRN: [id number] :1950 Visit Information Date & Time Provider Department Dept. Phone Encounter #  
 2018  8:00 AM Isidro Alexander MD [facility] Oncology at [facility] [phone] [id number] Your Appointments 3/1/2018  9:00 AM  
Follow Up with Isidro Alexander MD  
[facility] Oncology at [facility] 217 [facility] [address] [address]  
[phone]  
  
   
 [address] [address] 2018  9:30 AM  
ROUTINE CARE with Placido Ramsey DO [facility] Internal Medicine ([address]) Appt Note: 6 month f/u  
 200 [facility] [address] [place] [id number]  
[phone]  
  
   
 [address] Upcoming Health Maintenance Date Due Hepatitis C Screening 1950 DTaP/Tdap/Td series (1 - Tdap) 1970 ZOSTER VACCINE AGE 60> 10/2/2009 GLAUCOMA SCREENING Q2Y 2014 Pneumococcal 65+ High/Highest Risk (1 of 2 - PCV13) 2014 MEDICARE YEARLY EXAM 2017 FOBT Q 1 YEAR AGE 50-75 10/31/2017 Allergies as of 2018  Review Complete On: 2018 By: Luanne Lopez No Known Allergies Current Immunizations  Reviewed on 2018 Name Date Influenza High Dose Vaccine PF 2017, 10/31/2016 Not reviewed this visit You Were Diagnosed With   
  
 Codes Comments Adenocarcinoma of lung, stage 4, right ([facility] [address]    -  Primary ICD-10-CM: C34.91 
ICD-9-CM: 162.9 Vitals BP Pulse Temp Resp Height(growth percentile) Weight(growth percentile) 168/74 (!) 53 97.8 °F (36.6 °C) 20 6' 1\" (1.854 m) 154 lb 6.4 oz (70 kg) SpO2 BMI Smoking Status 98% 20.37 kg/m2 Never Smoker Vitals History BMI and BSA Data  Body Mass Index Body Surface Area  
 20.37 kg/m 2 1.9 m 2  
 Preferred Pharmacy Pharmacy Name Phone [address] [place] [address] Selene Moon AT [address]. [phone] Your Updated Medication List  
  
   
This list is accurate as of: 2/1/18  8:35 AM.  Always use your most recent med list. amLODIPine 10 mg tablet Commonly known as:  Arva Brazen TK 1 T PO   QD  
  
 cholecalciferol 1,000 unit tablet Commonly known as:  VITAMIN D3 Take 1,000 Units by mouth daily. levothyroxine 75 mcg tablet Commonly known as:  SYNTHROID Take 0.5 Tabs by mouth Daily (before breakfast). * losartan 50 mg tablet Commonly known as:  COZAAR TK 1 T PO D. STOP HYDROCHLORATHIAZIDE  
  
 * losartan 100 mg tablet Commonly known as:  COZAAR Take 1 Tab by mouth daily. PHOS--160-250 mg packet Generic drug:  potassium, sodium phosphates TK 2 PACKETS PO AS ONE DOSE  
  
 TARCEVA PO Take  by mouth daily. ZOMETA 4 mg/100 mL Pgbk infusion Generic drug:  zoledronic acid 4 mg by IntraVENous route every twenty-eight (28) days. * Notice: This list has 2 medication(s) that are the same as other medications prescribed for you. Read the directions carefully, and ask your doctor or other care provider to review them with you. To-Do List   
 02/08/2018 3:00 PM  
  Appointment with [address] [place] [address] ([phone])  
  
 02/13/2018 Lab:  METABOLIC PANEL, COMPREHENSIVE   
  
 03/08/2018 3:00 PM  
  Appointment with [address] [place] [address] ([phone]) 04/05/2018 3:00 PM  
  Appointment with [address] [place] [address] ([phone]) [facility] SERVICES! Dear Dexter Morales: 
Thank you for requesting a THE NOCKLIST account. Our records indicate that you already have an active THE NOCKLIST account. You can access your account anytime at [email or web address] Did you know that you can access your hospital and ER discharge instructions at any time in Syllabuster? You can also review all of your test results from your hospital stay or ER visit. Additional Information If you have questions, please visit the Frequently Asked Questions section of the Syllabuster website at [email or web address] Remember, Syllabuster is NOT to be used for urgent needs. For medical emergencies, dial 911. Now available from your iPhone and Android! Please provide this summary of care documentation to your next provider. Your primary care clinician is listed as Derald Collet. If you have any questions after today's visit, please call [phone].

## 2018-02-01 NOTE — PROGRESS NOTES
Hematology/Oncology progress note    REASON FOR VISIT: Stage IV EGFR mutated NSCLC    HISTORY OF PRESENT ILLNESS: Mr. Lashay Calero is a 75 y.o. male with prostate cancer who was diagnosed with stage IV NSCLC- adenocarcinoma ( EGFR mutated , PD-L1 low) in May 2017. Follow up on palliative Tarceva- Initiated 6/26/17. Oncologic history   Mr. Lashay Calero noticed a new cough and fevers back in March of 2017. He went to Urgent Care and received antibiotics and cough medication. He states this worked for a while, but he began to notice his cough return. This was intermittent. He had some tightness across his anterior chest which he related to the infection. Chest x-ray was abnormal and he was told to proceed to the Emergency Department. Lisa Weaver had been under surveillance for right lower lobe mass that was initially noted in 2015. Bronch at that time was negative for malignancy. He was evaluated by Dr. Weston Wilkerson with Thoracic Surgery in 2015 for possible surgical resection. CT chest in November of 2016 with mass size unchanged but some right basilar pleural thickening. CT chest obtained 5/14/17 however showed a new large right pleural effusion with right middle lobe and right lower lobe collapse. Irregular spiculated right lower lobe mass 3.8cm and stable precarinal enlarged lymph nodes were noted. New right posterior second rib lytic lesion and new right hepatic hypodensity consistent with mets. He underwent a therapeutic thoracentesis on 5/15/17 with removal of 1500 cc of serosanguinous fluid. Pathology showed adenocarcinoma. PET CT showed pleural, bone, liver and flor metastasis. MRI brain 5/20/17: No metastasis. Needed repeat R sided thoracentesis on 5/25/17, had some post procedure hydropneumothorax. He was seen by Dr. Debra Claudio at [facility] for Pleurx catheter.      CT guided biopsy of R lung mass done 5/25 which showed adenocarcinoma. EGFR mutation detected Exon 18 and 21      Treatment  6/26/17: Tarceva. Monthly Xgeva. Palliative XRT to R hip   CT CAP 10/2/17: Response    Past Medical History:   Diagnosis Date    Hypertension        Past Surgical History:   Procedure Laterality Date    HX ORTHOPAEDIC      reconstruction of left little finger       No Known Allergies    Current Outpatient Prescriptions   Medication Sig Dispense Refill    zoledronic acid (ZOMETA) 4 mg/100 mL pgbk infusion 4 mg by IntraVENous route every twenty-eight (28) days.  amLODIPine (NORVASC) 10 mg tablet TK 1 T PO   QD  3    losartan (COZAAR) 100 mg tablet Take 1 Tab by mouth daily. 30 Tab 5    ERLOTINIB HCL (TARCEVA PO) Take  by mouth daily.  levothyroxine (SYNTHROID) 75 mcg tablet Take 0.5 Tabs by mouth Daily (before breakfast). 30 Tab 2    cholecalciferol (VITAMIN D3) 1,000 unit tablet Take 1,000 Units by mouth daily.  losartan (COZAAR) 50 mg tablet TK 1 T PO D. STOP HYDROCHLORATHIAZIDE  2    PHOS--160-250 mg packet TK 2 PACKETS PO AS ONE DOSE  0       Social History     Social History    Marital status:      Spouse name: N/A    Number of children: N/A    Years of education: N/A     Social History Main Topics    Smoking status: Never Smoker    Smokeless tobacco: Never Used    Alcohol use 6.0 oz/week     10 Glasses of wine per week      Comment: regularly    Drug use: No    Sexual activity: Yes     Partners: Female     Other Topics Concern    None     Social History Narrative       Family History   Problem Relation Age of Onset    Cancer Mother      leukemia    Stroke Father     Cancer Brother      bladder       ROS  He has minor am nose bleeds that stop on pressure. No other bleeding. He has am fecal urgency and no diarrhea. He states that he has no new cough , CP, no HA. SBP still at 160 mm Hg. Taking Losartan 100 mg daily. He is now on Amlodipine 5 mg daily. He feels well otherwise. No new cough or CP. Breathing is no different. Has no pain. No rash.   He has been eating well and maintaining his weight. ECOG PS is 0      Emotional well being addressed and patient is coping well      Physical Examination:   Visit Vitals    /74    Pulse (!) 53    Temp 97.8 °F (36.6 °C)    Resp 20    Ht 6' 1\" (1.854 m)    Wt 154 lb 6.4 oz (70 kg)    SpO2 98%    BMI 20.37 kg/m2     General appearance - alert, well appearing, and in no distress  Mental status - oriented to person, place, and time  Mouth - mucous membranes moist, pharynx normal without lesions. Neck - supple, no significant adenopathy  Lymphatics - no palpable lymphadenopathy, no hepatosplenomegaly  Chest -clear to auscultation,  no added sounds  Heart - normal rate, regular rhythm, normal S1, S2, no murmurs, rubs, clicks or gallops  Abdomen - soft, nontender, nondistended, no masses or organomegaly, bowel sounds present  Neurological - normal speech, no focal findings or movement disorder noted    LABS  Lab Results   Component Value Date/Time    WBC 6.1 10/02/2017 12:00 AM    HGB 11.9 10/02/2017 12:00 AM    HCT 37.5 10/02/2017 12:00 AM    PLATELET 874 50/57/6720 12:00 AM    MCV 81 10/02/2017 12:00 AM    ABS.  NEUTROPHILS 4.2 10/02/2017 12:00 AM     Lab Results   Component Value Date/Time    Sodium 142 01/11/2018 03:23 PM    Sodium 141 01/11/2018 03:23 PM    Potassium 3.6 01/11/2018 03:23 PM    Potassium 3.8 01/11/2018 03:23 PM    Chloride 105 01/11/2018 03:23 PM    Chloride 105 01/11/2018 03:23 PM    CO2 32 01/11/2018 03:23 PM    CO2 32 01/11/2018 03:23 PM    Glucose 86 01/11/2018 03:23 PM    Glucose 87 01/11/2018 03:23 PM    BUN 17 01/11/2018 03:23 PM    BUN 17 01/11/2018 03:23 PM    Creatinine 0.73 01/11/2018 03:23 PM    Creatinine 0.70 01/11/2018 03:23 PM    GFR est AA >60 01/11/2018 03:23 PM    GFR est AA >60 01/11/2018 03:23 PM    GFR est non-AA >60 01/11/2018 03:23 PM    GFR est non-AA >60 01/11/2018 03:23 PM    Calcium 8.8 01/11/2018 03:23 PM    Calcium 8.6 01/11/2018 03:23 PM     Lab Results   Component Value Date/Time    AST (SGOT) 37 01/11/2018 03:23 PM    Alk. phosphatase 86 01/11/2018 03:23 PM    Protein, total 7.1 01/11/2018 03:23 PM    Albumin 3.5 01/11/2018 03:23 PM    Albumin 3.4 01/11/2018 03:23 PM    Globulin 3.6 01/11/2018 03:23 PM    A-G Ratio 1.0 01/11/2018 03:23 PM     CT Chest- abdomen - pelvis 10/2/17    Decreased right pleural thickening and size of right lower lobe  pulmonary mass with interval sclerosis of multiple osseous metastases as above  compatible with response to therapy    CT CAP 1/23/18  IMPRESSION  IMPRESSION:   1. Increase in size of a right lower lobe mass.     2. Stable right pleural effusion with adjacent pleural thickening.     3. Stable scattered sclerotic bony metastases.     4. No evidence for new metastatic disease in the chest, abdomen, or pelvis.        ASSESSMENT AND PLAN  Mr. Carmen Guevara is a 75 y.o. male with      1. Stage IV NSCLC with R lung mass, mediastinal adenopathy, malignant R pleural effusion, multiple asymptomatic bone metastasis and possibly liver metastasis. EGFR mutated, PD-L1 5%. He is tolerating Tarceva well. · Reviewed CT. Apart from the lung mass having increased from 2.5- 2.9 cm - no changes. Per RECIST- stable disease  · Continue Tarceva at current dose  · CT CAP in 2 months  · Monthly labs as usual  · Continue monthly Zometa      2. S/P R thoracentesis on 5/15/17 and again on 5/25- s/p Pleurx by Dr. Mary Dumont. No new concerns      3. [place] 6 prostate cancer on active surveillance      4. Anemia secondary to 1-stable      5. R hip pain secondary to osseous metastasis s/p XRT      6. HTN- Not likely related to Tarceva. He will continue Losartan but increase Norvasc to 10 mg daily. 7.  Elevated Bb secondary to Tarceva- 1.2 today. Repeat in 10 days.  For now no dose changes      RTC 1 month    Noe Enamorado MD

## 2018-02-05 RX ORDER — ZOLEDRONIC ACID 4 MG/100ML
4 SOLUTION INTRAVENOUS ONCE
Status: COMPLETED | OUTPATIENT
Start: 2018-02-08 | End: 2018-02-08

## 2018-02-08 ENCOUNTER — HOSPITAL ENCOUNTER (OUTPATIENT)
Dept: INFUSION THERAPY | Age: 68
Discharge: HOME | End: 2018-02-08
Payer: MEDICARE

## 2018-02-08 ENCOUNTER — TELEPHONE (OUTPATIENT)
Dept: ONCOLOGY | Age: 68
End: 2018-02-08

## 2018-02-08 VITALS
SYSTOLIC BLOOD PRESSURE: 151 MMHG | RESPIRATION RATE: 18 BRPM | DIASTOLIC BLOOD PRESSURE: 76 MMHG | TEMPERATURE: 96.7 F | HEART RATE: 52 BPM

## 2018-02-08 LAB
ALBUMIN SERPL-MCNC: 3.5 G/DL (ref 3.5–5)
ALBUMIN/GLOB SERPL: 0.9 {RATIO} (ref 1.1–2.2)
ALP SERPL-CCNC: 90 U/L (ref 45–117)
ALT SERPL-CCNC: 29 U/L (ref 12–78)
ANION GAP SERPL CALC-SCNC: 4 MMOL/L (ref 5–15)
AST SERPL-CCNC: 35 U/L (ref 15–37)
BILIRUB SERPL-MCNC: 1.7 MG/DL (ref 0.2–1)
BUN SERPL-MCNC: 20 MG/DL (ref 6–20)
BUN/CREAT SERPL: 31 (ref 12–20)
CALCIUM SERPL-MCNC: 8.4 MG/DL (ref 8.5–10.1)
CHLORIDE SERPL-SCNC: 105 MMOL/L (ref 97–108)
CO2 SERPL-SCNC: 35 MMOL/L (ref 21–32)
CREAT SERPL-MCNC: 0.64 MG/DL (ref 0.7–1.3)
GLOBULIN SER CALC-MCNC: 3.7 G/DL (ref 2–4)
GLUCOSE SERPL-MCNC: 83 MG/DL (ref 65–100)
MAGNESIUM SERPL-MCNC: 2 MG/DL (ref 1.6–2.4)
PHOSPHATE SERPL-MCNC: 2.5 MG/DL (ref 2.6–4.7)
POTASSIUM SERPL-SCNC: 3.2 MMOL/L (ref 3.5–5.1)
PROT SERPL-MCNC: 7.2 G/DL (ref 6.4–8.2)
SODIUM SERPL-SCNC: 144 MMOL/L (ref 136–145)

## 2018-02-08 PROCEDURE — 74011250636 HC RX REV CODE- 250/636: Performed by: NURSE PRACTITIONER

## 2018-02-08 PROCEDURE — 96374 THER/PROPH/DIAG INJ IV PUSH: CPT

## 2018-02-08 PROCEDURE — 36415 COLL VENOUS BLD VENIPUNCTURE: CPT | Performed by: NURSE PRACTITIONER

## 2018-02-08 PROCEDURE — 83735 ASSAY OF MAGNESIUM: CPT | Performed by: NURSE PRACTITIONER

## 2018-02-08 PROCEDURE — 84100 ASSAY OF PHOSPHORUS: CPT | Performed by: NURSE PRACTITIONER

## 2018-02-08 PROCEDURE — 80053 COMPREHEN METABOLIC PANEL: CPT | Performed by: NURSE PRACTITIONER

## 2018-02-08 RX ORDER — SODIUM CHLORIDE 0.9 % (FLUSH) 0.9 %
10 SYRINGE (ML) INJECTION AS NEEDED
Status: ACTIVE | OUTPATIENT
Start: 2018-02-08 | End: 2018-02-09

## 2018-02-08 RX ADMIN — ZOLEDRONIC ACID 4 MG: 0.04 INJECTION, SOLUTION INTRAVENOUS at 16:35

## 2018-02-08 RX ADMIN — Medication 10 ML: at 15:30

## 2018-02-08 RX ADMIN — Medication 10 ML: at 16:55

## 2018-02-08 NOTE — TELEPHONE ENCOUNTER
Juliann/PAKO called HIPAA verified. He will be getting his zometa today. Potassium is 3.2. Advised to provide patient with a list of foods high in potassium with education.

## 2018-02-08 NOTE — PROGRESS NOTES
[facility] SHORT VISIT NOTE    8284  Pt arrived to [facility] ambulatory and in no distress for monthly Zometa. Assessment completed, pt denies any new complaints. Blood pressure 151/76, pulse (!) 52, temperature 96.7 °F (35.9 °C), resp. rate 18.    24g PIV placed to left AC without difficulty. Positive blood return noted; labs drawn as ordered. Recent Results (from the past 12 hour(s))   METABOLIC PANEL, COMPREHENSIVE    Collection Time: 02/08/18  3:21 PM   Result Value Ref Range    Sodium 144 136 - 145 mmol/L    Potassium 3.2 (L) 3.5 - 5.1 mmol/L    Chloride 105 97 - 108 mmol/L    CO2 35 (H) 21 - 32 mmol/L    Anion gap 4 (L) 5 - 15 mmol/L    Glucose 83 65 - 100 mg/dL    BUN 20 6 - 20 MG/DL    Creatinine 0.64 (L) 0.70 - 1.30 MG/DL    BUN/Creatinine ratio 31 (H) 12 - 20      GFR est AA >60 >60 ml/min/1.73m2    GFR est non-AA >60 >60 ml/min/1.73m2    Calcium 8.4 (L) 8.5 - 10.1 MG/DL    Bilirubin, total 1.7 (H) 0.2 - 1.0 MG/DL    ALT (SGPT) 29 12 - 78 U/L    AST (SGOT) 35 15 - 37 U/L    Alk. phosphatase 90 45 - 117 U/L    Protein, total 7.2 6.4 - 8.2 g/dL    Albumin 3.5 3.5 - 5.0 g/dL    Globulin 3.7 2.0 - 4.0 g/dL    A-G Ratio 0.9 (L) 1.1 - 2.2     MAGNESIUM    Collection Time: 02/08/18  3:21 PM   Result Value Ref Range    Magnesium 2.0 1.6 - 2.4 mg/dL   PHOSPHORUS    Collection Time: 02/08/18  3:21 PM   Result Value Ref Range    Phosphorus 2.5 (L) 2.6 - 4.7 MG/DL   Nisha Stahl RN at Dr. Eligio Hanna office notified of low potassium level. All providers are out of the office now; therefore, no new orders received at this time. Patient education provided to patient about hypokalemia and potassium rich foods that patient can eat. Patient verbalized understanding. Medication given:  Zometa IV    6190  Discharged home ambulatory and in no distress. Tolerated treatment well. Next appointment 3/8/18 at 1500.

## 2018-02-13 ENCOUNTER — HOSPITAL ENCOUNTER (OUTPATIENT)
Dept: LAB | Age: 68
Discharge: HOME | End: 2018-02-13
Payer: MEDICARE

## 2018-02-13 DIAGNOSIS — C34.91 ADENOCARCINOMA OF LUNG, STAGE 4, RIGHT (HCC): ICD-10-CM

## 2018-02-13 PROCEDURE — 80053 COMPREHEN METABOLIC PANEL: CPT

## 2018-02-13 PROCEDURE — 36415 COLL VENOUS BLD VENIPUNCTURE: CPT

## 2018-02-14 LAB
ALBUMIN SERPL-MCNC: 3.9 G/DL (ref 3.6–4.8)
ALBUMIN/GLOB SERPL: 1.3 {RATIO} (ref 1.2–2.2)
ALP SERPL-CCNC: 100 IU/L (ref 39–117)
ALT SERPL-CCNC: 31 IU/L (ref 0–44)
AST SERPL-CCNC: 48 IU/L (ref 0–40)
BILIRUB SERPL-MCNC: 1.2 MG/DL (ref 0–1.2)
BUN SERPL-MCNC: 13 MG/DL (ref 8–27)
BUN/CREAT SERPL: 20 (ref 10–24)
CALCIUM SERPL-MCNC: 8.5 MG/DL (ref 8.6–10.2)
CHLORIDE SERPL-SCNC: 99 MMOL/L (ref 96–106)
CO2 SERPL-SCNC: 27 MMOL/L (ref 18–29)
CREAT SERPL-MCNC: 0.65 MG/DL (ref 0.76–1.27)
GFR SERPLBLD CREATININE-BSD FMLA CKD-EPI: 100 ML/MIN/1.73
GFR SERPLBLD CREATININE-BSD FMLA CKD-EPI: 116 ML/MIN/1.73
GLOBULIN SER CALC-MCNC: 3 G/DL (ref 1.5–4.5)
GLUCOSE SERPL-MCNC: 84 MG/DL (ref 65–99)
POTASSIUM SERPL-SCNC: 3.7 MMOL/L (ref 3.5–5.2)
PROT SERPL-MCNC: 6.9 G/DL (ref 6–8.5)
SODIUM SERPL-SCNC: 144 MMOL/L (ref 134–144)

## 2018-02-19 RX ORDER — AMLODIPINE BESYLATE 10 MG/1
TABLET ORAL
Qty: 30 TAB | Refills: 0 | Status: SHIPPED | OUTPATIENT
Start: 2018-02-19 | End: 2018-03-22 | Stop reason: SDUPTHER

## 2018-02-20 NOTE — PROGRESS NOTES
Call received from patient, HIPAA verified. Advised of result note by provider, verbalized understanding and thanked for call.

## 2018-03-01 ENCOUNTER — OFFICE VISIT (OUTPATIENT)
Dept: ONCOLOGY | Age: 68
End: 2018-03-01

## 2018-03-01 ENCOUNTER — APPOINTMENT (OUTPATIENT)
Dept: INFUSION THERAPY | Age: 68
End: 2018-03-01
Payer: MEDICARE

## 2018-03-01 VITALS
DIASTOLIC BLOOD PRESSURE: 80 MMHG | BODY MASS INDEX: 19.91 KG/M2 | TEMPERATURE: 97.6 F | SYSTOLIC BLOOD PRESSURE: 170 MMHG | WEIGHT: 150.2 LBS | HEIGHT: 73 IN | HEART RATE: 56 BPM | OXYGEN SATURATION: 97 % | RESPIRATION RATE: 16 BRPM

## 2018-03-01 DIAGNOSIS — C34.90 MALIGNANT NEOPLASM OF LUNG, UNSPECIFIED LATERALITY, UNSPECIFIED PART OF LUNG (HCC): Primary | ICD-10-CM

## 2018-03-01 DIAGNOSIS — R79.89 ABNORMAL LFTS: ICD-10-CM

## 2018-03-01 DIAGNOSIS — J90 PLEURAL EFFUSION, RIGHT: ICD-10-CM

## 2018-03-01 DIAGNOSIS — C79.51 BONE METASTASES (HCC): ICD-10-CM

## 2018-03-01 NOTE — MR AVS SNAPSHOT
2700 [facility] 209 1400 [address] [place] [address] 
[phone] Patient: Merlin Caper MRN: [id number] :1950 Visit Information Date & Time Provider Department Dept. Phone Encounter #  
 3/1/2018  9:00 AM Roise Kayser, MD [address] [place] [address] Oncology at [address] [id number] Your Appointments 2018  9:30 AM  
ROUTINE CARE with Cindy Kennedy DO [facility] Internal Medicine ([facility] Appt Note: 6 month f/u  
 [address] [facility] [address]  
[phone]  
  
   
 17[address] [facility] [address] Upcoming Health Maintenance Date Due Hepatitis C Screening 1950 DTaP/Tdap/Td series (1 - Tdap) 1970 ZOSTER VACCINE AGE 60> 10/2/2009 GLAUCOMA SCREENING Q2Y 2014 Pneumococcal 65+ High/Highest Risk (1 of 2 - PCV13) 2014 MEDICARE YEARLY EXAM 2017 FOBT Q 1 YEAR AGE 50-75 10/31/2017 Allergies as of 3/1/2018  Review Complete On: 3/1/2018 By: Salena Low No Known Allergies Current Immunizations  Reviewed on 2018 Name Date Influenza High Dose Vaccine PF 2017, 10/31/2016 Not reviewed this visit You Were Diagnosed With   
  
 Codes Comments Malignant neoplasm of lung, unspecified laterality, unspecified part of lung ([facility] [address]    -  Primary ICD-10-CM: C34.90 ICD-9-CM: 162.9 Vitals BP Pulse Temp Resp Height(growth percentile) Weight(growth percentile) 170/80 (!) 56 97.6 °F (36.4 °C) 16 6' 1\" (1.854 m) 150 lb 3.2 oz (68.1 kg) SpO2 BMI Smoking Status 97% 19.82 kg/m2 Never Smoker Vitals History BMI and BSA Data Body Mass Index Body Surface Area  
 19.82 kg/m 2 1.87 m 2 Preferred Pharmacy Pharmacy Name Phone [address] [facility] AT [facility]. [phone] Your Updated Medication List  
  
 This list is accurate as of 3/1/18  9:44 AM.  Always use your most recent med list. amLODIPine 10 mg tablet Commonly known as:  Winter Omer TAKE 1 TABLET BY MOUTH EVERY DAY  
  
 cholecalciferol 1,000 unit tablet Commonly known as:  VITAMIN D3 Take 1,000 Units by mouth daily. levothyroxine 75 mcg tablet Commonly known as:  SYNTHROID Take 0.5 Tabs by mouth Daily (before breakfast). * losartan 50 mg tablet Commonly known as:  COZAAR TK 1 T PO D. STOP HYDROCHLORATHIAZIDE  
  
 * losartan 100 mg tablet Commonly known as:  COZAAR Take 1 Tab by mouth daily. PHOS--160-250 mg packet Generic drug:  potassium, sodium phosphates TK 2 PACKETS PO AS ONE DOSE  
  
 TARCEVA PO Take  by mouth daily. ZOMETA 4 mg/100 mL Pgbk infusion Generic drug:  zoledronic acid 4 mg by IntraVENous route every twenty-eight (28) days. * Notice: This list has 2 medication(s) that are the same as other medications prescribed for you. Read the directions carefully, and ask your doctor or other care provider to review them with you. To-Do List   
 03/08/2018 3:00 PM  
  Appointment with 40 [facility] ([phone])  
  
 03/15/2018 Imaging:  CT ABD PELV W WO CONT   
  
 03/15/2018 Imaging:  CT CHEST W CONT   
  
 03/15/2018 Imaging:  NM BONE SCAN [facility] BODY   
  
 04/05/2018 3:00 PM  
  Appointment with [address] [place] [address] ([phone]) [facility] SERVICES! Dear Aydin Martinez: 
Thank you for requesting a FrenchWeb account. Our records indicate that you already have an active FrenchWeb account. You can access your account anytime at [email or web address] Did you know that you can access your hospital and ER discharge instructions at any time in FrenchWeb? You can also review all of your test results from your hospital stay or ER visit. Additional Information If you have questions, please visit the Frequently Asked Questions section of the DealerRaterhart website at [email or web address] Remember, BlueWare is NOT to be used for urgent needs. For medical emergencies, dial 911. Now available from your iPhone and Android! Please provide this summary of care documentation to your next provider. Your primary care clinician is listed as Paul Elias. If you have any questions after today's visit, please call [phone].

## 2018-03-01 NOTE — PROGRESS NOTES
Phil Rascon is a 75 y.o. male here today for follow up, lung cancer. Patient reports pain to left mid back over the last week, started last Wednesday. Has been applying heat topically which provides temporary relief, taking ibuprofen as needed.

## 2018-03-01 NOTE — PROGRESS NOTES
Hematology/Oncology progress note    REASON FOR VISIT: Stage IV EGFR mutated NSCLC    HISTORY OF PRESENT ILLNESS: Mr. Gilford Fess is a 75 y.o. male with prostate cancer who was diagnosed with stage IV NSCLC- adenocarcinoma ( EGFR mutated , PD-L1 low) in May 2017. Follow up on palliative Tarceva- Initiated 6/26/17. Oncologic history   Mr. Gilford Fess noticed a new cough and fevers back in March of 2017. He went to Urgent Care and received antibiotics and cough medication. He states this worked for a while, but he began to notice his cough return. This was intermittent. He had some tightness across his anterior chest which he related to the infection. Chest x-ray was abnormal and he was told to proceed to the Emergency Department. Caroline Enamorado had been under surveillance for right lower lobe mass that was initially noted in 2015. Bronch at that time was negative for malignancy. He was evaluated by Dr. Tish Bernal with Thoracic Surgery in 2015 for possible surgical resection. CT chest in November of 2016 with mass size unchanged but some right basilar pleural thickening. CT chest obtained 5/14/17 however showed a new large right pleural effusion with right middle lobe and right lower lobe collapse. Irregular spiculated right lower lobe mass 3.8cm and stable precarinal enlarged lymph nodes were noted. New right posterior second rib lytic lesion and new right hepatic hypodensity consistent with mets. He underwent a therapeutic thoracentesis on 5/15/17 with removal of 1500 cc of serosanguinous fluid. Pathology showed adenocarcinoma. PET CT showed pleural, bone, liver and flor metastasis. MRI brain 5/20/17: No metastasis. Needed repeat R sided thoracentesis on 5/25/17, had some post procedure hydropneumothorax. He was seen by Dr. Geno Stephen at [address] [place] [address] for Pleurx catheter.      CT guided biopsy of R lung mass done 5/25 which showed adenocarcinoma. EGFR mutation detected Exon 18 and 21      Treatment  6/26/17: Tarceva. Monthly Xgeva. Palliative XRT to R hip   CT CAP 10/2/17: Response  Ct 1/23/18: CT with some growth of LLL mass but stable by RECIST. Past Medical History:   Diagnosis Date    Hypertension        Past Surgical History:   Procedure Laterality Date    HX ORTHOPAEDIC      reconstruction of left little finger       No Known Allergies    Current Outpatient Prescriptions   Medication Sig Dispense Refill    amLODIPine (NORVASC) 10 mg tablet TAKE 1 TABLET BY MOUTH EVERY DAY 30 Tab 0    zoledronic acid (ZOMETA) 4 mg/100 mL pgbk infusion 4 mg by IntraVENous route every twenty-eight (28) days.  amLODIPine (NORVASC) 10 mg tablet TK 1 T PO   QD  3    losartan (COZAAR) 50 mg tablet TK 1 T PO D. STOP HYDROCHLORATHIAZIDE  2    losartan (COZAAR) 100 mg tablet Take 1 Tab by mouth daily. 30 Tab 5    PHOS--160-250 mg packet TK 2 PACKETS PO AS ONE DOSE  0    ERLOTINIB HCL (TARCEVA PO) Take  by mouth daily.  levothyroxine (SYNTHROID) 75 mcg tablet Take 0.5 Tabs by mouth Daily (before breakfast). 30 Tab 2    cholecalciferol (VITAMIN D3) 1,000 unit tablet Take 1,000 Units by mouth daily. Social History     Social History    Marital status:      Spouse name: N/A    Number of children: N/A    Years of education: N/A     Social History Main Topics    Smoking status: Never Smoker    Smokeless tobacco: Never Used    Alcohol use 6.0 oz/week     10 Glasses of wine per week      Comment: regularly    Drug use: No    Sexual activity: Yes     Partners: Female     Other Topics Concern    Not on file     Social History Narrative       Family History   Problem Relation Age of Onset    Cancer Mother      leukemia    Stroke Father     Cancer Brother      bladder       ROS  He has a constant mid left back pain since last seen- hot packs and advil helps. He takes one every 8 hours. No SOB, no cough, no other pain, no CP. No nose bleeds usually.   He is on Norvasc 10 mg now with the Losartal and SBP has been in the 140s. No rash. He has been eating well and maintaining his weight. ECOG PS is 0      Emotional well being addressed and patient is coping well      Physical Examination:   Visit Vitals    Ht 6' 1\" (1.854 m)     General appearance - alert, well appearing, and in no distress  Mental status - oriented to person, place, and time  Mouth - mucous membranes moist, pharynx normal without lesions. Neck - supple, no significant adenopathy  Lymphatics - no palpable lymphadenopathy, no hepatosplenomegaly  Chest -clear to auscultation,  no added sounds  Heart - normal rate, regular rhythm, normal S1, S2, no murmurs, rubs, clicks or gallops  Abdomen - soft, nontender, nondistended, no masses or organomegaly, bowel sounds present  Neurological - normal speech, no focal findings or movement disorder noted    LABS  Lab Results   Component Value Date/Time    WBC 6.1 10/02/2017 12:00 AM    HGB 11.9 (L) 10/02/2017 12:00 AM    HCT 37.5 10/02/2017 12:00 AM    PLATELET 624 61/32/2905 12:00 AM    MCV 81 10/02/2017 12:00 AM    ABS. NEUTROPHILS 4.2 10/02/2017 12:00 AM     Lab Results   Component Value Date/Time    Sodium 144 02/13/2018 10:40 AM    Potassium 3.7 02/13/2018 10:40 AM    Chloride 99 02/13/2018 10:40 AM    CO2 27 02/13/2018 10:40 AM    Glucose 84 02/13/2018 10:40 AM    BUN 13 02/13/2018 10:40 AM    Creatinine 0.65 (L) 02/13/2018 10:40 AM    GFR est  02/13/2018 10:40 AM    GFR est non- 02/13/2018 10:40 AM    Calcium 8.5 (L) 02/13/2018 10:40 AM     Lab Results   Component Value Date/Time    AST (SGOT) 48 (H) 02/13/2018 10:40 AM    Alk.  phosphatase 100 02/13/2018 10:40 AM    Protein, total 6.9 02/13/2018 10:40 AM    Albumin 3.9 02/13/2018 10:40 AM    Globulin 3.7 02/08/2018 03:21 PM    A-G Ratio 1.3 02/13/2018 10:40 AM     CT Chest- abdomen - pelvis 10/2/17    Decreased right pleural thickening and size of right lower lobe  pulmonary mass with interval sclerosis of multiple osseous metastases as above  compatible with response to therapy    CT CAP 1/23/18  IMPRESSION  IMPRESSION:   1. Increase in size of a right lower lobe mass.     2. Stable right pleural effusion with adjacent pleural thickening.     3. Stable scattered sclerotic bony metastases.     4. No evidence for new metastatic disease in the chest, abdomen, or pelvis.        ASSESSMENT AND PLAN  Mr. Cathleen Vasques is a 75 y.o. male with      1. Stage IV NSCLC with R lung mass, mediastinal adenopathy, malignant R pleural effusion, multiple asymptomatic bone metastasis and possibly liver metastasis. EGFR mutated, PD-L1 5%. He is tolerating Tarceva well. · Reviewed CT. Apart from the lung mass having increased from 2.5- 2.9 cm - no changes. Per RECIST- stable disease  · Continue Tarceva at current dose  · He has new mid back pain which is concerning for progression in the bone . Will repeat CT and bone scan  · Monthly labs as usual  · Continue monthly Zometa      2. S/P R thoracentesis on 5/15/17 and again on 5/25- s/p Pleurx by Dr. Ashli Bui. No new concerns      3. Kanwal 6 prostate cancer on active surveillance      4. Anemia secondary to 1-stable      5. R hip pain secondary to osseous metastasis s/p XRT. Now with new mid upper back pain- Bone scan and if the only site of progression will consider XRT and continue Tarceva        6. HTN- Not likely related to Tarceva. Well controlled on  Losartan and Norvasc      7. Elevated Bb secondary to Tarceva- 1.2 when last checked.    For now no dose changes      RTC 3 weeks with CT, bone scan and labs    Yu Mora MD

## 2018-03-05 RX ORDER — ZOLEDRONIC ACID 4 MG/100ML
4 SOLUTION INTRAVENOUS ONCE
Status: COMPLETED | OUTPATIENT
Start: 2018-03-08 | End: 2018-03-08

## 2018-03-08 ENCOUNTER — HOSPITAL ENCOUNTER (OUTPATIENT)
Dept: INFUSION THERAPY | Age: 68
Discharge: HOME | End: 2018-03-08
Payer: MEDICARE

## 2018-03-08 VITALS
TEMPERATURE: 97 F | HEART RATE: 55 BPM | SYSTOLIC BLOOD PRESSURE: 147 MMHG | DIASTOLIC BLOOD PRESSURE: 76 MMHG | RESPIRATION RATE: 16 BRPM

## 2018-03-08 LAB
ALBUMIN SERPL-MCNC: 3.4 G/DL (ref 3.5–5)
ALBUMIN/GLOB SERPL: 0.9 {RATIO} (ref 1.1–2.2)
ALP SERPL-CCNC: 93 U/L (ref 45–117)
ALT SERPL-CCNC: 26 U/L (ref 12–78)
ANION GAP SERPL CALC-SCNC: 2 MMOL/L (ref 5–15)
AST SERPL-CCNC: 34 U/L (ref 15–37)
BILIRUB DIRECT SERPL-MCNC: 0.4 MG/DL (ref 0–0.2)
BILIRUB SERPL-MCNC: 1.8 MG/DL (ref 0.2–1)
BUN SERPL-MCNC: 23 MG/DL (ref 6–20)
BUN/CREAT SERPL: 28 (ref 12–20)
CALCIUM SERPL-MCNC: 8.6 MG/DL (ref 8.5–10.1)
CHLORIDE SERPL-SCNC: 103 MMOL/L (ref 97–108)
CO2 SERPL-SCNC: 36 MMOL/L (ref 21–32)
CREAT SERPL-MCNC: 0.82 MG/DL (ref 0.7–1.3)
GLOBULIN SER CALC-MCNC: 3.9 G/DL (ref 2–4)
GLUCOSE SERPL-MCNC: 63 MG/DL (ref 65–100)
MAGNESIUM SERPL-MCNC: 2.1 MG/DL (ref 1.6–2.4)
PHOSPHATE SERPL-MCNC: 2.4 MG/DL (ref 2.6–4.7)
POTASSIUM SERPL-SCNC: 3.3 MMOL/L (ref 3.5–5.1)
PROT SERPL-MCNC: 7.3 G/DL (ref 6.4–8.2)
SODIUM SERPL-SCNC: 141 MMOL/L (ref 136–145)

## 2018-03-08 PROCEDURE — 36415 COLL VENOUS BLD VENIPUNCTURE: CPT | Performed by: NURSE PRACTITIONER

## 2018-03-08 PROCEDURE — 83735 ASSAY OF MAGNESIUM: CPT | Performed by: NURSE PRACTITIONER

## 2018-03-08 PROCEDURE — 96374 THER/PROPH/DIAG INJ IV PUSH: CPT

## 2018-03-08 PROCEDURE — 74011250636 HC RX REV CODE- 250/636: Performed by: NURSE PRACTITIONER

## 2018-03-08 PROCEDURE — 84100 ASSAY OF PHOSPHORUS: CPT | Performed by: NURSE PRACTITIONER

## 2018-03-08 PROCEDURE — 80076 HEPATIC FUNCTION PANEL: CPT | Performed by: NURSE PRACTITIONER

## 2018-03-08 PROCEDURE — 80048 BASIC METABOLIC PNL TOTAL CA: CPT | Performed by: NURSE PRACTITIONER

## 2018-03-08 RX ORDER — SODIUM CHLORIDE 9 MG/ML
25 INJECTION, SOLUTION INTRAVENOUS AS NEEDED
Status: DISPENSED | OUTPATIENT
Start: 2018-03-08 | End: 2018-03-09

## 2018-03-08 RX ORDER — SODIUM CHLORIDE 0.9 % (FLUSH) 0.9 %
5-10 SYRINGE (ML) INJECTION AS NEEDED
Status: ACTIVE | OUTPATIENT
Start: 2018-03-08 | End: 2018-03-09

## 2018-03-08 RX ADMIN — ZOLEDRONIC ACID 4 MG: 0.04 INJECTION, SOLUTION INTRAVENOUS at 16:43

## 2018-03-08 NOTE — PROGRESS NOTES
Outpatient Infusion Center Short Visit Progress Note    2604 Pt admit to [facility] for Zometa ambulatory in stable condition. Assessment completed. No new concerns voiced. Please review pending lab results in [address] [place] [address]. Patient Vitals for the past 12 hrs:   Temp Pulse Resp BP   03/08/18 1516 97 °F (36.1 °C) (!) 55 16 147/76       PIV with positive blood return. Lab drawn, flushed and de-accessed per protocol. Medications:  Zometa    1700 Pt tolerated treatment well. D/c home ambulatory in no distress. Pt aware of next appointment scheduled for 4/5/18.

## 2018-03-15 ENCOUNTER — HOSPITAL ENCOUNTER (OUTPATIENT)
Dept: NUCLEAR MEDICINE | Age: 68
Discharge: HOME | End: 2018-03-15
Attending: INTERNAL MEDICINE
Payer: MEDICARE

## 2018-03-15 ENCOUNTER — HOSPITAL ENCOUNTER (OUTPATIENT)
Dept: CT IMAGING | Age: 68
Discharge: HOME | End: 2018-03-15
Attending: INTERNAL MEDICINE
Payer: MEDICARE

## 2018-03-15 DIAGNOSIS — C34.90 MALIGNANT NEOPLASM OF LUNG, UNSPECIFIED LATERALITY, UNSPECIFIED PART OF LUNG (HCC): ICD-10-CM

## 2018-03-15 PROCEDURE — 74011636320 HC RX REV CODE- 636/320: Performed by: INTERNAL MEDICINE

## 2018-03-15 PROCEDURE — 74177 CT ABD & PELVIS W/CONTRAST: CPT

## 2018-03-15 PROCEDURE — 78306 BONE IMAGING WHOLE BODY: CPT

## 2018-03-15 PROCEDURE — 74011000258 HC RX REV CODE- 258: Performed by: INTERNAL MEDICINE

## 2018-03-15 PROCEDURE — 71260 CT THORAX DX C+: CPT

## 2018-03-15 RX ORDER — SODIUM CHLORIDE 0.9 % (FLUSH) 0.9 %
10 SYRINGE (ML) INJECTION
Status: COMPLETED | OUTPATIENT
Start: 2018-03-15 | End: 2018-03-15

## 2018-03-15 RX ADMIN — IOHEXOL 50 ML: 240 INJECTION, SOLUTION INTRATHECAL; INTRAVASCULAR; INTRAVENOUS; ORAL at 13:01

## 2018-03-15 RX ADMIN — Medication 10 ML: at 13:01

## 2018-03-15 RX ADMIN — SODIUM CHLORIDE 100 ML: 900 INJECTION, SOLUTION INTRAVENOUS at 13:01

## 2018-03-15 RX ADMIN — IOPAMIDOL 100 ML: 755 INJECTION, SOLUTION INTRAVENOUS at 13:01

## 2018-03-22 ENCOUNTER — TELEPHONE (OUTPATIENT)
Dept: ONCOLOGY | Age: 68
End: 2018-03-22

## 2018-03-22 ENCOUNTER — HOSPITAL ENCOUNTER (OUTPATIENT)
Dept: NON INVASIVE DIAGNOSTICS | Age: 68
Discharge: HOME | End: 2018-03-22
Payer: MEDICARE

## 2018-03-22 ENCOUNTER — OFFICE VISIT (OUTPATIENT)
Dept: ONCOLOGY | Age: 68
End: 2018-03-22

## 2018-03-22 VITALS
OXYGEN SATURATION: 96 % | WEIGHT: 152.6 LBS | HEART RATE: 64 BPM | DIASTOLIC BLOOD PRESSURE: 82 MMHG | HEIGHT: 73 IN | SYSTOLIC BLOOD PRESSURE: 155 MMHG | BODY MASS INDEX: 20.22 KG/M2 | TEMPERATURE: 97.9 F | RESPIRATION RATE: 20 BRPM

## 2018-03-22 DIAGNOSIS — R79.89 ABNORMAL LFTS: ICD-10-CM

## 2018-03-22 DIAGNOSIS — C34.91 ADENOCARCINOMA OF LUNG, STAGE 4, RIGHT (HCC): Primary | ICD-10-CM

## 2018-03-22 DIAGNOSIS — C79.51 BONE METASTASES (HCC): ICD-10-CM

## 2018-03-22 DIAGNOSIS — C34.91 ADENOCARCINOMA OF LUNG, STAGE 4, RIGHT (HCC): ICD-10-CM

## 2018-03-22 DIAGNOSIS — C61 PROSTATE CANCER (HCC): ICD-10-CM

## 2018-03-22 LAB
ATRIAL RATE: 61 BPM
CALCULATED P AXIS, ECG09: 57 DEGREES
CALCULATED R AXIS, ECG10: 38 DEGREES
CALCULATED T AXIS, ECG11: 55 DEGREES
DIAGNOSIS, 93000: NORMAL
P-R INTERVAL, ECG05: 158 MS
Q-T INTERVAL, ECG07: 414 MS
QRS DURATION, ECG06: 102 MS
QTC CALCULATION (BEZET), ECG08: 416 MS
VENTRICULAR RATE, ECG03: 61 BPM

## 2018-03-22 PROCEDURE — 93005 ELECTROCARDIOGRAM TRACING: CPT

## 2018-03-22 RX ORDER — AMLODIPINE BESYLATE 10 MG/1
TABLET ORAL
Qty: 30 TAB | Refills: 0 | Status: SHIPPED | OUTPATIENT
Start: 2018-03-22 | End: 2018-05-18 | Stop reason: ALTCHOICE

## 2018-03-22 NOTE — TELEPHONE ENCOUNTER
Call to patient, HIPAA verified. Patient advised of scheduled radiation oncology appointment on 3/27/18 at 80 am. Will need to report to outpatient registration and allow 2 hours for the appointment. Patient verbalized understanding and thanked for call.

## 2018-03-22 NOTE — PROGRESS NOTES
Hematology/Oncology progress note    REASON FOR VISIT: Stage IV EGFR mutated NSCLC    HISTORY OF PRESENT ILLNESS: Mr. Maida Rios is a 75 y.o. male with prostate cancer who was diagnosed with stage IV NSCLC- adenocarcinoma ( EGFR mutated , PD-L1 low) in May 2017. Follow up on palliative Tarceva- Initiated 6/26/17. Oncologic history   Mr. Maida Rios noticed a new cough and fevers back in March of 2017. He went to Urgent Care and received antibiotics and cough medication. He states this worked for a while, but he began to notice his cough return. This was intermittent. He had some tightness across his anterior chest which he related to the infection. Chest x-ray was abnormal and he was told to proceed to the Emergency Department. Alaina Mitchell had been under surveillance for right lower lobe mass that was initially noted in 2015. Bronch at that time was negative for malignancy. He was evaluated by Dr. Mary Carmen Paniagua with Thoracic Surgery in 2015 for possible surgical resection. CT chest in November of 2016 with mass size unchanged but some right basilar pleural thickening. CT chest obtained 5/14/17 however showed a new large right pleural effusion with right middle lobe and right lower lobe collapse. Irregular spiculated right lower lobe mass 3.8cm and stable precarinal enlarged lymph nodes were noted. New right posterior second rib lytic lesion and new right hepatic hypodensity consistent with mets. He underwent a therapeutic thoracentesis on 5/15/17 with removal of 1500 cc of serosanguinous fluid. Pathology showed adenocarcinoma. PET CT showed pleural, bone, liver and flor metastasis. MRI brain 5/20/17: No metastasis. Needed repeat R sided thoracentesis on 5/25/17, had some post procedure hydropneumothorax. He was seen by Dr. Vickey Moss at [facility] for Pleurx catheter.      CT guided biopsy of R lung mass done 5/25 which showed adenocarcinoma. EGFR mutation detected Exon 18 and 21      Treatment  6/26/17: Tarceva. Monthly Xgeva. Palliative XRT to R hip   CT CAP 10/2/17: Response  Ct 1/23/18: CT with some growth of LLL mass but stable by RECIST. CT 3/15/18 and Bone scan stable    Past Medical History:   Diagnosis Date    Hypertension        Past Surgical History:   Procedure Laterality Date    HX ORTHOPAEDIC      reconstruction of left little finger       No Known Allergies    Current Outpatient Prescriptions   Medication Sig Dispense Refill    amLODIPine (NORVASC) 10 mg tablet TAKE 1 TABLET BY MOUTH EVERY DAY 30 Tab 0    zoledronic acid (ZOMETA) 4 mg/100 mL pgbk infusion 4 mg by IntraVENous route every twenty-eight (28) days.  losartan (COZAAR) 50 mg tablet TK 1 T PO D. STOP HYDROCHLORATHIAZIDE  2    losartan (COZAAR) 100 mg tablet Take 1 Tab by mouth daily. 30 Tab 5    PHOS--160-250 mg packet TK 2 PACKETS PO AS ONE DOSE  0    ERLOTINIB HCL (TARCEVA PO) Take  by mouth daily.  levothyroxine (SYNTHROID) 75 mcg tablet Take 0.5 Tabs by mouth Daily (before breakfast). 30 Tab 2    cholecalciferol (VITAMIN D3) 1,000 unit tablet Take 1,000 Units by mouth daily. Social History     Social History    Marital status:      Spouse name: N/A    Number of children: N/A    Years of education: N/A     Social History Main Topics    Smoking status: Never Smoker    Smokeless tobacco: Never Used    Alcohol use 6.0 oz/week     10 Glasses of wine per week      Comment: regularly    Drug use: No    Sexual activity: Yes     Partners: Female     Other Topics Concern    Not on file     Social History Narrative       Family History   Problem Relation Age of Onset    Cancer Mother      leukemia    Stroke Father     Cancer Brother      bladder       ROS  He has a constant mid left back pain , sharp, worse on movement. He has been dealing with this. He has had an occasional advil which helps. No SOB, no cough, no other pain, no CP. No nose bleeds usually. He has a slight rash on his nose, some splits on his soles. He is on Norvasc 10 mg now with the Losartal and SBP has been in the 140-150 range. Has noted some short tem memory changes. He has been eating well and maintaining his weight. ECOG PS is 0      Emotional well being addressed and patient is coping well      Physical Examination:   Visit Vitals    /82    Pulse 64    Temp 97.9 °F (36.6 °C)    Resp 20    Ht 6' 1\" (1.854 m)    Wt 152 lb 9.6 oz (69.2 kg)    SpO2 96%    BMI 20.13 kg/m2     General appearance - alert, well appearing, and in no distress  Mental status - oriented to person, place, and time  Mouth - mucous membranes moist, pharynx normal without lesions. Neck - supple, no significant adenopathy  Lymphatics - no palpable lymphadenopathy, no hepatosplenomegaly  Chest -clear to auscultation,  no added sounds  Heart - normal rate, regular rhythm, normal S1, S2, no murmurs, rubs, clicks or gallops  Abdomen - soft, nontender, nondistended, no masses or organomegaly, bowel sounds present  Neurological - normal speech, slight nystagmus  Skin: grade 1 aceniform rash on the brigde of nose    LABS  Lab Results   Component Value Date/Time    WBC 6.1 10/02/2017 12:00 AM    HGB 11.9 (L) 10/02/2017 12:00 AM    HCT 37.5 10/02/2017 12:00 AM    PLATELET 752 26/22/6424 12:00 AM    MCV 81 10/02/2017 12:00 AM    ABS. NEUTROPHILS 4.2 10/02/2017 12:00 AM     Lab Results   Component Value Date/Time    Sodium 141 03/08/2018 03:15 PM    Potassium 3.3 (L) 03/08/2018 03:15 PM    Chloride 103 03/08/2018 03:15 PM    CO2 36 (H) 03/08/2018 03:15 PM    Glucose 63 (L) 03/08/2018 03:15 PM    BUN 23 (H) 03/08/2018 03:15 PM    Creatinine 0.82 03/08/2018 03:15 PM    GFR est AA >60 03/08/2018 03:15 PM    GFR est non-AA >60 03/08/2018 03:15 PM    Calcium 8.6 03/08/2018 03:15 PM     Lab Results   Component Value Date/Time    AST (SGOT) 34 03/08/2018 03:15 PM    Alk.  phosphatase 93 03/08/2018 03:15 PM    Protein, total 7.3 03/08/2018 03:15 PM    Albumin 3.4 (L) 03/08/2018 03:15 PM Globulin 3.9 03/08/2018 03:15 PM    A-G Ratio 0.9 (L) 03/08/2018 03:15 PM     CT Chest- abdomen - pelvis 10/2/17    Decreased right pleural thickening and size of right lower lobe  pulmonary mass with interval sclerosis of multiple osseous metastases as above  compatible with response to therapy    CT CAP and Bone scan- 3/15/18  1. CT of the chest demonstrates no significant change. Right lower lobe mass and  nodule are stable. Right pleural thickening and nodularity and small right  effusion are stable. 2. Bony metastatic disease is stable. 2. CT of the abdomen and pelvis is unchanged. . Subcentimeter low-density in the  liver which is indeterminate is stable.         ASSESSMENT AND PLAN  Mr. Sharyle Cummins is a 75 y.o. male with      1. Stage IV NSCLC with R lung mass, mediastinal adenopathy, malignant R pleural effusion, multiple asymptomatic bone metastasis and possibly liver metastasis. EGFR mutated, PD-L1 5%. He is tolerating Tarceva well. · Reviewed CT and Bone scan which are stable per my discussions with Radiology Dr. Angela Wellington  · Continue Tarceva at current dose  · He has new mid back pain - review of CT shows several thoracic spine lesions . · Monthly labs as usual- add TSH and EKG  · Continue monthly Zometa      2. S/P R thoracentesis on 5/15/17 and again on 5/25- s/p Pleurx by Dr. Hi Wu. No new concerns      3. Jefferson 6 prostate cancer on active surveillance      4. Anemia secondary to 1-stable      5. R hip pain secondary to osseous metastasis s/p XRT. Now with new mid upper back pain- Bone scan and        CT stable but I am concerned about focal progression. Will refer back to Dr. Tellis Bernheim for palliative XRT but will     continue Tarceva      6. HTN- Not likely related to Tarceva. Well controlled on  Losartan and Norvasc      7. Elevated Bb secondary to Tarceva- 1.8 when last       checked. For now no dose changes. Recheck April 6    8. Short term memory changes: May be secondary to Tarceva.  Discussed that we will do an MRI of the brain if persists      RTC 4 weeks    Donald Valentin MD

## 2018-03-22 NOTE — MR AVS SNAPSHOT
[address] [facility] [address] [place] [address] 
[phone] Patient: Fabio Stephens MRN: [id number] :1950 Visit Information Date & Time Provider Department Dept. Phone Encounter #  
 3/22/2018  9:00 AM Isidro Alexander  [facility] [phone] [id number] Your Appointments 2018  9:30 AM  
ROUTINE CARE with Placido Ramsey DO [facility] ([facility]) Appt Note: 6 month f/u  
 [address] [facility] [address]  
[phone]  
  
   
 [address] [place] Upcoming Health Maintenance Date Due Hepatitis C Screening 1950 DTaP/Tdap/Td series (1 - Tdap) 1970 ZOSTER VACCINE AGE 60> 10/2/2009 GLAUCOMA SCREENING Q2Y 2014 Pneumococcal 65+ High/Highest Risk (1 of 2 - PCV13) 2014 FOBT Q 1 YEAR AGE 50-75 10/31/2017 MEDICARE YEARLY EXAM 3/15/2018 Allergies as of 3/22/2018  Review Complete On: 3/22/2018 By: Luanne Lopez No Known Allergies Current Immunizations  Reviewed on 2018 Name Date Influenza High Dose Vaccine PF 2017, 10/31/2016 Not reviewed this visit You Were Diagnosed With   
  
 Codes Comments Adenocarcinoma of lung, stage 4, right ([facility] [address]    -  Primary ICD-10-CM: C34.91 
ICD-9-CM: 162.9 Vitals BP Pulse Temp Resp Height(growth percentile) Weight(growth percentile) 155/82 64 97.9 °F (36.6 °C) 20 6' 1\" (1.854 m) 152 lb 9.6 oz (69.2 kg) SpO2 BMI Smoking Status 96% 20.13 kg/m2 Never Smoker Vitals History BMI and BSA Data Body Mass Index Body Surface Area  
 20.13 kg/m 2 1.89 m 2 Preferred Pharmacy Pharmacy Name Phone [address] [facility] [address] AT [phone]. [phone] Your Updated Medication List  
  
   
 This list is accurate as of 3/22/18  9:22 AM.  Always use your most recent med list. amLODIPine 10 mg tablet Commonly known as:  Arva Brazen TAKE 1 TABLET BY MOUTH EVERY DAY  
  
 cholecalciferol 1,000 unit tablet Commonly known as:  VITAMIN D3 Take 1,000 Units by mouth daily. levothyroxine 75 mcg tablet Commonly known as:  SYNTHROID Take 0.5 Tabs by mouth Daily (before breakfast). * losartan 50 mg tablet Commonly known as:  COZAAR TK 1 T PO D. STOP HYDROCHLORATHIAZIDE  
  
 * losartan 100 mg tablet Commonly known as:  COZAAR Take 1 Tab by mouth daily. PHOS--160-250 mg packet Generic drug:  potassium, sodium phosphates TK 2 PACKETS PO AS ONE DOSE  
  
 TARCEVA PO Take  by mouth daily. ZOMETA 4 mg/100 mL Pgbk infusion Generic drug:  zoledronic acid 4 mg by IntraVENous route every twenty-eight (28) days. * Notice: This list has 2 medication(s) that are the same as other medications prescribed for you. Read the directions carefully, and ask your doctor or other care provider to review them with you. To-Do List   
 03/23/2018 ECG:  EKG, 12 LEAD, INITIAL   
  
 04/05/2018 3:00 PM  
  Appointment with [address] [place] [address] ([phone])  
  
 05/03/2018 3:00 PM  
  Appointment with [address] [place] [address] ([phone])  
  
 06/07/2018 3:00 PM  
  Appointment with [address] [place] [address] ([phone])  
  
 07/05/2018 3:00 PM  
  Appointment with [address] [place] [address] ([phone]) Introducing [facility]! Dear Dexter Morales: 
Thank you for requesting a [employer] account. Our records indicate that you already have an active [employer] account. You can access your account anytime at [email or web address] Did you know that you can access your hospital and ER discharge instructions at any time in [facility]? You can also review all of your test results from your hospital stay or ER visit. Additional Information If you have questions, please visit the Frequently Asked Questions section of the [employer] website at [email or web address] Remember, [facility] is NOT to be used for urgent needs. For medical emergencies, dial 911. Now available from your iPhone and Android! Please provide this summary of care documentation to your next provider. Your primary care clinician is listed as Tyler Welsh. If you have any questions after today's visit, please call [phone].

## 2018-03-26 DIAGNOSIS — C34.91 ADENOCARCINOMA OF LUNG, STAGE 4, RIGHT (HCC): Primary | ICD-10-CM

## 2018-03-26 DIAGNOSIS — R94.31 ABNORMAL EKG: ICD-10-CM

## 2018-03-26 NOTE — PROGRESS NOTES
Call to patient, HIPAA verified. Advised of result note by provider. Patient verbalized understanding, will go to have repeat EKG tomorrow morning when he is here for another appointment. Thanked for call.

## 2018-03-27 ENCOUNTER — HOSPITAL ENCOUNTER (OUTPATIENT)
Dept: NON INVASIVE DIAGNOSTICS | Age: 68
Discharge: HOME | End: 2018-03-27
Payer: MEDICARE

## 2018-03-27 ENCOUNTER — HOSPITAL ENCOUNTER (OUTPATIENT)
Dept: RADIATION THERAPY | Age: 68
Discharge: HOME | End: 2018-03-27

## 2018-03-27 DIAGNOSIS — R94.31 ABNORMAL EKG: ICD-10-CM

## 2018-03-27 DIAGNOSIS — C34.91 ADENOCARCINOMA OF LUNG, STAGE 4, RIGHT (HCC): ICD-10-CM

## 2018-03-27 LAB
ATRIAL RATE: 54 BPM
CALCULATED P AXIS, ECG09: 65 DEGREES
CALCULATED R AXIS, ECG10: 51 DEGREES
CALCULATED T AXIS, ECG11: -70 DEGREES
DIAGNOSIS, 93000: NORMAL
P-R INTERVAL, ECG05: 170 MS
Q-T INTERVAL, ECG07: 434 MS
QRS DURATION, ECG06: 96 MS
QTC CALCULATION (BEZET), ECG08: 411 MS
VENTRICULAR RATE, ECG03: 54 BPM

## 2018-03-27 PROCEDURE — 93005 ELECTROCARDIOGRAM TRACING: CPT

## 2018-03-30 ENCOUNTER — HOSPITAL ENCOUNTER (OUTPATIENT)
Dept: MRI IMAGING | Age: 68
Discharge: HOME | End: 2018-03-30
Attending: RADIOLOGY
Payer: MEDICARE

## 2018-03-30 VITALS — BODY MASS INDEX: 19.66 KG/M2 | WEIGHT: 149 LBS

## 2018-03-30 DIAGNOSIS — C79.51 SECONDARY MALIGNANT NEOPLASM OF BONE AND BONE MARROW (HCC): ICD-10-CM

## 2018-03-30 DIAGNOSIS — C79.52 SECONDARY MALIGNANT NEOPLASM OF BONE AND BONE MARROW (HCC): ICD-10-CM

## 2018-03-30 PROCEDURE — 72197 MRI PELVIS W/O & W/DYE: CPT

## 2018-03-30 PROCEDURE — 72157 MRI CHEST SPINE W/O & W/DYE: CPT

## 2018-03-30 PROCEDURE — A9576 INJ PROHANCE MULTIPACK: HCPCS | Performed by: RADIOLOGY

## 2018-03-30 PROCEDURE — 72158 MRI LUMBAR SPINE W/O & W/DYE: CPT

## 2018-03-30 PROCEDURE — 74011250636 HC RX REV CODE- 250/636: Performed by: RADIOLOGY

## 2018-03-30 RX ADMIN — GADOTERIDOL 13 ML: 279.3 INJECTION, SOLUTION INTRAVENOUS at 14:17

## 2018-04-02 RX ORDER — ZOLEDRONIC ACID 4 MG/100ML
4 SOLUTION INTRAVENOUS ONCE
Status: DISCONTINUED | OUTPATIENT
Start: 2018-04-05 | End: 2018-04-05 | Stop reason: SDUPTHER

## 2018-04-03 NOTE — PROGRESS NOTES
Call to patient, HIPAA verified. Advised of result note by provider. Patient verbalized understanding and thanked for call.

## 2018-04-04 ENCOUNTER — HOSPITAL ENCOUNTER (OUTPATIENT)
Dept: RADIATION THERAPY | Age: 68
Discharge: HOME | End: 2018-04-04
Payer: MEDICARE

## 2018-04-04 PROCEDURE — 77470 SPECIAL RADIATION TREATMENT: CPT

## 2018-04-05 ENCOUNTER — HOSPITAL ENCOUNTER (OUTPATIENT)
Dept: INFUSION THERAPY | Age: 68
Discharge: HOME | End: 2018-04-05
Payer: MEDICARE

## 2018-04-05 VITALS
WEIGHT: 152 LBS | TEMPERATURE: 97.7 F | BODY MASS INDEX: 20.05 KG/M2 | HEART RATE: 56 BPM | SYSTOLIC BLOOD PRESSURE: 130 MMHG | RESPIRATION RATE: 16 BRPM | DIASTOLIC BLOOD PRESSURE: 83 MMHG

## 2018-04-05 LAB
ALBUMIN SERPL-MCNC: 3.5 G/DL (ref 3.5–5)
ANION GAP SERPL CALC-SCNC: 4 MMOL/L (ref 5–15)
BUN SERPL-MCNC: 12 MG/DL (ref 6–20)
BUN/CREAT SERPL: 16 (ref 12–20)
CALCIUM SERPL-MCNC: 8.4 MG/DL (ref 8.5–10.1)
CHLORIDE SERPL-SCNC: 103 MMOL/L (ref 97–108)
CO2 SERPL-SCNC: 36 MMOL/L (ref 21–32)
CREAT SERPL-MCNC: 0.73 MG/DL (ref 0.7–1.3)
GLUCOSE SERPL-MCNC: 73 MG/DL (ref 65–100)
PHOSPHATE SERPL-MCNC: 1.7 MG/DL (ref 2.6–4.7)
POTASSIUM SERPL-SCNC: 2.8 MMOL/L (ref 3.5–5.1)
SODIUM SERPL-SCNC: 143 MMOL/L (ref 136–145)
TSH SERPL DL<=0.05 MIU/L-ACNC: 2.65 UIU/ML (ref 0.36–3.74)

## 2018-04-05 PROCEDURE — 96374 THER/PROPH/DIAG INJ IV PUSH: CPT

## 2018-04-05 PROCEDURE — 36415 COLL VENOUS BLD VENIPUNCTURE: CPT | Performed by: NURSE PRACTITIONER

## 2018-04-05 PROCEDURE — 80069 RENAL FUNCTION PANEL: CPT | Performed by: NURSE PRACTITIONER

## 2018-04-05 PROCEDURE — 74011250636 HC RX REV CODE- 250/636: Performed by: NURSE PRACTITIONER

## 2018-04-05 PROCEDURE — 84443 ASSAY THYROID STIM HORMONE: CPT | Performed by: NURSE PRACTITIONER

## 2018-04-05 RX ORDER — SODIUM CHLORIDE 0.9 % (FLUSH) 0.9 %
5-10 SYRINGE (ML) INJECTION AS NEEDED
Status: ACTIVE | OUTPATIENT
Start: 2018-04-05 | End: 2018-04-06

## 2018-04-05 RX ORDER — POTASSIUM CHLORIDE 20 MEQ/1
40 TABLET, EXTENDED RELEASE ORAL 2 TIMES DAILY
Qty: 20 TAB | Refills: 0 | Status: SHIPPED | OUTPATIENT
Start: 2018-04-05 | End: 2018-04-10

## 2018-04-05 RX ORDER — SODIUM CHLORIDE 9 MG/ML
25 INJECTION, SOLUTION INTRAVENOUS AS NEEDED
Status: DISPENSED | OUTPATIENT
Start: 2018-04-05 | End: 2018-04-06

## 2018-04-05 RX ADMIN — Medication 10 ML: at 15:19

## 2018-04-05 RX ADMIN — SODIUM CHLORIDE 25 ML/HR: 900 INJECTION, SOLUTION INTRAVENOUS at 15:19

## 2018-04-05 RX ADMIN — ZOLEDRONIC ACID 4 MG: 0.04 INJECTION, SOLUTION INTRAVENOUS at 16:13

## 2018-04-05 NOTE — PROGRESS NOTES
Outpatient Infusion Center Short Visit Progress Note    9765 Pt admit to [facility] for Zometa ambulatory in stable condition. Assessment completed. No new concerns voiced. Patient Vitals for the past 12 hrs:   Temp Pulse Resp BP   04/05/18 1515 97.7 °F (36.5 °C) (!) 56 16 130/83       Peripheral IV accessed in left arm with positive blood return. Lab drawn and sent for processing. PIV flushed and NS started at [facility]. Medications:  NS KVO  Zometa    1635 Pt tolerated treatment well. PIV maintained positive blood return. PIV flushed and de-accessed per protocol. D/c home ambulatory in no distress. Pt aware of next appointment scheduled for 5/3/18 at 3:00.     Recent Results (from the past 12 hour(s))   RENAL FUNCTION PANEL    Collection Time: 04/05/18  3:19 PM   Result Value Ref Range    Sodium 143 136 - 145 mmol/L    Potassium 2.8 (L) 3.5 - 5.1 mmol/L    Chloride 103 97 - 108 mmol/L    CO2 36 (H) 21 - 32 mmol/L    Anion gap 4 (L) 5 - 15 mmol/L    Glucose 73 65 - 100 mg/dL    BUN 12 6 - 20 MG/DL    Creatinine 0.73 0.70 - 1.30 MG/DL    BUN/Creatinine ratio 16 12 - 20      GFR est AA >60 >60 ml/min/1.73m2    GFR est non-AA >60 >60 ml/min/1.73m2    Calcium 8.4 (L) 8.5 - 10.1 MG/DL    Phosphorus 1.7 (L) 2.6 - 4.7 MG/DL    Albumin 3.5 3.5 - 5.0 g/dL   TSH 3RD GENERATION    Collection Time: 04/05/18  3:19 PM   Result Value Ref Range    TSH 2.65 0.36 - 3.74 uIU/mL

## 2018-04-05 NOTE — PROGRESS NOTES
Message to [address] [place] [address] [address] with recommendations. Potassium sent to pharmacy on file.

## 2018-04-05 NOTE — PROGRESS NOTES
Potassium is critically low. Phosphorus low as well. Does he have potassium supplements in the home? It appears he has been on phosphorus replacement. Need to confirm he is taking this.

## 2018-04-12 ENCOUNTER — HOSPITAL ENCOUNTER (OUTPATIENT)
Dept: RADIATION THERAPY | Age: 68
Discharge: HOME | End: 2018-04-12
Payer: MEDICARE

## 2018-04-12 PROCEDURE — 77307 TELETHX ISODOSE PLAN CPLX: CPT

## 2018-04-12 PROCEDURE — 77334 RADIATION TREATMENT AID(S): CPT

## 2018-04-16 ENCOUNTER — HOSPITAL ENCOUNTER (OUTPATIENT)
Dept: RADIATION THERAPY | Age: 68
Discharge: HOME | End: 2018-04-16
Payer: MEDICARE

## 2018-04-16 PROCEDURE — 77412 RADIATION TX DELIVERY LVL 3: CPT

## 2018-04-16 PROCEDURE — 77387 GUIDANCE FOR RADJ TX DLVR: CPT

## 2018-04-17 ENCOUNTER — TELEPHONE (OUTPATIENT)
Dept: ONCOLOGY | Age: 68
End: 2018-04-17

## 2018-04-17 NOTE — TELEPHONE ENCOUNTER
Call to patient. Left detailed message    Per Dr Agusto Diez. Requesting lab ([facility]) draw from patient. Thursday 4/19 is best for [facility]. He can go before or after his radiation appointment.

## 2018-04-18 ENCOUNTER — HOSPITAL ENCOUNTER (OUTPATIENT)
Dept: RADIATION THERAPY | Age: 68
Discharge: HOME | End: 2018-04-18
Payer: MEDICARE

## 2018-04-18 PROCEDURE — 77412 RADIATION TX DELIVERY LVL 3: CPT

## 2018-04-18 PROCEDURE — 77387 GUIDANCE FOR RADJ TX DLVR: CPT

## 2018-04-19 ENCOUNTER — DOCUMENTATION ONLY (OUTPATIENT)
Dept: ONCOLOGY | Age: 68
End: 2018-04-19

## 2018-04-19 ENCOUNTER — HOSPITAL ENCOUNTER (OUTPATIENT)
Dept: RADIATION THERAPY | Age: 68
Discharge: HOME | End: 2018-04-19
Payer: MEDICARE

## 2018-04-19 ENCOUNTER — HOSPITAL ENCOUNTER (OUTPATIENT)
Dept: INFUSION THERAPY | Age: 68
Discharge: HOME | End: 2018-04-19
Payer: MEDICARE

## 2018-04-19 VITALS — SYSTOLIC BLOOD PRESSURE: 155 MMHG | DIASTOLIC BLOOD PRESSURE: 91 MMHG | HEART RATE: 52 BPM | RESPIRATION RATE: 16 BRPM

## 2018-04-19 PROCEDURE — 77387 GUIDANCE FOR RADJ TX DLVR: CPT

## 2018-04-19 PROCEDURE — 77412 RADIATION TX DELIVERY LVL 3: CPT

## 2018-04-19 NOTE — PROGRESS NOTES
Patient arrived as a add on today at 65 for lab work. Nurse provided phlebotomist [address] [place] [address] Blood Collection Kit for the draw. Patient was feeling well, alert, and had no concerns today. Patient was drawn from right arm peripherally with no complications.      BP: 155/91  Heart Rate: 52  Resp: 16

## 2018-04-20 ENCOUNTER — HOSPITAL ENCOUNTER (OUTPATIENT)
Dept: RADIATION THERAPY | Age: 68
Discharge: HOME | End: 2018-04-20
Payer: MEDICARE

## 2018-04-20 PROCEDURE — 77387 GUIDANCE FOR RADJ TX DLVR: CPT

## 2018-04-20 PROCEDURE — 77412 RADIATION TX DELIVERY LVL 3: CPT

## 2018-04-20 PROCEDURE — 77336 RADIATION PHYSICS CONSULT: CPT

## 2018-04-23 ENCOUNTER — HOSPITAL ENCOUNTER (OUTPATIENT)
Dept: RADIATION THERAPY | Age: 68
Discharge: HOME | End: 2018-04-23
Payer: MEDICARE

## 2018-04-23 PROCEDURE — 77387 GUIDANCE FOR RADJ TX DLVR: CPT

## 2018-04-23 PROCEDURE — 77412 RADIATION TX DELIVERY LVL 3: CPT

## 2018-04-24 ENCOUNTER — HOSPITAL ENCOUNTER (OUTPATIENT)
Dept: RADIATION THERAPY | Age: 68
Discharge: HOME | End: 2018-04-24
Payer: MEDICARE

## 2018-04-24 PROCEDURE — 77412 RADIATION TX DELIVERY LVL 3: CPT

## 2018-04-24 PROCEDURE — 77387 GUIDANCE FOR RADJ TX DLVR: CPT

## 2018-04-25 ENCOUNTER — HOSPITAL ENCOUNTER (OUTPATIENT)
Dept: RADIATION THERAPY | Age: 68
Discharge: HOME | End: 2018-04-25
Payer: MEDICARE

## 2018-04-25 PROCEDURE — 77412 RADIATION TX DELIVERY LVL 3: CPT

## 2018-04-25 PROCEDURE — 77387 GUIDANCE FOR RADJ TX DLVR: CPT

## 2018-04-26 ENCOUNTER — HOSPITAL ENCOUNTER (OUTPATIENT)
Dept: RADIATION THERAPY | Age: 68
Discharge: HOME | End: 2018-04-26
Payer: MEDICARE

## 2018-04-26 PROCEDURE — 77412 RADIATION TX DELIVERY LVL 3: CPT

## 2018-04-26 PROCEDURE — 77387 GUIDANCE FOR RADJ TX DLVR: CPT

## 2018-04-27 ENCOUNTER — HOSPITAL ENCOUNTER (OUTPATIENT)
Dept: RADIATION THERAPY | Age: 68
Discharge: HOME | End: 2018-04-27
Payer: MEDICARE

## 2018-04-27 PROCEDURE — 77412 RADIATION TX DELIVERY LVL 3: CPT

## 2018-04-27 PROCEDURE — 77336 RADIATION PHYSICS CONSULT: CPT

## 2018-04-27 PROCEDURE — 77387 GUIDANCE FOR RADJ TX DLVR: CPT

## 2018-05-01 ENCOUNTER — TELEPHONE (OUTPATIENT)
Dept: ONCOLOGY | Age: 68
End: 2018-05-01

## 2018-05-01 NOTE — TELEPHONE ENCOUNTER
[address] [facility] from [facility] called would like a call back in reference to pt.  Her direct line is [phone]

## 2018-05-02 ENCOUNTER — TELEPHONE (OUTPATIENT)
Dept: ONCOLOGY | Age: 68
End: 2018-05-02

## 2018-05-02 NOTE — TELEPHONE ENCOUNTER
[facility] from [facility] called would like a return call from TARA

## 2018-05-03 ENCOUNTER — DOCUMENTATION ONLY (OUTPATIENT)
Dept: ONCOLOGY | Age: 68
End: 2018-05-03

## 2018-05-03 ENCOUNTER — HOSPITAL ENCOUNTER (OUTPATIENT)
Dept: INFUSION THERAPY | Age: 68
Discharge: HOME | End: 2018-05-03
Payer: MEDICARE

## 2018-05-03 ENCOUNTER — OFFICE VISIT (OUTPATIENT)
Dept: ONCOLOGY | Age: 68
End: 2018-05-03

## 2018-05-03 VITALS
DIASTOLIC BLOOD PRESSURE: 72 MMHG | WEIGHT: 148 LBS | HEIGHT: 73 IN | TEMPERATURE: 98.2 F | BODY MASS INDEX: 19.61 KG/M2 | HEART RATE: 76 BPM | RESPIRATION RATE: 16 BRPM | OXYGEN SATURATION: 94 % | SYSTOLIC BLOOD PRESSURE: 114 MMHG

## 2018-05-03 VITALS — RESPIRATION RATE: 16 BRPM

## 2018-05-03 DIAGNOSIS — A09 DIARRHEA OF INFECTIOUS ORIGIN: ICD-10-CM

## 2018-05-03 DIAGNOSIS — A09 DIARRHEA OF INFECTIOUS ORIGIN: Primary | ICD-10-CM

## 2018-05-03 LAB
ALBUMIN SERPL-MCNC: 2.5 G/DL (ref 3.5–5)
ANION GAP SERPL CALC-SCNC: 3 MMOL/L (ref 5–15)
BUN SERPL-MCNC: 18 MG/DL (ref 6–20)
BUN/CREAT SERPL: 24 (ref 12–20)
CALCIUM SERPL-MCNC: 8 MG/DL (ref 8.5–10.1)
CHLORIDE SERPL-SCNC: 100 MMOL/L (ref 97–108)
CO2 SERPL-SCNC: 33 MMOL/L (ref 21–32)
CREAT SERPL-MCNC: 0.74 MG/DL (ref 0.7–1.3)
GLUCOSE SERPL-MCNC: 100 MG/DL (ref 65–100)
PHOSPHATE SERPL-MCNC: 3 MG/DL (ref 2.6–4.7)
POTASSIUM SERPL-SCNC: 4 MMOL/L (ref 3.5–5.1)
SODIUM SERPL-SCNC: 136 MMOL/L (ref 136–145)

## 2018-05-03 PROCEDURE — 74011250636 HC RX REV CODE- 250/636: Performed by: NURSE PRACTITIONER

## 2018-05-03 PROCEDURE — 96374 THER/PROPH/DIAG INJ IV PUSH: CPT

## 2018-05-03 PROCEDURE — 36415 COLL VENOUS BLD VENIPUNCTURE: CPT | Performed by: NURSE PRACTITIONER

## 2018-05-03 PROCEDURE — 80069 RENAL FUNCTION PANEL: CPT | Performed by: NURSE PRACTITIONER

## 2018-05-03 PROCEDURE — 96360 HYDRATION IV INFUSION INIT: CPT

## 2018-05-03 RX ORDER — ONDANSETRON HYDROCHLORIDE 8 MG/1
TABLET, FILM COATED ORAL
COMMUNITY
Start: 2018-04-25 | End: 2019-01-01 | Stop reason: SDUPTHER

## 2018-05-03 RX ADMIN — ZOLEDRONIC ACID 4 MG: 0.04 INJECTION, SOLUTION INTRAVENOUS at 17:05

## 2018-05-03 NOTE — PROGRESS NOTES
Hematology/Oncology progress note    REASON FOR VISIT: Stage IV EGFR mutated NSCLC    HISTORY OF PRESENT ILLNESS: Mr. Wade York is a 75 y.o. male with prostate cancer who was diagnosed with stage IV NSCLC- adenocarcinoma ( EGFR mutated , PD-L1 low) in May 2017. Tarceva- Initiated 6/26/17. Now with progression    Oncologic history   Mr. Wade York noticed a new cough and fevers back in March of 2017. He went to Urgent Care and received antibiotics and cough medication. He states this worked for a while, but he began to notice his cough return. This was intermittent. He had some tightness across his anterior chest which he related to the infection. Chest x-ray was abnormal and he was told to proceed to the Emergency Department. Rosendo Springre had been under surveillance for right lower lobe mass that was initially noted in 2015. Bronch at that time was negative for malignancy. He was evaluated by Dr. Gómez Vera with Thoracic Surgery in 2015 for possible surgical resection. CT chest in November of 2016 with mass size unchanged but some right basilar pleural thickening. CT chest obtained 5/14/17 however showed a new large right pleural effusion with right middle lobe and right lower lobe collapse. Irregular spiculated right lower lobe mass 3.8cm and stable precarinal enlarged lymph nodes were noted. New right posterior second rib lytic lesion and new right hepatic hypodensity consistent with mets. He underwent a therapeutic thoracentesis on 5/15/17 with removal of 1500 cc of serosanguinous fluid. Pathology showed adenocarcinoma. PET CT showed pleural, bone, liver and flor metastasis. MRI brain 5/20/17: No metastasis. Needed repeat R sided thoracentesis on 5/25/17, had some post procedure hydropneumothorax. He was seen by Dr. Hortensia Celeste at [facility] for Pleurx catheter.      CT guided biopsy of R lung mass done 5/25 which showed adenocarcinoma. EGFR mutation detected Exon 18 and 21      Treatment  6/26/17: Tarceva. Monthly Xgeva.  Palliative XRT to R hip   CT CAP 10/2/17: Response  Ct 1/23/18: CT with some growth of LLL mass but stable by RECIST. CT 3/15/18 and Bone scan stable though he had worsening left back pain. MRI results showed extensive disease at L2, S2 and additional lesions as previously known. Received palliative XRT that he completed 3/30/18      Past Medical History:   Diagnosis Date    Hypertension        Past Surgical History:   Procedure Laterality Date    HX ORTHOPAEDIC      reconstruction of left little finger       No Known Allergies    Current Outpatient Prescriptions   Medication Sig Dispense Refill    ondansetron hcl (ZOFRAN) 8 mg tablet       osimertinib (TAGRISSO) 80 mg tablet Take 1 Tab by mouth daily. Indications: EGFR T790M MUTATION-POSITIVE NON-SMALL CELL LUNG CANCER 30 Tab 11    levothyroxine (SYNTHROID) 75 mcg tablet TAKE 1/2 TABLET BY MOUTH DAILY BEFORE BREAKFAST 30 Tab 5    amLODIPine (NORVASC) 10 mg tablet TAKE 1 TABLET BY MOUTH EVERY DAY 30 Tab 0    zoledronic acid (ZOMETA) 4 mg/100 mL pgbk infusion 4 mg by IntraVENous route every twenty-eight (28) days.  losartan (COZAAR) 50 mg tablet TK 1 T PO D. STOP HYDROCHLORATHIAZIDE  2    losartan (COZAAR) 100 mg tablet Take 1 Tab by mouth daily. 30 Tab 5    PHOS--160-250 mg packet TK 2 PACKETS PO AS ONE DOSE  0    cholecalciferol (VITAMIN D3) 1,000 unit tablet Take 1,000 Units by mouth daily.        Facility-Administered Medications Ordered in Other Visits   Medication Dose Route Frequency Provider Last Rate Last Dose    zoledronic acid (ZOMETA) 4 mg/100mL infusion  4 mg IntraVENous ONCE Edgardo Peñaloza NP           Social History     Social History    Marital status:      Spouse name: N/A    Number of children: N/A    Years of education: N/A     Social History Main Topics    Smoking status: Never Smoker    Smokeless tobacco: Never Used    Alcohol use 6.0 oz/week     10 Glasses of wine per week      Comment: regularly    Drug use: No    Sexual activity: Yes     Partners: Female     Other Topics Concern    Not on file     Social History Narrative       Family History   Problem Relation Age of Onset    Cancer Mother      leukemia    Stroke Father     Cancer Brother      bladder       ROS  He completed XRT to the L2 and Sacrum on 3/30/18. He has no pain since. He had developed watery diarrhea last week, he felt warm, has lost some weight since. He has no idea how high the fever was. He has continued to be on Tarceva  He has noted persistent memory issues. ECOG PS is 0      Emotional well being addressed and patient is coping well      Physical Examination:   Visit Vitals    /72    Pulse 76    Temp 98.2 °F (36.8 °C)    Resp 16    Ht 6' 1\" (1.854 m)    Wt 148 lb (67.1 kg)    SpO2 94%    BMI 19.53 kg/m2     General appearance - alert, well appearing, and in no distress  Mental status - oriented to person, place, and time  Mouth - mucous membranes moist, pharynx normal without lesions. Neck - supple, no significant adenopathy  Lymphatics - no palpable lymphadenopathy, no hepatosplenomegaly  Chest -clear to auscultation,  no added sounds  Heart - normal rate, regular rhythm, normal S1, S2, no murmurs, rubs, clicks or gallops  Abdomen - soft, nontender, nondistended, no masses or organomegaly, bowel sounds present  Neurological - normal speech, slight nystagmus  Skin: grade 1 aceniform rash on the brigde of nose    LABS  Lab Results   Component Value Date/Time    WBC 6.1 10/02/2017 12:00 AM    HGB 11.9 (L) 10/02/2017 12:00 AM    HCT 37.5 10/02/2017 12:00 AM    PLATELET 842 88/47/3987 12:00 AM    MCV 81 10/02/2017 12:00 AM    ABS.  NEUTROPHILS 4.2 10/02/2017 12:00 AM     Lab Results   Component Value Date/Time    Sodium 143 04/05/2018 03:19 PM    Potassium 2.8 (L) 04/05/2018 03:19 PM    Chloride 103 04/05/2018 03:19 PM    CO2 36 (H) 04/05/2018 03:19 PM    Glucose 73 04/05/2018 03:19 PM    BUN 12 04/05/2018 03:19 PM    Creatinine 0.73 04/05/2018 03:19 PM    GFR est AA >60 04/05/2018 03:19 PM    GFR est non-AA >60 04/05/2018 03:19 PM    Calcium 8.4 (L) 04/05/2018 03:19 PM     Lab Results   Component Value Date/Time    AST (SGOT) 34 03/08/2018 03:15 PM    Alk. phosphatase 93 03/08/2018 03:15 PM    Protein, total 7.3 03/08/2018 03:15 PM    Albumin 3.5 04/05/2018 03:19 PM    Globulin 3.9 03/08/2018 03:15 PM    A-G Ratio 0.9 (L) 03/08/2018 03:15 PM     CT Chest- abdomen - pelvis 10/2/17    Decreased right pleural thickening and size of right lower lobe  pulmonary mass with interval sclerosis of multiple osseous metastases as above  compatible with response to therapy    CT CAP and Bone scan- 3/15/18  1. CT of the chest demonstrates no significant change. Right lower lobe mass and  nodule are stable. Right pleural thickening and nodularity and small right  effusion are stable. 2. Bony metastatic disease is stable. 2. CT of the abdomen and pelvis is unchanged. . Subcentimeter low-density in the  liver which is indeterminate is stable. MRI 3/30/18  IMPRESSION  IMPRESSION:   1. Left T8 transverse process metastasis has extra cortical breakout, extension  into the lamina and pedicle, and extension into the left T8 neural foramen. Mild  neural foraminal stenosis associated. 2. Stable densely sclerotic T5 metastasis. Sclerotic right fifth rib metastasis. 3. Right lower lobe lung carcinoma. 4. Bulky right pleural carcinomatosis. Densely septated small right pleural  effusion. IMPRESSION  IMPRESSION:   1. Extracortical extension of L2 metastasis, obliterating and expanding the  right neural foramen, and extending into the epidural space. 2. Viable enhancing tumor throughout the L2 vertebral body around the sclerotic  portion in the inferior right posterolateral corner. 3. S2 and S3 vertebral body metastases. Possible epidural and left S2 neural  foraminal extension.     Guardant 360 results under media- T790M present      Mu Garay Geetha Wilson is a 75 y.o. male with      Stage IV NSCLC   With R lung mass, mediastinal adenopathy, malignant R pleural effusion, multiple asymptomatic bone metastasis and possibly liver metastasis. EGFR mutated, PD-L1 5%. Found to have T790M mutation    His CT and bone scans prom 3/15/18 showed stable disease on Tarceva  However there was concern for clinical progression as he had increasing L back pain. MRI spine shows extensive bony metastatic disease  His Guardant 5 shows a T18 M mutation which is associated with Tarceva resistance. Taken together we decided to switch to Osimertininb- 80 mg daily    We discussed the potential toxicities in detail. Potential side effects include, but are not limited to, nausea, vomiting, diarrhea, taste changes, myelosuppression, infection, fatigue, allergic reactions, rash, edema, cardiac toxicities. The patient asked several well thought out questions which I answered to the best of my ability and to their apparent satisfaction. The patient has given consent for chemotherapy. · Osimertinib approval. Taper Tarceva down to 150 mg every other day until he received Osimertinib. Once he has received the drug he can start Osimertinib the following day  · Labs today and then every month to include TSH  · EKG every 3 months  · Continue monthly Zometa      1. S/P R thoracentesis on 5/15/17 and again on 5/25- s/p Pleurx by Dr. Beth Hidalgo. No new concerns      2. Kanwal 6 prostate cancer on active surveillance      3. Anemia secondary to 1-CBC today      5. Bone pain:  secondary to osseous metastasis s/p XRT to R ischium and now L2. S2. Pain has resolved  Monitor symptoms at T8 which has a rather aggressive lesion as well    6. HTN- Now hypotensive at home- hold amlodipine      7. Diarrhea- R/O Cdiff. Likely secondary to Tarceva and XRT. IVF today , decrease Tarceva as discussed    8. Short term memory changes: May be secondary to Tarceva.  Discussed that we will do an MRI of the brain if persists      RTC 2 weeks after starting Man Montes MD

## 2018-05-03 NOTE — PROGRESS NOTES
Mali Heath is a 75 y.o. male here today for follow up, lung cancer. Noted elevated temperature over the past week, did not take temperature, managed with ibuprofen. Was having nausea and diarrhea over the past week, stopped BP medications, BP noted to be lower.

## 2018-05-03 NOTE — PROGRESS NOTES
Had the opportunity to meet with patient and wife to discuss the role of specialty pharmacy and tagrisso. [employer] educational material provided and discussed. Reviewed the importance of safety while handling, storage safety, administration and the importance of taking daily at the same time each day. Reviewed possible side effects and the importance of keeping us notified of any SOB, rash or uncontrolled diarrhea. Pt states he will review material provided and advise if any additional questions/concerns. He will, at Gonzalo direction, taper his tarceva to every other day until tagrisso rec'd. He will then discontinue tarceva once starting tagrisso. Chemo consent obtained.

## 2018-05-03 NOTE — PROGRESS NOTES
[facility]patient [facility]fusion [facility] Short Visit Progress Note    1500 Pt admit to [facility] for hydration and  ambulatory in stable condition. Assessment completed. No new concerns voiced. Please review pending lab results in [address] [place] [address]. Patient Vitals for the past 12 hrs:   Temp Pulse Resp BP   05/03/18 1503 (P) 97.8 °F (36.6 °C) (P) 86 16 (P) 123/72       PIV with positive blood return. Lab drawn, flushed, heparinized and de-accessed per protocol. Medications:  Hydration  Zometa    1730 Pt tolerated treatment well. D/c home ambulatory in no distress. Pt aware of next appointment scheduled for 6/7/18.

## 2018-05-04 ENCOUNTER — HOSPITAL ENCOUNTER (OUTPATIENT)
Dept: LAB | Age: 68
Discharge: HOME | End: 2018-05-04
Payer: MEDICARE

## 2018-05-04 ENCOUNTER — TELEPHONE (OUTPATIENT)
Dept: ONCOLOGY | Age: 68
End: 2018-05-04

## 2018-05-04 PROCEDURE — 87324 CLOSTRIDIUM AG IA: CPT

## 2018-05-04 NOTE — TELEPHONE ENCOUNTER
Ana from [facility] called on patient's behalf requesting a return call. She is requesting a more specific icd 10 for patient's diagnosis. Please return call to discuss [phone].   valerie

## 2018-05-04 NOTE — TELEPHONE ENCOUNTER
Call to [facility]. Provided all applicable ICD 10 codes as indicated in CC. HIPAA verified.

## 2018-05-06 LAB — C DIFF TOX A+B STL QL IA: NEGATIVE

## 2018-05-09 ENCOUNTER — TELEPHONE (OUTPATIENT)
Dept: ONCOLOGY | Age: 68
End: 2018-05-09

## 2018-05-09 NOTE — TELEPHONE ENCOUNTER
Tagrisso processed through [employer] with $3,000. Copay    Faxed to [employer] patient financial assistance form with appropriate documentation and signatures. Confirmation rec'd. Front to scan. Awaiting approval for tagrisso.

## 2018-05-11 NOTE — TELEPHONE ENCOUNTER
Call to patient, HIPAA verified. Patient advised of Cdiff result note, diarrhea has resolved. Has some residual fatigue, occasional cramping. No further loose stools. Thanked for call. Will contact office with further needs.

## 2018-05-18 ENCOUNTER — OFFICE VISIT (OUTPATIENT)
Dept: INTERNAL MEDICINE CLINIC | Age: 68
End: 2018-05-18

## 2018-05-18 VITALS
WEIGHT: 141.8 LBS | RESPIRATION RATE: 24 BRPM | DIASTOLIC BLOOD PRESSURE: 75 MMHG | TEMPERATURE: 96.9 F | SYSTOLIC BLOOD PRESSURE: 125 MMHG | OXYGEN SATURATION: 98 % | HEART RATE: 70 BPM | BODY MASS INDEX: 18.79 KG/M2 | HEIGHT: 73 IN

## 2018-05-18 DIAGNOSIS — I10 ESSENTIAL HYPERTENSION: ICD-10-CM

## 2018-05-18 DIAGNOSIS — E03.9 ACQUIRED HYPOTHYROIDISM: ICD-10-CM

## 2018-05-18 DIAGNOSIS — C34.91 NON-SMALL CELL CANCER OF RIGHT LUNG (HCC): Primary | ICD-10-CM

## 2018-05-18 DIAGNOSIS — C79.51 BONE METASTASES (HCC): ICD-10-CM

## 2018-05-18 NOTE — PROGRESS NOTES
Chief Complaint   Patient presents with    Hypertension     6 month     1. Have you been to the ER, urgent care clinic since your last visit? Hospitalized since your last visit? No    2. Have you seen or consulted any other health care providers outside of the [address] [place] [address] since your last visit? Include any pap smears or colon screening.  No

## 2018-05-18 NOTE — MR AVS SNAPSHOT
[address] [facility] 102 1400 [address] [place] [address] 
[phone] Patient: Nickie Khalil MRN: [id number] :1950 Visit Information Date & Time Provider Department Dept. Phone Encounter #  
 2018  9:30 AM Georgette Olivas, [address] [facility] [phone] [id number] Follow-up Instructions Return in about 6 months (around 2018). Upcoming Health Maintenance Date Due Hepatitis C Screening 1950 DTaP/Tdap/Td series (1 - Tdap) 1970 ZOSTER VACCINE AGE 60> 10/2/2009 GLAUCOMA SCREENING Q2Y 2014 Pneumococcal 65+ High/Highest Risk (1 of 2 - PCV13) 2014 FOBT Q 1 YEAR AGE 50-75 10/31/2017 MEDICARE YEARLY EXAM 3/15/2018 Influenza Age 5 to Adult 2018 Allergies as of 2018  Review Complete On: 2018 By: Georgette Olivas,  No Known Allergies Current Immunizations  Reviewed on 2018 Name Date Influenza High Dose Vaccine PF 2017, 10/31/2016 Not reviewed this visit You Were Diagnosed With   
  
 Codes Comments Non-small cell cancer of right lung (HCC)    -  Primary ICD-10-CM: C34.91 
ICD-9-CM: 162.9 Bone metastases ([address]     ICD-10-CM: C79.51 
ICD-9-CM: 198.5 Essential hypertension     ICD-10-CM: I10 
ICD-9-CM: 401.9 Acquired hypothyroidism     ICD-10-CM: E03.9 ICD-9-CM: 769. 9 Vitals BP Pulse Temp Resp Height(growth percentile) Weight(growth percentile) 125/75 (BP 1 Location: Left arm, BP Patient Position: Sitting) 70 96.9 °F (36.1 °C) (Oral) 24 6' 1\" (1.854 m) 141 lb 12.8 oz (64.3 kg) SpO2 BMI Smoking Status 98% 18.71 kg/m2 Never Smoker Vitals History BMI and BSA Data Body Mass Index Body Surface Area 18.71 kg/m 2 1.82 m 2 Preferred Pharmacy Pharmacy Name Phone [address] [facility] [address] [facility] AT [facility]. [phone] Your Updated Medication List  
  
   
This list is accurate as of 5/18/18 10:03 AM.  Always use your most recent med list.  
  
  
  
  
 cholecalciferol 1,000 unit tablet Commonly known as:  VITAMIN D3 Take 1,000 Units by mouth daily. levothyroxine 75 mcg tablet Commonly known as:  SYNTHROID  
TAKE 1/2 TABLET BY MOUTH DAILY BEFORE BREAKFAST  
  
 ondansetron hcl 8 mg tablet Commonly known as:  ZOFRAN  
  
 osimertinib 80 mg tablet Commonly known as:  TAGRISSO Take 1 Tab by mouth daily. Indications: EGFR T790M MUTATION-POSITIVE NON-SMALL CELL LUNG CANCER  
  
 PHOS--160-250 mg packet Generic drug:  potassium, sodium phosphates TK 2 PACKETS PO AS ONE DOSE  
  
 ZOMETA 4 mg/100 mL Pgbk infusion Generic drug:  zoledronic acid 4 mg by IntraVENous route every twenty-eight (28) days. Follow-up Instructions Return in about 6 months (around 11/18/2018). To-Do List   
 06/07/2018 3:00 PM  
  Appointment with [address] [place] [address] 6 at [address] [place] [address] ([phone])  
  
 07/05/2018 3:00 PM  
  Appointment with [address] [place] [address] 6 at [address] [place] [address] Road ([phone]) [facility]! Dear Munir Vasquez: 
Thank you for requesting a [employer] account. Our records indicate that you already have an active [employer] account. You can access your account anytime at [email or web address]. [email or web address] Did you know that you can access your hospital and ER discharge instructions at any time in [employer]? You can also review all of your test results from your hospital stay or ER visit. Additional Information If you have questions, please visit the Frequently Asked Questions section of the Pro[employer] website at [email or web address]. [email or web address] Remember, Pro[employer] is NOT to be used for urgent needs. For medical emergencies, dial 911. Now available from your iPhone and Android! Please provide this summary of care documentation to your next provider. Your primary care clinician is listed as Librado Claudio. If you have any questions after today's visit, please call [phone].

## 2018-05-18 NOTE — PROGRESS NOTES
HISTORY OF PRESENT ILLNESS  Najma Client is a 75 y.o. male. He is back after a few months for follow-up. BP has been on the low side. Oncologist stop medications. Followed and treated by oncology for metastatic lung cancer. Has metastasis to his spine. Has received XRT. Appetite is poor. He has lost a lot of weight. Energy is low. Denies tobacco or alcohol use. Denies any other new complaints. Hypertension    Associated symptoms include malaise/fatigue. Pertinent negatives include no chest pain, no headaches, no dizziness and no shortness of breath. Review of Systems   Constitutional: Positive for malaise/fatigue and weight loss. Negative for fever. Eyes: Negative. Respiratory: Negative for cough and shortness of breath. Cardiovascular: Negative for chest pain and leg swelling. Gastrointestinal: Negative for abdominal pain. Genitourinary: Negative for dysuria. Musculoskeletal: Positive for back pain. Negative for falls and joint pain. Skin: Negative. Neurological: Negative for dizziness, sensory change, focal weakness and headaches. Psychiatric/Behavioral: Negative for depression. The patient is not nervous/anxious and does not have insomnia. All other systems reviewed and are negative. Physical Exam   Constitutional: He is oriented to person, place, and time. He appears well-developed and well-nourished. No distress. Cachectic thin man  Underweight   HENT:   Head: Normocephalic and atraumatic. Mouth/Throat: Oropharynx is clear and moist.   Eyes: Conjunctivae are normal. No scleral icterus. Neck: Normal range of motion. Neck supple. No JVD present. No thyromegaly present. Cardiovascular: Normal rate, regular rhythm, normal heart sounds and intact distal pulses. No murmur heard. Pulmonary/Chest: Effort normal. No respiratory distress. He has no wheezes. He has no rales. Dec sounds    Abdominal: Soft. Bowel sounds are normal. He exhibits no distension. There is no tenderness. Musculoskeletal: He exhibits tenderness (Thoracics, lumbars, and sacral spine). He exhibits no edema. Thoracic scoliosis, chronic   Neurological: He is alert and oriented to person, place, and time. Coordination normal.   Skin: Skin is warm and dry. No rash noted. Psychiatric: He has a normal mood and affect. His behavior is normal.   Seems chronically depressed   Nursing note and vitals reviewed. ASSESSMENT and PLAN  Diagnoses and all orders for this visit:    1. Non-small cell cancer of right lung ([facility]    2. Bone metastases ([facility]    3. Essential hypertension    4. Acquired hypothyroidism      Follow-up Disposition:  Return in about 6 months (around 11/18/2018).    lab results and schedule of future lab studies reviewed with patient  reviewed diet, exercise and weight control  reviewed medications and side effects in detail  Stop BP medications since his BP is on the low side and stable off of them  Advised patient to increase calorie intake including supplements like Ensure or boost  F/u with other MD's as scheduled

## 2018-05-29 ENCOUNTER — TELEPHONE (OUTPATIENT)
Dept: ONCOLOGY | Age: 68
End: 2018-05-29

## 2018-05-29 NOTE — TELEPHONE ENCOUNTER
HIPAA verified. Stated recd med Friday and started. No questions and lab to be done 6/7/18. To call with any questions.

## 2018-06-07 ENCOUNTER — OFFICE VISIT (OUTPATIENT)
Dept: ONCOLOGY | Age: 68
End: 2018-06-07

## 2018-06-07 ENCOUNTER — HOSPITAL ENCOUNTER (OUTPATIENT)
Dept: INFUSION THERAPY | Age: 68
Discharge: HOME | End: 2018-06-07
Payer: MEDICARE

## 2018-06-07 VITALS
SYSTOLIC BLOOD PRESSURE: 150 MMHG | TEMPERATURE: 97.7 F | HEART RATE: 59 BPM | RESPIRATION RATE: 20 BRPM | OXYGEN SATURATION: 97 % | HEIGHT: 73 IN | WEIGHT: 146.6 LBS | BODY MASS INDEX: 19.43 KG/M2 | DIASTOLIC BLOOD PRESSURE: 79 MMHG

## 2018-06-07 VITALS
TEMPERATURE: 97.7 F | DIASTOLIC BLOOD PRESSURE: 77 MMHG | RESPIRATION RATE: 16 BRPM | HEART RATE: 50 BPM | SYSTOLIC BLOOD PRESSURE: 142 MMHG

## 2018-06-07 DIAGNOSIS — C34.91 ADENOCARCINOMA OF LUNG, STAGE 4, RIGHT (HCC): Primary | ICD-10-CM

## 2018-06-07 DIAGNOSIS — C61 PROSTATE CANCER (HCC): ICD-10-CM

## 2018-06-07 DIAGNOSIS — C79.51 BONE METASTASES (HCC): ICD-10-CM

## 2018-06-07 LAB
ALBUMIN SERPL-MCNC: 2.9 G/DL (ref 3.5–5)
ALBUMIN/GLOB SERPL: 0.7 {RATIO} (ref 1.1–2.2)
ALP SERPL-CCNC: 349 U/L (ref 45–117)
ALT SERPL-CCNC: 53 U/L (ref 12–78)
ANION GAP SERPL CALC-SCNC: 7 MMOL/L (ref 5–15)
AST SERPL-CCNC: 61 U/L (ref 15–37)
BASOPHILS # BLD: 0 K/UL (ref 0–0.1)
BASOPHILS NFR BLD: 0 % (ref 0–1)
BILIRUB SERPL-MCNC: 0.8 MG/DL (ref 0.2–1)
BUN SERPL-MCNC: 23 MG/DL (ref 6–20)
BUN/CREAT SERPL: 32 (ref 12–20)
CALCIUM SERPL-MCNC: 9.1 MG/DL (ref 8.5–10.1)
CHLORIDE SERPL-SCNC: 107 MMOL/L (ref 97–108)
CO2 SERPL-SCNC: 26 MMOL/L (ref 21–32)
CREAT SERPL-MCNC: 0.72 MG/DL (ref 0.7–1.3)
DIFFERENTIAL METHOD BLD: ABNORMAL
EOSINOPHIL # BLD: 0.1 K/UL (ref 0–0.4)
EOSINOPHIL NFR BLD: 2 % (ref 0–7)
ERYTHROCYTE [DISTWIDTH] IN BLOOD BY AUTOMATED COUNT: 16.1 % (ref 11.5–14.5)
GLOBULIN SER CALC-MCNC: 4 G/DL (ref 2–4)
GLUCOSE SERPL-MCNC: 76 MG/DL (ref 65–100)
HCT VFR BLD AUTO: 33.6 % (ref 36.6–50.3)
HGB BLD-MCNC: 10.6 G/DL (ref 12.1–17)
IMM GRANULOCYTES # BLD: 0 K/UL
IMM GRANULOCYTES NFR BLD AUTO: 0 %
LYMPHOCYTES # BLD: 0.3 K/UL (ref 0.8–3.5)
LYMPHOCYTES NFR BLD: 10 % (ref 12–49)
MCH RBC QN AUTO: 28.2 PG (ref 26–34)
MCHC RBC AUTO-ENTMCNC: 31.5 G/DL (ref 30–36.5)
MCV RBC AUTO: 89.4 FL (ref 80–99)
MONOCYTES # BLD: 0.3 K/UL (ref 0–1)
MONOCYTES NFR BLD: 8 % (ref 5–13)
NEUTS SEG # BLD: 2.7 K/UL (ref 1.8–8)
NEUTS SEG NFR BLD: 80 % (ref 32–75)
NRBC # BLD: 0 K/UL (ref 0–0.01)
NRBC BLD-RTO: 0 PER 100 WBC
PLATELET # BLD AUTO: 93 K/UL (ref 150–400)
PMV BLD AUTO: 12.2 FL (ref 8.9–12.9)
POTASSIUM SERPL-SCNC: 4 MMOL/L (ref 3.5–5.1)
PROT SERPL-MCNC: 6.9 G/DL (ref 6.4–8.2)
RBC # BLD AUTO: 3.76 M/UL (ref 4.1–5.7)
RBC MORPH BLD: ABNORMAL
SODIUM SERPL-SCNC: 140 MMOL/L (ref 136–145)
TSH SERPL DL<=0.05 MIU/L-ACNC: 1.35 UIU/ML (ref 0.36–3.74)
WBC # BLD AUTO: 3.4 K/UL (ref 4.1–11.1)

## 2018-06-07 PROCEDURE — 96374 THER/PROPH/DIAG INJ IV PUSH: CPT

## 2018-06-07 PROCEDURE — 85025 COMPLETE CBC W/AUTO DIFF WBC: CPT | Performed by: NURSE PRACTITIONER

## 2018-06-07 PROCEDURE — 74011250636 HC RX REV CODE- 250/636: Performed by: NURSE PRACTITIONER

## 2018-06-07 PROCEDURE — 84443 ASSAY THYROID STIM HORMONE: CPT | Performed by: NURSE PRACTITIONER

## 2018-06-07 PROCEDURE — 80053 COMPREHEN METABOLIC PANEL: CPT | Performed by: NURSE PRACTITIONER

## 2018-06-07 PROCEDURE — 36415 COLL VENOUS BLD VENIPUNCTURE: CPT | Performed by: NURSE PRACTITIONER

## 2018-06-07 RX ORDER — SODIUM CHLORIDE 0.9 % (FLUSH) 0.9 %
10-40 SYRINGE (ML) INJECTION AS NEEDED
Status: ACTIVE | OUTPATIENT
Start: 2018-06-07 | End: 2018-06-08

## 2018-06-07 RX ORDER — AMLODIPINE BESYLATE 10 MG/1
TABLET ORAL
Refills: 0 | COMMUNITY
Start: 2018-03-22 | End: 2018-07-18

## 2018-06-07 RX ORDER — LOSARTAN POTASSIUM 100 MG/1
TABLET ORAL
Refills: 5 | COMMUNITY
Start: 2018-03-22 | End: 2018-07-18

## 2018-06-07 RX ADMIN — Medication 10 ML: at 15:22

## 2018-06-07 RX ADMIN — Medication 20 ML: at 16:50

## 2018-06-07 RX ADMIN — ZOLEDRONIC ACID 4 MG: 0.04 INJECTION, SOLUTION INTRAVENOUS at 16:35

## 2018-06-07 NOTE — PROGRESS NOTES
Hematology/Oncology progress note    REASON FOR VISIT: Stage IV EGFR mutated NSCLC    HISTORY OF PRESENT ILLNESS: Mr. Maribell Seaman is a 75 y.o. male with prostate cancer who was diagnosed with stage IV NSCLC- adenocarcinoma ( EGFR mutated , PD-L1 low) in May 2017. Tarceva- Initiated 6/26/17. Disease progression in March 2018- now on Osimertinib. Oncologic history   Mr. Maribell Seaman noticed a new cough and fevers back in March of 2017. He went to Urgent Care and received antibiotics and cough medication. He states this worked for a while, but he began to notice his cough return. This was intermittent. He had some tightness across his anterior chest which he related to the infection. Chest x-ray was abnormal and he was told to proceed to the Emergency Department. Douglas Holbrook had been under surveillance for right lower lobe mass that was initially noted in 2015. Bronch at that time was negative for malignancy. He was evaluated by Dr. Caterina Mittal with Thoracic Surgery in 2015 for possible surgical resection. CT chest in November of 2016 with mass size unchanged but some right basilar pleural thickening. CT chest obtained 5/14/17 however showed a new large right pleural effusion with right middle lobe and right lower lobe collapse. Irregular spiculated right lower lobe mass 3.8cm and stable precarinal enlarged lymph nodes were noted. New right posterior second rib lytic lesion and new right hepatic hypodensity consistent with mets. He underwent a therapeutic thoracentesis on 5/15/17 with removal of 1500 cc of serosanguinous fluid. Pathology showed adenocarcinoma. PET CT showed pleural, bone, liver and flor metastasis. MRI brain 5/20/17: No metastasis. Needed repeat R sided thoracentesis on 5/25/17, had some post procedure hydropneumothorax. He was seen by Dr. Yissel Hart at [facility] for Pleurx catheter.      CT guided biopsy of R lung mass done 5/25 which showed adenocarcinoma.  EGFR mutation detected Exon 18 and 21      Treatment  6/26/17: Tarceva. Monthly Xgeva. Palliative XRT to R hip   CT CAP 10/2/17: Response  Ct 1/23/18: CT with some growth of LLL mass but stable by RECIST. CT 3/15/18 and Bone scan stable though he had worsening left back pain. MRI results showed extensive disease at L2, S2 and additional lesions as previously known. Received palliative XRT that he completed 4/18/18 5/26/18: Started Osimertinib as he had a T790M mutation       Past Medical History:   Diagnosis Date    Cancer ([facility] [address] 2017    lung ca    Hypertension        Past Surgical History:   Procedure Laterality Date    HX ORTHOPAEDIC      reconstruction of left little finger       No Known Allergies    Current Outpatient Prescriptions   Medication Sig Dispense Refill    ondansetron hcl (ZOFRAN) 8 mg tablet       osimertinib (TAGRISSO) 80 mg tablet Take 1 Tab by mouth daily. Indications: EGFR T790M MUTATION-POSITIVE NON-SMALL CELL LUNG CANCER 30 Tab 11    levothyroxine (SYNTHROID) 75 mcg tablet TAKE 1/2 TABLET BY MOUTH DAILY BEFORE BREAKFAST 30 Tab 5    zoledronic acid (ZOMETA) 4 mg/100 mL pgbk infusion 4 mg by IntraVENous route every twenty-eight (28) days.  cholecalciferol (VITAMIN D3) 1,000 unit tablet Take 1,000 Units by mouth daily.       amLODIPine (NORVASC) 10 mg tablet TK 1 T PO   QD  0    losartan (COZAAR) 100 mg tablet TK 1 T PO D  5     Facility-Administered Medications Ordered in Other Visits   Medication Dose Route Frequency Provider Last Rate Last Dose    sodium chloride (NS) flush 10-40 mL  10-40 mL IntraVENous PRN Salomón Carranza NP        zoledronic acid (ZOMETA) 4 mg/100mL infusion  4 mg IntraVENous ONCE Salomón Carranza NP           Social History     Social History    Marital status:      Spouse name: N/A    Number of children: N/A    Years of education: N/A     Social History Main Topics    Smoking status: Never Smoker    Smokeless tobacco: Never Used    Alcohol use 6.0 oz/week     10 Glasses of wine per week      Comment: ocassionally    Drug use: No    Sexual activity: Yes     Partners: Female      Comment:  has 2 children     Other Topics Concern    Not on file     Social History Narrative       Family History   Problem Relation Age of Onset    Cancer Mother      leukemia    Stroke Father     Cancer Brother      bladder       ROS  He started Osimertinib 5/26/18. Tolerating well. No diarrhea. He has been getting his strength back. He has been eating better. Still gets some nausea- low level and constant-has had it since XRT but overall he thinks its better. Has NO pain- rare pain in between his shoulder blades. No fevers. Cough came back- but got better once he started the Osimertinib. Has gained some weight. ECOG PS is 0      Emotional well being addressed and patient is coping well      Physical Examination:   Visit Vitals    /79    Pulse (!) 59    Temp 97.7 °F (36.5 °C)    Resp 20    Ht 6' 1\" (1.854 m)    Wt 146 lb 9.6 oz (66.5 kg)    SpO2 97%    BMI 19.34 kg/m2     General appearance - alert, Frail, and in no distress  Mental status - oriented to person, place, and time  Mouth - mucous membranes moist, pharynx normal without lesions. Neck - supple, no significant adenopathy  Lymphatics - no palpable lymphadenopathy, no hepatosplenomegaly  Chest -clear to auscultation though somewhat decreased at the L base  Heart - normal rate, regular rhythm, normal S1, S2, no murmurs, rubs, clicks or gallops  Abdomen - soft, nontender, nondistended, no masses or organomegaly, bowel sounds present  Neurological - normal speech, slight nystagmus  Skin: No rashes    LABS  Lab Results   Component Value Date/Time    WBC 6.1 10/02/2017 12:00 AM    HGB 11.9 (L) 10/02/2017 12:00 AM    HCT 37.5 10/02/2017 12:00 AM    PLATELET 867 11/07/2719 12:00 AM    MCV 81 10/02/2017 12:00 AM    ABS.  NEUTROPHILS 4.2 10/02/2017 12:00 AM     Lab Results   Component Value Date/Time    Sodium 136 05/03/2018 03:06 PM    Potassium 4.0 05/03/2018 03:06 PM    Chloride 100 05/03/2018 03:06 PM    CO2 33 (H) 05/03/2018 03:06 PM    Glucose 100 05/03/2018 03:06 PM    BUN 18 05/03/2018 03:06 PM    Creatinine 0.74 05/03/2018 03:06 PM    GFR est AA >60 05/03/2018 03:06 PM    GFR est non-AA >60 05/03/2018 03:06 PM    Calcium 8.0 (L) 05/03/2018 03:06 PM     Lab Results   Component Value Date/Time    AST (SGOT) 34 03/08/2018 03:15 PM    Alk. phosphatase 93 03/08/2018 03:15 PM    Protein, total 7.3 03/08/2018 03:15 PM    Albumin 2.5 (L) 05/03/2018 03:06 PM    Globulin 3.9 03/08/2018 03:15 PM    A-G Ratio 0.9 (L) 03/08/2018 03:15 PM     CT Chest- abdomen - pelvis 10/2/17    Decreased right pleural thickening and size of right lower lobe  pulmonary mass with interval sclerosis of multiple osseous metastases as above  compatible with response to therapy    CT CAP and Bone scan- 3/15/18  1. CT of the chest demonstrates no significant change. Right lower lobe mass and  nodule are stable. Right pleural thickening and nodularity and small right  effusion are stable. 2. Bony metastatic disease is stable. 2. CT of the abdomen and pelvis is unchanged. . Subcentimeter low-density in the  liver which is indeterminate is stable. MRI 3/30/18  IMPRESSION  IMPRESSION:   1. Left T8 transverse process metastasis has extra cortical breakout, extension  into the lamina and pedicle, and extension into the left T8 neural foramen. Mild  neural foraminal stenosis associated. 2. Stable densely sclerotic T5 metastasis. Sclerotic right fifth rib metastasis. 3. Right lower lobe lung carcinoma. 4. Bulky right pleural carcinomatosis. Densely septated small right pleural  effusion. IMPRESSION  IMPRESSION:   1. Extracortical extension of L2 metastasis, obliterating and expanding the  right neural foramen, and extending into the epidural space.   2. Viable enhancing tumor throughout the L2 vertebral body around the sclerotic  portion in the inferior right posterolateral corner. 3. S2 and S3 vertebral body metastases. Possible epidural and left S2 neural  foraminal extension. Guardant 360 results under media- T790M present      ASSESSMENT AND PLAN  Mr. Maribell Seaman is a 75 y.o. male with      1. Stage IV NSCLC   With R lung mass, mediastinal adenopathy, malignant R pleural effusion, multiple asymptomatic bone metastasis and possibly liver metastasis. EGFR mutated, PD-L1 5%. Found to have T790M mutation    His CT and bone scans prom 3/15/18 showed stable disease on Tarceva  However there was concern for clinical progression as he had increasing L back pain. MRI spine shows extensive bony metastatic disease  His Guardant 5 shows a T18 M mutation which is associated with Tarceva resistance. Taken together we decided to switch to Osimertininb- 80 mg daily    He started this 5/26/18 and has no side effects  Labs are pending      · Osimertinib - continue current dose  · Labs today pending, next in 1 month with Zometa  · EKG every 3 months  · Continue monthly Zometa  · Scans in 2 months        2. [place] 6 prostate cancer on active surveillance      3. Bone pain:  secondary to osseous metastasis s/p XRT to R ischium and now L2. S2. Pain has resolved  Monitor symptoms at T8 which has a rather aggressive lesion as well    4. HTN- Now hypotensive  hold amlodipine      5.    Diarrhea- resolved      RTC 1 month    Ezekiel Echevarria MD

## 2018-06-07 NOTE — PROGRESS NOTES
Outpatient Infusion Center Progress Note    2174  Pt admit to [facility] for zometa/labs ambulatory in stable condition. Assessment completed. No new concerns voiced. 24 gauge PIV placed in left forearm with positive blood return, labs drawn as ordered and sent for processing. Recent Results (from the past 12 hour(s))   CBC WITH AUTOMATED DIFF    Collection Time: 06/07/18  3:20 PM   Result Value Ref Range    WBC 3.4 (L) 4.1 - 11.1 K/uL    RBC 3.76 (L) 4.10 - 5.70 M/uL    HGB 10.6 (L) 12.1 - 17.0 g/dL    HCT 33.6 (L) 36.6 - 50.3 %    MCV 89.4 80.0 - 99.0 FL    MCH 28.2 26.0 - 34.0 PG    MCHC 31.5 30.0 - 36.5 g/dL    RDW 16.1 (H) 11.5 - 14.5 %    PLATELET 93 (L) 018 - 400 K/uL    MPV 12.2 8.9 - 12.9 FL    NRBC 0.0 0  WBC    ABSOLUTE NRBC 0.00 0.00 - 0.01 K/uL    NEUTROPHILS 80 (H) 32 - 75 %    LYMPHOCYTES 10 (L) 12 - 49 %    MONOCYTES 8 5 - 13 %    EOSINOPHILS 2 0 - 7 %    BASOPHILS 0 0 - 1 %    IMMATURE GRANULOCYTES 0 %    ABS. NEUTROPHILS 2.7 1.8 - 8.0 K/UL    ABS. LYMPHOCYTES 0.3 (L) 0.8 - 3.5 K/UL    ABS. MONOCYTES 0.3 0.0 - 1.0 K/UL    ABS. EOSINOPHILS 0.1 0.0 - 0.4 K/UL    ABS. BASOPHILS 0.0 0.0 - 0.1 K/UL    ABS. IMM. GRANS. 0.0 K/UL    DF MANUAL      RBC COMMENTS ANISOCYTOSIS  1+       METABOLIC PANEL, COMPREHENSIVE    Collection Time: 06/07/18  3:20 PM   Result Value Ref Range    Sodium 140 136 - 145 mmol/L    Potassium 4.0 3.5 - 5.1 mmol/L    Chloride 107 97 - 108 mmol/L    CO2 26 21 - 32 mmol/L    Anion gap 7 5 - 15 mmol/L    Glucose 76 65 - 100 mg/dL    BUN 23 (H) 6 - 20 MG/DL    Creatinine 0.72 0.70 - 1.30 MG/DL    BUN/Creatinine ratio 32 (H) 12 - 20      GFR est AA >60 >60 ml/min/1.73m2    GFR est non-AA >60 >60 ml/min/1.73m2    Calcium 9.1 8.5 - 10.1 MG/DL    Bilirubin, total 0.8 0.2 - 1.0 MG/DL    ALT (SGPT) 53 12 - 78 U/L    AST (SGOT) 61 (H) 15 - 37 U/L    Alk.  phosphatase 349 (H) 45 - 117 U/L    Protein, total 6.9 6.4 - 8.2 g/dL    Albumin 2.9 (L) 3.5 - 5.0 g/dL    Globulin 4.0 2.0 - 4.0 g/dL A-G Ratio 0.7 (L) 1.1 - 2.2     TSH 3RD GENERATION    Collection Time: 06/07/18  3:20 PM   Result Value Ref Range    TSH 1.35 0.36 - 3.74 uIU/mL       Labs within treatment parameters. Notified MAURICIO Mendez NP of pt's platelets of 93, no additional orders received. Medications:  Zometa IV    Patient Vitals for the past 12 hrs:   Temp Pulse Resp BP   06/07/18 1653 - (!) 50 - 142/77   06/07/18 1505 97.7 °F (36.5 °C) (!) 50 16 146/76     1700  Pt tolerated treatment well. Positive blood return pre and post infusion. PIV flushed and removed per [facility] policy. D/c home ambulatory in no distress.  Pt aware of next appointment scheduled for 7/5/18 at 3 pm.

## 2018-06-08 DIAGNOSIS — C34.91 ADENOCARCINOMA OF LUNG, STAGE 4, RIGHT (HCC): Primary | ICD-10-CM

## 2018-06-08 DIAGNOSIS — C34.91 ADENOCARCINOMA OF LUNG, STAGE 4, RIGHT (HCC): ICD-10-CM

## 2018-06-08 NOTE — PROGRESS NOTES
Labs reviewed    Blood counts are low.      Please have him recheck cbc with diff in 1 week    Continue osimertinib for now

## 2018-06-08 NOTE — PROGRESS NOTES
Call to patient, HIPAA verified. Advised of result note by provider. Verbalized understanding and thanked for call. Will have labs drawn next week, will send lab slip to lab karli at [facility].

## 2018-06-14 ENCOUNTER — HOSPITAL ENCOUNTER (OUTPATIENT)
Dept: LAB | Age: 68
Discharge: HOME | End: 2018-06-14
Payer: MEDICARE

## 2018-06-14 PROCEDURE — 36415 COLL VENOUS BLD VENIPUNCTURE: CPT

## 2018-06-14 PROCEDURE — 85025 COMPLETE CBC W/AUTO DIFF WBC: CPT

## 2018-06-15 DIAGNOSIS — D69.6 THROMBOCYTOPENIA (HCC): Primary | ICD-10-CM

## 2018-06-15 DIAGNOSIS — C34.91 ADENOCARCINOMA OF LUNG, STAGE 4, RIGHT (HCC): ICD-10-CM

## 2018-06-15 LAB
BASOPHILS # BLD AUTO: 0 X10E3/UL (ref 0–0.2)
BASOPHILS NFR BLD AUTO: 1 %
EOSINOPHIL # BLD AUTO: 0.1 X10E3/UL (ref 0–0.4)
EOSINOPHIL NFR BLD AUTO: 3 %
ERYTHROCYTE [DISTWIDTH] IN BLOOD BY AUTOMATED COUNT: 17 % (ref 12.3–15.4)
HCT VFR BLD AUTO: 34.8 % (ref 37.5–51)
HGB BLD-MCNC: 11.2 G/DL (ref 13–17.7)
IMM GRANULOCYTES # BLD: 0 X10E3/UL (ref 0–0.1)
IMM GRANULOCYTES NFR BLD: 0 %
LYMPHOCYTES # BLD AUTO: 0.7 X10E3/UL (ref 0.7–3.1)
LYMPHOCYTES NFR BLD AUTO: 20 %
MCH RBC QN AUTO: 28 PG (ref 26.6–33)
MCHC RBC AUTO-ENTMCNC: 32.2 G/DL (ref 31.5–35.7)
MCV RBC AUTO: 87 FL (ref 79–97)
MONOCYTES # BLD AUTO: 0.4 X10E3/UL (ref 0.1–0.9)
MONOCYTES NFR BLD AUTO: 11 %
MORPHOLOGY BLD-IMP: ABNORMAL
NEUTROPHILS # BLD AUTO: 2.3 X10E3/UL (ref 1.4–7)
NEUTROPHILS NFR BLD AUTO: 65 %
PLATELET # BLD AUTO: 68 X10E3/UL (ref 150–379)
RBC # BLD AUTO: 4 X10E6/UL (ref 4.14–5.8)
WBC # BLD AUTO: 3.5 X10E3/UL (ref 3.4–10.8)

## 2018-06-15 NOTE — PROGRESS NOTES
Grade 2 toxicity with thrombocytopenia. Repeat CBC ordered to be obtained at [facility] next Tuesday or Wednesday. Will have Nursing fax to [facility] at [facility]. Continue current 80mg dose of Tagrisso.

## 2018-06-20 ENCOUNTER — TELEPHONE (OUTPATIENT)
Dept: ONCOLOGY | Age: 68
End: 2018-06-20

## 2018-06-20 ENCOUNTER — HOSPITAL ENCOUNTER (OUTPATIENT)
Dept: LAB | Age: 68
Discharge: HOME | End: 2018-06-20
Payer: MEDICARE

## 2018-06-20 PROCEDURE — 85025 COMPLETE CBC W/AUTO DIFF WBC: CPT

## 2018-06-20 PROCEDURE — 36415 COLL VENOUS BLD VENIPUNCTURE: CPT

## 2018-06-20 NOTE — TELEPHONE ENCOUNTER
Call to patient, message left to return call. Need to check to see when he was going to have labs drawn, will need results prior to him leaving the country.

## 2018-06-20 NOTE — TELEPHONE ENCOUNTER
Call received from patient, patient had labs drawn first thing this morning. Advised would watch for results and advise once received. Thanked for return call.

## 2018-06-21 DIAGNOSIS — D69.6 THROMBOCYTOPENIA (HCC): Primary | ICD-10-CM

## 2018-06-21 DIAGNOSIS — C34.91 NON-SMALL CELL CANCER OF RIGHT LUNG (HCC): ICD-10-CM

## 2018-06-21 LAB
BASOPHILS # BLD AUTO: 0 X10E3/UL (ref 0–0.2)
BASOPHILS NFR BLD AUTO: 0 %
EOSINOPHIL # BLD AUTO: 0.2 X10E3/UL (ref 0–0.4)
EOSINOPHIL NFR BLD AUTO: 5 %
ERYTHROCYTE [DISTWIDTH] IN BLOOD BY AUTOMATED COUNT: 17.9 % (ref 12.3–15.4)
HCT VFR BLD AUTO: 36.9 % (ref 37.5–51)
HGB BLD-MCNC: 11.9 G/DL (ref 13–17.7)
IMM GRANULOCYTES # BLD: 0 X10E3/UL (ref 0–0.1)
IMM GRANULOCYTES NFR BLD: 0 %
LYMPHOCYTES # BLD AUTO: 0.8 X10E3/UL (ref 0.7–3.1)
LYMPHOCYTES NFR BLD AUTO: 21 %
MCH RBC QN AUTO: 28.3 PG (ref 26.6–33)
MCHC RBC AUTO-ENTMCNC: 32.2 G/DL (ref 31.5–35.7)
MCV RBC AUTO: 88 FL (ref 79–97)
MONOCYTES # BLD AUTO: 0.3 X10E3/UL (ref 0.1–0.9)
MONOCYTES NFR BLD AUTO: 9 %
MORPHOLOGY BLD-IMP: ABNORMAL
NEUTROPHILS # BLD AUTO: 2.4 X10E3/UL (ref 1.4–7)
NEUTROPHILS NFR BLD AUTO: 65 %
PLATELET # BLD AUTO: 41 X10E3/UL (ref 150–379)
RBC # BLD AUTO: 4.21 X10E6/UL (ref 4.14–5.8)
WBC # BLD AUTO: 3.7 X10E3/UL (ref 3.4–10.8)

## 2018-06-21 NOTE — PROGRESS NOTES
CBC reviewed. Call to patient. HIPAA verified. He is now with grade 3 heme toxicity. Will hold Eliverto Lava for 2 weeks. He will return for two days (7/3 and 7/4) and have asked he obtain repeat CBC on 7/3/18. Prefers Nettie Peers at [facility] and will have nursing fax order there.

## 2018-07-03 ENCOUNTER — HOSPITAL ENCOUNTER (OUTPATIENT)
Dept: LAB | Age: 68
Discharge: HOME | End: 2018-07-03
Payer: MEDICARE

## 2018-07-03 PROCEDURE — 85025 COMPLETE CBC W/AUTO DIFF WBC: CPT

## 2018-07-03 PROCEDURE — 36415 COLL VENOUS BLD VENIPUNCTURE: CPT

## 2018-07-04 LAB
BASOPHILS # BLD AUTO: 0 X10E3/UL (ref 0–0.2)
BASOPHILS NFR BLD AUTO: 1 %
EOSINOPHIL # BLD AUTO: 0.1 X10E3/UL (ref 0–0.4)
EOSINOPHIL NFR BLD AUTO: 2 %
ERYTHROCYTE [DISTWIDTH] IN BLOOD BY AUTOMATED COUNT: 19.1 % (ref 12.3–15.4)
HCT VFR BLD AUTO: 34.6 % (ref 37.5–51)
HGB BLD-MCNC: 11.2 G/DL (ref 13–17.7)
IMM GRANULOCYTES # BLD: 0 X10E3/UL (ref 0–0.1)
IMM GRANULOCYTES NFR BLD: 0 %
LYMPHOCYTES # BLD AUTO: 0.5 X10E3/UL (ref 0.7–3.1)
LYMPHOCYTES NFR BLD AUTO: 13 %
MCH RBC QN AUTO: 28.4 PG (ref 26.6–33)
MCHC RBC AUTO-ENTMCNC: 32.4 G/DL (ref 31.5–35.7)
MCV RBC AUTO: 88 FL (ref 79–97)
MONOCYTES # BLD AUTO: 0.4 X10E3/UL (ref 0.1–0.9)
MONOCYTES NFR BLD AUTO: 9 %
MORPHOLOGY BLD-IMP: ABNORMAL
NEUTROPHILS # BLD AUTO: 3.2 X10E3/UL (ref 1.4–7)
NEUTROPHILS NFR BLD AUTO: 75 %
PLATELET # BLD AUTO: 117 X10E3/UL (ref 150–379)
RBC # BLD AUTO: 3.95 X10E6/UL (ref 4.14–5.8)
WBC # BLD AUTO: 4.3 X10E3/UL (ref 3.4–10.8)

## 2018-07-05 ENCOUNTER — APPOINTMENT (OUTPATIENT)
Dept: INFUSION THERAPY | Age: 68
End: 2018-07-05
Payer: MEDICARE

## 2018-07-12 ENCOUNTER — DOCUMENTATION ONLY (OUTPATIENT)
Dept: ONCOLOGY | Age: 68
End: 2018-07-12

## 2018-07-12 ENCOUNTER — HOSPITAL ENCOUNTER (OUTPATIENT)
Dept: INFUSION THERAPY | Age: 68
Discharge: HOME | End: 2018-07-12
Payer: MEDICARE

## 2018-07-12 ENCOUNTER — OFFICE VISIT (OUTPATIENT)
Dept: ONCOLOGY | Age: 68
End: 2018-07-12

## 2018-07-12 VITALS
DIASTOLIC BLOOD PRESSURE: 78 MMHG | RESPIRATION RATE: 16 BRPM | OXYGEN SATURATION: 98 % | TEMPERATURE: 96.6 F | SYSTOLIC BLOOD PRESSURE: 144 MMHG | WEIGHT: 150.6 LBS | BODY MASS INDEX: 19.87 KG/M2 | HEART RATE: 58 BPM

## 2018-07-12 VITALS
OXYGEN SATURATION: 97 % | DIASTOLIC BLOOD PRESSURE: 83 MMHG | TEMPERATURE: 97.8 F | HEIGHT: 73 IN | HEART RATE: 50 BPM | WEIGHT: 150.8 LBS | RESPIRATION RATE: 20 BRPM | SYSTOLIC BLOOD PRESSURE: 153 MMHG | BODY MASS INDEX: 19.99 KG/M2

## 2018-07-12 DIAGNOSIS — R06.02 SOB (SHORTNESS OF BREATH): ICD-10-CM

## 2018-07-12 DIAGNOSIS — C34.91 NON-SMALL CELL CANCER OF RIGHT LUNG (HCC): Primary | ICD-10-CM

## 2018-07-12 DIAGNOSIS — R60.9 EDEMA, UNSPECIFIED TYPE: ICD-10-CM

## 2018-07-12 DIAGNOSIS — C34.91 ADENOCARCINOMA OF LUNG, STAGE 4, RIGHT (HCC): ICD-10-CM

## 2018-07-12 DIAGNOSIS — C79.51 BONE METASTASES (HCC): ICD-10-CM

## 2018-07-12 LAB
ALBUMIN SERPL-MCNC: 2.2 G/DL (ref 3.5–5)
ALBUMIN/GLOB SERPL: 0.6 {RATIO} (ref 1.1–2.2)
ALP SERPL-CCNC: 205 U/L (ref 45–117)
ALT SERPL-CCNC: 39 U/L (ref 12–78)
ANION GAP SERPL CALC-SCNC: 4 MMOL/L (ref 5–15)
AST SERPL-CCNC: 58 U/L (ref 15–37)
BASOPHILS # BLD: 0 K/UL (ref 0–0.1)
BASOPHILS NFR BLD: 0 % (ref 0–1)
BILIRUB SERPL-MCNC: 1.1 MG/DL (ref 0.2–1)
BUN SERPL-MCNC: 15 MG/DL (ref 6–20)
BUN/CREAT SERPL: 23 (ref 12–20)
CALCIUM SERPL-MCNC: 8.2 MG/DL (ref 8.5–10.1)
CHLORIDE SERPL-SCNC: 110 MMOL/L (ref 97–108)
CO2 SERPL-SCNC: 29 MMOL/L (ref 21–32)
CREAT SERPL-MCNC: 0.65 MG/DL (ref 0.7–1.3)
DIFFERENTIAL METHOD BLD: ABNORMAL
EOSINOPHIL # BLD: 0.1 K/UL (ref 0–0.4)
EOSINOPHIL NFR BLD: 2 % (ref 0–7)
ERYTHROCYTE [DISTWIDTH] IN BLOOD BY AUTOMATED COUNT: 19.5 % (ref 11.5–14.5)
GLOBULIN SER CALC-MCNC: 3.9 G/DL (ref 2–4)
GLUCOSE SERPL-MCNC: 71 MG/DL (ref 65–100)
HCT VFR BLD AUTO: 32.5 % (ref 36.6–50.3)
HGB BLD-MCNC: 10.4 G/DL (ref 12.1–17)
IMM GRANULOCYTES # BLD: 0 K/UL (ref 0–0.04)
IMM GRANULOCYTES NFR BLD AUTO: 0 % (ref 0–0.5)
LYMPHOCYTES # BLD: 0.5 K/UL (ref 0.8–3.5)
LYMPHOCYTES NFR BLD: 15 % (ref 12–49)
MCH RBC QN AUTO: 29.4 PG (ref 26–34)
MCHC RBC AUTO-ENTMCNC: 32 G/DL (ref 30–36.5)
MCV RBC AUTO: 91.8 FL (ref 80–99)
MONOCYTES # BLD: 0.3 K/UL (ref 0–1)
MONOCYTES NFR BLD: 9 % (ref 5–13)
NEUTS SEG # BLD: 2.1 K/UL (ref 1.8–8)
NEUTS SEG NFR BLD: 74 % (ref 32–75)
NRBC # BLD: 0 K/UL (ref 0–0.01)
NRBC BLD-RTO: 0 PER 100 WBC
PLATELET # BLD AUTO: 97 K/UL (ref 150–400)
PLATELET COMMENTS,PCOM: ABNORMAL
PMV BLD AUTO: 11.1 FL (ref 8.9–12.9)
POTASSIUM SERPL-SCNC: 4.2 MMOL/L (ref 3.5–5.1)
PROT SERPL-MCNC: 6.1 G/DL (ref 6.4–8.2)
RBC # BLD AUTO: 3.54 M/UL (ref 4.1–5.7)
RBC MORPH BLD: ABNORMAL
SODIUM SERPL-SCNC: 143 MMOL/L (ref 136–145)
TSH SERPL DL<=0.05 MIU/L-ACNC: 1.94 UIU/ML (ref 0.36–3.74)
WBC # BLD AUTO: 3 K/UL (ref 4.1–11.1)

## 2018-07-12 PROCEDURE — 74011250636 HC RX REV CODE- 250/636: Performed by: NURSE PRACTITIONER

## 2018-07-12 PROCEDURE — 36415 COLL VENOUS BLD VENIPUNCTURE: CPT | Performed by: NURSE PRACTITIONER

## 2018-07-12 PROCEDURE — 96374 THER/PROPH/DIAG INJ IV PUSH: CPT

## 2018-07-12 PROCEDURE — 84443 ASSAY THYROID STIM HORMONE: CPT | Performed by: NURSE PRACTITIONER

## 2018-07-12 PROCEDURE — 80053 COMPREHEN METABOLIC PANEL: CPT | Performed by: NURSE PRACTITIONER

## 2018-07-12 PROCEDURE — 85025 COMPLETE CBC W/AUTO DIFF WBC: CPT | Performed by: NURSE PRACTITIONER

## 2018-07-12 RX ORDER — SODIUM CHLORIDE 0.9 % (FLUSH) 0.9 %
10-40 SYRINGE (ML) INJECTION AS NEEDED
Status: ACTIVE | OUTPATIENT
Start: 2018-07-12 | End: 2018-07-13

## 2018-07-12 RX ADMIN — ZOLEDRONIC ACID 4 MG: 0.04 INJECTION, SOLUTION INTRAVENOUS at 16:44

## 2018-07-12 RX ADMIN — Medication 10 ML: at 15:15

## 2018-07-12 NOTE — PROGRESS NOTES
Hematology/Oncology progress note    REASON FOR VISIT: Stage IV EGFR mutated NSCLC    HISTORY OF PRESENT ILLNESS: Mr. Sultana Guzman is a 75 y.o. male with prostate cancer who was diagnosed with stage IV NSCLC- adenocarcinoma ( EGFR mutated , PD-L1 low) in May 2017. Here to follow up on Osimertinib    Oncologic history   Mr. Sultana Guzman noticed a new cough and fevers back in March of 2017. He went to Urgent Care and received antibiotics and cough medication. He states this worked for a while, but he began to notice his cough return. This was intermittent. He had some tightness across his anterior chest which he related to the infection. Chest x-ray was abnormal and he was told to proceed to the Emergency Department. Juan Carlos Brito had been under surveillance for right lower lobe mass that was initially noted in 2015. Bronch at that time was negative for malignancy. He was evaluated by Dr. Chandler Donnelly with Thoracic Surgery in 2015 for possible surgical resection. CT chest in November of 2016 with mass size unchanged but some right basilar pleural thickening. CT chest obtained 5/14/17 however showed a new large right pleural effusion with right middle lobe and right lower lobe collapse. Irregular spiculated right lower lobe mass 3.8cm and stable precarinal enlarged lymph nodes were noted. New right posterior second rib lytic lesion and new right hepatic hypodensity consistent with mets. He underwent a therapeutic thoracentesis on 5/15/17 with removal of 1500 cc of serosanguinous fluid. Pathology showed adenocarcinoma. PET CT showed pleural, bone, liver and flor metastasis. MRI brain 5/20/17: No metastasis. Needed repeat R sided thoracentesis on 5/25/17, had some post procedure hydropneumothorax. He was seen by Dr. Jeremy Zelaya at [facility] for Pleurx catheter.      CT guided biopsy of R lung mass done 5/25 which showed adenocarcinoma. EGFR mutation detected Exon 18 and 21      Treatment  6/26/17: Tarceva. Monthly Xgeva.  Palliative XRT to R hip CT CAP 10/2/17: Response  Ct 1/23/18: CT with some growth of LLL mass but stable by RECIST. CT 3/15/18 and Bone scan stable though he had worsening left back pain. MRI results showed extensive disease at L2, S2 and additional lesions as previously known. Received palliative XRT that he completed 4/18/18 5/26/18: Started Osimertinib as he had a T790M mutation. Stopped 6/20/18 due to platelets of 98W  Resumed at 80 mg every other day on 7/6/18       Past Medical History:   Diagnosis Date    Cancer [facility]) 2017    lung ca    Hypertension        Past Surgical History:   Procedure Laterality Date    HX ORTHOPAEDIC      reconstruction of left little finger       No Known Allergies    Current Outpatient Prescriptions   Medication Sig Dispense Refill    osimertinib (TAGRISSO) 80 mg tablet Take 1 Tab by mouth daily. Indications: EGFR T790M MUTATION-POSITIVE NON-SMALL CELL LUNG CANCER 30 Tab 11    levothyroxine (SYNTHROID) 75 mcg tablet TAKE 1/2 TABLET BY MOUTH DAILY BEFORE BREAKFAST 30 Tab 5    zoledronic acid (ZOMETA) 4 mg/100 mL pgbk infusion 4 mg by IntraVENous route every twenty-eight (28) days.  cholecalciferol (VITAMIN D3) 1,000 unit tablet Take 1,000 Units by mouth daily.       amLODIPine (NORVASC) 10 mg tablet TK 1 T PO   QD  0    losartan (COZAAR) 100 mg tablet TK 1 T PO D  5    ondansetron hcl (ZOFRAN) 8 mg tablet        Facility-Administered Medications Ordered in Other Visits   Medication Dose Route Frequency Provider Last Rate Last Dose    sodium chloride (NS) flush 10-40 mL  10-40 mL IntraVENous PRN Sheral Mor, NP   10 mL at 07/12/18 1515    zoledronic acid (ZOMETA) 4 mg/100mL infusion  4 mg IntraVENous ONCE Abe Garcia NP           Social History     Social History    Marital status:      Spouse name: N/A    Number of children: N/A    Years of education: N/A     Social History Main Topics    Smoking status: Never Smoker    Smokeless tobacco: Never Used    Alcohol use 6.0 oz/week     10 Glasses of wine per week      Comment: ocassionally    Drug use: No    Sexual activity: Yes     Partners: Female      Comment:  has 2 children     Other Topics Concern    None     Social History Narrative       Family History   Problem Relation Age of Onset    Cancer Mother      leukemia    Stroke Father     Cancer Brother      bladder       ROS  He started Osimertinib 5/26/18. Stopped on 6/20/18 due to grade 3 thrombocytopenia. Resumed at 80 mg every other day on 7/6. Tolerating well. Has some SOB on significant exertion ( hiking 3 miles). He has no diarrhea. Has an intermittent cough that is dry. He has had no pain. No nausea. Has gained some weight. ECOG PS is 0      Emotional well being addressed and patient is coping well      Physical Examination:   Visit Vitals    /83    Pulse (!) 50    Temp 97.8 °F (36.6 °C)    Resp 20    Ht 6' 1\" (1.854 m)    Wt 150 lb 12.8 oz (68.4 kg)    SpO2 97%    BMI 19.9 kg/m2     General appearance - alert, Frail, and in no distress  Mental status - oriented to person, place, and time  Mouth - mucous membranes moist, pharynx normal without lesions. Neck - supple, no significant adenopathy  Lymphatics - no palpable lymphadenopathy, no hepatosplenomegaly  Chest -clear to auscultation though somewhat decreased at the L base  Heart - normal rate, regular rhythm, normal S1, S2, no murmurs, rubs, clicks or gallops  Abdomen - soft, nontender, nondistended, no masses or organomegaly, bowel sounds present  Neurological - normal speech, slight nystagmus  Skin: No rashes  MSK- Bilateral pedal edema    LABS  Lab Results   Component Value Date/Time    WBC 3.0 (L) 07/12/2018 03:10 PM    HGB 10.4 (L) 07/12/2018 03:10 PM    HCT 32.5 (L) 07/12/2018 03:10 PM    PLATELET 97 (L) 45/35/4891 03:10 PM    MCV 91.8 07/12/2018 03:10 PM    ABS.  NEUTROPHILS PENDING 07/12/2018 03:10 PM     Lab Results   Component Value Date/Time    Sodium 143 07/12/2018 03:10 PM Potassium 4.2 07/12/2018 03:10 PM    Chloride 110 (H) 07/12/2018 03:10 PM    CO2 29 07/12/2018 03:10 PM    Glucose 71 07/12/2018 03:10 PM    BUN 15 07/12/2018 03:10 PM    Creatinine 0.65 (L) 07/12/2018 03:10 PM    GFR est AA >60 07/12/2018 03:10 PM    GFR est non-AA >60 07/12/2018 03:10 PM    Calcium 8.2 (L) 07/12/2018 03:10 PM     Lab Results   Component Value Date/Time    AST (SGOT) 58 (H) 07/12/2018 03:10 PM    Alk. phosphatase 205 (H) 07/12/2018 03:10 PM    Protein, total 6.1 (L) 07/12/2018 03:10 PM    Albumin 2.2 (L) 07/12/2018 03:10 PM    Globulin 3.9 07/12/2018 03:10 PM    A-G Ratio 0.6 (L) 07/12/2018 03:10 PM     CT Chest- abdomen - pelvis 10/2/17    Decreased right pleural thickening and size of right lower lobe  pulmonary mass with interval sclerosis of multiple osseous metastases as above  compatible with response to therapy    CT CAP and Bone scan- 3/15/18  1. CT of the chest demonstrates no significant change. Right lower lobe mass and  nodule are stable. Right pleural thickening and nodularity and small right  effusion are stable. 2. Bony metastatic disease is stable. 2. CT of the abdomen and pelvis is unchanged. . Subcentimeter low-density in the  liver which is indeterminate is stable. MRI 3/30/18  IMPRESSION  IMPRESSION:   1. Left T8 transverse process metastasis has extra cortical breakout, extension  into the lamina and pedicle, and extension into the left T8 neural foramen. Mild  neural foraminal stenosis associated. 2. Stable densely sclerotic T5 metastasis. Sclerotic right fifth rib metastasis. 3. Right lower lobe lung carcinoma. 4. Bulky right pleural carcinomatosis. Densely septated small right pleural  effusion. IMPRESSION  IMPRESSION:   1. Extracortical extension of L2 metastasis, obliterating and expanding the  right neural foramen, and extending into the epidural space.   2. Viable enhancing tumor throughout the L2 vertebral body around the sclerotic  portion in the inferior right posterolateral corner. 3. S2 and S3 vertebral body metastases. Possible epidural and left S2 neural  foraminal extension. Guardant 360 results under media- T790M present      ASSESSMENT AND PLAN  Mr. Chelsie Curry is a 75 y.o. male with      1. Stage IV NSCLC   With R lung mass, mediastinal adenopathy, malignant R pleural effusion, multiple asymptomatic bone metastasis and possibly liver metastasis. EGFR mutated, PD-L1 5%. Found to have T790M mutation    His CT and bone scans prom 3/15/18 showed stable disease on Tarceva  However there was concern for clinical progression as he had increasing L back pain. MRI spine showed extensive bony metastatic disease  His Guardant 360 showed a T18 M mutation which is associated with Tarceva resistance. Taken together we decided to switch to Osimertininb- 80 mg daily    He started this 5/26/18 and developed grade 3 thrombocytopenia. Dose reduced to 80 mg every other day on 7/6/18 as he was en route to [place]. He has been tolerating this for the most part. Labs today show that platelets have started to trend down again. I will get him to 40 mg daily and process the prescription    Need him to be on the drug consisently for 2 months before restaging      · Osimertinib - 80 mg every other day and when he receivesit switch to 40 mg daily  · Labs every 2 weeks  · Zometa as schedules  · EKG in 2 months      2. [place] 6 prostate cancer on active surveillance      3. Bone pain:  secondary to osseous metastasis s/p XRT to R ischium and now L2. S2. Pain has resolved  Monitor symptoms at T8 which has a rather aggressive lesion as well    4. HTN- On amlodipine- hold      5. Diarrhea- resolved    6.  Edema- bilateral    Dopplers ordered and negative for DVT  ECHO ordered and reviewed - EF 60%  Trace effusion as confirmed on CXR  Likely due to hypoalbuminemia   Dietician consult  Will r/o proteinuria as a cause  Hold amlodipine lest its contributing    RTC with next Stella Alvarado London Hernandes MD

## 2018-07-12 NOTE — PROGRESS NOTES
12 Pt admit to [facility] for monthly labs/Zometa ambulatory in stable condition. Assessment completed, recent trip abroad, no issues reported. No new concerns voiced. Peripheral IV access established with positive blood return. Labs drawn per order and sent. Line flushed, clamped, Curos Cap applied to end clave. Line wrapped and secured. 1520 ambulatory to MD office. 1640 patient returned to [facility], medication infused without issue. Reinforced to patient to inform all other medical/dental providers that he is receiving Zometa Q 4 weeks. Visit Vitals    /78 (BP 1 Location: Right arm, BP Patient Position: Sitting)    Pulse (!) 58    Temp 96.6 °F (35.9 °C)    Resp 16    Wt 68.3 kg (150 lb 9.6 oz)    SpO2 98%    BMI 19.87 kg/m2       Medications:  Zometa 4 MG    1705 Pt tolerated treatment well. Peripheral IV access removed at discharge. D/c home ambulatory in no distress. Pt aware of next [facility] appointment scheduled for 8/9/18. Recent Results (from the past 12 hour(s))   CBC WITH AUTOMATED DIFF    Collection Time: 07/12/18  3:10 PM   Result Value Ref Range    WBC 3.0 (L) 4.1 - 11.1 K/uL    RBC 3.54 (L) 4.10 - 5.70 M/uL    HGB 10.4 (L) 12.1 - 17.0 g/dL    HCT 32.5 (L) 36.6 - 50.3 %    MCV 91.8 80.0 - 99.0 FL    MCH 29.4 26.0 - 34.0 PG    MCHC 32.0 30.0 - 36.5 g/dL    RDW 19.5 (H) 11.5 - 14.5 %    PLATELET 97 (L) 480 - 400 K/uL    MPV 11.1 8.9 - 12.9 FL    NRBC 0.0 0  WBC    ABSOLUTE NRBC 0.00 0.00 - 0.01 K/uL    NEUTROPHILS 74 32 - 75 %    LYMPHOCYTES 15 12 - 49 %    MONOCYTES 9 5 - 13 %    EOSINOPHILS 2 0 - 7 %    BASOPHILS 0 0 - 1 %    IMMATURE GRANULOCYTES 0 0.0 - 0.5 %    ABS. NEUTROPHILS 2.1 1.8 - 8.0 K/UL    ABS. LYMPHOCYTES 0.5 (L) 0.8 - 3.5 K/UL    ABS. MONOCYTES 0.3 0.0 - 1.0 K/UL    ABS. EOSINOPHILS 0.1 0.0 - 0.4 K/UL    ABS. BASOPHILS 0.0 0.0 - 0.1 K/UL    ABS. IMM.  GRANS. 0.0 0.00 - 0.04 K/UL    DF SMEAR SCANNED      PLATELET COMMENTS Large Platelets      RBC COMMENTS ANISOCYTOSIS  1+       METABOLIC PANEL, COMPREHENSIVE    Collection Time: 07/12/18  3:10 PM   Result Value Ref Range    Sodium 143 136 - 145 mmol/L    Potassium 4.2 3.5 - 5.1 mmol/L    Chloride 110 (H) 97 - 108 mmol/L    CO2 29 21 - 32 mmol/L    Anion gap 4 (L) 5 - 15 mmol/L    Glucose 71 65 - 100 mg/dL    BUN 15 6 - 20 MG/DL    Creatinine 0.65 (L) 0.70 - 1.30 MG/DL    BUN/Creatinine ratio 23 (H) 12 - 20      GFR est AA >60 >60 ml/min/1.73m2    GFR est non-AA >60 >60 ml/min/1.73m2    Calcium 8.2 (L) 8.5 - 10.1 MG/DL    Bilirubin, total 1.1 (H) 0.2 - 1.0 MG/DL    ALT (SGPT) 39 12 - 78 U/L    AST (SGOT) 58 (H) 15 - 37 U/L    Alk.  phosphatase 205 (H) 45 - 117 U/L    Protein, total 6.1 (L) 6.4 - 8.2 g/dL    Albumin 2.2 (L) 3.5 - 5.0 g/dL    Globulin 3.9 2.0 - 4.0 g/dL    A-G Ratio 0.6 (L) 1.1 - 2.2     TSH 3RD GENERATION    Collection Time: 07/12/18  3:10 PM   Result Value Ref Range    TSH 1.94 0.36 - 3.74 uIU/mL

## 2018-07-12 NOTE — Clinical Note
Process script for Osimertinib 40 mg daily Until then he can continue 80 mg every other day Need him to have labs repeated in 2 weeks to include cbc diff and CMP and see me

## 2018-07-12 NOTE — PROGRESS NOTES
Patient scheduled for Echo tomorrow 7/13 at [facility] at [address] [place] [address], and Echo tomorrow at [facility] at 11 am. Patient thanked for coordination.

## 2018-07-13 ENCOUNTER — HOSPITAL ENCOUNTER (OUTPATIENT)
Dept: VASCULAR SURGERY | Age: 68
Discharge: HOME | End: 2018-07-13
Attending: INTERNAL MEDICINE
Payer: MEDICARE

## 2018-07-13 ENCOUNTER — CLINICAL SUPPORT (OUTPATIENT)
Dept: CARDIOLOGY CLINIC | Age: 68
End: 2018-07-13

## 2018-07-13 DIAGNOSIS — R06.02 SOB (SHORTNESS OF BREATH): ICD-10-CM

## 2018-07-13 DIAGNOSIS — R60.9 EDEMA, UNSPECIFIED TYPE: Primary | ICD-10-CM

## 2018-07-13 DIAGNOSIS — R60.9 EDEMA, UNSPECIFIED TYPE: ICD-10-CM

## 2018-07-13 DIAGNOSIS — J90 PLEURAL EFFUSION: ICD-10-CM

## 2018-07-13 PROCEDURE — 93970 EXTREMITY STUDY: CPT

## 2018-07-13 NOTE — PROCEDURES
[address]  *** FINAL REPORT ***    Name: Rafael Sarabia  MRN: [id number]    Outpatient  : 02 Dec 1950  HIS Order #: [id number]  [id number] [facility] Visit #: [id number]  Date: 2018    TYPE OF TEST: Peripheral Venous Testing    REASON FOR TEST  Limb swelling    Right Leg:-  Deep venous thrombosis:           No  Superficial venous thrombosis:    No  Deep venous insufficiency:        Not examined  Superficial venous insufficiency: Not examined    Left Leg:-  Deep venous thrombosis:           No  Superficial venous thrombosis:    No  Deep venous insufficiency:        Not examined  Superficial venous insufficiency: Not examined      INTERPRETATION/FINDINGS  PROCEDURE:  Color duplex ultrasound imaging of lower extremity veins. FINDINGS:       Right: The common femoral, deep femoral, femoral, popliteal,  posterior tibial, peroneal, and great saphenous are patent and without   evidence of thrombus;  each is fully compressible and there is no  narrowing of the flow channel on color Doppler imaging. Phasic flow  is observed in the common femoral vein. Left:   The common femoral, deep femoral, femoral, popliteal,  posterior tibial, peroneal, and great saphenous are patent and without   evidence of thrombus;  each is fully compressible and there is no  narrowing of the flow channel on color Doppler imaging. Phasic flow  is observed in the common femoral vein. IMPRESSION:  No evidence of right or left lower extremity vein  thrombosis. ADDITIONAL COMMENTS    I have personally reviewed the data relevant to the interpretation of  this  study.     TECHNOLOGIST: Donaldo Castaneda RVT  Signed: 2018 10:44 AM    PHYSICIAN: Alvino Anaya MD  Signed: 07/15/2018 06:47 AM

## 2018-07-16 ENCOUNTER — TELEPHONE (OUTPATIENT)
Dept: ONCOLOGY | Age: 68
End: 2018-07-16

## 2018-07-16 ENCOUNTER — HOSPITAL ENCOUNTER (OUTPATIENT)
Dept: GENERAL RADIOLOGY | Age: 68
Discharge: HOME | End: 2018-07-16
Payer: MEDICARE

## 2018-07-16 DIAGNOSIS — C34.91 ADENOCARCINOMA OF LUNG, STAGE 4, RIGHT (HCC): ICD-10-CM

## 2018-07-16 DIAGNOSIS — C34.91 ADENOCARCINOMA OF LUNG, STAGE 4, RIGHT (HCC): Primary | ICD-10-CM

## 2018-07-16 PROCEDURE — 71046 X-RAY EXAM CHEST 2 VIEWS: CPT

## 2018-07-18 ENCOUNTER — PATIENT MESSAGE (OUTPATIENT)
Dept: ONCOLOGY | Age: 68
End: 2018-07-18

## 2018-07-18 NOTE — TELEPHONE ENCOUNTER
From: Juan Pablo Martinez RN  To: Juan Luis Weston  Sent: 7/18/2018 12:27 PM EDT  Subject: amlodipine    Hello. We are trying to determine the cause of your edema. Amlodpine can contribute to edema. Are you still taking this medication? If so, can we stop this for a week. We would need you to keep a blood pressure diary for this week, once in the AM and once in the PM.  Keep us posted on results. Certainly let us know if you have any questions.   Thanks

## 2018-07-20 ENCOUNTER — HOSPITAL ENCOUNTER (OUTPATIENT)
Dept: LAB | Age: 68
Discharge: HOME | End: 2018-07-20
Payer: MEDICARE

## 2018-07-20 DIAGNOSIS — C34.91 ADENOCARCINOMA OF LUNG, STAGE 4, RIGHT (HCC): ICD-10-CM

## 2018-07-20 PROCEDURE — 85025 COMPLETE CBC W/AUTO DIFF WBC: CPT

## 2018-07-20 PROCEDURE — 82540 ASSAY OF CREATINE: CPT

## 2018-07-20 PROCEDURE — 80053 COMPREHEN METABOLIC PANEL: CPT

## 2018-07-20 PROCEDURE — 36415 COLL VENOUS BLD VENIPUNCTURE: CPT

## 2018-07-21 LAB
ALBUMIN SERPL-MCNC: 3 G/DL (ref 3.6–4.8)
ALBUMIN/GLOB SERPL: 0.9 {RATIO} (ref 1.2–2.2)
ALP SERPL-CCNC: 195 IU/L (ref 39–117)
ALT SERPL-CCNC: 37 IU/L (ref 0–44)
AST SERPL-CCNC: 56 IU/L (ref 0–40)
BASOPHILS # BLD AUTO: 0 X10E3/UL (ref 0–0.2)
BASOPHILS NFR BLD AUTO: 1 %
BILIRUB SERPL-MCNC: 1.1 MG/DL (ref 0–1.2)
BUN SERPL-MCNC: 14 MG/DL (ref 8–27)
BUN/CREAT SERPL: 18 (ref 10–24)
CALCIUM SERPL-MCNC: 8.7 MG/DL (ref 8.6–10.2)
CHLORIDE SERPL-SCNC: 104 MMOL/L (ref 96–106)
CO2 SERPL-SCNC: 27 MMOL/L (ref 20–29)
CREAT SERPL-MCNC: 0.76 MG/DL (ref 0.76–1.27)
CREAT UR-MCNC: 152.2 MG/DL
EOSINOPHIL # BLD AUTO: 0.1 X10E3/UL (ref 0–0.4)
EOSINOPHIL NFR BLD AUTO: 3 %
ERYTHROCYTE [DISTWIDTH] IN BLOOD BY AUTOMATED COUNT: 19.3 % (ref 12.3–15.4)
GLOBULIN SER CALC-MCNC: 3.2 G/DL (ref 1.5–4.5)
GLUCOSE SERPL-MCNC: 74 MG/DL (ref 65–99)
HCT VFR BLD AUTO: 34.1 % (ref 37.5–51)
HGB BLD-MCNC: 10.7 G/DL (ref 13–17.7)
IMM GRANULOCYTES # BLD: 0 X10E3/UL (ref 0–0.1)
IMM GRANULOCYTES NFR BLD: 0 %
LYMPHOCYTES # BLD AUTO: 0.6 X10E3/UL (ref 0.7–3.1)
LYMPHOCYTES NFR BLD AUTO: 14 %
MCH RBC QN AUTO: 28.5 PG (ref 26.6–33)
MCHC RBC AUTO-ENTMCNC: 31.4 G/DL (ref 31.5–35.7)
MCV RBC AUTO: 91 FL (ref 79–97)
MONOCYTES # BLD AUTO: 0.3 X10E3/UL (ref 0.1–0.9)
MONOCYTES NFR BLD AUTO: 8 %
MORPHOLOGY BLD-IMP: ABNORMAL
NEUTROPHILS # BLD AUTO: 3 X10E3/UL (ref 1.4–7)
NEUTROPHILS NFR BLD AUTO: 74 %
PLATELET # BLD AUTO: 82 X10E3/UL (ref 150–379)
POTASSIUM SERPL-SCNC: 4.6 MMOL/L (ref 3.5–5.2)
PROT SERPL-MCNC: 6.2 G/DL (ref 6–8.5)
PROT UR-MCNC: 23 MG/DL
PROT/CREAT UR: 151 MG/G CREAT (ref 0–200)
RBC # BLD AUTO: 3.76 X10E6/UL (ref 4.14–5.8)
SODIUM SERPL-SCNC: 141 MMOL/L (ref 134–144)
WBC # BLD AUTO: 4 X10E3/UL (ref 3.4–10.8)

## 2018-08-02 ENCOUNTER — APPOINTMENT (OUTPATIENT)
Dept: INFUSION THERAPY | Age: 68
End: 2018-08-02
Payer: MEDICARE

## 2018-08-07 RX ORDER — ONDANSETRON 2 MG/ML
8 INJECTION INTRAMUSCULAR; INTRAVENOUS AS NEEDED
Status: CANCELLED | OUTPATIENT
Start: 2018-01-01

## 2018-08-07 RX ORDER — HYDROCORTISONE SODIUM SUCCINATE 100 MG/2ML
100 INJECTION, POWDER, FOR SOLUTION INTRAMUSCULAR; INTRAVENOUS AS NEEDED
Status: CANCELLED | OUTPATIENT
Start: 2018-08-09

## 2018-08-07 RX ORDER — ACETAMINOPHEN 325 MG/1
650 TABLET ORAL AS NEEDED
Status: CANCELLED
Start: 2019-01-01

## 2018-08-07 RX ORDER — ACETAMINOPHEN 325 MG/1
650 TABLET ORAL AS NEEDED
Status: CANCELLED
Start: 2018-01-01

## 2018-08-07 RX ORDER — ALBUTEROL SULFATE 0.83 MG/ML
2.5 SOLUTION RESPIRATORY (INHALATION) AS NEEDED
Status: CANCELLED
Start: 2018-08-09

## 2018-08-07 RX ORDER — DIPHENHYDRAMINE HYDROCHLORIDE 50 MG/ML
50 INJECTION, SOLUTION INTRAMUSCULAR; INTRAVENOUS AS NEEDED
Status: CANCELLED
Start: 2018-01-01

## 2018-08-07 RX ORDER — DIPHENHYDRAMINE HYDROCHLORIDE 50 MG/ML
50 INJECTION, SOLUTION INTRAMUSCULAR; INTRAVENOUS AS NEEDED
Status: CANCELLED
Start: 2019-01-01

## 2018-08-07 RX ORDER — HEPARIN 100 UNIT/ML
300-500 SYRINGE INTRAVENOUS AS NEEDED
Status: CANCELLED
Start: 2019-01-01

## 2018-08-07 RX ORDER — ONDANSETRON 2 MG/ML
8 INJECTION INTRAMUSCULAR; INTRAVENOUS AS NEEDED
Status: CANCELLED | OUTPATIENT
Start: 2019-01-01

## 2018-08-07 RX ORDER — DIPHENHYDRAMINE HYDROCHLORIDE 50 MG/ML
50 INJECTION, SOLUTION INTRAMUSCULAR; INTRAVENOUS AS NEEDED
Status: CANCELLED
Start: 2018-08-09

## 2018-08-07 RX ORDER — EPINEPHRINE 1 MG/ML
0.3 INJECTION, SOLUTION, CONCENTRATE INTRAVENOUS AS NEEDED
Status: CANCELLED | OUTPATIENT
Start: 2018-01-01

## 2018-08-07 RX ORDER — SODIUM CHLORIDE 9 MG/ML
10 INJECTION INTRAMUSCULAR; INTRAVENOUS; SUBCUTANEOUS AS NEEDED
Status: CANCELLED | OUTPATIENT
Start: 2018-01-01

## 2018-08-07 RX ORDER — SODIUM CHLORIDE 0.9 % (FLUSH) 0.9 %
10 SYRINGE (ML) INJECTION AS NEEDED
Status: CANCELLED
Start: 2018-01-01

## 2018-08-07 RX ORDER — HYDROCORTISONE SODIUM SUCCINATE 100 MG/2ML
100 INJECTION, POWDER, FOR SOLUTION INTRAMUSCULAR; INTRAVENOUS AS NEEDED
Status: CANCELLED | OUTPATIENT
Start: 2018-01-01

## 2018-08-07 RX ORDER — ONDANSETRON 2 MG/ML
8 INJECTION INTRAMUSCULAR; INTRAVENOUS AS NEEDED
Status: CANCELLED | OUTPATIENT
Start: 2018-08-09

## 2018-08-07 RX ORDER — EPINEPHRINE 1 MG/ML
0.3 INJECTION, SOLUTION, CONCENTRATE INTRAVENOUS AS NEEDED
Status: CANCELLED | OUTPATIENT
Start: 2018-08-09

## 2018-08-07 RX ORDER — ALBUTEROL SULFATE 0.83 MG/ML
2.5 SOLUTION RESPIRATORY (INHALATION) AS NEEDED
Status: CANCELLED
Start: 2019-01-01

## 2018-08-07 RX ORDER — HEPARIN 100 UNIT/ML
300-500 SYRINGE INTRAVENOUS AS NEEDED
Status: CANCELLED
Start: 2018-01-01

## 2018-08-07 RX ORDER — EPINEPHRINE 1 MG/ML
0.3 INJECTION, SOLUTION, CONCENTRATE INTRAVENOUS AS NEEDED
Status: CANCELLED | OUTPATIENT
Start: 2019-01-01

## 2018-08-07 RX ORDER — ACETAMINOPHEN 325 MG/1
650 TABLET ORAL AS NEEDED
Status: CANCELLED
Start: 2018-08-09

## 2018-08-07 RX ORDER — HYDROCORTISONE SODIUM SUCCINATE 100 MG/2ML
100 INJECTION, POWDER, FOR SOLUTION INTRAMUSCULAR; INTRAVENOUS AS NEEDED
Status: CANCELLED | OUTPATIENT
Start: 2019-01-01

## 2018-08-07 RX ORDER — SODIUM CHLORIDE 0.9 % (FLUSH) 0.9 %
10 SYRINGE (ML) INJECTION AS NEEDED
Status: CANCELLED
Start: 2019-01-01

## 2018-08-07 RX ORDER — SODIUM CHLORIDE 9 MG/ML
10 INJECTION INTRAMUSCULAR; INTRAVENOUS; SUBCUTANEOUS AS NEEDED
Status: CANCELLED | OUTPATIENT
Start: 2019-01-01

## 2018-08-07 RX ORDER — ALBUTEROL SULFATE 0.83 MG/ML
2.5 SOLUTION RESPIRATORY (INHALATION) AS NEEDED
Status: CANCELLED
Start: 2018-01-01

## 2018-08-09 ENCOUNTER — HOSPITAL ENCOUNTER (OUTPATIENT)
Dept: INFUSION THERAPY | Age: 68
Discharge: HOME | End: 2018-08-09
Payer: MEDICARE

## 2018-08-09 ENCOUNTER — OFFICE VISIT (OUTPATIENT)
Dept: ONCOLOGY | Age: 68
End: 2018-08-09

## 2018-08-09 VITALS
TEMPERATURE: 98 F | RESPIRATION RATE: 18 BRPM | SYSTOLIC BLOOD PRESSURE: 157 MMHG | WEIGHT: 155.2 LBS | DIASTOLIC BLOOD PRESSURE: 82 MMHG | HEART RATE: 50 BPM | BODY MASS INDEX: 20.48 KG/M2

## 2018-08-09 VITALS
HEIGHT: 73 IN | SYSTOLIC BLOOD PRESSURE: 157 MMHG | RESPIRATION RATE: 20 BRPM | OXYGEN SATURATION: 97 % | BODY MASS INDEX: 20.6 KG/M2 | TEMPERATURE: 97.6 F | WEIGHT: 155.4 LBS | DIASTOLIC BLOOD PRESSURE: 84 MMHG | HEART RATE: 48 BPM

## 2018-08-09 DIAGNOSIS — C79.51 BONE METASTASES (HCC): ICD-10-CM

## 2018-08-09 DIAGNOSIS — R06.02 SOB (SHORTNESS OF BREATH): Primary | ICD-10-CM

## 2018-08-09 DIAGNOSIS — R79.89 ABNORMAL LFTS: ICD-10-CM

## 2018-08-09 DIAGNOSIS — C61 ADENOCARCINOMA OF PROSTATE (HCC): ICD-10-CM

## 2018-08-09 DIAGNOSIS — C34.91 ADENOCARCINOMA OF LUNG, STAGE 4, RIGHT (HCC): ICD-10-CM

## 2018-08-09 LAB
ALBUMIN SERPL-MCNC: 2.7 G/DL (ref 3.5–5)
ALBUMIN/GLOB SERPL: 0.7 {RATIO} (ref 1.1–2.2)
ALP SERPL-CCNC: 193 U/L (ref 45–117)
ALT SERPL-CCNC: 39 U/L (ref 12–78)
ANION GAP SERPL CALC-SCNC: 7 MMOL/L (ref 5–15)
AST SERPL-CCNC: 56 U/L (ref 15–37)
BASOPHILS # BLD: 0 K/UL (ref 0–0.1)
BASOPHILS NFR BLD: 0 % (ref 0–1)
BILIRUB SERPL-MCNC: 1.1 MG/DL (ref 0.2–1)
BUN SERPL-MCNC: 14 MG/DL (ref 6–20)
BUN/CREAT SERPL: 20 (ref 12–20)
CALCIUM SERPL-MCNC: 8.8 MG/DL (ref 8.5–10.1)
CHLORIDE SERPL-SCNC: 109 MMOL/L (ref 97–108)
CO2 SERPL-SCNC: 29 MMOL/L (ref 21–32)
CREAT SERPL-MCNC: 0.71 MG/DL (ref 0.7–1.3)
DIFFERENTIAL METHOD BLD: ABNORMAL
EOSINOPHIL # BLD: 0.1 K/UL (ref 0–0.4)
EOSINOPHIL NFR BLD: 3 % (ref 0–7)
ERYTHROCYTE [DISTWIDTH] IN BLOOD BY AUTOMATED COUNT: 18 % (ref 11.5–14.5)
GLOBULIN SER CALC-MCNC: 3.7 G/DL (ref 2–4)
GLUCOSE SERPL-MCNC: 81 MG/DL (ref 65–100)
HCT VFR BLD AUTO: 33.5 % (ref 36.6–50.3)
HGB BLD-MCNC: 10.6 G/DL (ref 12.1–17)
IMM GRANULOCYTES # BLD: 0 K/UL (ref 0–0.04)
IMM GRANULOCYTES NFR BLD AUTO: 0 % (ref 0–0.5)
LYMPHOCYTES # BLD: 0.5 K/UL (ref 0.8–3.5)
LYMPHOCYTES NFR BLD: 18 % (ref 12–49)
MCH RBC QN AUTO: 29.6 PG (ref 26–34)
MCHC RBC AUTO-ENTMCNC: 31.6 G/DL (ref 30–36.5)
MCV RBC AUTO: 93.6 FL (ref 80–99)
MONOCYTES # BLD: 0.3 K/UL (ref 0–1)
MONOCYTES NFR BLD: 9 % (ref 5–13)
NEUTS SEG # BLD: 2.1 K/UL (ref 1.8–8)
NEUTS SEG NFR BLD: 70 % (ref 32–75)
NRBC # BLD: 0 K/UL (ref 0–0.01)
NRBC BLD-RTO: 0 PER 100 WBC
PLATELET # BLD AUTO: 74 K/UL (ref 150–400)
PLATELET COMMENTS,PCOM: ABNORMAL
PMV BLD AUTO: 11.7 FL (ref 8.9–12.9)
POTASSIUM SERPL-SCNC: 3.9 MMOL/L (ref 3.5–5.1)
PROT SERPL-MCNC: 6.4 G/DL (ref 6.4–8.2)
RBC # BLD AUTO: 3.58 M/UL (ref 4.1–5.7)
RBC MORPH BLD: ABNORMAL
RBC MORPH BLD: ABNORMAL
SODIUM SERPL-SCNC: 145 MMOL/L (ref 136–145)
TSH SERPL DL<=0.05 MIU/L-ACNC: 3.2 UIU/ML (ref 0.36–3.74)
WBC # BLD AUTO: 3 K/UL (ref 4.1–11.1)

## 2018-08-09 PROCEDURE — 36415 COLL VENOUS BLD VENIPUNCTURE: CPT | Performed by: INTERNAL MEDICINE

## 2018-08-09 PROCEDURE — 85025 COMPLETE CBC W/AUTO DIFF WBC: CPT | Performed by: INTERNAL MEDICINE

## 2018-08-09 PROCEDURE — 80053 COMPREHEN METABOLIC PANEL: CPT | Performed by: INTERNAL MEDICINE

## 2018-08-09 PROCEDURE — 84443 ASSAY THYROID STIM HORMONE: CPT | Performed by: INTERNAL MEDICINE

## 2018-08-09 PROCEDURE — 74011250636 HC RX REV CODE- 250/636: Performed by: NURSE PRACTITIONER

## 2018-08-09 PROCEDURE — 96374 THER/PROPH/DIAG INJ IV PUSH: CPT

## 2018-08-09 RX ORDER — SODIUM CHLORIDE 9 MG/ML
25 INJECTION, SOLUTION INTRAVENOUS CONTINUOUS
Status: DISCONTINUED | OUTPATIENT
Start: 2018-08-09 | End: 2018-08-13 | Stop reason: HOSPADM

## 2018-08-09 RX ORDER — SODIUM CHLORIDE 0.9 % (FLUSH) 0.9 %
10 SYRINGE (ML) INJECTION AS NEEDED
Status: ACTIVE | OUTPATIENT
Start: 2018-08-09 | End: 2018-08-10

## 2018-08-09 RX ORDER — HEPARIN 100 UNIT/ML
300-500 SYRINGE INTRAVENOUS AS NEEDED
Status: ACTIVE | OUTPATIENT
Start: 2018-08-09 | End: 2018-08-10

## 2018-08-09 RX ORDER — SODIUM CHLORIDE 9 MG/ML
10 INJECTION INTRAMUSCULAR; INTRAVENOUS; SUBCUTANEOUS AS NEEDED
Status: ACTIVE | OUTPATIENT
Start: 2018-08-09 | End: 2018-08-10

## 2018-08-09 RX ADMIN — ZOLEDRONIC ACID 4 MG: 0.04 INJECTION, SOLUTION INTRAVENOUS at 16:40

## 2018-08-09 RX ADMIN — Medication 20 ML: at 16:55

## 2018-08-09 RX ADMIN — Medication 10 ML: at 15:30

## 2018-08-09 NOTE — PROGRESS NOTES
Hematology/Oncology progress note    REASON FOR VISIT: Stage IV EGFR mutated NSCLC    HISTORY OF PRESENT ILLNESS: Mr. Raghavendra Turner is a 75 y.o. male with prostate cancer who was diagnosed with stage IV NSCLC- adenocarcinoma (EGFR mutated , PD-L1 low) in May 2017. Here to follow up on Osimertinib. Oncologic history   Mr. Raghavendra Turner noticed a new cough and fevers back in March of 2017. He went to Urgent Care and received antibiotics and cough medication. He states this worked for a while, but he began to notice his cough return. This was intermittent. He had some tightness across his anterior chest which he related to the infection. Chest x-ray was abnormal and he was told to proceed to the Emergency Department. Estela Marquez had been under surveillance for right lower lobe mass that was initially noted in 2015. Bronch at that time was negative for malignancy. He was evaluated by Dr. Jerilyn Del Angel with Thoracic Surgery in 2015 for possible surgical resection. CT chest in November of 2016 with mass size unchanged but some right basilar pleural thickening. CT chest obtained 5/14/17 however showed a new large right pleural effusion with right middle lobe and right lower lobe collapse. Irregular spiculated right lower lobe mass 3.8cm and stable precarinal enlarged lymph nodes were noted. New right posterior second rib lytic lesion and new right hepatic hypodensity consistent with mets. He underwent a therapeutic thoracentesis on 5/15/17 with removal of 1500 cc of serosanguinous fluid. Pathology showed adenocarcinoma. PET CT showed pleural, bone, liver and flor metastasis. MRI brain 5/20/17: No metastasis. Needed repeat R sided thoracentesis on 5/25/17, had some post procedure hydropneumothorax. He was seen by Dr. Dariela May at [facility] for Pleurx catheter.      CT guided biopsy of R lung mass done 5/25 which showed adenocarcinoma. EGFR mutation detected Exon 18 and 21    Treatment  6/26/17: Tarceva. Monthly Xgeva.  Palliative XRT to R hip   CT CAP 10/2/17: Response  Ct 1/23/18: CT with some growth of LLL mass but stable by RECIST. CT 3/15/18 and Bone scan stable though he had worsening left back pain. MRI results showed extensive disease at L2, S2 and additional lesions as previously known. Received palliative XRT that he completed 4/18/18 5/26/18: Started Osimertinib as he had a T790M mutation. Stopped 6/20/18 due to platelets of 80M  Resumed at 80 mg every other day on 7/6/18   Started 40mg daily on 7/12/18      Past Medical History:   Diagnosis Date    Cancer [facility]) 2017    lung ca    Hypertension        Past Surgical History:   Procedure Laterality Date    HX ORTHOPAEDIC      reconstruction of left little finger       No Known Allergies    Current Outpatient Prescriptions   Medication Sig Dispense Refill    osimertinib (TAGRISSO) 40 mg tablet Take 1 Tab by mouth daily. Indications: EGFR T790M MUTATION-POSITIVE NON-SMALL CELL LUNG CANCER 30 Tab 3    levothyroxine (SYNTHROID) 75 mcg tablet TAKE 1/2 TABLET BY MOUTH DAILY BEFORE BREAKFAST 30 Tab 5    zoledronic acid (ZOMETA) 4 mg/100 mL pgbk infusion 4 mg by IntraVENous route every twenty-eight (28) days.  cholecalciferol (VITAMIN D3) 1,000 unit tablet Take 1,000 Units by mouth daily.       ondansetron hcl (ZOFRAN) 8 mg tablet        Facility-Administered Medications Ordered in Other Visits   Medication Dose Route Frequency Provider Last Rate Last Dose    saline peripheral flush soln 10 mL  10 mL InterCATHeter PRN Noemi Chapman NP        sodium chloride 0.9% injection 10 mL  10 mL IntraVENous PRN Noemi Chapman NP        heparin (porcine) pf 300-500 Units  300-500 Units InterCATHeter PRN Noemi Chapman NP           Social History     Social History    Marital status:      Spouse name: N/A    Number of children: N/A    Years of education: N/A     Social History Main Topics    Smoking status: Never Smoker    Smokeless tobacco: Never Used    Alcohol use 6.0 oz/week     10 Glasses of wine per week      Comment: ocassionally    Drug use: No    Sexual activity: Yes     Partners: Female      Comment:  has 2 children     Other Topics Concern    None     Social History Narrative       Family History   Problem Relation Age of Onset    Cancer Mother      leukemia    Stroke Father     Cancer Brother      bladder       ROS  Currently on Osimertinib ( started 5/26/18) and dose was decreased to 40mg daily ~ 7/12/18  Denies N/V, pain, SOB, or memory issues. Eating well and maintaining weight. Occasional dry cough. Still with leg swelling without improvement, mostly just in ankles and feet. Has not improved much of NOrvasc. Reports blurry eyesight. Has made an appointment with optometrist coming up--will follow-up with this visit Denies any pain. ECOG PS is 0    Emotional well being addressed and patient is coping well      Physical Examination:   Visit Vitals    /84    Pulse (!) 48    Temp 97.6 °F (36.4 °C)    Resp 20    Ht 6' 1\" (1.854 m)    Wt 155 lb 6.4 oz (70.5 kg)    SpO2 97%    BMI 20.5 kg/m2     General appearance - alert, frail, and in no distress  Mental status - oriented to person, place, and time  Mouth - mucous membranes moist, pharynx normal without lesions. Neck - supple, no significant adenopathy  Lymphatics - no palpable lymphadenopathy, no hepatosplenomegaly  Chest -clear to auscultation though somewhat decreased at the L base  Heart - normal rate, regular rhythm, normal S1, S2, no murmurs, rubs, clicks or gallops  Abdomen - soft, nontender, nondistended, no masses or organomegaly, bowel sounds present  Neurological - normal speech, slight nystagmus  Skin: No rashes  MSK- Bilateral pedal and ankle edema    LABS  Lab Results   Component Value Date/Time    WBC 4.0 07/20/2018 11:23 AM    HGB 10.7 (L) 07/20/2018 11:23 AM    HCT 34.1 (L) 07/20/2018 11:23 AM    PLATELET 82 (LL) 86/07/1547 11:23 AM    MCV 91 07/20/2018 11:23 AM    ABS.  NEUTROPHILS 3.0 07/20/2018 11:23 AM     Lab Results   Component Value Date/Time    Sodium 141 07/20/2018 11:23 AM    Potassium 4.6 07/20/2018 11:23 AM    Chloride 104 07/20/2018 11:23 AM    CO2 27 07/20/2018 11:23 AM    Glucose 74 07/20/2018 11:23 AM    BUN 14 07/20/2018 11:23 AM    Creatinine 0.76 07/20/2018 11:23 AM    GFR est  07/20/2018 11:23 AM    GFR est non-AA 94 07/20/2018 11:23 AM    Calcium 8.7 07/20/2018 11:23 AM     Lab Results   Component Value Date/Time    AST (SGOT) 56 (H) 07/20/2018 11:23 AM    Alk. phosphatase 195 (H) 07/20/2018 11:23 AM    Protein, total 6.2 07/20/2018 11:23 AM    Albumin 3.0 (L) 07/20/2018 11:23 AM    Globulin 3.9 07/12/2018 03:10 PM    A-G Ratio 0.9 (L) 07/20/2018 11:23 AM     CT Chest- abdomen - pelvis 10/2/17    Decreased right pleural thickening and size of right lower lobe  pulmonary mass with interval sclerosis of multiple osseous metastases as above  compatible with response to therapy    CT CAP and Bone scan- 3/15/18  1. CT of the chest demonstrates no significant change. Right lower lobe mass and  nodule are stable. Right pleural thickening and nodularity and small right  effusion are stable. 2. Bony metastatic disease is stable. 2. CT of the abdomen and pelvis is unchanged. . Subcentimeter low-density in the  liver which is indeterminate is stable. MRI 3/30/18  IMPRESSION  IMPRESSION:   1. Left T8 transverse process metastasis has extra cortical breakout, extension  into the lamina and pedicle, and extension into the left T8 neural foramen. Mild  neural foraminal stenosis associated. 2. Stable densely sclerotic T5 metastasis. Sclerotic right fifth rib metastasis. 3. Right lower lobe lung carcinoma. 4. Bulky right pleural carcinomatosis. Densely septated small right pleural  effusion. IMPRESSION  IMPRESSION:   1. Extracortical extension of L2 metastasis, obliterating and expanding the  right neural foramen, and extending into the epidural space.   2. Viable enhancing tumor throughout the L2 vertebral body around the sclerotic  portion in the inferior right posterolateral corner. 3. S2 and S3 vertebral body metastases. Possible epidural and left S2 neural  foraminal extension. Guardant 360 results under media- T790M present      ASSESSMENT AND PLAN  Mr. Chacha Heath is a 75 y.o. male with      1. Stage IV NSCLC   With R lung mass, mediastinal adenopathy, malignant R pleural effusion, multiple asymptomatic bone metastasis and possibly liver metastasis. EGFR mutated, PD-L1 5%. Found to have T790M mutation    His CT and bone scans prom 3/15/18 showed stable disease on Tarceva  However there was concern for clinical progression as he had increasing L back pain. MRI spine showed extensive bony metastatic disease  His Guardant 360 showed a T18 M mutation which is associated with Tarceva resistance. Taken together we decided to switch to Osimertininb- 80 mg daily    He started this 5/26/18 and developed grade 3 thrombocytopenia. Dose reduced to 80 mg every other day on 7/6/18 as he was en route to [facility]. He has been tolerating this for the most part. However developed grade 3 thrombocytopenia and dose was reduced to 40 mg daily on 7/12/18. Tolerating this well. Labs again show persistent grade 2 thrombocytopenia and 2 fold elevation in AST/ ALT    · Osimertinib - continue 40 mg daily  · Labs every 2 weeks  · Zometa as scheduled  · EKG prior to next visit  · CT chest/abdomen/pelvis , bone scan prior to next visit    2. Kanwal 6 prostate cancer on active surveillance    3. Bone pain:  secondary to osseous metastasis s/p XRT to R ischium and now L2. S2. Pain has resolved  Monitor symptoms at T8 which has a rather aggressive lesion as well    4. HTN- On amlodipine- holding currently    5. Diarrhea- resolved    6.  Edema- bilateral    Dopplers ordered and negative for DVT  ECHO ordered and reviewed - EF 60%  Trace effusion as confirmed on CXR  Likely due to hypoalbuminemia   Dietician consult  Will r/o proteinuria as a cause  Hold amlodipine lest its contributing    7.  Elevated liver enzymes  2 fold increase in AST, ALT secondary to Osimertinib  Monitor    RTC with next Analy Najera MD

## 2018-08-09 NOTE — PROGRESS NOTES
Outpatient Infusion Center Progress Note    3713  Pt admit to [facility] for Q4 week zometa/labs ambulatory in stable condition. Assessment completed. No new concerns voiced. Pt has some swelling in his ankles x 1 month, MD is aware. 24 gauge PIV placed in right forearm with positive blood return, labs drawn as ordered. Visit Vitals    /82    Pulse (!) 50    Temp 98 °F (36.7 °C)    Resp 18    Wt 70.4 kg (155 lb 3.2 oz)    BMI 20.48 kg/m2        Recent Results (from the past 12 hour(s))   CBC WITH AUTOMATED DIFF    Collection Time: 08/09/18  3:29 PM   Result Value Ref Range    WBC 3.0 (L) 4.1 - 11.1 K/uL    RBC 3.58 (L) 4.10 - 5.70 M/uL    HGB 10.6 (L) 12.1 - 17.0 g/dL    HCT 33.5 (L) 36.6 - 50.3 %    MCV 93.6 80.0 - 99.0 FL    MCH 29.6 26.0 - 34.0 PG    MCHC 31.6 30.0 - 36.5 g/dL    RDW 18.0 (H) 11.5 - 14.5 %    PLATELET 74 (L) 884 - 400 K/uL    MPV 11.7 8.9 - 12.9 FL    NRBC 0.0 0  WBC    ABSOLUTE NRBC 0.00 0.00 - 0.01 K/uL    NEUTROPHILS 70 32 - 75 %    LYMPHOCYTES 18 12 - 49 %    MONOCYTES 9 5 - 13 %    EOSINOPHILS 3 0 - 7 %    BASOPHILS 0 0 - 1 %    IMMATURE GRANULOCYTES 0 0.0 - 0.5 %    ABS. NEUTROPHILS 2.1 1.8 - 8.0 K/UL    ABS. LYMPHOCYTES 0.5 (L) 0.8 - 3.5 K/UL    ABS. MONOCYTES 0.3 0.0 - 1.0 K/UL    ABS. EOSINOPHILS 0.1 0.0 - 0.4 K/UL    ABS. BASOPHILS 0.0 0.0 - 0.1 K/UL    ABS. IMM.  GRANS. 0.0 0.00 - 0.04 K/UL    DF SMEAR SCANNED      PLATELET COMMENTS Large Platelets      RBC COMMENTS ANISOCYTOSIS  1+        RBC COMMENTS RARE RBC FRAGMENTS PRESENT    METABOLIC PANEL, COMPREHENSIVE    Collection Time: 08/09/18  3:29 PM   Result Value Ref Range    Sodium 145 136 - 145 mmol/L    Potassium 3.9 3.5 - 5.1 mmol/L    Chloride 109 (H) 97 - 108 mmol/L    CO2 29 21 - 32 mmol/L    Anion gap 7 5 - 15 mmol/L    Glucose 81 65 - 100 mg/dL    BUN 14 6 - 20 MG/DL    Creatinine 0.71 0.70 - 1.30 MG/DL    BUN/Creatinine ratio 20 12 - 20      GFR est AA >60 >60 ml/min/1.73m2    GFR est non-AA >60 >60 ml/min/1.73m2    Calcium 8.8 8.5 - 10.1 MG/DL    Bilirubin, total 1.1 (H) 0.2 - 1.0 MG/DL    ALT (SGPT) 39 12 - 78 U/L    AST (SGOT) 56 (H) 15 - 37 U/L    Alk. phosphatase 193 (H) 45 - 117 U/L    Protein, total 6.4 6.4 - 8.2 g/dL    Albumin 2.7 (L) 3.5 - 5.0 g/dL    Globulin 3.7 2.0 - 4.0 g/dL    A-G Ratio 0.7 (L) 1.1 - 2.2     TSH 3RD GENERATION    Collection Time: 08/09/18  3:29 PM   Result Value Ref Range    TSH 3.20 0.36 - 3.74 uIU/mL       Labs within treatment parameters. Pt denies any recent or upcoming dental procedures. Medications:  Zometa IV    1700  Pt tolerated treatment well. Positive blood return pre and post infusion. PIV flushed and removed per [facility] policy. D/c home ambulatory in no distress. Pt aware of next appointment scheduled for 9/6/18 at 1430.    1710  Voicemail left for Nata Jameson NP regarding patient's platelet count of 74.

## 2018-08-28 ENCOUNTER — HOSPITAL ENCOUNTER (OUTPATIENT)
Dept: CT IMAGING | Age: 68
Discharge: HOME | End: 2018-08-28
Attending: INTERNAL MEDICINE
Payer: MEDICARE

## 2018-08-28 ENCOUNTER — HOSPITAL ENCOUNTER (OUTPATIENT)
Dept: NUCLEAR MEDICINE | Age: 68
Discharge: HOME | End: 2018-08-28
Attending: INTERNAL MEDICINE
Payer: MEDICARE

## 2018-08-28 DIAGNOSIS — C34.91 ADENOCARCINOMA OF LUNG, STAGE 4, RIGHT (HCC): ICD-10-CM

## 2018-08-28 PROCEDURE — 74177 CT ABD & PELVIS W/CONTRAST: CPT

## 2018-08-28 PROCEDURE — 78306 BONE IMAGING WHOLE BODY: CPT

## 2018-08-28 PROCEDURE — 71260 CT THORAX DX C+: CPT

## 2018-08-28 PROCEDURE — 74011000258 HC RX REV CODE- 258: Performed by: INTERNAL MEDICINE

## 2018-08-28 PROCEDURE — 74011636320 HC RX REV CODE- 636/320: Performed by: INTERNAL MEDICINE

## 2018-08-28 RX ORDER — SODIUM CHLORIDE 0.9 % (FLUSH) 0.9 %
10 SYRINGE (ML) INJECTION
Status: COMPLETED | OUTPATIENT
Start: 2018-08-28 | End: 2018-08-28

## 2018-08-28 RX ADMIN — IOPAMIDOL 100 ML: 755 INJECTION, SOLUTION INTRAVENOUS at 13:22

## 2018-08-28 RX ADMIN — SODIUM CHLORIDE 100 ML: 900 INJECTION, SOLUTION INTRAVENOUS at 13:22

## 2018-08-28 RX ADMIN — IOHEXOL 50 ML: 240 INJECTION, SOLUTION INTRATHECAL; INTRAVASCULAR; INTRAVENOUS; ORAL at 13:22

## 2018-08-28 RX ADMIN — Medication 10 ML: at 13:22

## 2018-08-29 ENCOUNTER — TELEPHONE (OUTPATIENT)
Dept: ONCOLOGY | Age: 68
End: 2018-08-29

## 2018-08-29 NOTE — TELEPHONE ENCOUNTER
Guillermo Woodard with radiology called on patient's behalf. She wanted to make the office aware of the reports for the CT scans and bone scan. If any questions return call to discuss [phone].   valerie

## 2018-08-30 ENCOUNTER — APPOINTMENT (OUTPATIENT)
Dept: INFUSION THERAPY | Age: 68
End: 2018-08-30
Payer: MEDICARE

## 2018-08-30 RX ORDER — SODIUM CHLORIDE 9 MG/ML
25 INJECTION, SOLUTION INTRAVENOUS CONTINUOUS
Status: CANCELLED
Start: 2018-01-01 | End: 2018-09-16

## 2018-08-31 ENCOUNTER — HOSPITAL ENCOUNTER (OUTPATIENT)
Dept: NON INVASIVE DIAGNOSTICS | Age: 68
Discharge: HOME | End: 2018-08-31
Payer: MEDICARE

## 2018-08-31 DIAGNOSIS — R06.02 SOB (SHORTNESS OF BREATH): ICD-10-CM

## 2018-08-31 LAB
ATRIAL RATE: 49 BPM
CALCULATED P AXIS, ECG09: 49 DEGREES
CALCULATED R AXIS, ECG10: 33 DEGREES
CALCULATED T AXIS, ECG11: 42 DEGREES
DIAGNOSIS, 93000: NORMAL
P-R INTERVAL, ECG05: 162 MS
Q-T INTERVAL, ECG07: 464 MS
QRS DURATION, ECG06: 102 MS
QTC CALCULATION (BEZET), ECG08: 419 MS
VENTRICULAR RATE, ECG03: 49 BPM

## 2018-08-31 PROCEDURE — 93005 ELECTROCARDIOGRAM TRACING: CPT

## 2018-09-06 ENCOUNTER — OFFICE VISIT (OUTPATIENT)
Dept: ONCOLOGY | Age: 68
End: 2018-09-06

## 2018-09-06 ENCOUNTER — HOSPITAL ENCOUNTER (OUTPATIENT)
Dept: INFUSION THERAPY | Age: 68
Discharge: HOME | End: 2018-09-06
Payer: MEDICARE

## 2018-09-06 VITALS
WEIGHT: 145.8 LBS | OXYGEN SATURATION: 96 % | HEART RATE: 50 BPM | RESPIRATION RATE: 20 BRPM | DIASTOLIC BLOOD PRESSURE: 85 MMHG | SYSTOLIC BLOOD PRESSURE: 169 MMHG | BODY MASS INDEX: 19.32 KG/M2 | HEIGHT: 73 IN | TEMPERATURE: 97 F

## 2018-09-06 VITALS
HEART RATE: 47 BPM | TEMPERATURE: 97.5 F | SYSTOLIC BLOOD PRESSURE: 154 MMHG | DIASTOLIC BLOOD PRESSURE: 84 MMHG | RESPIRATION RATE: 18 BRPM

## 2018-09-06 DIAGNOSIS — C34.91 ADENOCARCINOMA OF LUNG, STAGE 4, RIGHT (HCC): Primary | ICD-10-CM

## 2018-09-06 DIAGNOSIS — R79.89 ABNORMAL LFTS: ICD-10-CM

## 2018-09-06 DIAGNOSIS — C34.91 NON-SMALL CELL CANCER OF RIGHT LUNG (HCC): ICD-10-CM

## 2018-09-06 DIAGNOSIS — C61 PROSTATE CANCER (HCC): ICD-10-CM

## 2018-09-06 LAB
ALBUMIN SERPL-MCNC: 3.2 G/DL (ref 3.5–5)
ALBUMIN/GLOB SERPL: 0.8 {RATIO} (ref 1.1–2.2)
ALP SERPL-CCNC: 213 U/L (ref 45–117)
ALT SERPL-CCNC: 43 U/L (ref 12–78)
ANION GAP SERPL CALC-SCNC: 4 MMOL/L (ref 5–15)
AST SERPL-CCNC: 53 U/L (ref 15–37)
BASOPHILS # BLD: 0 K/UL (ref 0–0.1)
BASOPHILS NFR BLD: 1 % (ref 0–1)
BILIRUB SERPL-MCNC: 1.1 MG/DL (ref 0.2–1)
BUN SERPL-MCNC: 17 MG/DL (ref 6–20)
BUN/CREAT SERPL: 21 (ref 12–20)
CALCIUM SERPL-MCNC: 8.8 MG/DL (ref 8.5–10.1)
CHLORIDE SERPL-SCNC: 106 MMOL/L (ref 97–108)
CO2 SERPL-SCNC: 31 MMOL/L (ref 21–32)
CREAT SERPL-MCNC: 0.81 MG/DL (ref 0.7–1.3)
DIFFERENTIAL METHOD BLD: ABNORMAL
EOSINOPHIL # BLD: 0.1 K/UL (ref 0–0.4)
EOSINOPHIL NFR BLD: 3 % (ref 0–7)
ERYTHROCYTE [DISTWIDTH] IN BLOOD BY AUTOMATED COUNT: 15.9 % (ref 11.5–14.5)
GLOBULIN SER CALC-MCNC: 3.9 G/DL (ref 2–4)
GLUCOSE SERPL-MCNC: 90 MG/DL (ref 65–100)
HCT VFR BLD AUTO: 33.1 % (ref 36.6–50.3)
HGB BLD-MCNC: 10.6 G/DL (ref 12.1–17)
IMM GRANULOCYTES # BLD: 0 K/UL (ref 0–0.04)
IMM GRANULOCYTES NFR BLD AUTO: 0 % (ref 0–0.5)
LYMPHOCYTES # BLD: 0.6 K/UL (ref 0.8–3.5)
LYMPHOCYTES NFR BLD: 17 % (ref 12–49)
MCH RBC QN AUTO: 30 PG (ref 26–34)
MCHC RBC AUTO-ENTMCNC: 32 G/DL (ref 30–36.5)
MCV RBC AUTO: 93.8 FL (ref 80–99)
MONOCYTES # BLD: 0.3 K/UL (ref 0–1)
MONOCYTES NFR BLD: 8 % (ref 5–13)
NEUTS SEG # BLD: 2.3 K/UL (ref 1.8–8)
NEUTS SEG NFR BLD: 71 % (ref 32–75)
NRBC # BLD: 0 K/UL (ref 0–0.01)
NRBC BLD-RTO: 0 PER 100 WBC
PLATELET # BLD AUTO: 95 K/UL (ref 150–400)
PLATELET COMMENTS,PCOM: ABNORMAL
PMV BLD AUTO: 12.7 FL (ref 8.9–12.9)
POTASSIUM SERPL-SCNC: 4.1 MMOL/L (ref 3.5–5.1)
PROT SERPL-MCNC: 7.1 G/DL (ref 6.4–8.2)
RBC # BLD AUTO: 3.53 M/UL (ref 4.1–5.7)
RBC MORPH BLD: ABNORMAL
SODIUM SERPL-SCNC: 141 MMOL/L (ref 136–145)
TSH SERPL DL<=0.05 MIU/L-ACNC: 2.55 UIU/ML (ref 0.36–3.74)
WBC # BLD AUTO: 3.3 K/UL (ref 4.1–11.1)

## 2018-09-06 PROCEDURE — 36415 COLL VENOUS BLD VENIPUNCTURE: CPT

## 2018-09-06 PROCEDURE — 80053 COMPREHEN METABOLIC PANEL: CPT

## 2018-09-06 PROCEDURE — 85025 COMPLETE CBC W/AUTO DIFF WBC: CPT

## 2018-09-06 PROCEDURE — 84443 ASSAY THYROID STIM HORMONE: CPT

## 2018-09-06 NOTE — PROGRESS NOTES
Hematology/Oncology progress note    REASON FOR VISIT: Stage IV EGFR mutated NSCLC    HISTORY OF PRESENT ILLNESS: Mr. Brandy Sewell is a 75 y.o. male with prostate cancer who was diagnosed with stage IV NSCLC- adenocarcinoma (EGFR mutated , PD-L1 low) in May 2017. Here to follow up on Osimertinib and review of recent scans. Overall, he is doing ok. States he started experiencing mid/lower back pain to the left side about a few days ago. Reports this pain is similar to the pain he had experienced in the past.  He states his swelling has improved. Denies any fevers, chills, nausea/vomiting, sob, diarrhea/constipation. His appetite is good. Oncologic history   Mr. Brandy Sewell noticed a new cough and fevers back in March of 2017. He went to Urgent Care and received antibiotics and cough medication. He states this worked for a while, but he began to notice his cough return. This was intermittent. He had some tightness across his anterior chest which he related to the infection. Chest x-ray was abnormal and he was told to proceed to the Emergency Department. Alena Weaver had been under surveillance for right lower lobe mass that was initially noted in 2015. Bronch at that time was negative for malignancy. He was evaluated by Dr. Louise Salvador with Thoracic Surgery in 2015 for possible surgical resection. CT chest in November of 2016 with mass size unchanged but some right basilar pleural thickening. CT chest obtained 5/14/17 however showed a new large right pleural effusion with right middle lobe and right lower lobe collapse. Irregular spiculated right lower lobe mass 3.8cm and stable precarinal enlarged lymph nodes were noted. New right posterior second rib lytic lesion and new right hepatic hypodensity consistent with mets. He underwent a therapeutic thoracentesis on 5/15/17 with removal of 1500 cc of serosanguinous fluid. Pathology showed adenocarcinoma. PET CT showed pleural, bone, liver and flor metastasis.  MRI brain 5/20/17: No metastasis. Needed repeat R sided thoracentesis on 5/25/17, had some post procedure hydropneumothorax. He was seen by Dr. Benita Alves at [facility] for Pleurx catheter.      CT guided biopsy of R lung mass done 5/25 which showed adenocarcinoma. EGFR mutation detected Exon 18 and 21    Treatment  6/26/17: Tarceva. Monthly Xgeva. Palliative XRT to R hip   CT CAP 10/2/17: Response  Ct 1/23/18: CT with some growth of LLL mass but stable by RECIST. CT 3/15/18 and Bone scan stable though he had worsening left back pain. MRI results showed extensive disease at L2, S2 and additional lesions as previously known. Received palliative XRT that he completed 4/18/18 5/26/18: Started Osimertinib as he had a T790M mutation. Stopped 6/20/18 due to platelets of 00Z  Resumed at 80 mg every other day on 7/6/18   Started 40mg daily on 7/12/18      Past Medical History:   Diagnosis Date    Cancer [facility]) 2017    lung ca    Hypertension        Past Surgical History:   Procedure Laterality Date    HX ORTHOPAEDIC      reconstruction of left little finger       No Known Allergies    Current Outpatient Prescriptions   Medication Sig Dispense Refill    osimertinib (TAGRISSO) 40 mg tablet Take 1 Tab by mouth daily. Indications: EGFR T790M MUTATION-POSITIVE NON-SMALL CELL LUNG CANCER 30 Tab 3    levothyroxine (SYNTHROID) 75 mcg tablet TAKE 1/2 TABLET BY MOUTH DAILY BEFORE BREAKFAST 30 Tab 5    cholecalciferol (VITAMIN D3) 1,000 unit tablet Take 1,000 Units by mouth daily.  ondansetron hcl (ZOFRAN) 8 mg tablet       zoledronic acid (ZOMETA) 4 mg/100 mL pgbk infusion 4 mg by IntraVENous route every twenty-eight (28) days.          Social History     Social History    Marital status:      Spouse name: N/A    Number of children: N/A    Years of education: N/A     Social History Main Topics    Smoking status: Never Smoker    Smokeless tobacco: Never Used    Alcohol use 6.0 oz/week     10 Glasses of wine per week      Comment: ocassionally    Drug use: No    Sexual activity: Yes     Partners: Female      Comment:  has 2 children     Other Topics Concern    None     Social History Narrative       Family History   Problem Relation Age of Onset    Cancer Mother      leukemia    Stroke Father     Cancer Brother      bladder       ROS  Currently on Osimertinib ( started 5/26/18) and dose was decreased to 40mg daily ~ 7/12/18    ECOG PS is 0    Emotional well being addressed and patient is coping well      Physical Examination:   Visit Vitals    /85    Pulse (!) 50    Temp 97 °F (36.1 °C)    Resp 20    Ht 6' 1\" (1.854 m)    Wt 145 lb 12.8 oz (66.1 kg)    SpO2 96%    BMI 19.24 kg/m2     General appearance - alert, frail, and in no distress  Mental status - oriented to person, place, and time  Mouth - mucous membranes moist, pharynx normal without lesions. Neck - supple, no significant adenopathy  Lymphatics - no palpable lymphadenopathy, no hepatosplenomegaly  Chest -clear to auscultation though somewhat decreased at the L base  Heart - normal rate, regular rhythm, normal S1, S2, no murmurs, rubs, clicks or gallops  Abdomen - soft, nontender, nondistended, no masses or organomegaly, bowel sounds present  Neurological - normal speech, slight nystagmus  Skin: No rashes  MSK- Bilateral pedal and ankle edema    LABS  Lab Results   Component Value Date/Time    WBC 3.0 (L) 08/09/2018 03:29 PM    HGB 10.6 (L) 08/09/2018 03:29 PM    HCT 33.5 (L) 08/09/2018 03:29 PM    PLATELET 74 (L) 63/[birth year]/1961 03:29 PM    MCV 93.6 08/09/2018 03:29 PM    ABS.  NEUTROPHILS 2.1 08/09/2018 03:29 PM     Lab Results   Component Value Date/Time    Sodium 145 08/09/2018 03:29 PM    Potassium 3.9 08/09/2018 03:29 PM    Chloride 109 (H) 08/09/2018 03:29 PM    CO2 29 08/09/2018 03:29 PM    Glucose 81 08/09/2018 03:29 PM    BUN 14 08/09/2018 03:29 PM    Creatinine 0.71 08/09/2018 03:29 PM    GFR est AA >60 08/09/2018 03:29 PM    GFR est non-AA >60 08/09/2018 03:29 PM    Calcium 8.8 08/09/2018 03:29 PM     Lab Results   Component Value Date/Time    AST (SGOT) 56 (H) 08/09/2018 03:29 PM    Alk. phosphatase 193 (H) 08/09/2018 03:29 PM    Protein, total 6.4 08/09/2018 03:29 PM    Albumin 2.7 (L) 08/09/2018 03:29 PM    Globulin 3.7 08/09/2018 03:29 PM    A-G Ratio 0.7 (L) 08/09/2018 03:29 PM     CT Chest- abdomen - pelvis 10/2/17    Decreased right pleural thickening and size of right lower lobe  pulmonary mass with interval sclerosis of multiple osseous metastases as above  compatible with response to therapy    CT CAP and Bone scan- 3/15/18  1. CT of the chest demonstrates no significant change. Right lower lobe mass and  nodule are stable. Right pleural thickening and nodularity and small right  effusion are stable. 2. Bony metastatic disease is stable. 2. CT of the abdomen and pelvis is unchanged. . Subcentimeter low-density in the  liver which is indeterminate is stable. MRI 3/30/18  IMPRESSION  IMPRESSION:   1. Left T8 transverse process metastasis has extra cortical breakout, extension  into the lamina and pedicle, and extension into the left T8 neural foramen. Mild  neural foraminal stenosis associated. 2. Stable densely sclerotic T5 metastasis. Sclerotic right fifth rib metastasis. 3. Right lower lobe lung carcinoma. 4. Bulky right pleural carcinomatosis. Densely septated small right pleural  effusion. IMPRESSION  IMPRESSION:   1. Extracortical extension of L2 metastasis, obliterating and expanding the  right neural foramen, and extending into the epidural space. 2. Viable enhancing tumor throughout the L2 vertebral body around the sclerotic  portion in the inferior right posterolateral corner. 3. S2 and S3 vertebral body metastases. Possible epidural and left S2 neural  foraminal extension.     Guardant 360 results under media- T790M present    CT c/a/p 8/28/18  IMPRESSION:   Further decrease in size in the right lower lobe mass, compatible with response  to treatment. Increased sclerosis of the S2 segment osseous lesion, possibly representing a  healing response. No new osseous lesions seen. New small amount of ascites in the pelvis and around the liver can be a  treatment response. Bone Scan 8/28/18  IMPRESSION: Improvement in the focus of activity at L2 and in the sacrum  otherwise stable pattern of osseous metastatic disease      ASSESSMENT AND PLAN  Mr. Suraj Garcia is a 75 y.o. male with    1. Stage IV NSCLC   With R lung mass, mediastinal adenopathy, malignant R pleural effusion, multiple asymptomatic bone metastasis and possibly liver metastasis. EGFR mutated, PD-L1 5%. Found to have T790M mutation    His CT and bone scans prom 3/15/18 showed stable disease on Tarceva  However there was concern for clinical progression as he had increasing L back pain. MRI spine showed extensive bony metastatic disease  His Guardant 360 showed a T18 M mutation which is associated with Tarceva resistance. Switched to Osimertininb- 80 mg daily on 5/26/18, dose reduced to 40 mg due to grade 3 thrombocytopenia. Scans reviewed from 8/28/18 and show evidence of response. · Osimertinib - continue 40 mg daily  · Labs every 4 weeks, recent EKG reviewed and will repeat in 3 months  · Zometa on hold for 2 months due to LE swelling. 2. Kanwal 6 prostate cancer on active surveillance    3. Bone pain:  secondary to osseous metastasis s/p XRT to R ischium and now L2. S2. Pain has resolved  Monitor symptoms at T8 which has a rather aggressive lesion as well. Most recent bone scan showed improvement. However, patient reports new onset of mid/lower back pain similar to the pain he felt in the past.  Will obtain MRI of L/T spine, ordered. 4. HTN-Elevated today. Resume amlodipine    5. Diarrhea- resolved    6.  Edema- bilateral    Dopplers ordered and negative for DVT  ECHO ordered and reviewed - EF 60%  Trace effusion as confirmed on CXR  Likely due to hypoalbuminemia   Dietician consult  Previously r/o proteinuria as a cause  Hold amlodipine as it may be contributing  EKG reviewed-Sinus [facility]-[place] for 2 months    Has improved since last visit. Continue to monitor. 7. Elevated liver enzymes  2 fold increase in AST, ALT secondary to Osimertinib  Stable today. Will continue to monitor. 8. Thrombocytopenia  Osimertininb was previously DR due to gr 3 thrombocytopenia. Improved today. Plts 95 today. Continue to monitor. RTC in 4 weeks with labs. Addendum  MRI thoracic spine was reviewed and shows stable lesions at T7, T5.  Will not consider additional XRT as he has limited BM reserve - will discuss adequate pain management    Claudetta Cruel, MD

## 2018-09-06 NOTE — PROGRESS NOTES
Deandre Menchaca is a 75 y.o. male here today for follow up, lung cancer. Patient reports discomfort to left side mid back, 3/10 today.

## 2018-09-06 NOTE — PROGRESS NOTES
Outpatient Infusion Center Progress Note Maria Alejandra Pt admit to [facility] for Q4 week labs/ [address] [facility] ambulatory in stable condition. Assessment completed. No new concerns voiced. Please see CC for labs Visit Vitals  /84  Pulse (!) 47  Temp 97.5 °F (36.4 °C)  Resp 18  
  
 
1440 Pt tolerated treatment well. D/c home ambulatory in no distress. Pt aware of next appointment scheduled for 10/4/18 .

## 2018-09-08 ENCOUNTER — HOSPITAL ENCOUNTER (OUTPATIENT)
Dept: MRI IMAGING | Age: 68
Discharge: HOME | End: 2018-09-08
Attending: NURSE PRACTITIONER
Payer: MEDICARE

## 2018-09-08 VITALS — BODY MASS INDEX: 19.26 KG/M2 | WEIGHT: 146 LBS

## 2018-09-08 DIAGNOSIS — C34.91 ADENOCARCINOMA OF LUNG, STAGE 4, RIGHT (HCC): ICD-10-CM

## 2018-09-08 PROCEDURE — A9575 INJ GADOTERATE MEGLUMI 0.1ML: HCPCS | Performed by: NURSE PRACTITIONER

## 2018-09-08 PROCEDURE — 74011250636 HC RX REV CODE- 250/636: Performed by: NURSE PRACTITIONER

## 2018-09-08 PROCEDURE — 72158 MRI LUMBAR SPINE W/O & W/DYE: CPT

## 2018-09-08 PROCEDURE — 72157 MRI CHEST SPINE W/O & W/DYE: CPT

## 2018-09-08 RX ORDER — GADOTERATE MEGLUMINE 376.9 MG/ML
14 INJECTION INTRAVENOUS
Status: COMPLETED | OUTPATIENT
Start: 2018-09-08 | End: 2018-09-08

## 2018-09-08 RX ADMIN — GADOTERATE MEGLUMINE 14 ML: 376.9 INJECTION INTRAVENOUS at 12:21

## 2018-10-04 ENCOUNTER — HOSPITAL ENCOUNTER (OUTPATIENT)
Dept: INFUSION THERAPY | Age: 68
Discharge: HOME | End: 2018-10-04
Payer: MEDICARE

## 2018-10-04 ENCOUNTER — OFFICE VISIT (OUTPATIENT)
Dept: ONCOLOGY | Age: 68
End: 2018-10-04

## 2018-10-04 VITALS
HEART RATE: 55 BPM | SYSTOLIC BLOOD PRESSURE: 140 MMHG | DIASTOLIC BLOOD PRESSURE: 84 MMHG | RESPIRATION RATE: 18 BRPM | TEMPERATURE: 97.4 F

## 2018-10-04 VITALS
OXYGEN SATURATION: 97 % | BODY MASS INDEX: 19.91 KG/M2 | RESPIRATION RATE: 20 BRPM | DIASTOLIC BLOOD PRESSURE: 77 MMHG | HEART RATE: 51 BPM | SYSTOLIC BLOOD PRESSURE: 168 MMHG | TEMPERATURE: 97.8 F | HEIGHT: 73 IN | WEIGHT: 150.2 LBS

## 2018-10-04 DIAGNOSIS — R60.9 EDEMA, UNSPECIFIED TYPE: ICD-10-CM

## 2018-10-04 DIAGNOSIS — C79.51 BONE METASTASES (HCC): ICD-10-CM

## 2018-10-04 DIAGNOSIS — C34.91 ADENOCARCINOMA OF LUNG, STAGE 4, RIGHT (HCC): Primary | ICD-10-CM

## 2018-10-04 LAB
ALBUMIN SERPL-MCNC: 3 G/DL (ref 3.5–5)
ALBUMIN/GLOB SERPL: 0.9 {RATIO} (ref 1.1–2.2)
ALP SERPL-CCNC: 167 U/L (ref 45–117)
ALT SERPL-CCNC: 41 U/L (ref 12–78)
ANION GAP SERPL CALC-SCNC: 5 MMOL/L (ref 5–15)
AST SERPL-CCNC: 48 U/L (ref 15–37)
BASOPHILS # BLD: 0 K/UL (ref 0–0.1)
BASOPHILS NFR BLD: 1 % (ref 0–1)
BILIRUB SERPL-MCNC: 0.9 MG/DL (ref 0.2–1)
BUN SERPL-MCNC: 15 MG/DL (ref 6–20)
BUN/CREAT SERPL: 19 (ref 12–20)
CALCIUM SERPL-MCNC: 9.1 MG/DL (ref 8.5–10.1)
CHLORIDE SERPL-SCNC: 105 MMOL/L (ref 97–108)
CO2 SERPL-SCNC: 30 MMOL/L (ref 21–32)
CREAT SERPL-MCNC: 0.8 MG/DL (ref 0.7–1.3)
DIFFERENTIAL METHOD BLD: ABNORMAL
EOSINOPHIL # BLD: 0.1 K/UL (ref 0–0.4)
EOSINOPHIL NFR BLD: 4 % (ref 0–7)
ERYTHROCYTE [DISTWIDTH] IN BLOOD BY AUTOMATED COUNT: 15.8 % (ref 11.5–14.5)
GLOBULIN SER CALC-MCNC: 3.5 G/DL (ref 2–4)
GLUCOSE SERPL-MCNC: 85 MG/DL (ref 65–100)
HCT VFR BLD AUTO: 32.3 % (ref 36.6–50.3)
HGB BLD-MCNC: 10.3 G/DL (ref 12.1–17)
IMM GRANULOCYTES # BLD: 0 K/UL (ref 0–0.04)
IMM GRANULOCYTES NFR BLD AUTO: 0 % (ref 0–0.5)
LYMPHOCYTES # BLD: 0.7 K/UL (ref 0.8–3.5)
LYMPHOCYTES NFR BLD: 18 % (ref 12–49)
MAGNESIUM SERPL-MCNC: 1.8 MG/DL (ref 1.6–2.4)
MCH RBC QN AUTO: 29.9 PG (ref 26–34)
MCHC RBC AUTO-ENTMCNC: 31.9 G/DL (ref 30–36.5)
MCV RBC AUTO: 93.6 FL (ref 80–99)
MONOCYTES # BLD: 0.4 K/UL (ref 0–1)
MONOCYTES NFR BLD: 10 % (ref 5–13)
NEUTS SEG # BLD: 2.5 K/UL (ref 1.8–8)
NEUTS SEG NFR BLD: 67 % (ref 32–75)
NRBC # BLD: 0 K/UL (ref 0–0.01)
NRBC BLD-RTO: 0 PER 100 WBC
PHOSPHATE SERPL-MCNC: 2.6 MG/DL (ref 2.6–4.7)
PLATELET # BLD AUTO: 81 K/UL (ref 150–400)
PLATELET COMMENTS,PCOM: ABNORMAL
PMV BLD AUTO: 12.1 FL (ref 8.9–12.9)
POTASSIUM SERPL-SCNC: 4 MMOL/L (ref 3.5–5.1)
PROT SERPL-MCNC: 6.5 G/DL (ref 6.4–8.2)
RBC # BLD AUTO: 3.45 M/UL (ref 4.1–5.7)
RBC MORPH BLD: ABNORMAL
SODIUM SERPL-SCNC: 140 MMOL/L (ref 136–145)
WBC # BLD AUTO: 3.7 K/UL (ref 4.1–11.1)

## 2018-10-04 PROCEDURE — 85025 COMPLETE CBC W/AUTO DIFF WBC: CPT | Performed by: INTERNAL MEDICINE

## 2018-10-04 PROCEDURE — 36415 COLL VENOUS BLD VENIPUNCTURE: CPT | Performed by: INTERNAL MEDICINE

## 2018-10-04 PROCEDURE — 84100 ASSAY OF PHOSPHORUS: CPT | Performed by: INTERNAL MEDICINE

## 2018-10-04 PROCEDURE — 80053 COMPREHEN METABOLIC PANEL: CPT | Performed by: INTERNAL MEDICINE

## 2018-10-04 PROCEDURE — 83735 ASSAY OF MAGNESIUM: CPT | Performed by: INTERNAL MEDICINE

## 2018-10-04 NOTE — PROGRESS NOTES
Outpatient Infusion Center Progress Note [id number] Pt admit to [facility] for Q4 week labs/ [address] [facility] ambulatory in stable condition. Assessment completed. No new concerns voiced. Please see CC for labs Visit Vitals  /84 (BP 1 Location: Right arm, BP Patient Position: At rest)  Pulse (!) 55  Temp 97.4 °F (36.3 °C)  Resp 18  
  
 
1425 Pt tolerated treatment well. D/c home ambulatory in no distress. Pt aware of next appointment scheduled for 11/1/18 .

## 2018-10-04 NOTE — PROGRESS NOTES
Hematology/Oncology progress note    REASON FOR VISIT: Stage IV EGFR mutated NSCLC    HISTORY OF PRESENT ILLNESS: Mr. Devante Parks is a 75 y.o. male with prostate cancer who was diagnosed with stage IV NSCLC- adenocarcinoma (EGFR mutated , PD-L1 low) in May 2017. Here to follow up on Osimertinib. Overall, he is doing well. Back pain has improved but still present intermittently. He has noticed pain improves after having bowel movements. Denies constipation. States he is also taking advil a few times a week and this helps grealty. Swelling in legs has also improved. Notes SOB with exertion and also has dry scratchy cough. Denies fever/chills. Eating and drinking well. Also notes fingernails are more brittle then they use to be. Oncologic history   Mr. Devante Parks noticed a new cough and fevers back in March of 2017. He went to Urgent Care and received antibiotics and cough medication. He states this worked for a while, but he began to notice his cough return. This was intermittent. He had some tightness across his anterior chest which he related to the infection. Chest x-ray was abnormal and he was told to proceed to the Emergency Department. Awanda Gosselin had been under surveillance for right lower lobe mass that was initially noted in 2015. Bronch at that time was negative for malignancy. He was evaluated by Dr. Maricarmen Munoz with Thoracic Surgery in 2015 for possible surgical resection. CT chest in November of 2016 with mass size unchanged but some right basilar pleural thickening. CT chest obtained 5/14/17 however showed a new large right pleural effusion with right middle lobe and right lower lobe collapse. Irregular spiculated right lower lobe mass 3.8cm and stable precarinal enlarged lymph nodes were noted. New right posterior second rib lytic lesion and new right hepatic hypodensity consistent with mets. He underwent a therapeutic thoracentesis on 5/15/17 with removal of 1500 cc of serosanguinous fluid.  Pathology showed adenocarcinoma. PET CT showed pleural, bone, liver and flor metastasis. MRI brain 5/20/17: No metastasis. Needed repeat R sided thoracentesis on 5/25/17, had some post procedure hydropneumothorax. He was seen by Dr. Hipolito Gaffney at [facility] for Pleurx catheter.      CT guided biopsy of R lung mass done 5/25 which showed adenocarcinoma. EGFR mutation detected Exon 18 and 21    Treatment  6/26/17: Tarceva. Monthly Xgeva. Palliative XRT to R hip   CT CAP 10/2/17: Response  Ct 1/23/18: CT with some growth of LLL mass but stable by RECIST. CT 3/15/18 and Bone scan stable though he had worsening left back pain. MRI results showed extensive disease at L2, S2 and additional lesions as previously known. Received palliative XRT that he completed 4/18/18 5/26/18: Started Osimertinib as he had a T790M mutation. Stopped 6/20/18 due to platelets of 14P  Resumed at 80 mg every other day on 7/6/18   Started 40mg daily on 7/12/18      Past Medical History:   Diagnosis Date    Cancer [facility]) 2017    lung ca    Hypertension        Past Surgical History:   Procedure Laterality Date    HX ORTHOPAEDIC      reconstruction of left little finger       No Known Allergies    Current Outpatient Prescriptions   Medication Sig Dispense Refill    amlodipine besylate (AMLODIPINE PO) Take  by mouth.  osimertinib (TAGRISSO) 40 mg tablet Take 1 Tab by mouth daily. Indications: EGFR T790M MUTATION-POSITIVE NON-SMALL CELL LUNG CANCER 30 Tab 3    levothyroxine (SYNTHROID) 75 mcg tablet TAKE 1/2 TABLET BY MOUTH DAILY BEFORE BREAKFAST 30 Tab 5    cholecalciferol (VITAMIN D3) 1,000 unit tablet Take 1,000 Units by mouth daily.  ondansetron hcl (ZOFRAN) 8 mg tablet       zoledronic acid (ZOMETA) 4 mg/100 mL pgbk infusion 4 mg by IntraVENous route every twenty-eight (28) days.          Social History     Social History    Marital status:      Spouse name: N/A    Number of children: N/A    Years of education: N/A     Social History Main Topics  Smoking status: Never Smoker    Smokeless tobacco: Never Used    Alcohol use 6.0 oz/week     10 Glasses of wine per week      Comment: ocassionally    Drug use: No    Sexual activity: Yes     Partners: Female      Comment:  has 2 children     Other Topics Concern    None     Social History Narrative       Family History   Problem Relation Age of Onset    Cancer Mother      leukemia    Stroke Father     Cancer Brother      bladder       ROS  Currently on Osimertinib ( started 5/26/18) and dose was decreased to 40mg daily ~ 7/12/18    ECOG PS is 0    Emotional well being addressed and patient is coping well    Physical Examination:   Visit Vitals    /77    Pulse (!) 51    Temp 97.8 °F (36.6 °C)    Resp 20    Ht 6' 1\" (1.854 m)    Wt 150 lb 3.2 oz (68.1 kg)    SpO2 97%    BMI 19.82 kg/m2     General appearance - alert, frail, and in no distress  Mental status - oriented to person, place, and time  Mouth - mucous membranes moist, pharynx normal without lesions. Neck - supple, no significant adenopathy  Lymphatics - no palpable lymphadenopathy, no hepatosplenomegaly  Chest -clear to auscultation  Heart - normal rate, regular rhythm, normal S1, S2, no murmurs, rubs, clicks or gallops; edema has resolved  Abdomen - soft, nontender, nondistended, no masses or organomegaly, bowel sounds present  Neurological - normal speech  Skin: No rashes  MSK- Bilateral pedal and ankle edema    LABS  Lab Results   Component Value Date/Time    WBC 3.7 (L) 10/04/2018 02:21 PM    HGB 10.3 (L) 10/04/2018 02:21 PM    HCT 32.3 (L) 10/04/2018 02:21 PM    PLATELET 81 (L) 73/77/3687 02:21 PM    MCV 93.6 10/04/2018 02:21 PM    ABS.  NEUTROPHILS PENDING 10/04/2018 02:21 PM     Lab Results   Component Value Date/Time    Sodium 141 09/06/2018 02:40 PM    Potassium 4.1 09/06/2018 02:40 PM    Chloride 106 09/06/2018 02:40 PM    CO2 31 09/06/2018 02:40 PM    Glucose 90 09/06/2018 02:40 PM    BUN 17 09/06/2018 02:40 PM Creatinine 0.81 09/06/2018 02:40 PM    GFR est AA >60 09/06/2018 02:40 PM    GFR est non-AA >60 09/06/2018 02:40 PM    Calcium 8.8 09/06/2018 02:40 PM     Lab Results   Component Value Date/Time    AST (SGOT) 53 (H) 09/06/2018 02:40 PM    Alk. phosphatase 213 (H) 09/06/2018 02:40 PM    Protein, total 7.1 09/06/2018 02:40 PM    Albumin 3.2 (L) 09/06/2018 02:40 PM    Globulin 3.9 09/06/2018 02:40 PM    A-G Ratio 0.8 (L) 09/06/2018 02:40 PM     CT Chest- abdomen - pelvis 10/2/17  Decreased right pleural thickening and size of right lower lobe  pulmonary mass with interval sclerosis of multiple osseous metastases as above  compatible with response to therapy    CT CAP and Bone scan- 3/15/18  1. CT of the chest demonstrates no significant change. Right lower lobe mass and  nodule are stable. Right pleural thickening and nodularity and small right  effusion are stable. 2. Bony metastatic disease is stable. 2. CT of the abdomen and pelvis is unchanged. . Subcentimeter low-density in the  liver which is indeterminate is stable. MRI 3/30/18  IMPRESSION:   1. Left T8 transverse process metastasis has extra cortical breakout, extension  into the lamina and pedicle, and extension into the left T8 neural foramen. Mild  neural foraminal stenosis associated. 2. Stable densely sclerotic T5 metastasis. Sclerotic right fifth rib metastasis. 3. Right lower lobe lung carcinoma. 4. Bulky right pleural carcinomatosis. Densely septated small right pleural  effusion. IMPRESSION:   1. Extracortical extension of L2 metastasis, obliterating and expanding the  right neural foramen, and extending into the epidural space. 2. Viable enhancing tumor throughout the L2 vertebral body around the sclerotic  portion in the inferior right posterolateral corner. 3. S2 and S3 vertebral body metastases. Possible epidural and left S2 neural  foraminal extension.     Guardant 360 results under media- T790M present    CT c/a/p 8/28/18  IMPRESSION:   Further decrease in size in the right lower lobe mass, compatible with response  to treatment. Increased sclerosis of the S2 segment osseous lesion, possibly representing a  healing response. No new osseous lesions seen. New small amount of ascites in the pelvis and around the liver can be a  treatment response. Bone Scan 8/28/18  IMPRESSION: Improvement in the focus of activity at L2 and in the sacrum  otherwise stable pattern of osseous metastatic disease    MRI lumbar spine 9/8/18: IMPRESSION:  1. Overall improvement in exam since the prior study with complete disappearance  in epidural soft tissue components of the S2 and L2 metastatic lesions. L2 and  S1 lesions are still viable, evidenced by enhancement, but overall have  decreased in size. 2. Scoliosis. Multilevel degenerative disc disease and degenerative changes  described above. 3. Indeterminate right paraspinal enhancement at L3 and L4. MRI thoracis spine 9/8/18: IMPRESSION:  1. No definite change in soft tissue component associated with the left T7  transverse process metastatic lesion. No significant change in soft tissue  component that extends into the left T7-T8 neuroforamen causing moderate to  severe narrowing. No change in T5 sclerotic lesion. 2. Scoliosis. Multilevel degenerative disc disease and degenerative changes also  redemonstrated. 3. Right-sided lung nodules/masses, pleural effusion, and right paraspinal soft  tissue thickening redemonstrated. ASSESSMENT AND PLAN  Mr. Ml Rosa is a 75 y.o. male with:    1. Stage IV NSCLC   With R lung mass, mediastinal adenopathy, malignant R pleural effusion, multiple asymptomatic bone metastasis and possibly liver metastasis. EGFR mutated, PD-L1 5%. Found to have T790M mutation    His CT and bone scans prom 3/15/18 showed stable disease on Tarceva  However there was concern for clinical progression as he had increasing L back pain.    MRI spine showed extensive bony metastatic disease  His Guardant 5 showed a T18 M mutation which is associated with Tarceva resistance. Switched to Osimertininb- 80 mg daily on 5/26/18, dose reduced to 40 mg due to grade 3 thrombocytopenia. Scans reviewed from 8/28/18 and show evidence of response. · Osimertinib - continue 40 mg daily  · Labs every 4 weeks, recent EKG reviewed and will repeat in 3 months (due end of November)  · Zometa on hold for 2 months due to LE swelling. 2. Kanwal 6 prostate cancer on active surveillance    3. Bone pain:  secondary to osseous metastasis s/p XRT to R ischium and now L2. S2.   Monitor symptoms at T8 which has a rather aggressive lesion as well. Most recent bone scan showed improvement. MRI of the thoracic and lumbar spine done due to new onset of mid/lower back pain similar to the pain he felt in the past.      MRI thoracic spine was reviewed and shows stable lesions at T7, T5. Will not consider additional XRT as he has limited BM reserve    Pain improved at today's visit    4. HTN. On amlodipine. 5. Diarrhea- resolved    6. Edema- bilateral  Dopplers ordered and negative for DVT  ECHO ordered and reviewed - EF 60%  Trace effusion as confirmed on CXR  Likely due to hypoalbuminemia   Previously r/o proteinuria as a cause  Hold amlodipine as it may be contributing   EKG reviewed, sinus bradycardia, due for EKG at the end of November  Holding Zometa for 2 months    Continues to improve at today's visit    7. Elevated liver enzymes  Grade 1 Secondary to Osimertinib  Will continue to monitor. 8. Thrombocytopenia  Osimertininb was previously DR due to gr 3 thrombocytopenia. Plts 81K today. Continue to monitor. RTC in 4 weeks with labs.       Gershon Meigs, MD

## 2018-10-09 DIAGNOSIS — C34.91 ADENOCARCINOMA OF LUNG, STAGE 4, RIGHT (HCC): Primary | ICD-10-CM

## 2018-11-01 NOTE — PROGRESS NOTES
Hematology/Oncology progress note    REASON FOR VISIT: Stage IV EGFR mutated NSCLC    HISTORY OF PRESENT ILLNESS: Mr. Daymon Dandy is a 75 y.o. male with prostate cancer who was diagnosed with stage IV NSCLC- adenocarcinoma (EGFR mutated , PD-L1 low) in May 2017. Here to follow up on Osimertinib. Overall doing well today. Back pain has completely resolved. No other aches or pains. Swelling in lower extremities has persisted but he does not believe it has gotten any worse. Has SOB on exertion and dry cough continues. Denies fevers/chills. Denies nausea/vomiting, diarrhea/constipation. Eating and drinking well. Fingernails remain brittle. Oncologic history   Mr. Daymon Dandy noticed a new cough and fevers back in March of 2017. He went to Urgent Care and received antibiotics and cough medication. He states this worked for a while, but he began to notice his cough return. This was intermittent. He had some tightness across his anterior chest which he related to the infection. Chest x-ray was abnormal and he was told to proceed to the Emergency Department. Mertha Nageotte had been under surveillance for right lower lobe mass that was initially noted in 2015. Bronch at that time was negative for malignancy. He was evaluated by Dr. Daren Thomas with Thoracic Surgery in 2015 for possible surgical resection. CT chest in November of 2016 with mass size unchanged but some right basilar pleural thickening. CT chest obtained 5/14/17 however showed a new large right pleural effusion with right middle lobe and right lower lobe collapse. Irregular spiculated right lower lobe mass 3.8cm and stable precarinal enlarged lymph nodes were noted. New right posterior second rib lytic lesion and new right hepatic hypodensity consistent with mets. He underwent a therapeutic thoracentesis on 5/15/17 with removal of 1500 cc of serosanguinous fluid. Pathology showed adenocarcinoma. PET CT showed pleural, bone, liver and flor metastasis.  MRI brain 5/20/17: No metastasis. Needed repeat R sided thoracentesis on 5/25/17, had some post procedure hydropneumothorax. He was seen by Dr. Shahla Borrero at [facility] for Pleurx catheter.      CT guided biopsy of R lung mass done 5/25 which showed adenocarcinoma. EGFR mutation detected Exon 18 and 21    Treatment  6/26/17: Tarceva. Monthly Xgeva. Palliative XRT to R hip   CT CAP 10/2/17: Response  Ct 1/23/18: CT with some growth of LLL mass but stable by RECIST. CT 3/15/18 and Bone scan stable though he had worsening left back pain. MRI results showed extensive disease at L2, S2 and additional lesions as previously known. Received palliative XRT that he completed 4/18/18 5/26/18: Started Osimertinib as he had a T790M mutation. Stopped 6/20/18 due to platelets of 61Q  Resumed at 80 mg every other day on 7/6/18   Started 40mg daily on 7/12/18      Past Medical History:   Diagnosis Date    Cancer [facility]) 2017    lung ca    Hypertension        Past Surgical History:   Procedure Laterality Date    HX ORTHOPAEDIC      reconstruction of left little finger       No Known Allergies    Current Outpatient Medications   Medication Sig Dispense Refill    amLODIPine (NORVASC) 10 mg tablet TAKE 1 TABLET BY MOUTH EVERY DAY 30 Tab 0    osimertinib (TAGRISSO) 40 mg tablet Take 1 Tab by mouth daily. Indications: EGFR T790M MUTATION-POSITIVE NON-SMALL CELL LUNG CANCER 30 Tab 3    amlodipine besylate (AMLODIPINE PO) Take  by mouth.  levothyroxine (SYNTHROID) 75 mcg tablet TAKE 1/2 TABLET BY MOUTH DAILY BEFORE BREAKFAST 30 Tab 5    cholecalciferol (VITAMIN D3) 1,000 unit tablet Take 1,000 Units by mouth daily.  ondansetron hcl (ZOFRAN) 8 mg tablet       zoledronic acid (ZOMETA) 4 mg/100 mL pgbk infusion 4 mg by IntraVENous route every twenty-eight (28) days.          Social History     Socioeconomic History    Marital status:      Spouse name: Not on file    Number of children: Not on file    Years of education: Not on file    Highest education level: Not on file   Social Needs    Financial resource strain: Not on file    Food insecurity - worry: Not on file    Food insecurity - inability: Not on file    Transportation needs - medical: Not on file   xPeerient needs - non-medical: Not on file   Occupational History    Not on file   Tobacco Use    Smoking status: Never Smoker    Smokeless tobacco: Never Used   Substance and Sexual Activity    Alcohol use: Yes     Alcohol/week: 6.0 oz     Types: 10 Glasses of wine per week     Comment: ocassionally    Drug use: No    Sexual activity: Yes     Partners: Female     Comment:  has 2 children   Other Topics Concern    Not on file   Social History Narrative    Not on file       Family History   Problem Relation Age of Onset    Cancer Mother         leukemia    Stroke Father     Cancer Brother         bladder       ROS  Currently on Osimertinib ( started 5/26/18) and dose was decreased to 40mg daily ~ 7/12/18    ECOG PS is 0    Emotional well being addressed and patient is coping well    Physical Examination:   Visit Vitals  /80 (BP 1 Location: Left arm, BP Patient Position: Sitting)   Pulse (!) 54   Temp 97.9 °F (36.6 °C) (Oral)   Resp 20   Ht 6' 1\" (1.854 m)   Wt 158 lb 3.2 oz (71.8 kg)   SpO2 96%   BMI 20.87 kg/m²     General appearance - alert, frail, and in no distress  Mental status - oriented to person, place, and time  Mouth - mucous membranes moist, pharynx normal without lesions.   Neck - supple, no significant adenopathy  Lymphatics - no palpable lymphadenopathy, no hepatosplenomegaly  Chest -clear to auscultation  Heart - normal rate, regular rhythm, normal S1, S2, no murmurs, rubs, clicks or gallops; 2+ LE edema  Abdomen - soft, nontender, nondistended, no masses or organomegaly, bowel sounds present  Neurological - normal speech  Skin: No rashes  MSK- Bilateral pedal and ankle edema    LABS  Lab Results   Component Value Date/Time    WBC 3.7 (L) 10/04/2018 02:21 PM    HGB 10.3 (L) 10/04/2018 02:21 PM    HCT 32.3 (L) 10/04/2018 02:21 PM    PLATELET 81 (L) 89/53/4339 02:21 PM    MCV 93.6 10/04/2018 02:21 PM    ABS. NEUTROPHILS 2.5 10/04/2018 02:21 PM     Lab Results   Component Value Date/Time    Sodium 140 10/04/2018 02:21 PM    Potassium 4.0 10/04/2018 02:21 PM    Chloride 105 10/04/2018 02:21 PM    CO2 30 10/04/2018 02:21 PM    Glucose 85 10/04/2018 02:21 PM    BUN 15 10/04/2018 02:21 PM    Creatinine 0.80 10/04/2018 02:21 PM    GFR est AA >60 10/04/2018 02:21 PM    GFR est non-AA >60 10/04/2018 02:21 PM    Calcium 9.1 10/04/2018 02:21 PM     Lab Results   Component Value Date/Time    AST (SGOT) 48 (H) 10/04/2018 02:21 PM    Alk. phosphatase 167 (H) 10/04/2018 02:21 PM    Protein, total 6.5 10/04/2018 02:21 PM    Albumin 3.0 (L) 10/04/2018 02:21 PM    Globulin 3.5 10/04/2018 02:21 PM    A-G Ratio 0.9 (L) 10/04/2018 02:21 PM     CT Chest- abdomen - pelvis 10/2/17  Decreased right pleural thickening and size of right lower lobe  pulmonary mass with interval sclerosis of multiple osseous metastases as above  compatible with response to therapy    CT CAP and Bone scan- 3/15/18  1. CT of the chest demonstrates no significant change. Right lower lobe mass and  nodule are stable. Right pleural thickening and nodularity and small right  effusion are stable. 2. Bony metastatic disease is stable. 2. CT of the abdomen and pelvis is unchanged. . Subcentimeter low-density in the  liver which is indeterminate is stable. MRI 3/30/18  IMPRESSION:   1. Left T8 transverse process metastasis has extra cortical breakout, extension  into the lamina and pedicle, and extension into the left T8 neural foramen. Mild  neural foraminal stenosis associated. 2. Stable densely sclerotic T5 metastasis. Sclerotic right fifth rib metastasis. 3. Right lower lobe lung carcinoma. 4. Bulky right pleural carcinomatosis. Densely septated small right pleural  effusion. IMPRESSION:   1. Extracortical extension of L2 metastasis, obliterating and expanding the  right neural foramen, and extending into the epidural space. 2. Viable enhancing tumor throughout the L2 vertebral body around the sclerotic  portion in the inferior right posterolateral corner. 3. S2 and S3 vertebral body metastases. Possible epidural and left S2 neural  foraminal extension. Guardant 360 results under media- T790M present    CT c/a/p 8/28/18  IMPRESSION:   Further decrease in size in the right lower lobe mass, compatible with response  to treatment. Increased sclerosis of the S2 segment osseous lesion, possibly representing a  healing response. No new osseous lesions seen. New small amount of ascites in the pelvis and around the liver can be a  treatment response. Bone Scan 8/28/18  IMPRESSION: Improvement in the focus of activity at L2 and in the sacrum  otherwise stable pattern of osseous metastatic disease    MRI lumbar spine 9/8/18: IMPRESSION:  1. Overall improvement in exam since the prior study with complete disappearance  in epidural soft tissue components of the S2 and L2 metastatic lesions. L2 and  S1 lesions are still viable, evidenced by enhancement, but overall have  decreased in size. 2. Scoliosis. Multilevel degenerative disc disease and degenerative changes  described above. 3. Indeterminate right paraspinal enhancement at L3 and L4. MRI thoracis spine 9/8/18: IMPRESSION:  1. No definite change in soft tissue component associated with the left T7  transverse process metastatic lesion. No significant change in soft tissue  component that extends into the left T7-T8 neuroforamen causing moderate to  severe narrowing. No change in T5 sclerotic lesion. 2. Scoliosis. Multilevel degenerative disc disease and degenerative changes also  redemonstrated. 3. Right-sided lung nodules/masses, pleural effusion, and right paraspinal soft  tissue thickening redemonstrated.     ASSESSMENT AND PLAN  Mr. Sheryle Guest is a 75 y.o. male with:    1. Stage IV NSCLC   With R lung mass, mediastinal adenopathy, malignant R pleural effusion, multiple asymptomatic bone metastasis and possibly liver metastasis. EGFR mutated, PD-L1 5%. Found to have T790M mutation    His CT and bone scans prom 3/15/18 showed stable disease on Tarceva  However there was concern for clinical progression as he had increasing L back pain. MRI spine showed extensive bony metastatic disease  His Guardant 360 showed a T18 M mutation which is associated with Tarceva resistance. Switched to Osimertininb- 80 mg daily on 5/26/18, dose reduced to 40 mg due to grade 3 thrombocytopenia. Scans reviewed from 8/28/18 and show evidence of response. Labs reviewed today and he continues to have grade 2 thrombocytopenia    · Osimertinib - continue 40 mg daily  · Labs every 4 weeks, recent EKG reviewed and will repeat in 3 months (due end of November)  · Zometa today and q3 months thereafter     2. Heiskell 6 prostate cancer on active surveillance    3. Bone pain:  secondary to osseous metastasis s/p XRT to R ischium and now L2. Monitor symptoms at T8 which has a rather aggressive lesion as well. Most recent bone scan showed improvement. MRI thoracic spine was reviewed and shows stable lesions at T7, T5. Will not consider additional XRT as he has limited BM reserve    Pain improved at today's visit    4. HTN. On amlodipine. 5. Diarrhea- resolved    6. Edema- bilateral  Dopplers ordered and negative for DVT  ECHO ordered and reviewed - EF 60%  Trace effusion as confirmed on CXR  Likely due to hypoalbuminemia   Previously r/o proteinuria as a cause  Hold amlodipine as it may be contributing   EKG reviewed, sinus bradycardia, due for EKG at the end of November-ordered today  Resume Zometa today and q3 months thereafter     7. Elevated liver enzymes  Grade 1 Secondary to Osimertinib  Will continue to monitor.     8. Thrombocytopenia  Osimertininb was previously  due to gr 3 thrombocytopenia. Plts 75K today. Continue to monitor.        RTC in 12/6 with labs and scan review      Marjan Sanchez MD

## 2018-11-01 NOTE — PROGRESS NOTES
Outpatient [facility]fusion Center Progress Note    1400  Pt admit to [place] for labs/zometa ambulatory in stable condition. Assessment completed. No new concerns voiced. Please see CC for pending labs. Patient sent upstairs for MD appointment. They will determine if patient will receive zometa today. ALVERTO Bland called down stating patient will be getting zometa today and the 3 months after. Visit Vitals  BP (P) 116/68   Pulse (P) 60   Temp (P) 98.8 °F (37.1 °C)        1515  Pt tolerated treatment well. D/c home ambulatory in no distress. MD to send new order for Zometa Q 3 months.

## 2018-11-13 NOTE — PATIENT INSTRUCTIONS
Vaccine Information Statement    Influenza (Flu) Vaccine (Inactivated or Recombinant): What you need to know    Many Vaccine Information Statements are available in Kiswahili and other languages. See www.immunize.org/vis  Hojas de Información Sobre Vacunas están disponibles en Español y en muchos otros idiomas. Visite www.immunize.org/vis    1. Why get vaccinated? Influenza (flu) is a contagious disease that spreads around the [place] every year, usually between October and May. Flu is caused by influenza viruses, and is spread mainly by coughing, sneezing, and close contact. Anyone can get flu. Flu strikes suddenly and can last several days. Symptoms vary by age, but can include:   fever/chills   sore throat   muscle aches   fatigue   cough   headache    runny or stuffy nose    Flu can also lead to pneumonia and blood infections, and cause diarrhea and seizures in children. If you have a medical condition, such as heart or lung disease, flu can make it worse. Flu is more dangerous for some people. Infants and young children, people 72years of age and older, pregnant women, and people with certain health conditions or a weakened immune system are at greatest risk. Each year thousands of people in the [facility] die from flu, and many more are hospitalized. Flu vaccine can:   keep you from getting flu,   make flu less severe if you do get it, and   keep you from spreading flu to your family and other people. 2. Inactivated and recombinant flu vaccines    A dose of flu vaccine is recommended every flu season. Children 6 months through 6years of age may need two doses during the same flu season. Everyone else needs only one dose each flu season.        Some inactivated flu vaccines contain a very small amount of a mercury-based preservative called thimerosal. Studies have not shown thimerosal in vaccines to be harmful, but flu vaccines that do not contain thimerosal are available. There is no live flu virus in flu shots. They cannot cause the flu. There are many flu viruses, and they are always changing. Each year a new flu vaccine is made to protect against three or four viruses that are likely to cause disease in the upcoming flu season. But even when the vaccine doesnt exactly match these viruses, it may still provide some protection    Flu vaccine cannot prevent:   flu that is caused by a virus not covered by the vaccine, or   illnesses that look like flu but are not. It takes about 2 weeks for protection to develop after vaccination, and protection lasts through the flu season. 3. Some people should not get this vaccine    Tell the person who is giving you the vaccine:     If you have any severe, life-threatening allergies. If you ever had a life-threatening allergic reaction after a dose of flu vaccine, or have a severe allergy to any part of this vaccine, you may be advised not to get vaccinated. Most, but not all, types of flu vaccine contain a small amount of egg protein.  If you ever had Guillain-Barré Syndrome (also called GBS). Some people with a history of GBS should not get this vaccine. This should be discussed with your doctor.  If you are not feeling well. It is usually okay to get flu vaccine when you have a mild illness, but you might be asked to come back when you feel better. 4. Risks of a vaccine reaction    With any medicine, including vaccines, there is a chance of reactions. These are usually mild and go away on their own, but serious reactions are also possible. Most people who get a flu shot do not have any problems with it.      Minor problems following a flu shot include:    soreness, redness, or swelling where the shot was given     hoarseness   sore, red or itchy eyes   cough   fever   aches   headache   itching   fatigue  If these problems occur, they usually begin soon after the shot and last 1 or 2 days. More serious problems following a flu shot can include the following:     There may be a small increased risk of Guillain-Barré Syndrome (GBS) after inactivated flu vaccine. This risk has been estimated at 1 or 2 additional cases per million people vaccinated. This is much lower than the risk of severe complications from flu, which can be prevented by flu vaccine.  Young children who get the flu shot along with pneumococcal vaccine (PCV13) and/or DTaP vaccine at the same time might be slightly more likely to have a seizure caused by fever. Ask your doctor for more information. Tell your doctor if a child who is getting flu vaccine has ever had a seizure. Problems that could happen after any injected vaccine:      People sometimes faint after a medical procedure, including vaccination. Sitting or lying down for about 15 minutes can help prevent fainting, and injuries caused by a fall. Tell your doctor if you feel dizzy, or have vision changes or ringing in the ears.  Some people get severe pain in the shoulder and have difficulty moving the arm where a shot was given. This happens very rarely.  Any medication can cause a severe allergic reaction. Such reactions from a vaccine are very rare, estimated at about 1 in a million doses, and would happen within a few minutes to a few hours after the vaccination. As with any medicine, there is a very remote chance of a vaccine causing a serious injury or death. The safety of vaccines is always being monitored. For more information, visit: www.cdc.gov/vaccinesafety/    5. What if there is a serious reaction? What should I look for?  Look for anything that concerns you, such as signs of a severe allergic reaction, very high fever, or unusual behavior.     Signs of a severe allergic reaction can include hives, swelling of the face and throat, difficulty breathing, a fast heartbeat, dizziness, and weakness - usually within a few minutes to a few hours after the vaccination. What should I do?  If you think it is a severe allergic reaction or other emergency that cant wait, call 9-1-1 and get the person to the nearest hospital. Otherwise, call your doctor.  Reactions should be reported to the Vaccine Adverse Event Reporting System (VAERS). Your doctor should file this report, or you can do it yourself through  the VAERS web site at www.vaers. [facility].gov, or by calling 9-689.200.6166. VAERS does not give medical advice. 6. The National Vaccine Injury Compensation Program    The [facility] Vaccine Injury Compensation Program (VICP) is a federal program that was created to compensate people who may have been injured by certain vaccines. Persons who believe they may have been injured by a vaccine can learn about the program and about filing a claim by calling 6-696.625.8792 or visiting the [employer] website at www.[facility].gov/vaccinecompensation. There is a time limit to file a claim for compensation. 7. How can I learn more?  Ask your healthcare provider. He or she can give you the vaccine package insert or suggest other sources of information.  Call your local or state health department.  Contact the Centers for Disease Control and Prevention (CDC):  - Call 1-116.204.8583 (6-629-TFD-INFO) or  - Visit CDCs website at www.cdc.gov/flu    Vaccine Information Statement   Inactivated Influenza Vaccine   8/7/2015  42 JOANNE Zaidi [id number]    Department of Health and Human Services  Centers for Disease Control and Prevention    Office Use Only

## 2018-11-13 NOTE — PROGRESS NOTES
Schedule of Personalized Health Plan  (Provide Copy to Patient)  The best way to stay healthy is to live a healthy lifestyle. A healthy lifestyle includes regular exercise, eating a well-balanced diet, keeping a healthy weight and not smoking. Regular physical exams and screening tests are another important way to take care of yourself. Preventive exams provided by health care providers can find health problems early when treatment works best and can keep you from getting certain diseases or illnesses. Preventive services include exams, lab tests, screenings, shots, monitoring and information to help you take care of your own health. All people over 65 should have a pneumonia shot. Pneumonia shots are usually only needed once in a lifetime unless your doctor decides differently. All people over 65 should have a yearly flu shot. People over 65 are at medium to high risk for Hepatitis B. Three shots are needed for complete protection. In addition to your physical exam, some screening tests are recommended:    Bone mass measurement (dexa scan) is recommended every two years if you have certain risk factors, such as personal history of vertebral fracture or chronic steroid medication use    Diabetes Mellitus screening is recommended every year. Glaucoma is an eye disease caused by high pressure in the eye. An eye exam is recommended every year. Cardiovascular screening tests that check your cholesterol and other blood fat (lipid) levels are recommended every five years. Colorectal Cancer screening tests help to find pre-cancerous polyps (growths in the colon) so they can be removed before they turn into cancer. Tests ordered for screening depend on your personal and family history risk factors.     Screening for Prostate Cancer is recommended yearly with a digital rectal exam and/or a PSA test    Here is a list of your current Health Maintenance items with a due date:  Health Maintenance Topic Date Due    Hepatitis C Screening  1950    DTaP/Tdap/Td series (1 - Tdap) 12/02/1970    Shingrix Vaccine Age 50> (1 of 2) 12/02/1999    GLAUCOMA SCREENING Q2Y  12/02/2014    Pneumococcal 65+ High/Highest Risk (1 of 2 - PCV13) 12/02/2014    FOBT Q 1 YEAR AGE 50-75  10/31/2017    MEDICARE YEARLY EXAM  03/15/2018    Influenza Age 9 to Adult  08/01/2018

## 2018-11-13 NOTE — PROGRESS NOTES
HISTORY OF PRESENT ILLNESS  Delfin Sanchez is a 75 y.o. male. Pt. comes in for f/u. Has multiple medical problems. BP is stable. He is followed by oncology for metastatic lung adenocarcinoma. Most recent CT showed shrinkage of the tumor. Has metastasis to the bones. Received radiation. Denies any significant pain. Denies chest pain or respiratory issues. Weight is been stable. Reports compliance with medications and diet. Med list and most recent labs/studies reviewed with pt. TSH and other labs have been stable. Trying to be active physically as tolerated. Denies tobacco or alcohol use. He is  and has grown children. ACP discussed. Reports no other new c/o. HPI    Review of Systems   Constitutional: Negative. HENT: Negative. Eyes: Negative. Respiratory: Negative for cough and shortness of breath. Cardiovascular: Positive for leg swelling. Negative for chest pain. Gastrointestinal: Negative for abdominal pain. Genitourinary: Negative for dysuria. Musculoskeletal: Negative for back pain, falls and joint pain. Skin: Negative. Neurological: Negative for dizziness, sensory change, focal weakness and headaches. Psychiatric/Behavioral: Negative for depression. The patient is not nervous/anxious and does not have insomnia. All other systems reviewed and are negative. Physical Exam   Constitutional: He is oriented to person, place, and time. He appears well-developed and well-nourished. No distress. Cachectic thin man  Underweight   HENT:   Head: Normocephalic and atraumatic. Mouth/Throat: Oropharynx is clear and moist.   Eyes: Conjunctivae are normal. No scleral icterus. Neck: Normal range of motion. Neck supple. No JVD present. No thyromegaly present. Cardiovascular: Normal rate, regular rhythm, normal heart sounds and intact distal pulses. No murmur heard. Pulmonary/Chest: Effort normal. No respiratory distress. He has no wheezes. He has no rales.    Dec sounds    Abdominal: Soft. Bowel sounds are normal. He exhibits no distension. There is no tenderness. Musculoskeletal: He exhibits edema (Bilateral ankles, chronic) and tenderness (Back, minimal). Thoracic scoliosis, chronic   Neurological: He is alert and oriented to person, place, and time. Coordination normal.   Skin: Skin is warm and dry. No rash noted. Psychiatric: He has a normal mood and affect. His behavior is normal.   Seems chronically depressed   Nursing note and vitals reviewed. ASSESSMENT and PLAN  Diagnoses and all orders for this visit:    1. Essential hypertension    2. Encounter for immunization  -     INFLUENZA VACCINE INACTIVATED (IIV), SUBUNIT, ADJUVANTED, IM  -     ADMIN INFLUENZA VIRUS VAC  -     PNEUMOCOCCAL CONJ VACCINE 13 VALENT IM    3. Non-small cell cancer of right lung ([facility] [address]    4. Acquired hypothyroidism    5. Colon cancer screening  -     COLOGUARD TEST (FECAL DNA COLORECTAL CANCER SCREENING)    6. Medicare annual wellness visit, subsequent      Follow-up Disposition:  Return in about 6 months (around 5/13/2019).    lab results and schedule of future lab studies reviewed with patient  reviewed diet, exercise and weight control  reviewed medications and side effects in detail  F/u with other MD's as scheduled  Overall seems stable

## 2018-11-13 NOTE — PROGRESS NOTES
Patient identified with 2 ID's, Name and Fadia Solid is a 75 y.o. male  Chief Complaint   Patient presents with    Lung Cancer     adenocarcinoma of lung follow up appointment       1. Have you been to the ER, urgent care clinic since your last visit? Hospitalized since your last visit? No     2. Have you seen or consulted any other health care providers outside of the [address] [place] [address] since your last visit? Include any pap smears or colon screening. Yes seen by Oral Surgeon at [address] [place] [address] DrsWai Prescribed chlorhexidine mouth wash about 3 weeks ago    Health Maintenance   Topic Date Due    Hepatitis C Screening  1950    DTaP/Tdap/Td series (1 - Tdap) 12/02/1970    Shingrix Vaccine Age 50> (1 of 2) 12/02/1999    GLAUCOMA SCREENING Q2Y  12/02/2014    FOBT Q 1 YEAR AGE 50-75  10/31/2017    Pneumococcal 65+ High/Highest Risk (2 of 2 - PPSV23) 01/08/2019    MEDICARE YEARLY EXAM  11/14/2019    Influenza Age 5 to Adult  Completed   Hep C can be added to next labs if requested, Tdap-not done-recommended, shingrix-not done-recommended, glaucoma screening has been done in the last year, pneumonia vaccine-not done-recommended, FOBT has been done last year, flu vaccine -not done- will get today    In the event something were to happen to you and you were unable to speak on your behalf, do you have an Advance Directive/ Living Will in place stating your wishes? Yes      If yes, do we have a copy on file? No patient will bring in for scanning to chart       Visit Vitals  /78 (BP 1 Location: Left arm, BP Patient Position: Sitting)   Pulse 63   Temp 96.8 °F (36 °C) (Oral)   Resp 16   Ht 6' 1\" (1.854 m)   Wt 155 lb (70.3 kg)   SpO2 97%   BMI 20.45 kg/m²       Medication Reconciliation reviewed with patient on this date    Fall Risk Assessment, last 12 mths 5/18/2018   Able to walk? Yes   Fall in past 12 months?  No       PHQ over the last two weeks 5/18/2018   Little interest or pleasure in doing things Not at all   Feeling down, depressed, irritable, or hopeless Not at all   Total Score PHQ 2 0       Learning Assessment 5/3/2018   PRIMARY LEARNER Patient   BARRIERS PRIMARY LEARNER NONE   PRIMARY LANGUAGE ENGLISH   LEARNER PREFERENCE PRIMARY READING   ANSWERED BY patient   RELATIONSHIP SELF       Abuse Screening Questionnaire 5/18/2018   Do you ever feel afraid of your partner? N   Are you in a relationship with someone who physically or mentally threatens you? N   Is it safe for you to go home? Y     The patient presents for routine flu and [id number] immunization, denies any symptoms, reactions or allergies that would exclude them from being immunized today. Risks and adverse reactions were discussed and the VIS was given to them. Flu vaccine administered to right deltoid. [id number] vaccine administered to left deltoid All questions were addressed. Patient was observed 15 min post injection. There were no reactions observed. No reactions noted.

## 2018-11-13 NOTE — PROGRESS NOTES
Ilya Gee is a 75 y.o. male and presents for annual Medicare Wellness Visit. Problem List: Reviewed with patient and discussed risk factors. Patient Active Problem List   Diagnosis Code    Lesion of right lung R91.1    Lung nodules R91.8    Essential hypertension I10    Thoracic scoliosis M41.9    Acquired hypothyroidism E03.9    Vitamin D deficiency E55.9    Elevated PSA R97.20    Prostate cancer ([facility] [address] C61    Pleural effusion, right J90    Pleural effusion J90    Non-small cell cancer of right lung (HCC) C34.91    Bone metastases (HCC) C79.51    Malignant neoplasm of lung (HCC) C34.90    Adenocarcinoma of lung, stage 4 (HCC) C34.90    Adenocarcinoma of prostate ([facility] [address] C61    Cough R05    Rash and nonspecific skin eruption R21    Abnormal LFTs R94.5    Adenocarcinoma of lung, stage 4, right (HCC) C34.91    SOB (shortness of breath) R06.02    Edema R60.9       Current medical providers:  Patient Care Team:  Heather López DO as PCP - General (Internal Medicine)  Nelli Leon MD as Surgeon (Thoracic (non-cardiac) Surgery)  Ruben Huggins MD (Pulmonary Disease)  Julianna Buitrago MD (Urology)  Aiden Friend LPN as Ambulatory Care Navigator (Internal Medicine)  Aditya Vincent, DARLING as Ambulatory Care Navigator (Internal Medicine)  Hemant Ardon RN as Nurse Navigator  Peyton Cook MD (Hematology and Oncology)    PSH: Reviewed with patient  Past Surgical History:   Procedure Laterality Date    HX ORTHOPAEDIC      reconstruction of left little finger        SH: Reviewed with patient  Social History     Tobacco Use    Smoking status: Never Smoker    Smokeless tobacco: Never Used   Substance Use Topics    Alcohol use:  Yes     Alcohol/week: 6.0 oz     Types: 10 Glasses of wine per week     Comment: ocassionally    Drug use: No       FH: Reviewed with patient  Family History   Problem Relation Age of Onset    Cancer Mother         leukemia    Stroke Father    Rekha Cancer Brother         bladder       Medications/Allergies: Reviewed with patient  Current Outpatient Medications on File Prior to Visit   Medication Sig Dispense Refill    chlorhexidine (PERIDEX) 0.12 % solution RINSE BID  0    amLODIPine (NORVASC) 10 mg tablet TAKE 1 TABLET BY MOUTH EVERY DAY 30 Tab 0    osimertinib (TAGRISSO) 40 mg tablet Take 1 Tab by mouth daily. Indications: EGFR T790M MUTATION-POSITIVE NON-SMALL CELL LUNG CANCER 30 Tab 3    levothyroxine (SYNTHROID) 75 mcg tablet TAKE 1/2 TABLET BY MOUTH DAILY BEFORE BREAKFAST 30 Tab 5    zoledronic acid (ZOMETA) 4 mg/100 mL pgbk infusion 4 mg by IntraVENous route every three (3) months.  cholecalciferol (VITAMIN D3) 1,000 unit tablet Take 1,000 Units by mouth daily.  amlodipine besylate (AMLODIPINE PO) Take  by mouth.  ondansetron hcl (ZOFRAN) 8 mg tablet        No current facility-administered medications on file prior to visit. No Known Allergies    Objective:  Visit Vitals  /78 (BP 1 Location: Left arm, BP Patient Position: Sitting)   Pulse 63   Temp 96.8 °F (36 °C) (Oral)   Resp 16   Ht 6' 1\" (1.854 m)   Wt 155 lb (70.3 kg)   SpO2 97%   BMI 20.45 kg/m²    Body mass index is 20.45 kg/m². Assessment of cognitive impairment: Alert and oriented x 3    Depression Screen:   PHQ over the last two weeks 5/18/2018   Little interest or pleasure in doing things Not at all   Feeling down, depressed, irritable, or hopeless Not at all   Total Score PHQ 2 0       Fall Risk Assessment:    Fall Risk Assessment, last 12 mths 5/18/2018   Able to walk? Yes   Fall in past 12 months? No       Functional Ability:   Does the patient exhibit a steady gait? yes   How long did it take the patient to get up and walk from a sitting position? 7 sec   Is the patient self reliant?  (ie can do own laundry, meals, household chores)  yes     Does the patient handle his/her own medications? yes     Does the patient handle his/her own money?    yes     Is the patients home safe (ie good lighting, handrails on stairs and bath, etc.)? yes     Did you notice or did patient express any hearing difficulties? no     Did you notice or did patient express any vision difficulties?   no     Were distance and reading eye charts used? no       Advance Care Planning:   Patient was offered the opportunity to discuss advance care planning:  yes     Does patient have an Advance Directive:  yes   If no, did you provide information on Caring Connections?  no       Plan:      Orders Placed This Encounter    Influenza Vaccine Inactivated (IIV)(FLUAD), Subunit, Adjuvanted, IM, (14586)    PNEUMOCOCCAL CONJ VACCINE 13 VALENT IM    Administration fee () for Medicare insured patients    chlorhexidine (PERIDEX) 0.12 % solution       Health Maintenance   Topic Date Due    Hepatitis C Screening  1950    DTaP/Tdap/Td series (1 - Tdap) 12/02/1970    Shingrix Vaccine Age 50> (1 of 2) 12/02/1999    GLAUCOMA SCREENING Q2Y  12/02/2014    Pneumococcal 65+ High/Highest Risk (1 of 2 - PCV13) 12/02/2014    FOBT Q 1 YEAR AGE 50-75  10/31/2017    MEDICARE YEARLY EXAM  03/15/2018    Influenza Age 9 to Adult  08/01/2018       *Patient verbalized understanding and agreement with the plan. A copy of the After Visit Summary with personalized health plan was given to the patient today.

## 2018-11-30 NOTE — PROGRESS NOTES
[phone]

## 2018-12-06 NOTE — TELEPHONE ENCOUNTER
Patient called and stated that he thought he had an appointment today but does not see it, Fariba  stated she needed to talk to Dr. Peyton Mitchell and would give a call back.     # [phone]

## 2018-12-11 NOTE — PROGRESS NOTES
Hematology/Oncology progress note    REASON FOR VISIT: Stage IV EGFR mutated NSCLC    HISTORY OF PRESENT ILLNESS: Mr. Cecilia Calderon is a 70 y.o. male with prostate cancer who was diagnosed with stage IV NSCLC- adenocarcinoma (EGFR mutated , PD-L1 low) in May 2017. Here to follow up on Osimertinib. Had scans. He was in [place] and came back 1 week ago. Has had fatigue since. He has no new pain. He is walking ok. He has no SOB. Stable once day coughing. Swelling has gone away. He has no bleeding. He has had no fevers, chills, change in appetite and weight. He has been out of [address] [place] [address]    Oncologic history   Mr. Cecilia Calderon noticed a new cough and fevers back in March of 2017. He went to Urgent Care and received antibiotics and cough medication. He states this worked for a while, but he began to notice his cough return. This was intermittent. He had some tightness across his anterior chest which he related to the infection. Chest x-ray was abnormal and he was told to proceed to the Emergency Department. Felecia Perez had been under surveillance for right lower lobe mass that was initially noted in 2015. Bronch at that time was negative for malignancy. He was evaluated by Dr. Mery Hickman with Thoracic Surgery in 2015 for possible surgical resection. CT chest in November of 2016 with mass size unchanged but some right basilar pleural thickening. CT chest obtained 5/14/17 however showed a new large right pleural effusion with right middle lobe and right lower lobe collapse. Irregular spiculated right lower lobe mass 3.8cm and stable precarinal enlarged lymph nodes were noted. New right posterior second rib lytic lesion and new right hepatic hypodensity consistent with mets. He underwent a therapeutic thoracentesis on 5/15/17 with removal of 1500 cc of serosanguinous fluid. Pathology showed adenocarcinoma. PET CT showed pleural, bone, liver and flor metastasis. MRI brain 5/20/17: No metastasis.  Needed repeat R sided thoracentesis on 5/25/17, had some post procedure hydropneumothorax. He was seen by Dr. Cailin Rosales at [facility] for Pleurx catheter.      CT guided biopsy of R lung mass done 5/25 which showed adenocarcinoma. EGFR mutation detected Exon 18 and 21    Treatment  6/26/17: Tarceva. Monthly Xgeva. Palliative XRT to R hip   CT CAP 10/2/17: Response  Ct 1/23/18: CT with some growth of LLL mass but stable by RECIST. CT 3/15/18 and Bone scan stable though he had worsening left back pain. MRI results showed extensive disease at L2, S2 and additional lesions as previously known. Received palliative XRT that he completed 4/18/18 5/26/18: Started Osimertinib as he had a T790M mutation. Stopped 6/20/18 due to platelets of 26Z  Resumed at 80 mg every other day on 7/6/18   Started 40mg daily on 7/12/18      Past Medical History:   Diagnosis Date    Cancer [facility]) 2017    lung ca    Hypertension        Past Surgical History:   Procedure Laterality Date    HX ORTHOPAEDIC      reconstruction of left little finger       No Known Allergies    Current Outpatient Medications   Medication Sig Dispense Refill    osimertinib (TAGRISSO) 40 mg tablet Take 1 Tab by mouth daily. 30 Tab 3    amLODIPine (NORVASC) 10 mg tablet TAKE 1 TABLET BY MOUTH EVERY DAY 30 Tab 0    levothyroxine (SYNTHROID) 75 mcg tablet TAKE 1/2 TABLET BY MOUTH DAILY BEFORE BREAKFAST 30 Tab 5    cholecalciferol (VITAMIN D3) 1,000 unit tablet Take 1,000 Units by mouth daily.  chlorhexidine (PERIDEX) 0.12 % solution RINSE BID  0    amlodipine besylate (AMLODIPINE PO) Take  by mouth.  ondansetron hcl (ZOFRAN) 8 mg tablet       zoledronic acid (ZOMETA) 4 mg/100 mL pgbk infusion 4 mg by IntraVENous route every three (3) months.          Social History     Socioeconomic History    Marital status:      Spouse name: Not on file    Number of children: Not on file    Years of education: Not on file    Highest education level: Not on file   Tobacco Use    Smoking status: Never Smoker    Smokeless tobacco: Never Used   Substance and Sexual Activity    Alcohol use: Yes     Alcohol/week: 6.0 oz     Types: 10 Glasses of wine per week     Comment: ocassionally    Drug use: No    Sexual activity: Yes     Partners: Female     Comment:  has 2 children       Family History   Problem Relation Age of Onset    Cancer Mother         leukemia    Stroke Father     Cancer Brother         bladder       ROS  Currently on Osimertinib ( started 5/26/18) and dose was decreased to 40mg daily ~ 7/12/18    ECOG PS is 0    Emotional well being addressed and patient is coping well    Physical Examination:   Visit Vitals  /83 (BP 1 Location: Left arm, BP Patient Position: Sitting)   Pulse (!) 50   Temp 97.9 °F (36.6 °C) (Oral)   Resp 20   Ht 6' 1\" (1.854 m)   Wt 151 lb 4.8 oz (68.6 kg)   SpO2 98%   BMI 19.96 kg/m²     General appearance - alert, frail, and in no distress  Mental status - oriented to person, place, and time  Mouth - mucous membranes moist, pharynx normal without lesions. Neck - supple, no significant adenopathy  Lymphatics - no palpable lymphadenopathy, no hepatosplenomegaly  Chest -clear to auscultation  Heart - normal rate, regular rhythm, normal S1, S2, no murmurs, rubs, clicks or gallops; edema has resolved  Abdomen - soft, nontender, nondistended, no masses or organomegaly, bowel sounds present  Neurological - normal speech  Skin: No rashes  MSK- Bilateral pedal and ankle edema    LABS  Lab Results   Component Value Date/Time    WBC 3.3 (L) 11/01/2018 02:12 PM    HGB 10.1 (L) 11/01/2018 02:12 PM    HCT 32.0 (L) 11/01/2018 02:12 PM    PLATELET 75 (L) 62/35/5363 02:12 PM    MCV 93.6 11/01/2018 02:12 PM    ABS.  NEUTROPHILS 2.3 11/01/2018 02:12 PM     Lab Results   Component Value Date/Time    Sodium 142 11/01/2018 02:12 PM    Potassium 3.6 11/01/2018 02:12 PM    Chloride 106 11/01/2018 02:12 PM    CO2 30 11/01/2018 02:12 PM    Glucose 84 11/01/2018 02:12 PM    BUN 21 (H) 11/01/2018 02:12 PM    Creatinine 0.66 (L) 11/01/2018 02:12 PM    GFR est AA >60 11/01/2018 02:12 PM    GFR est non-AA >60 11/01/2018 02:12 PM    Calcium 8.6 11/01/2018 02:12 PM     Lab Results   Component Value Date/Time    AST (SGOT) 59 (H) 11/01/2018 02:12 PM    Alk. phosphatase 223 (H) 11/01/2018 02:12 PM    Protein, total 6.8 11/01/2018 02:12 PM    Albumin 3.0 (L) 11/01/2018 02:12 PM    Globulin 3.8 11/01/2018 02:12 PM    A-G Ratio 0.8 (L) 11/01/2018 02:12 PM     CT Chest- abdomen - pelvis 10/2/17  Decreased right pleural thickening and size of right lower lobe  pulmonary mass with interval sclerosis of multiple osseous metastases as above  compatible with response to therapy    CT CAP and Bone scan- 3/15/18  1. CT of the chest demonstrates no significant change. Right lower lobe mass and  nodule are stable. Right pleural thickening and nodularity and small right  effusion are stable. 2. Bony metastatic disease is stable. 2. CT of the abdomen and pelvis is unchanged. . Subcentimeter low-density in the  liver which is indeterminate is stable. MRI 3/30/18  IMPRESSION:   1. Left T8 transverse process metastasis has extra cortical breakout, extension  into the lamina and pedicle, and extension into the left T8 neural foramen. Mild  neural foraminal stenosis associated. 2. Stable densely sclerotic T5 metastasis. Sclerotic right fifth rib metastasis. 3. Right lower lobe lung carcinoma. 4. Bulky right pleural carcinomatosis. Densely septated small right pleural  effusion. IMPRESSION:   1. Extracortical extension of L2 metastasis, obliterating and expanding the  right neural foramen, and extending into the epidural space. 2. Viable enhancing tumor throughout the L2 vertebral body around the sclerotic  portion in the inferior right posterolateral corner. 3. S2 and S3 vertebral body metastases. Possible epidural and left S2 neural  foraminal extension.     Guardant 360 results under media- T790M present    12/4/18 CT IMPRESSION  IMPRESSION:  Increase size of dominant right basilar mass and contiguous satellite nodule. 12/4/18  Bone scan  IMPRESSION  IMPRESSION: Stable pattern of osseous metastatic disease.         ASSESSMENT AND PLAN  Mr. Alondra Ray is a 70 y.o. male with:    1. Stage IV NSCLC   With R lung mass, mediastinal adenopathy, malignant R pleural effusion, multiple asymptomatic bone metastasis and possibly liver metastasis. EGFR mutated, PD-L1 5%. Found to have T790M mutation    Switched to Osimertininb- 80 mg daily on 5/26/18, dose reduced to 40 mg due to grade 3 thrombocytopenia. Scans reviewed from 8/28/18 and show evidence of response. Scans from 12/18 now show some growth in the lung mass- overall growth is 17% and does not meet criteria from progression. He is doing really well clinically and tolerating Tagrisso apart from grade 2 thrombocytopenia. We discussed continuing the same treatment with repeat scans within 2 months from 12/4/18. Upon progression will consider Carbo+ Alimta and Keytruda    · Osimertinib - continue 40 mg daily-expedite processing of new prescription  · Labs every 4 weeks,  EKG every 3 months (ordered today)  · Zometa q3 months - last received 11/1  · [facility]    2. [place] 6 prostate cancer on active surveillance    3. Bone pain:  secondary to osseous metastasis s/p XRT to R ischium and now L2. Monitor symptoms at T8 which has a rather aggressive lesion as well. Most recent bone scan showed stability and he is asymptomatic. 4. HTN. On amlodipine. 5. Diarrhea- resolved    6. Edema- bilateral  Dopplers ordered and negative for DVT  ECHO ordered and reviewed - EF 60%  Resolved    7. Elevated liver enzymes  Grade 1 Secondary to Osimertinib  Will continue to monitor. 8. Thrombocytopenia  Osimertininb was previously DR due to gr 3 thrombocytopenia. Plts 75K today. Continue to monitor.        RTC in 1 month with labs      Osei Huynh MD

## 2018-12-11 NOTE — PROGRESS NOTES
Amado Elizondo is a 70 y.o. male here today for follow up, lung cancer. 1. Have you been to the ER, urgent care clinic since your last visit? Hospitalized since your last visit? No    2. Have you seen or consulted any other health care providers outside of the [address] [place] [address] since your last visit? Include any pap smears or colon screening.  No

## 2018-12-11 NOTE — TELEPHONE ENCOUNTER
Processed tagrisso refill on 11/30 to [facility]. Patient unable to get medication per website. Call to [facility]. Spoke with [employer]    They will process within 7-10 business days.   Will notify patient to expect phone call for delivery via [employer]

## 2019-01-01 ENCOUNTER — HOSPITAL ENCOUNTER (OUTPATIENT)
Dept: RADIATION THERAPY | Age: 69
Discharge: HOME | End: 2019-08-12
Payer: MEDICARE

## 2019-01-01 ENCOUNTER — DOCUMENTATION ONLY (OUTPATIENT)
Dept: ONCOLOGY | Age: 69
End: 2019-01-01

## 2019-01-01 ENCOUNTER — HOSPITAL ENCOUNTER (OUTPATIENT)
Dept: ULTRASOUND IMAGING | Age: 69
Discharge: HOME | End: 2019-03-22
Attending: REGISTERED NURSE
Payer: MEDICARE

## 2019-01-01 ENCOUNTER — HOSPITAL ENCOUNTER (OUTPATIENT)
Dept: INFUSION THERAPY | Age: 69
Discharge: HOME | End: 2019-07-02
Payer: MEDICARE

## 2019-01-01 ENCOUNTER — HOSPITAL ENCOUNTER (OUTPATIENT)
Dept: CT IMAGING | Age: 69
Discharge: HOME | End: 2019-06-25
Payer: MEDICARE

## 2019-01-01 ENCOUNTER — HOSPITAL ENCOUNTER (OUTPATIENT)
Dept: CT IMAGING | Age: 69
Discharge: HOME | End: 2019-02-09
Attending: INTERNAL MEDICINE
Payer: MEDICARE

## 2019-01-01 ENCOUNTER — DOCUMENTATION ONLY (OUTPATIENT)
Dept: PALLATIVE CARE | Age: 69
End: 2019-01-01

## 2019-01-01 ENCOUNTER — HOSPITAL ENCOUNTER (OUTPATIENT)
Dept: CT IMAGING | Age: 69
Discharge: HOME | End: 2019-04-30
Attending: INTERNAL MEDICINE
Payer: MEDICARE

## 2019-01-01 ENCOUNTER — HOSPITAL ENCOUNTER (OUTPATIENT)
Dept: NUCLEAR MEDICINE | Age: 69
Discharge: HOME | End: 2019-08-26
Attending: INTERNAL MEDICINE
Payer: MEDICARE

## 2019-01-01 ENCOUNTER — OFFICE VISIT (OUTPATIENT)
Dept: HEMATOLOGY | Age: 69
End: 2019-01-01

## 2019-01-01 ENCOUNTER — HOSPITAL ENCOUNTER (OUTPATIENT)
Dept: INTERVENTIONAL RADIOLOGY/VASCULAR | Age: 69
Discharge: HOME | End: 2019-02-22
Attending: STUDENT IN AN ORGANIZED HEALTH CARE EDUCATION/TRAINING PROGRAM | Admitting: STUDENT IN AN ORGANIZED HEALTH CARE EDUCATION/TRAINING PROGRAM
Payer: MEDICARE

## 2019-01-01 ENCOUNTER — HOSPITAL ENCOUNTER (OUTPATIENT)
Dept: ULTRASOUND IMAGING | Age: 69
Discharge: HOME | End: 2019-05-17
Attending: REGISTERED NURSE
Payer: MEDICARE

## 2019-01-01 ENCOUNTER — HOSPITAL ENCOUNTER (OUTPATIENT)
Dept: INFUSION THERAPY | Age: 69
Discharge: HOME | End: 2019-05-22
Payer: MEDICARE

## 2019-01-01 ENCOUNTER — HOSPITAL ENCOUNTER (OUTPATIENT)
Dept: RADIATION THERAPY | Age: 69
Discharge: HOME | End: 2019-08-02
Payer: MEDICARE

## 2019-01-01 ENCOUNTER — TELEPHONE (OUTPATIENT)
Dept: PALLATIVE CARE | Age: 69
End: 2019-01-01

## 2019-01-01 ENCOUNTER — HOSPITAL ENCOUNTER (OUTPATIENT)
Dept: RADIATION THERAPY | Age: 69
Discharge: HOME | End: 2019-03-18
Payer: MEDICARE

## 2019-01-01 ENCOUNTER — HOME CARE VISIT (OUTPATIENT)
Dept: SCHEDULING | Facility: HOME HEALTH | Age: 69
End: 2019-01-01
Payer: MEDICARE

## 2019-01-01 ENCOUNTER — HOSPITAL ENCOUNTER (OUTPATIENT)
Dept: INFUSION THERAPY | Age: 69
Discharge: HOME | End: 2019-09-03
Payer: MEDICARE

## 2019-01-01 ENCOUNTER — APPOINTMENT (OUTPATIENT)
Dept: INFUSION THERAPY | Age: 69
End: 2019-01-01

## 2019-01-01 ENCOUNTER — HOSPITAL ENCOUNTER (OUTPATIENT)
Dept: RADIATION THERAPY | Age: 69
Discharge: HOME | End: 2019-08-01
Payer: MEDICARE

## 2019-01-01 ENCOUNTER — HOSPITAL ENCOUNTER (EMERGENCY)
Age: 69
Discharge: STILL ADMITTED OR ADMINISTRATIVE | End: 2019-08-25
Attending: NURSE PRACTITIONER
Payer: MEDICARE

## 2019-01-01 ENCOUNTER — HOME CARE VISIT (OUTPATIENT)
Dept: HOSPICE | Facility: HOSPICE | Age: 69
End: 2019-01-01
Payer: MEDICARE

## 2019-01-01 ENCOUNTER — OFFICE VISIT (OUTPATIENT)
Dept: ONCOLOGY | Age: 69
End: 2019-01-01

## 2019-01-01 ENCOUNTER — HOSPITAL ENCOUNTER (OUTPATIENT)
Dept: RADIATION THERAPY | Age: 69
Discharge: HOME | End: 2019-07-31
Payer: MEDICARE

## 2019-01-01 ENCOUNTER — HOSPITAL ENCOUNTER (OUTPATIENT)
Dept: MRI IMAGING | Age: 69
Discharge: HOME | End: 2019-02-09
Attending: INTERNAL MEDICINE
Payer: MEDICARE

## 2019-01-01 ENCOUNTER — HOSPITAL ENCOUNTER (OUTPATIENT)
Dept: RADIATION THERAPY | Age: 69
Discharge: HOME | End: 2019-03-08
Payer: MEDICARE

## 2019-01-01 ENCOUNTER — HOSPITAL ENCOUNTER (OUTPATIENT)
Dept: RADIATION THERAPY | Age: 69
Discharge: HOME | End: 2019-07-29
Payer: MEDICARE

## 2019-01-01 ENCOUNTER — HOSPITAL ENCOUNTER (OUTPATIENT)
Dept: INFUSION THERAPY | Age: 69
Discharge: HOME | End: 2019-09-24
Payer: MEDICARE

## 2019-01-01 ENCOUNTER — HOSPITAL ENCOUNTER (OUTPATIENT)
Dept: INFUSION THERAPY | Age: 69
End: 2019-01-01
Payer: MEDICARE

## 2019-01-01 ENCOUNTER — HOSPITAL ENCOUNTER (OUTPATIENT)
Dept: RADIATION THERAPY | Age: 69
Discharge: HOME | End: 2019-03-07
Payer: MEDICARE

## 2019-01-01 ENCOUNTER — HOSPICE ADMISSION (OUTPATIENT)
Dept: HOSPICE | Facility: HOSPICE | Age: 69
End: 2019-01-01
Payer: MEDICARE

## 2019-01-01 ENCOUNTER — APPOINTMENT (OUTPATIENT)
Dept: INFUSION THERAPY | Age: 69
End: 2019-01-01
Payer: MEDICARE

## 2019-01-01 ENCOUNTER — HOSPITAL ENCOUNTER (OUTPATIENT)
Dept: INFUSION THERAPY | Age: 69
Discharge: HOME | End: 2019-08-20
Payer: MEDICARE

## 2019-01-01 ENCOUNTER — HOSPITAL ENCOUNTER (OUTPATIENT)
Dept: ULTRASOUND IMAGING | Age: 69
Discharge: HOME | End: 2019-04-10
Attending: INTERNAL MEDICINE
Payer: MEDICARE

## 2019-01-01 ENCOUNTER — HOSPITAL ENCOUNTER (OUTPATIENT)
Dept: RADIATION THERAPY | Age: 69
Discharge: HOME | End: 2019-07-25
Payer: MEDICARE

## 2019-01-01 ENCOUNTER — HOSPITAL ENCOUNTER (OUTPATIENT)
Dept: INFUSION THERAPY | Age: 69
Discharge: HOME | End: 2019-02-01
Payer: MEDICARE

## 2019-01-01 ENCOUNTER — HOSPITAL ENCOUNTER (OUTPATIENT)
Dept: RADIATION THERAPY | Age: 69
Discharge: HOME | End: 2019-08-06
Payer: MEDICARE

## 2019-01-01 ENCOUNTER — HOSPITAL ENCOUNTER (OUTPATIENT)
Dept: RADIATION THERAPY | Age: 69
Discharge: HOME | End: 2019-09-03

## 2019-01-01 ENCOUNTER — TELEPHONE (OUTPATIENT)
Dept: HEMATOLOGY | Age: 69
End: 2019-01-01

## 2019-01-01 ENCOUNTER — TELEPHONE (OUTPATIENT)
Dept: ONCOLOGY | Age: 69
End: 2019-01-01

## 2019-01-01 ENCOUNTER — HOSPITAL ENCOUNTER (OUTPATIENT)
Dept: RADIATION THERAPY | Age: 69
Discharge: HOME | End: 2019-08-14
Payer: MEDICARE

## 2019-01-01 ENCOUNTER — HOSPITAL ENCOUNTER (OUTPATIENT)
Dept: INFUSION THERAPY | Age: 69
Discharge: HOME | End: 2019-10-10
Payer: MEDICARE

## 2019-01-01 ENCOUNTER — HOSPITAL ENCOUNTER (OUTPATIENT)
Dept: RADIATION THERAPY | Age: 69
Discharge: HOME | End: 2019-08-13
Payer: MEDICARE

## 2019-01-01 ENCOUNTER — HOSPITAL ENCOUNTER (OUTPATIENT)
Dept: INFUSION THERAPY | Age: 69
Discharge: HOME | End: 2019-06-19
Payer: MEDICARE

## 2019-01-01 ENCOUNTER — HOME HEALTH ADMISSION (OUTPATIENT)
Dept: HOME HEALTH SERVICES | Facility: HOME HEALTH | Age: 69
End: 2019-01-01
Payer: MEDICARE

## 2019-01-01 ENCOUNTER — HOSPITAL ENCOUNTER (OUTPATIENT)
Dept: CT IMAGING | Age: 69
Discharge: HOME | End: 2019-08-26
Attending: INTERNAL MEDICINE
Payer: MEDICARE

## 2019-01-01 ENCOUNTER — HOSPITAL ENCOUNTER (OUTPATIENT)
Dept: RADIATION THERAPY | Age: 69
Discharge: HOME | End: 2019-07-26

## 2019-01-01 ENCOUNTER — RESEARCH ENCOUNTER (OUTPATIENT)
Dept: ONCOLOGY | Age: 69
End: 2019-01-01

## 2019-01-01 ENCOUNTER — HOSPITAL ENCOUNTER (OUTPATIENT)
Dept: INFUSION THERAPY | Age: 69
Discharge: HOME | End: 2019-08-14
Payer: MEDICARE

## 2019-01-01 ENCOUNTER — HOSPITAL ENCOUNTER (OUTPATIENT)
Dept: RADIATION THERAPY | Age: 69
Discharge: HOME | End: 2019-03-13
Payer: MEDICARE

## 2019-01-01 ENCOUNTER — HOSPITAL ENCOUNTER (OUTPATIENT)
Dept: RADIATION THERAPY | Age: 69
Discharge: HOME | End: 2019-08-09
Payer: MEDICARE

## 2019-01-01 ENCOUNTER — HOSPITAL ENCOUNTER (OUTPATIENT)
Dept: INFUSION THERAPY | Age: 69
Discharge: HOME | End: 2019-07-03
Payer: MEDICARE

## 2019-01-01 ENCOUNTER — HOSPITAL ENCOUNTER (OUTPATIENT)
Dept: RADIATION THERAPY | Age: 69
Discharge: HOME | End: 2019-03-14
Payer: MEDICARE

## 2019-01-01 ENCOUNTER — HOSPITAL ENCOUNTER (OUTPATIENT)
Dept: ULTRASOUND IMAGING | Age: 69
Discharge: HOME | End: 2019-04-19
Attending: REGISTERED NURSE
Payer: MEDICARE

## 2019-01-01 ENCOUNTER — HOSPITAL ENCOUNTER (OUTPATIENT)
Dept: INFUSION THERAPY | Age: 69
Discharge: HOME | End: 2019-02-26
Payer: MEDICARE

## 2019-01-01 ENCOUNTER — HOSPITAL ENCOUNTER (OUTPATIENT)
Dept: INFUSION THERAPY | Age: 69
Discharge: HOME | End: 2019-07-31
Payer: MEDICARE

## 2019-01-01 ENCOUNTER — HOSPITAL ENCOUNTER (OUTPATIENT)
Dept: RADIATION THERAPY | Age: 69
Discharge: HOME | End: 2019-03-19
Payer: MEDICARE

## 2019-01-01 ENCOUNTER — HOSPITAL ENCOUNTER (OUTPATIENT)
Dept: RADIATION THERAPY | Age: 69
Discharge: HOME | End: 2019-08-05
Payer: MEDICARE

## 2019-01-01 ENCOUNTER — PATIENT MESSAGE (OUTPATIENT)
Dept: ONCOLOGY | Age: 69
End: 2019-01-01

## 2019-01-01 ENCOUNTER — HOSPITAL ENCOUNTER (OUTPATIENT)
Dept: GENERAL RADIOLOGY | Age: 69
Discharge: HOME | End: 2019-07-02
Payer: MEDICARE

## 2019-01-01 ENCOUNTER — HOSPITAL ENCOUNTER (OUTPATIENT)
Dept: INFUSION THERAPY | Age: 69
Discharge: HOME | End: 2019-03-19
Payer: MEDICARE

## 2019-01-01 ENCOUNTER — HOSPITAL ENCOUNTER (OUTPATIENT)
Dept: ULTRASOUND IMAGING | Age: 69
Discharge: HOME | End: 2019-04-30
Attending: INTERNAL MEDICINE
Payer: MEDICARE

## 2019-01-01 ENCOUNTER — HOSPITAL ENCOUNTER (OUTPATIENT)
Dept: RADIATION THERAPY | Age: 69
Discharge: HOME | End: 2019-03-12
Payer: MEDICARE

## 2019-01-01 ENCOUNTER — OFFICE VISIT (OUTPATIENT)
Dept: PALLATIVE CARE | Age: 69
End: 2019-01-01

## 2019-01-01 ENCOUNTER — HOSPITAL ENCOUNTER (OUTPATIENT)
Dept: RADIATION THERAPY | Age: 69
Discharge: HOME | End: 2019-03-04
Payer: MEDICARE

## 2019-01-01 ENCOUNTER — OFFICE VISIT (OUTPATIENT)
Dept: INTERNAL MEDICINE CLINIC | Age: 69
End: 2019-01-01

## 2019-01-01 ENCOUNTER — HOSPITAL ENCOUNTER (OUTPATIENT)
Dept: INFUSION THERAPY | Age: 69
Discharge: HOME | End: 2019-08-28
Payer: MEDICARE

## 2019-01-01 ENCOUNTER — APPOINTMENT (OUTPATIENT)
Dept: CT IMAGING | Age: 69
End: 2019-01-01

## 2019-01-01 ENCOUNTER — HOSPITAL ENCOUNTER (OUTPATIENT)
Dept: RADIATION THERAPY | Age: 69
Discharge: HOME | End: 2019-07-30
Payer: MEDICARE

## 2019-01-01 ENCOUNTER — HOSPITAL ENCOUNTER (OUTPATIENT)
Dept: ULTRASOUND IMAGING | Age: 69
Discharge: HOME | End: 2019-05-30
Attending: INTERNAL MEDICINE
Payer: MEDICARE

## 2019-01-01 ENCOUNTER — HOSPITAL ENCOUNTER (OUTPATIENT)
Dept: ULTRASOUND IMAGING | Age: 69
Discharge: HOME | End: 2019-04-02
Attending: REGISTERED NURSE
Payer: MEDICARE

## 2019-01-01 ENCOUNTER — HOSPITAL ENCOUNTER (OUTPATIENT)
Dept: RADIATION THERAPY | Age: 69
Discharge: HOME | End: 2019-08-08
Payer: MEDICARE

## 2019-01-01 ENCOUNTER — HOME CARE VISIT (OUTPATIENT)
Dept: HOME HEALTH SERVICES | Facility: HOME HEALTH | Age: 69
End: 2019-01-01
Payer: MEDICARE

## 2019-01-01 ENCOUNTER — HOSPITAL ENCOUNTER (OUTPATIENT)
Dept: LAB | Age: 69
Discharge: HOME | End: 2019-01-08
Payer: MEDICARE

## 2019-01-01 ENCOUNTER — HOSPITAL ENCOUNTER (OUTPATIENT)
Dept: RADIATION THERAPY | Age: 69
Discharge: HOME | End: 2019-02-28

## 2019-01-01 ENCOUNTER — HOSPITAL ENCOUNTER (OUTPATIENT)
Dept: INTERVENTIONAL RADIOLOGY/VASCULAR | Age: 69
Discharge: HOME | End: 2019-05-06
Attending: INTERNAL MEDICINE | Admitting: RADIOLOGY
Payer: MEDICARE

## 2019-01-01 ENCOUNTER — HOSPITAL ENCOUNTER (EMERGENCY)
Age: 69
Discharge: HOME | End: 2019-10-04
Attending: EMERGENCY MEDICINE
Payer: MEDICARE

## 2019-01-01 ENCOUNTER — HOSPITAL ENCOUNTER (OUTPATIENT)
Dept: RADIATION THERAPY | Age: 69
Discharge: HOME | End: 2019-08-07
Payer: MEDICARE

## 2019-01-01 ENCOUNTER — HOSPITAL ENCOUNTER (OUTPATIENT)
Dept: RADIATION THERAPY | Age: 69
Discharge: HOME | End: 2019-03-11
Payer: MEDICARE

## 2019-01-01 ENCOUNTER — DOCUMENTATION ONLY (OUTPATIENT)
Dept: INFUSION THERAPY | Age: 69
End: 2019-01-01

## 2019-01-01 ENCOUNTER — HOSPITAL ENCOUNTER (OUTPATIENT)
Dept: INFUSION THERAPY | Age: 69
Discharge: HOME | End: 2019-05-03
Payer: MEDICARE

## 2019-01-01 ENCOUNTER — HOSPITAL ENCOUNTER (OUTPATIENT)
Dept: RADIATION THERAPY | Age: 69
Discharge: HOME | End: 2019-03-15
Payer: MEDICARE

## 2019-01-01 ENCOUNTER — HOSPITAL ENCOUNTER (OUTPATIENT)
Dept: MRI IMAGING | Age: 69
Discharge: HOME | End: 2019-08-25
Attending: INTERNAL MEDICINE
Payer: MEDICARE

## 2019-01-01 ENCOUNTER — HOSPITAL ENCOUNTER (OUTPATIENT)
Dept: INFUSION THERAPY | Age: 69
Discharge: HOME | End: 2019-05-01
Payer: MEDICARE

## 2019-01-01 ENCOUNTER — HOSPITAL ENCOUNTER (OUTPATIENT)
Dept: MRI IMAGING | Age: 69
Discharge: HOME | End: 2019-06-25
Payer: MEDICARE

## 2019-01-01 ENCOUNTER — HOSPITAL ENCOUNTER (OUTPATIENT)
Dept: INFUSION THERAPY | Age: 69
Discharge: HOME | End: 2019-02-19
Payer: MEDICARE

## 2019-01-01 VITALS
SYSTOLIC BLOOD PRESSURE: 171 MMHG | TEMPERATURE: 97.9 F | HEART RATE: 68 BPM | DIASTOLIC BLOOD PRESSURE: 87 MMHG | OXYGEN SATURATION: 94 % | RESPIRATION RATE: 18 BRPM

## 2019-01-01 VITALS
HEART RATE: 94 BPM | HEIGHT: 73 IN | HEART RATE: 66 BPM | OXYGEN SATURATION: 100 % | TEMPERATURE: 98.4 F | WEIGHT: 156 LBS | SYSTOLIC BLOOD PRESSURE: 120 MMHG | DIASTOLIC BLOOD PRESSURE: 57 MMHG | BODY MASS INDEX: 20.67 KG/M2 | DIASTOLIC BLOOD PRESSURE: 68 MMHG | OXYGEN SATURATION: 96 % | SYSTOLIC BLOOD PRESSURE: 154 MMHG | RESPIRATION RATE: 16 BRPM | RESPIRATION RATE: 17 BRPM

## 2019-01-01 VITALS — DIASTOLIC BLOOD PRESSURE: 68 MMHG | HEART RATE: 56 BPM | RESPIRATION RATE: 24 BRPM | SYSTOLIC BLOOD PRESSURE: 110 MMHG

## 2019-01-01 VITALS
SYSTOLIC BLOOD PRESSURE: 128 MMHG | TEMPERATURE: 99.1 F | OXYGEN SATURATION: 98 % | HEART RATE: 69 BPM | DIASTOLIC BLOOD PRESSURE: 72 MMHG

## 2019-01-01 VITALS
RESPIRATION RATE: 18 BRPM | DIASTOLIC BLOOD PRESSURE: 73 MMHG | TEMPERATURE: 98.2 F | SYSTOLIC BLOOD PRESSURE: 110 MMHG | OXYGEN SATURATION: 95 % | HEART RATE: 61 BPM

## 2019-01-01 VITALS
WEIGHT: 137 LBS | SYSTOLIC BLOOD PRESSURE: 126 MMHG | BODY MASS INDEX: 18.16 KG/M2 | DIASTOLIC BLOOD PRESSURE: 77 MMHG | OXYGEN SATURATION: 93 % | HEIGHT: 73 IN | BODY MASS INDEX: 19.22 KG/M2 | TEMPERATURE: 97.7 F | TEMPERATURE: 97.5 F | OXYGEN SATURATION: 90 % | HEART RATE: 72 BPM | DIASTOLIC BLOOD PRESSURE: 80 MMHG | HEIGHT: 73 IN | SYSTOLIC BLOOD PRESSURE: 130 MMHG | RESPIRATION RATE: 17 BRPM | HEART RATE: 69 BPM | WEIGHT: 145 LBS

## 2019-01-01 VITALS
SYSTOLIC BLOOD PRESSURE: 125 MMHG | HEART RATE: 60 BPM | RESPIRATION RATE: 18 BRPM | DIASTOLIC BLOOD PRESSURE: 66 MMHG | OXYGEN SATURATION: 97 % | TEMPERATURE: 97.6 F

## 2019-01-01 VITALS
SYSTOLIC BLOOD PRESSURE: 136 MMHG | DIASTOLIC BLOOD PRESSURE: 76 MMHG | HEART RATE: 58 BPM | OXYGEN SATURATION: 98 % | RESPIRATION RATE: 18 BRPM | TEMPERATURE: 97.4 F

## 2019-01-01 VITALS
HEART RATE: 62 BPM | DIASTOLIC BLOOD PRESSURE: 80 MMHG | SYSTOLIC BLOOD PRESSURE: 122 MMHG | RESPIRATION RATE: 18 BRPM | TEMPERATURE: 97.9 F | HEIGHT: 73 IN | WEIGHT: 145 LBS | BODY MASS INDEX: 19.22 KG/M2

## 2019-01-01 VITALS
OXYGEN SATURATION: 92 % | DIASTOLIC BLOOD PRESSURE: 62 MMHG | TEMPERATURE: 98.6 F | HEART RATE: 64 BPM | SYSTOLIC BLOOD PRESSURE: 106 MMHG

## 2019-01-01 VITALS
WEIGHT: 134 LBS | BODY MASS INDEX: 17.76 KG/M2 | HEIGHT: 73 IN | HEART RATE: 73 BPM | DIASTOLIC BLOOD PRESSURE: 61 MMHG | SYSTOLIC BLOOD PRESSURE: 118 MMHG | RESPIRATION RATE: 16 BRPM | TEMPERATURE: 98 F | OXYGEN SATURATION: 100 %

## 2019-01-01 VITALS
HEART RATE: 60 BPM | HEIGHT: 73 IN | OXYGEN SATURATION: 92 % | TEMPERATURE: 97.8 F | DIASTOLIC BLOOD PRESSURE: 82 MMHG | BODY MASS INDEX: 19.93 KG/M2 | RESPIRATION RATE: 16 BRPM | SYSTOLIC BLOOD PRESSURE: 133 MMHG | WEIGHT: 150.4 LBS

## 2019-01-01 VITALS
RESPIRATION RATE: 16 BRPM | HEART RATE: 73 BPM | HEIGHT: 73 IN | SYSTOLIC BLOOD PRESSURE: 99 MMHG | BODY MASS INDEX: 18.8 KG/M2 | WEIGHT: 141.9 LBS | DIASTOLIC BLOOD PRESSURE: 62 MMHG | TEMPERATURE: 97.9 F

## 2019-01-01 VITALS
OXYGEN SATURATION: 94 % | DIASTOLIC BLOOD PRESSURE: 69 MMHG | RESPIRATION RATE: 16 BRPM | SYSTOLIC BLOOD PRESSURE: 118 MMHG | TEMPERATURE: 98 F | HEART RATE: 86 BPM

## 2019-01-01 VITALS
WEIGHT: 143.6 LBS | SYSTOLIC BLOOD PRESSURE: 128 MMHG | TEMPERATURE: 97.9 F | RESPIRATION RATE: 18 BRPM | DIASTOLIC BLOOD PRESSURE: 87 MMHG | HEART RATE: 81 BPM | HEIGHT: 73 IN | OXYGEN SATURATION: 99 % | BODY MASS INDEX: 19.03 KG/M2

## 2019-01-01 VITALS
SYSTOLIC BLOOD PRESSURE: 140 MMHG | OXYGEN SATURATION: 100 % | BODY MASS INDEX: 20.51 KG/M2 | RESPIRATION RATE: 17 BRPM | HEIGHT: 73 IN | TEMPERATURE: 97.7 F | HEART RATE: 61 BPM | DIASTOLIC BLOOD PRESSURE: 70 MMHG | WEIGHT: 154.76 LBS

## 2019-01-01 VITALS
WEIGHT: 158.8 LBS | OXYGEN SATURATION: 98 % | SYSTOLIC BLOOD PRESSURE: 114 MMHG | TEMPERATURE: 97.3 F | HEIGHT: 73 IN | DIASTOLIC BLOOD PRESSURE: 72 MMHG | RESPIRATION RATE: 16 BRPM | HEART RATE: 66 BPM | BODY MASS INDEX: 21.05 KG/M2

## 2019-01-01 VITALS
WEIGHT: 126 LBS | OXYGEN SATURATION: 97 % | HEIGHT: 73 IN | HEART RATE: 94 BPM | BODY MASS INDEX: 16.7 KG/M2 | SYSTOLIC BLOOD PRESSURE: 121 MMHG | DIASTOLIC BLOOD PRESSURE: 78 MMHG | TEMPERATURE: 98.3 F

## 2019-01-01 VITALS
OXYGEN SATURATION: 93 % | TEMPERATURE: 99 F | SYSTOLIC BLOOD PRESSURE: 117 MMHG | DIASTOLIC BLOOD PRESSURE: 73 MMHG | RESPIRATION RATE: 18 BRPM | HEART RATE: 86 BPM

## 2019-01-01 VITALS
HEART RATE: 66 BPM | SYSTOLIC BLOOD PRESSURE: 127 MMHG | HEIGHT: 73 IN | OXYGEN SATURATION: 99 % | RESPIRATION RATE: 16 BRPM | WEIGHT: 153.1 LBS | BODY MASS INDEX: 20.29 KG/M2 | TEMPERATURE: 97.5 F | DIASTOLIC BLOOD PRESSURE: 70 MMHG

## 2019-01-01 VITALS
DIASTOLIC BLOOD PRESSURE: 60 MMHG | TEMPERATURE: 98.2 F | SYSTOLIC BLOOD PRESSURE: 102 MMHG | RESPIRATION RATE: 22 BRPM | OXYGEN SATURATION: 97 % | HEART RATE: 75 BPM

## 2019-01-01 VITALS
HEIGHT: 73 IN | WEIGHT: 138 LBS | RESPIRATION RATE: 18 BRPM | OXYGEN SATURATION: 95 % | TEMPERATURE: 97.9 F | DIASTOLIC BLOOD PRESSURE: 86 MMHG | BODY MASS INDEX: 18.29 KG/M2 | HEART RATE: 76 BPM | SYSTOLIC BLOOD PRESSURE: 138 MMHG

## 2019-01-01 VITALS — SYSTOLIC BLOOD PRESSURE: 104 MMHG | HEART RATE: 96 BPM | RESPIRATION RATE: 28 BRPM | DIASTOLIC BLOOD PRESSURE: 60 MMHG

## 2019-01-01 VITALS
SYSTOLIC BLOOD PRESSURE: 155 MMHG | TEMPERATURE: 97 F | RESPIRATION RATE: 16 BRPM | WEIGHT: 154 LBS | HEIGHT: 73 IN | HEART RATE: 60 BPM | BODY MASS INDEX: 20.41 KG/M2 | DIASTOLIC BLOOD PRESSURE: 82 MMHG

## 2019-01-01 VITALS
WEIGHT: 154 LBS | DIASTOLIC BLOOD PRESSURE: 75 MMHG | OXYGEN SATURATION: 97 % | HEIGHT: 73 IN | RESPIRATION RATE: 16 BRPM | SYSTOLIC BLOOD PRESSURE: 138 MMHG | BODY MASS INDEX: 20.41 KG/M2 | HEART RATE: 61 BPM | TEMPERATURE: 97.7 F

## 2019-01-01 VITALS
HEART RATE: 53 BPM | TEMPERATURE: 97.9 F | DIASTOLIC BLOOD PRESSURE: 75 MMHG | RESPIRATION RATE: 18 BRPM | SYSTOLIC BLOOD PRESSURE: 165 MMHG

## 2019-01-01 VITALS
TEMPERATURE: 97.8 F | WEIGHT: 140 LBS | SYSTOLIC BLOOD PRESSURE: 95 MMHG | DIASTOLIC BLOOD PRESSURE: 58 MMHG | HEIGHT: 73 IN | HEART RATE: 62 BPM | BODY MASS INDEX: 18.55 KG/M2 | OXYGEN SATURATION: 89 % | RESPIRATION RATE: 18 BRPM

## 2019-01-01 VITALS
RESPIRATION RATE: 16 BRPM | WEIGHT: 145.8 LBS | HEART RATE: 83 BPM | TEMPERATURE: 98.1 F | SYSTOLIC BLOOD PRESSURE: 116 MMHG | BODY MASS INDEX: 19.32 KG/M2 | DIASTOLIC BLOOD PRESSURE: 74 MMHG | HEIGHT: 73 IN | OXYGEN SATURATION: 99 %

## 2019-01-01 VITALS
HEIGHT: 73 IN | RESPIRATION RATE: 16 BRPM | TEMPERATURE: 97.6 F | OXYGEN SATURATION: 97 % | BODY MASS INDEX: 19.91 KG/M2 | HEART RATE: 56 BPM | WEIGHT: 150.2 LBS | DIASTOLIC BLOOD PRESSURE: 83 MMHG | SYSTOLIC BLOOD PRESSURE: 160 MMHG

## 2019-01-01 VITALS
HEIGHT: 73 IN | WEIGHT: 148.2 LBS | TEMPERATURE: 97.2 F | OXYGEN SATURATION: 94 % | BODY MASS INDEX: 19.64 KG/M2 | HEART RATE: 64 BPM | SYSTOLIC BLOOD PRESSURE: 118 MMHG | DIASTOLIC BLOOD PRESSURE: 62 MMHG

## 2019-01-01 VITALS — RESPIRATION RATE: 28 BRPM | SYSTOLIC BLOOD PRESSURE: 124 MMHG | HEART RATE: 72 BPM | DIASTOLIC BLOOD PRESSURE: 60 MMHG

## 2019-01-01 VITALS
SYSTOLIC BLOOD PRESSURE: 111 MMHG | OXYGEN SATURATION: 97 % | WEIGHT: 145.5 LBS | DIASTOLIC BLOOD PRESSURE: 76 MMHG | BODY MASS INDEX: 19.28 KG/M2 | HEART RATE: 89 BPM | HEIGHT: 73 IN | RESPIRATION RATE: 18 BRPM | TEMPERATURE: 98.5 F

## 2019-01-01 VITALS
OXYGEN SATURATION: 98 % | DIASTOLIC BLOOD PRESSURE: 72 MMHG | SYSTOLIC BLOOD PRESSURE: 142 MMHG | RESPIRATION RATE: 18 BRPM | HEART RATE: 61 BPM

## 2019-01-01 VITALS
HEIGHT: 73 IN | SYSTOLIC BLOOD PRESSURE: 137 MMHG | HEART RATE: 58 BPM | DIASTOLIC BLOOD PRESSURE: 59 MMHG | OXYGEN SATURATION: 100 % | WEIGHT: 148 LBS | TEMPERATURE: 97.6 F | BODY MASS INDEX: 19.61 KG/M2 | RESPIRATION RATE: 20 BRPM

## 2019-01-01 VITALS
TEMPERATURE: 96.8 F | SYSTOLIC BLOOD PRESSURE: 162 MMHG | DIASTOLIC BLOOD PRESSURE: 74 MMHG | RESPIRATION RATE: 18 BRPM | HEART RATE: 51 BPM

## 2019-01-01 VITALS
BODY MASS INDEX: 16.62 KG/M2 | HEIGHT: 73 IN | DIASTOLIC BLOOD PRESSURE: 78 MMHG | RESPIRATION RATE: 18 BRPM | OXYGEN SATURATION: 92 % | HEART RATE: 97 BPM | TEMPERATURE: 98.4 F | SYSTOLIC BLOOD PRESSURE: 117 MMHG

## 2019-01-01 VITALS
HEIGHT: 73 IN | BODY MASS INDEX: 18.54 KG/M2 | RESPIRATION RATE: 12 BRPM | DIASTOLIC BLOOD PRESSURE: 81 MMHG | WEIGHT: 139.9 LBS | TEMPERATURE: 97.9 F | OXYGEN SATURATION: 97 % | HEART RATE: 77 BPM | SYSTOLIC BLOOD PRESSURE: 119 MMHG

## 2019-01-01 VITALS
DIASTOLIC BLOOD PRESSURE: 65 MMHG | TEMPERATURE: 98.2 F | RESPIRATION RATE: 16 BRPM | HEART RATE: 66 BPM | BODY MASS INDEX: 19.11 KG/M2 | OXYGEN SATURATION: 98 % | HEIGHT: 73 IN | WEIGHT: 144.2 LBS | SYSTOLIC BLOOD PRESSURE: 100 MMHG

## 2019-01-01 VITALS
RESPIRATION RATE: 18 BRPM | TEMPERATURE: 98 F | DIASTOLIC BLOOD PRESSURE: 70 MMHG | OXYGEN SATURATION: 97 % | SYSTOLIC BLOOD PRESSURE: 115 MMHG | HEART RATE: 74 BPM

## 2019-01-01 VITALS
HEART RATE: 65 BPM | DIASTOLIC BLOOD PRESSURE: 54 MMHG | WEIGHT: 150 LBS | BODY MASS INDEX: 19.79 KG/M2 | OXYGEN SATURATION: 99 % | TEMPERATURE: 98.1 F | RESPIRATION RATE: 20 BRPM | SYSTOLIC BLOOD PRESSURE: 103 MMHG

## 2019-01-01 VITALS
RESPIRATION RATE: 17 BRPM | HEART RATE: 62 BPM | OXYGEN SATURATION: 99 % | SYSTOLIC BLOOD PRESSURE: 128 MMHG | DIASTOLIC BLOOD PRESSURE: 56 MMHG

## 2019-01-01 VITALS — HEART RATE: 72 BPM | RESPIRATION RATE: 20 BRPM | DIASTOLIC BLOOD PRESSURE: 60 MMHG | SYSTOLIC BLOOD PRESSURE: 90 MMHG

## 2019-01-01 VITALS
OXYGEN SATURATION: 97 % | RESPIRATION RATE: 14 BRPM | DIASTOLIC BLOOD PRESSURE: 81 MMHG | HEIGHT: 72 IN | HEART RATE: 88 BPM | SYSTOLIC BLOOD PRESSURE: 121 MMHG | BODY MASS INDEX: 18.28 KG/M2 | TEMPERATURE: 98.2 F | WEIGHT: 135 LBS

## 2019-01-01 VITALS
SYSTOLIC BLOOD PRESSURE: 143 MMHG | TEMPERATURE: 98.4 F | HEART RATE: 71 BPM | DIASTOLIC BLOOD PRESSURE: 76 MMHG | BODY MASS INDEX: 17.89 KG/M2 | WEIGHT: 135 LBS | HEIGHT: 73 IN | OXYGEN SATURATION: 92 %

## 2019-01-01 VITALS
TEMPERATURE: 97.6 F | SYSTOLIC BLOOD PRESSURE: 115 MMHG | HEART RATE: 78 BPM | DIASTOLIC BLOOD PRESSURE: 66 MMHG | OXYGEN SATURATION: 97 % | RESPIRATION RATE: 14 BRPM

## 2019-01-01 VITALS
RESPIRATION RATE: 20 BRPM | SYSTOLIC BLOOD PRESSURE: 110 MMHG | OXYGEN SATURATION: 97 % | DIASTOLIC BLOOD PRESSURE: 68 MMHG | HEART RATE: 68 BPM

## 2019-01-01 VITALS
HEART RATE: 64 BPM | BODY MASS INDEX: 16.78 KG/M2 | OXYGEN SATURATION: 94 % | DIASTOLIC BLOOD PRESSURE: 75 MMHG | WEIGHT: 126.6 LBS | RESPIRATION RATE: 18 BRPM | OXYGEN SATURATION: 93 % | TEMPERATURE: 98.1 F | SYSTOLIC BLOOD PRESSURE: 136 MMHG | SYSTOLIC BLOOD PRESSURE: 114 MMHG | DIASTOLIC BLOOD PRESSURE: 84 MMHG | HEIGHT: 73 IN | TEMPERATURE: 97.3 F | HEART RATE: 77 BPM

## 2019-01-01 VITALS
TEMPERATURE: 97.9 F | HEIGHT: 73 IN | BODY MASS INDEX: 21.42 KG/M2 | HEART RATE: 69 BPM | WEIGHT: 161.6 LBS | DIASTOLIC BLOOD PRESSURE: 87 MMHG | RESPIRATION RATE: 16 BRPM | OXYGEN SATURATION: 93 % | SYSTOLIC BLOOD PRESSURE: 156 MMHG

## 2019-01-01 VITALS
TEMPERATURE: 97.5 F | DIASTOLIC BLOOD PRESSURE: 82 MMHG | HEART RATE: 65 BPM | WEIGHT: 159.8 LBS | OXYGEN SATURATION: 98 % | RESPIRATION RATE: 16 BRPM | SYSTOLIC BLOOD PRESSURE: 132 MMHG | BODY MASS INDEX: 21.18 KG/M2 | HEIGHT: 73 IN

## 2019-01-01 VITALS
HEIGHT: 73 IN | BODY MASS INDEX: 18.7 KG/M2 | DIASTOLIC BLOOD PRESSURE: 64 MMHG | TEMPERATURE: 97.8 F | RESPIRATION RATE: 18 BRPM | OXYGEN SATURATION: 92 % | HEART RATE: 56 BPM | WEIGHT: 141.09 LBS | SYSTOLIC BLOOD PRESSURE: 101 MMHG

## 2019-01-01 VITALS
WEIGHT: 141.4 LBS | DIASTOLIC BLOOD PRESSURE: 67 MMHG | HEART RATE: 61 BPM | SYSTOLIC BLOOD PRESSURE: 126 MMHG | BODY MASS INDEX: 19.15 KG/M2 | OXYGEN SATURATION: 99 % | TEMPERATURE: 97.3 F | HEIGHT: 72 IN

## 2019-01-01 VITALS
SYSTOLIC BLOOD PRESSURE: 96 MMHG | RESPIRATION RATE: 18 BRPM | HEIGHT: 73 IN | HEART RATE: 53 BPM | BODY MASS INDEX: 19.11 KG/M2 | WEIGHT: 144.2 LBS | TEMPERATURE: 97.2 F | DIASTOLIC BLOOD PRESSURE: 61 MMHG

## 2019-01-01 VITALS
HEART RATE: 59 BPM | DIASTOLIC BLOOD PRESSURE: 65 MMHG | TEMPERATURE: 98.7 F | RESPIRATION RATE: 20 BRPM | SYSTOLIC BLOOD PRESSURE: 108 MMHG | OXYGEN SATURATION: 90 %

## 2019-01-01 VITALS
HEIGHT: 73 IN | HEART RATE: 71 BPM | DIASTOLIC BLOOD PRESSURE: 71 MMHG | OXYGEN SATURATION: 96 % | RESPIRATION RATE: 20 BRPM | TEMPERATURE: 97.4 F | BODY MASS INDEX: 18.16 KG/M2 | SYSTOLIC BLOOD PRESSURE: 105 MMHG | WEIGHT: 137 LBS

## 2019-01-01 VITALS — DIASTOLIC BLOOD PRESSURE: 70 MMHG | HEART RATE: 68 BPM | RESPIRATION RATE: 24 BRPM | SYSTOLIC BLOOD PRESSURE: 100 MMHG

## 2019-01-01 VITALS
DIASTOLIC BLOOD PRESSURE: 70 MMHG | BODY MASS INDEX: 18.16 KG/M2 | OXYGEN SATURATION: 95 % | SYSTOLIC BLOOD PRESSURE: 116 MMHG | TEMPERATURE: 97.1 F | HEIGHT: 73 IN | HEART RATE: 69 BPM | WEIGHT: 137 LBS

## 2019-01-01 VITALS
BODY MASS INDEX: 17.73 KG/M2 | HEIGHT: 73 IN | SYSTOLIC BLOOD PRESSURE: 107 MMHG | WEIGHT: 133.8 LBS | HEART RATE: 67 BPM | RESPIRATION RATE: 18 BRPM | DIASTOLIC BLOOD PRESSURE: 78 MMHG | OXYGEN SATURATION: 98 % | TEMPERATURE: 96.5 F

## 2019-01-01 VITALS
TEMPERATURE: 98.5 F | HEART RATE: 98 BPM | OXYGEN SATURATION: 99 % | RESPIRATION RATE: 16 BRPM | SYSTOLIC BLOOD PRESSURE: 118 MMHG | DIASTOLIC BLOOD PRESSURE: 74 MMHG

## 2019-01-01 VITALS
SYSTOLIC BLOOD PRESSURE: 120 MMHG | TEMPERATURE: 96.9 F | HEART RATE: 63 BPM | OXYGEN SATURATION: 98 % | WEIGHT: 151.4 LBS | DIASTOLIC BLOOD PRESSURE: 66 MMHG | HEIGHT: 73 IN | BODY MASS INDEX: 20.06 KG/M2

## 2019-01-01 VITALS
HEART RATE: 59 BPM | BODY MASS INDEX: 16.29 KG/M2 | DIASTOLIC BLOOD PRESSURE: 86 MMHG | RESPIRATION RATE: 18 BRPM | OXYGEN SATURATION: 96 % | WEIGHT: 122.9 LBS | HEIGHT: 73 IN | TEMPERATURE: 97.5 F | BODY MASS INDEX: 18.21 KG/M2 | WEIGHT: 138 LBS | SYSTOLIC BLOOD PRESSURE: 136 MMHG

## 2019-01-01 VITALS
HEART RATE: 51 BPM | RESPIRATION RATE: 14 BRPM | OXYGEN SATURATION: 100 % | SYSTOLIC BLOOD PRESSURE: 124 MMHG | DIASTOLIC BLOOD PRESSURE: 56 MMHG

## 2019-01-01 DIAGNOSIS — Z11.59 ENCOUNTER FOR SCREENING FOR OTHER VIRAL DISEASES: Primary | ICD-10-CM

## 2019-01-01 DIAGNOSIS — C79.51 LUNG CANCER METASTATIC TO BONE (HCC): ICD-10-CM

## 2019-01-01 DIAGNOSIS — C79.51 BONE METASTASES (HCC): ICD-10-CM

## 2019-01-01 DIAGNOSIS — C34.91 ADENOCARCINOMA OF LUNG, STAGE 4, RIGHT (HCC): Primary | ICD-10-CM

## 2019-01-01 DIAGNOSIS — R63.0 ANOREXIA: ICD-10-CM

## 2019-01-01 DIAGNOSIS — C79.31 BRAIN METASTASES (HCC): Primary | ICD-10-CM

## 2019-01-01 DIAGNOSIS — C34.91 ADENOCARCINOMA OF LUNG, STAGE 4, RIGHT (HCC): ICD-10-CM

## 2019-01-01 DIAGNOSIS — R06.02 SOB (SHORTNESS OF BREATH): ICD-10-CM

## 2019-01-01 DIAGNOSIS — C61 ADENOCARCINOMA OF PROSTATE (HCC): Primary | ICD-10-CM

## 2019-01-01 DIAGNOSIS — G62.9 NEUROPATHY: ICD-10-CM

## 2019-01-01 DIAGNOSIS — C34.91 NON-SMALL CELL CANCER OF RIGHT LUNG (HCC): ICD-10-CM

## 2019-01-01 DIAGNOSIS — C79.31 BRAIN METASTASES (HCC): ICD-10-CM

## 2019-01-01 DIAGNOSIS — C61 ADENOCARCINOMA OF PROSTATE (HCC): ICD-10-CM

## 2019-01-01 DIAGNOSIS — C34.91 NON-SMALL CELL CANCER OF RIGHT LUNG (HCC): Primary | ICD-10-CM

## 2019-01-01 DIAGNOSIS — R18.8 OTHER ASCITES: Primary | ICD-10-CM

## 2019-01-01 DIAGNOSIS — C34.90 LUNG CANCER METASTATIC TO BONE (HCC): ICD-10-CM

## 2019-01-01 DIAGNOSIS — G89.3 CANCER ASSOCIATED PAIN: ICD-10-CM

## 2019-01-01 DIAGNOSIS — R18.8 OTHER ASCITES: ICD-10-CM

## 2019-01-01 DIAGNOSIS — C34.90 MALIGNANT NEOPLASM OF LUNG, UNSPECIFIED LATERALITY, UNSPECIFIED PART OF LUNG (HCC): ICD-10-CM

## 2019-01-01 DIAGNOSIS — R05.9 COUGH: ICD-10-CM

## 2019-01-01 DIAGNOSIS — C34.90 MALIGNANT NEOPLASM OF LUNG, UNSPECIFIED LATERALITY, UNSPECIFIED PART OF LUNG (HCC): Primary | ICD-10-CM

## 2019-01-01 DIAGNOSIS — S01.512A LACERATION OF TONGUE, INITIAL ENCOUNTER: Primary | ICD-10-CM

## 2019-01-01 DIAGNOSIS — E03.9 ACQUIRED HYPOTHYROIDISM: ICD-10-CM

## 2019-01-01 DIAGNOSIS — J90 PLEURAL EFFUSION: ICD-10-CM

## 2019-01-01 DIAGNOSIS — C61 PROSTATE CANCER (HCC): ICD-10-CM

## 2019-01-01 DIAGNOSIS — D50.8 OTHER IRON DEFICIENCY ANEMIA: ICD-10-CM

## 2019-01-01 DIAGNOSIS — D69.6 THROMBOCYTOPENIA (HCC): ICD-10-CM

## 2019-01-01 DIAGNOSIS — R09.89 ABNORMAL LUNG SOUNDS: ICD-10-CM

## 2019-01-01 DIAGNOSIS — R79.9 ABNORMAL FINDING OF BLOOD CHEMISTRY: ICD-10-CM

## 2019-01-01 DIAGNOSIS — M25.551 RIGHT HIP PAIN: Primary | ICD-10-CM

## 2019-01-01 DIAGNOSIS — Z11.59 ENCOUNTER FOR SCREENING FOR OTHER VIRAL DISEASES: ICD-10-CM

## 2019-01-01 DIAGNOSIS — K59.03 DRUG-INDUCED CONSTIPATION: ICD-10-CM

## 2019-01-01 DIAGNOSIS — M25.551 RIGHT HIP PAIN: ICD-10-CM

## 2019-01-01 DIAGNOSIS — R64 CACHEXIA (HCC): ICD-10-CM

## 2019-01-01 DIAGNOSIS — C34.90 LUNG CANCER METASTATIC TO BONE (HCC): Primary | ICD-10-CM

## 2019-01-01 DIAGNOSIS — C61 PROSTATE CANCER (HCC): Primary | ICD-10-CM

## 2019-01-01 DIAGNOSIS — C34.90 ADENOCARCINOMA OF LUNG, STAGE 4, UNSPECIFIED LATERALITY (HCC): ICD-10-CM

## 2019-01-01 DIAGNOSIS — Z71.89 GOALS OF CARE, COUNSELING/DISCUSSION: ICD-10-CM

## 2019-01-01 DIAGNOSIS — R07.89 LEFT-SIDED CHEST WALL PAIN: ICD-10-CM

## 2019-01-01 DIAGNOSIS — C79.51 BONE METASTASES (HCC): Primary | ICD-10-CM

## 2019-01-01 DIAGNOSIS — R60.9 EDEMA, UNSPECIFIED TYPE: ICD-10-CM

## 2019-01-01 DIAGNOSIS — R05.9 COUGH: Primary | ICD-10-CM

## 2019-01-01 DIAGNOSIS — K76.6 PORTAL HYPERTENSION (HCC): ICD-10-CM

## 2019-01-01 DIAGNOSIS — C79.51 LUNG CANCER METASTATIC TO BONE (HCC): Primary | ICD-10-CM

## 2019-01-01 DIAGNOSIS — K76.89 NODULAR REGENERATIVE HYPERPLASIA OF LIVER: Primary | ICD-10-CM

## 2019-01-01 LAB
A1AT SERPL-MCNC: 279 MG/DL (ref 90–200)
ABO + RH BLD: NORMAL
ACTIN IGG SERPL-ACNC: 17 UNITS (ref 0–19)
ALBUMIN FLD-MCNC: 0.8 G/DL
ALBUMIN FLD-MCNC: 0.9 G/DL
ALBUMIN SERPL-MCNC: 1.9 G/DL (ref 3.5–5)
ALBUMIN SERPL-MCNC: 2 G/DL (ref 3.5–5)
ALBUMIN SERPL-MCNC: 2.1 G/DL (ref 3.5–5)
ALBUMIN SERPL-MCNC: 2.2 G/DL (ref 3.5–5)
ALBUMIN SERPL-MCNC: 2.3 G/DL (ref 3.5–5)
ALBUMIN SERPL-MCNC: 2.4 G/DL (ref 3.5–5)
ALBUMIN SERPL-MCNC: 2.7 G/DL (ref 3.6–4.8)
ALBUMIN SERPL-MCNC: 2.8 G/DL (ref 3.5–5)
ALBUMIN SERPL-MCNC: 2.9 G/DL (ref 3.6–4.8)
ALBUMIN SERPL-MCNC: 2.9 G/DL (ref 3.6–4.8)
ALBUMIN SERPL-MCNC: 3.2 G/DL (ref 3.5–5)
ALBUMIN SERPL-MCNC: 3.6 G/DL (ref 3.6–4.8)
ALBUMIN/GLOB SERPL: 0.5 {RATIO} (ref 1.1–2.2)
ALBUMIN/GLOB SERPL: 0.6 {RATIO} (ref 1.1–2.2)
ALBUMIN/GLOB SERPL: 0.7 {RATIO} (ref 1.1–2.2)
ALBUMIN/GLOB SERPL: 0.9 {RATIO} (ref 1.1–2.2)
ALBUMIN/GLOB SERPL: 1.3 {RATIO} (ref 1.2–2.2)
ALP SERPL-CCNC: 266 IU/L (ref 39–117)
ALP SERPL-CCNC: 304 U/L (ref 45–117)
ALP SERPL-CCNC: 305 IU/L (ref 39–117)
ALP SERPL-CCNC: 313 U/L (ref 45–117)
ALP SERPL-CCNC: 318 U/L (ref 45–117)
ALP SERPL-CCNC: 318 U/L (ref 45–117)
ALP SERPL-CCNC: 321 U/L (ref 45–117)
ALP SERPL-CCNC: 331 U/L (ref 45–117)
ALP SERPL-CCNC: 333 IU/L (ref 39–117)
ALP SERPL-CCNC: 336 U/L (ref 45–117)
ALP SERPL-CCNC: 345 U/L (ref 45–117)
ALP SERPL-CCNC: 353 U/L (ref 45–117)
ALP SERPL-CCNC: 354 U/L (ref 45–117)
ALP SERPL-CCNC: 370 U/L (ref 45–117)
ALP SERPL-CCNC: 383 U/L (ref 45–117)
ALP SERPL-CCNC: 404 U/L (ref 45–117)
ALP SERPL-CCNC: 455 IU/L (ref 39–117)
ALP SERPL-CCNC: 471 U/L (ref 45–117)
ALP SERPL-CCNC: 473 U/L (ref 45–117)
ALT SERPL-CCNC: 15 U/L (ref 12–78)
ALT SERPL-CCNC: 15 U/L (ref 12–78)
ALT SERPL-CCNC: 16 U/L (ref 12–78)
ALT SERPL-CCNC: 16 U/L (ref 12–78)
ALT SERPL-CCNC: 18 U/L (ref 12–78)
ALT SERPL-CCNC: 19 U/L (ref 12–78)
ALT SERPL-CCNC: 20 U/L (ref 12–78)
ALT SERPL-CCNC: 21 U/L (ref 12–78)
ALT SERPL-CCNC: 22 IU/L (ref 0–44)
ALT SERPL-CCNC: 28 IU/L (ref 0–44)
ALT SERPL-CCNC: 29 U/L (ref 12–78)
ALT SERPL-CCNC: 31 IU/L (ref 0–44)
ALT SERPL-CCNC: 31 U/L (ref 12–78)
ALT SERPL-CCNC: 34 U/L (ref 12–78)
ALT SERPL-CCNC: 35 IU/L (ref 0–44)
ALT SERPL-CCNC: 35 U/L (ref 12–78)
ALT SERPL-CCNC: 41 U/L (ref 12–78)
ALT SERPL-CCNC: 47 U/L (ref 12–78)
ALT SERPL-CCNC: 60 U/L (ref 12–78)
AMMONIA PLAS-SCNC: 18 UMOL/L
ANA TITR SER IF: NEGATIVE {TITER}
ANION GAP SERPL CALC-SCNC: 3 MMOL/L (ref 5–15)
ANION GAP SERPL CALC-SCNC: 4 MMOL/L (ref 5–15)
ANION GAP SERPL CALC-SCNC: 5 MMOL/L (ref 5–15)
ANION GAP SERPL CALC-SCNC: 6 MMOL/L (ref 5–15)
ANION GAP SERPL CALC-SCNC: 7 MMOL/L (ref 5–15)
ANION GAP SERPL CALC-SCNC: 7 MMOL/L (ref 5–15)
ANION GAP SERPL CALC-SCNC: 8 MMOL/L (ref 5–15)
APPEARANCE FLD: ABNORMAL
APPEARANCE FLD: CLEAR
APPEARANCE FLD: CLEAR
APPEARANCE UR: CLEAR
AST SERPL-CCNC: 31 IU/L (ref 0–40)
AST SERPL-CCNC: 35 U/L (ref 15–37)
AST SERPL-CCNC: 37 U/L (ref 15–37)
AST SERPL-CCNC: 41 U/L (ref 15–37)
AST SERPL-CCNC: 42 U/L (ref 15–37)
AST SERPL-CCNC: 44 IU/L (ref 0–40)
AST SERPL-CCNC: 44 IU/L (ref 0–40)
AST SERPL-CCNC: 47 IU/L (ref 0–40)
AST SERPL-CCNC: 47 U/L (ref 15–37)
AST SERPL-CCNC: 50 U/L (ref 15–37)
AST SERPL-CCNC: 54 U/L (ref 15–37)
AST SERPL-CCNC: 55 U/L (ref 15–37)
AST SERPL-CCNC: 56 U/L (ref 15–37)
AST SERPL-CCNC: 56 U/L (ref 15–37)
AST SERPL-CCNC: 58 U/L (ref 15–37)
AST SERPL-CCNC: 64 U/L (ref 15–37)
AST SERPL-CCNC: 69 U/L (ref 15–37)
AST SERPL-CCNC: 74 U/L (ref 15–37)
AST SERPL-CCNC: 84 U/L (ref 15–37)
BACTERIA URNS QL MICRO: NEGATIVE /HPF
BASOPHILS # BLD AUTO: 0 X10E3/UL (ref 0–0.2)
BASOPHILS # BLD: 0 K/UL (ref 0–0.1)
BASOPHILS NFR BLD AUTO: 0 %
BASOPHILS NFR BLD AUTO: 1 %
BASOPHILS NFR BLD: 0 % (ref 0–1)
BASOPHILS NFR BLD: 1 % (ref 0–1)
BILIRUB DIRECT SERPL-MCNC: 0.48 MG/DL (ref 0–0.4)
BILIRUB DIRECT SERPL-MCNC: 0.53 MG/DL (ref 0–0.4)
BILIRUB DIRECT SERPL-MCNC: 0.64 MG/DL (ref 0–0.4)
BILIRUB SERPL-MCNC: 0.6 MG/DL (ref 0.2–1)
BILIRUB SERPL-MCNC: 0.8 MG/DL (ref 0.2–1)
BILIRUB SERPL-MCNC: 0.9 MG/DL (ref 0.2–1)
BILIRUB SERPL-MCNC: 0.9 MG/DL (ref 0.2–1)
BILIRUB SERPL-MCNC: 0.9 MG/DL (ref 0–1.2)
BILIRUB SERPL-MCNC: 1 MG/DL (ref 0.2–1)
BILIRUB SERPL-MCNC: 1.2 MG/DL (ref 0.2–1)
BILIRUB SERPL-MCNC: 1.2 MG/DL (ref 0.2–1)
BILIRUB SERPL-MCNC: 1.3 MG/DL (ref 0.2–1)
BILIRUB SERPL-MCNC: 1.3 MG/DL (ref 0.2–1)
BILIRUB SERPL-MCNC: 1.3 MG/DL (ref 0–1.2)
BILIRUB SERPL-MCNC: 1.3 MG/DL (ref 0–1.2)
BILIRUB SERPL-MCNC: 1.4 MG/DL (ref 0.2–1)
BILIRUB SERPL-MCNC: 1.4 MG/DL (ref 0.2–1)
BILIRUB SERPL-MCNC: 1.4 MG/DL (ref 0–1.2)
BILIRUB SERPL-MCNC: 1.6 MG/DL (ref 0.2–1)
BILIRUB SERPL-MCNC: 1.8 MG/DL (ref 0.2–1)
BILIRUB SERPL-MCNC: 1.9 MG/DL (ref 0.2–1)
BILIRUB SERPL-MCNC: 2.6 MG/DL (ref 0.2–1)
BILIRUB UR QL: NEGATIVE
BLD PROD TYP BPU: NORMAL
BLOOD GROUP ANTIBODIES SERPL: NORMAL
BPU ID: NORMAL
BUN SERPL-MCNC: 18 MG/DL (ref 8–27)
BUN SERPL-MCNC: 19 MG/DL (ref 6–20)
BUN SERPL-MCNC: 20 MG/DL (ref 6–20)
BUN SERPL-MCNC: 22 MG/DL (ref 6–20)
BUN SERPL-MCNC: 26 MG/DL (ref 6–20)
BUN SERPL-MCNC: 27 MG/DL (ref 6–20)
BUN SERPL-MCNC: 27 MG/DL (ref 8–27)
BUN SERPL-MCNC: 28 MG/DL (ref 6–20)
BUN SERPL-MCNC: 28 MG/DL (ref 8–27)
BUN SERPL-MCNC: 30 MG/DL (ref 6–20)
BUN SERPL-MCNC: 31 MG/DL (ref 8–27)
BUN SERPL-MCNC: 33 MG/DL (ref 6–20)
BUN SERPL-MCNC: 34 MG/DL (ref 8–27)
BUN SERPL-MCNC: 35 MG/DL (ref 6–20)
BUN SERPL-MCNC: 39 MG/DL (ref 6–20)
BUN SERPL-MCNC: 41 MG/DL (ref 6–20)
BUN/CREAT SERPL: 24 (ref 12–20)
BUN/CREAT SERPL: 25 (ref 10–24)
BUN/CREAT SERPL: 27 (ref 12–20)
BUN/CREAT SERPL: 27 (ref 12–20)
BUN/CREAT SERPL: 30 (ref 12–20)
BUN/CREAT SERPL: 32 (ref 12–20)
BUN/CREAT SERPL: 33 (ref 12–20)
BUN/CREAT SERPL: 35 (ref 12–20)
BUN/CREAT SERPL: 37 (ref 10–24)
BUN/CREAT SERPL: 37 (ref 12–20)
BUN/CREAT SERPL: 37 (ref 12–20)
BUN/CREAT SERPL: 38 (ref 12–20)
BUN/CREAT SERPL: 39 (ref 10–24)
BUN/CREAT SERPL: 40 (ref 10–24)
BUN/CREAT SERPL: 42 (ref 12–20)
BUN/CREAT SERPL: 43 (ref 12–20)
BUN/CREAT SERPL: 43 (ref 12–20)
BUN/CREAT SERPL: 45 (ref 12–20)
BUN/CREAT SERPL: 49 (ref 10–24)
BUN/CREAT SERPL: 52 (ref 12–20)
C-ANCA TITR SER IF: NORMAL TITER
CALCIUM SERPL-MCNC: 8.1 MG/DL (ref 8.6–10.2)
CALCIUM SERPL-MCNC: 8.3 MG/DL (ref 8.6–10.2)
CALCIUM SERPL-MCNC: 8.4 MG/DL (ref 8.5–10.1)
CALCIUM SERPL-MCNC: 8.4 MG/DL (ref 8.5–10.1)
CALCIUM SERPL-MCNC: 8.7 MG/DL (ref 8.5–10.1)
CALCIUM SERPL-MCNC: 8.8 MG/DL (ref 8.5–10.1)
CALCIUM SERPL-MCNC: 8.9 MG/DL (ref 8.6–10.2)
CALCIUM SERPL-MCNC: 9 MG/DL (ref 8.5–10.1)
CALCIUM SERPL-MCNC: 9 MG/DL (ref 8.5–10.1)
CALCIUM SERPL-MCNC: 9.1 MG/DL (ref 8.5–10.1)
CALCIUM SERPL-MCNC: 9.1 MG/DL (ref 8.5–10.1)
CALCIUM SERPL-MCNC: 9.1 MG/DL (ref 8.6–10.2)
CALCIUM SERPL-MCNC: 9.2 MG/DL (ref 8.5–10.1)
CALCIUM SERPL-MCNC: 9.3 MG/DL (ref 8.5–10.1)
CALCIUM SERPL-MCNC: 9.4 MG/DL (ref 8.5–10.1)
CALCIUM SERPL-MCNC: 9.4 MG/DL (ref 8.6–10.2)
CALCIUM SERPL-MCNC: 9.6 MG/DL (ref 8.5–10.1)
CALCIUM SERPL-MCNC: 9.9 MG/DL (ref 8.5–10.1)
CHLORIDE SERPL-SCNC: 100 MMOL/L (ref 97–108)
CHLORIDE SERPL-SCNC: 100 MMOL/L (ref 97–108)
CHLORIDE SERPL-SCNC: 101 MMOL/L (ref 96–106)
CHLORIDE SERPL-SCNC: 102 MMOL/L (ref 97–108)
CHLORIDE SERPL-SCNC: 103 MMOL/L (ref 97–108)
CHLORIDE SERPL-SCNC: 104 MMOL/L (ref 96–106)
CHLORIDE SERPL-SCNC: 104 MMOL/L (ref 97–108)
CHLORIDE SERPL-SCNC: 104 MMOL/L (ref 97–108)
CHLORIDE SERPL-SCNC: 105 MMOL/L (ref 96–106)
CHLORIDE SERPL-SCNC: 105 MMOL/L (ref 97–108)
CHLORIDE SERPL-SCNC: 106 MMOL/L (ref 97–108)
CHLORIDE SERPL-SCNC: 106 MMOL/L (ref 97–108)
CHLORIDE SERPL-SCNC: 107 MMOL/L (ref 97–108)
CHLORIDE SERPL-SCNC: 108 MMOL/L (ref 97–108)
CHLORIDE SERPL-SCNC: 109 MMOL/L (ref 97–108)
CHLORIDE SERPL-SCNC: 109 MMOL/L (ref 97–108)
CHLORIDE SERPL-SCNC: 111 MMOL/L (ref 97–108)
CHLORIDE SERPL-SCNC: 99 MMOL/L (ref 97–108)
CHOLEST SERPL-MCNC: 126 MG/DL
CO2 SERPL-SCNC: 21 MMOL/L (ref 20–29)
CO2 SERPL-SCNC: 24 MMOL/L (ref 20–29)
CO2 SERPL-SCNC: 25 MMOL/L (ref 21–32)
CO2 SERPL-SCNC: 26 MMOL/L (ref 20–29)
CO2 SERPL-SCNC: 26 MMOL/L (ref 21–32)
CO2 SERPL-SCNC: 28 MMOL/L (ref 21–32)
CO2 SERPL-SCNC: 28 MMOL/L (ref 21–32)
CO2 SERPL-SCNC: 29 MMOL/L (ref 21–32)
CO2 SERPL-SCNC: 30 MMOL/L (ref 21–32)
CO2 SERPL-SCNC: 31 MMOL/L (ref 21–32)
CO2 SERPL-SCNC: 31 MMOL/L (ref 21–32)
CO2 SERPL-SCNC: 33 MMOL/L (ref 21–32)
CO2 SERPL-SCNC: 35 MMOL/L (ref 21–32)
COLOR FLD: ABNORMAL
COLOR FLD: YELLOW
COLOR FLD: YELLOW
COLOR UR: ABNORMAL
COMMENT, 144067: NORMAL
COMMENT, HOLDF: NORMAL
CREAT SERPL-MCNC: 0.64 MG/DL (ref 0.7–1.3)
CREAT SERPL-MCNC: 0.67 MG/DL (ref 0.76–1.27)
CREAT SERPL-MCNC: 0.69 MG/DL (ref 0.7–1.3)
CREAT SERPL-MCNC: 0.7 MG/DL (ref 0.76–1.27)
CREAT SERPL-MCNC: 0.71 MG/DL (ref 0.76–1.27)
CREAT SERPL-MCNC: 0.71 MG/DL (ref 0.7–1.3)
CREAT SERPL-MCNC: 0.71 MG/DL (ref 0.7–1.3)
CREAT SERPL-MCNC: 0.73 MG/DL (ref 0.76–1.27)
CREAT SERPL-MCNC: 0.74 MG/DL (ref 0.7–1.3)
CREAT SERPL-MCNC: 0.76 MG/DL (ref 0.7–1.3)
CREAT SERPL-MCNC: 0.77 MG/DL (ref 0.7–1.3)
CREAT SERPL-MCNC: 0.79 MG/DL (ref 0.7–1.3)
CREAT SERPL-MCNC: 0.8 MG/DL (ref 0.7–1.3)
CREAT SERPL-MCNC: 0.82 MG/DL (ref 0.7–1.3)
CREAT SERPL-MCNC: 0.83 MG/DL (ref 0.76–1.27)
CREAT SERPL-MCNC: 0.84 MG/DL (ref 0.7–1.3)
CREAT SERPL-MCNC: 0.94 MG/DL (ref 0.7–1.3)
CREAT SERPL-MCNC: 0.95 MG/DL (ref 0.7–1.3)
CREAT SERPL-MCNC: 1.01 MG/DL (ref 0.7–1.3)
CREAT SERPL-MCNC: 1.02 MG/DL (ref 0.7–1.3)
CROSSMATCH RESULT,%XM: NORMAL
CROSSMATCH RESULT,%XM: NORMAL
DIFFERENTIAL METHOD BLD: ABNORMAL
EOSINOPHIL # BLD AUTO: 0 X10E3/UL (ref 0–0.4)
EOSINOPHIL # BLD AUTO: 0.1 X10E3/UL (ref 0–0.4)
EOSINOPHIL # BLD AUTO: 0.1 X10E3/UL (ref 0–0.4)
EOSINOPHIL # BLD: 0 K/UL (ref 0–0.4)
EOSINOPHIL # BLD: 0.1 K/UL (ref 0–0.4)
EOSINOPHIL NFR BLD AUTO: 0 %
EOSINOPHIL NFR BLD AUTO: 1 %
EOSINOPHIL NFR BLD AUTO: 3 %
EOSINOPHIL NFR BLD: 0 % (ref 0–7)
EOSINOPHIL NFR BLD: 1 % (ref 0–7)
EOSINOPHIL NFR BLD: 2 % (ref 0–7)
EOSINOPHIL NFR BLD: 2 % (ref 0–7)
EOSINOPHIL NFR FLD MANUAL: 1 %
EPITH CASTS URNS QL MICRO: ABNORMAL /LPF
ERYTHROCYTE [DISTWIDTH] IN BLOOD BY AUTOMATED COUNT: 15.8 % (ref 11.5–14.5)
ERYTHROCYTE [DISTWIDTH] IN BLOOD BY AUTOMATED COUNT: 15.9 % (ref 11.5–14.5)
ERYTHROCYTE [DISTWIDTH] IN BLOOD BY AUTOMATED COUNT: 16.2 % (ref 12.3–15.4)
ERYTHROCYTE [DISTWIDTH] IN BLOOD BY AUTOMATED COUNT: 16.3 % (ref 11.5–14.5)
ERYTHROCYTE [DISTWIDTH] IN BLOOD BY AUTOMATED COUNT: 16.5 % (ref 11.5–14.5)
ERYTHROCYTE [DISTWIDTH] IN BLOOD BY AUTOMATED COUNT: 16.7 % (ref 11.5–14.5)
ERYTHROCYTE [DISTWIDTH] IN BLOOD BY AUTOMATED COUNT: 16.7 % (ref 12.3–15.4)
ERYTHROCYTE [DISTWIDTH] IN BLOOD BY AUTOMATED COUNT: 17.3 % (ref 12.3–15.4)
ERYTHROCYTE [DISTWIDTH] IN BLOOD BY AUTOMATED COUNT: 17.4 % (ref 11.5–14.5)
ERYTHROCYTE [DISTWIDTH] IN BLOOD BY AUTOMATED COUNT: 17.4 % (ref 11.5–14.5)
ERYTHROCYTE [DISTWIDTH] IN BLOOD BY AUTOMATED COUNT: 17.9 % (ref 11.5–14.5)
ERYTHROCYTE [DISTWIDTH] IN BLOOD BY AUTOMATED COUNT: 17.9 % (ref 12.3–15.4)
ERYTHROCYTE [DISTWIDTH] IN BLOOD BY AUTOMATED COUNT: 18 % (ref 11.5–14.5)
ERYTHROCYTE [DISTWIDTH] IN BLOOD BY AUTOMATED COUNT: 18.5 % (ref 11.5–14.5)
ERYTHROCYTE [DISTWIDTH] IN BLOOD BY AUTOMATED COUNT: 19 % (ref 11.5–14.5)
ERYTHROCYTE [DISTWIDTH] IN BLOOD BY AUTOMATED COUNT: 20.3 % (ref 11.5–14.5)
ERYTHROCYTE [DISTWIDTH] IN BLOOD BY AUTOMATED COUNT: 21.1 % (ref 11.5–14.5)
ERYTHROCYTE [DISTWIDTH] IN BLOOD BY AUTOMATED COUNT: 21.1 % (ref 11.5–14.5)
ERYTHROCYTE [DISTWIDTH] IN BLOOD BY AUTOMATED COUNT: 21.4 % (ref 11.5–14.5)
ERYTHROCYTE [DISTWIDTH] IN BLOOD BY AUTOMATED COUNT: 23.5 % (ref 12.3–15.4)
FERRITIN SERPL-MCNC: 329 NG/ML (ref 30–400)
FERRITIN SERPL-MCNC: 749 NG/ML (ref 30–400)
GLOBULIN SER CALC-MCNC: 2.8 G/DL (ref 1.5–4.5)
GLOBULIN SER CALC-MCNC: 3.2 G/DL (ref 2–4)
GLOBULIN SER CALC-MCNC: 3.4 G/DL (ref 2–4)
GLOBULIN SER CALC-MCNC: 3.5 G/DL (ref 2–4)
GLOBULIN SER CALC-MCNC: 3.6 G/DL (ref 2–4)
GLOBULIN SER CALC-MCNC: 3.8 G/DL (ref 2–4)
GLOBULIN SER CALC-MCNC: 3.9 G/DL (ref 2–4)
GLOBULIN SER CALC-MCNC: 3.9 G/DL (ref 2–4)
GLOBULIN SER CALC-MCNC: 4 G/DL (ref 2–4)
GLOBULIN SER CALC-MCNC: 4.1 G/DL (ref 2–4)
GLUCOSE SERPL-MCNC: 100 MG/DL (ref 65–100)
GLUCOSE SERPL-MCNC: 100 MG/DL (ref 65–100)
GLUCOSE SERPL-MCNC: 101 MG/DL (ref 65–100)
GLUCOSE SERPL-MCNC: 102 MG/DL (ref 65–99)
GLUCOSE SERPL-MCNC: 111 MG/DL (ref 65–100)
GLUCOSE SERPL-MCNC: 112 MG/DL (ref 65–99)
GLUCOSE SERPL-MCNC: 125 MG/DL (ref 65–100)
GLUCOSE SERPL-MCNC: 135 MG/DL (ref 65–100)
GLUCOSE SERPL-MCNC: 142 MG/DL (ref 65–100)
GLUCOSE SERPL-MCNC: 73 MG/DL (ref 65–100)
GLUCOSE SERPL-MCNC: 80 MG/DL (ref 65–100)
GLUCOSE SERPL-MCNC: 81 MG/DL (ref 65–100)
GLUCOSE SERPL-MCNC: 81 MG/DL (ref 65–99)
GLUCOSE SERPL-MCNC: 86 MG/DL (ref 65–100)
GLUCOSE SERPL-MCNC: 89 MG/DL (ref 65–100)
GLUCOSE SERPL-MCNC: 91 MG/DL (ref 65–100)
GLUCOSE SERPL-MCNC: 93 MG/DL (ref 65–100)
GLUCOSE SERPL-MCNC: 94 MG/DL (ref 65–99)
GLUCOSE SERPL-MCNC: 95 MG/DL (ref 65–99)
GLUCOSE SERPL-MCNC: 99 MG/DL (ref 65–100)
GLUCOSE UR STRIP.AUTO-MCNC: NEGATIVE MG/DL
HBV SURFACE AG SERPL QL IA: NEGATIVE
HCT VFR BLD AUTO: 19.8 % (ref 36.6–50.3)
HCT VFR BLD AUTO: 24.6 % (ref 36.6–50.3)
HCT VFR BLD AUTO: 26.3 % (ref 37.5–51)
HCT VFR BLD AUTO: 26.6 % (ref 37.5–51)
HCT VFR BLD AUTO: 27.7 % (ref 36.6–50.3)
HCT VFR BLD AUTO: 27.7 % (ref 36.6–50.3)
HCT VFR BLD AUTO: 27.9 % (ref 37.5–51)
HCT VFR BLD AUTO: 28.3 % (ref 36.6–50.3)
HCT VFR BLD AUTO: 28.6 % (ref 36.6–50.3)
HCT VFR BLD AUTO: 28.7 % (ref 36.6–50.3)
HCT VFR BLD AUTO: 29 % (ref 36.6–50.3)
HCT VFR BLD AUTO: 29.3 % (ref 36.6–50.3)
HCT VFR BLD AUTO: 29.3 % (ref 36.6–50.3)
HCT VFR BLD AUTO: 29.9 % (ref 36.6–50.3)
HCT VFR BLD AUTO: 30.2 % (ref 36.6–50.3)
HCT VFR BLD AUTO: 30.9 % (ref 36.6–50.3)
HCT VFR BLD AUTO: 31.2 % (ref 36.6–50.3)
HCT VFR BLD AUTO: 32.6 % (ref 36.6–50.3)
HCT VFR BLD AUTO: 32.9 % (ref 37.5–51)
HCT VFR BLD AUTO: 34.7 % (ref 37.5–51)
HCV AB S/CO SERPL IA: <0.1 S/CO RATIO (ref 0–0.9)
HDLC SERPL-MCNC: 34 MG/DL
HDLC SERPL: 3.7 {RATIO} (ref 0–5)
HGB BLD-MCNC: 10.1 G/DL (ref 12.1–17)
HGB BLD-MCNC: 10.1 G/DL (ref 13–17.7)
HGB BLD-MCNC: 11.1 G/DL (ref 13–17.7)
HGB BLD-MCNC: 6 G/DL (ref 12.1–17)
HGB BLD-MCNC: 7.5 G/DL (ref 12.1–17)
HGB BLD-MCNC: 8.2 G/DL (ref 13–17.7)
HGB BLD-MCNC: 8.3 G/DL (ref 12.1–17)
HGB BLD-MCNC: 8.4 G/DL (ref 12.1–17)
HGB BLD-MCNC: 8.4 G/DL (ref 12.1–17)
HGB BLD-MCNC: 8.5 G/DL (ref 12.1–17)
HGB BLD-MCNC: 8.5 G/DL (ref 12.1–17)
HGB BLD-MCNC: 8.7 G/DL (ref 12.1–17)
HGB BLD-MCNC: 8.7 G/DL (ref 12.1–17)
HGB BLD-MCNC: 8.7 G/DL (ref 13–17.7)
HGB BLD-MCNC: 8.8 G/DL (ref 12.1–17)
HGB BLD-MCNC: 8.9 G/DL (ref 13–17.7)
HGB BLD-MCNC: 9.1 G/DL (ref 12.1–17)
HGB BLD-MCNC: 9.4 G/DL (ref 12.1–17)
HGB BLD-MCNC: 9.5 G/DL (ref 12.1–17)
HGB BLD-MCNC: 9.7 G/DL (ref 12.1–17)
HGB UR QL STRIP: NEGATIVE
HYALINE CASTS URNS QL MICRO: ABNORMAL /LPF (ref 0–5)
IMM GRANULOCYTES # BLD AUTO: 0 K/UL
IMM GRANULOCYTES # BLD AUTO: 0 K/UL (ref 0–0.04)
IMM GRANULOCYTES # BLD AUTO: 0 X10E3/UL (ref 0–0.1)
IMM GRANULOCYTES # BLD AUTO: 0.1 K/UL (ref 0–0.04)
IMM GRANULOCYTES # BLD AUTO: 0.1 X10E3/UL (ref 0–0.1)
IMM GRANULOCYTES NFR BLD AUTO: 0 %
IMM GRANULOCYTES NFR BLD AUTO: 0 % (ref 0–0.5)
IMM GRANULOCYTES NFR BLD AUTO: 1 %
IMM GRANULOCYTES NFR BLD AUTO: 1 %
IMM GRANULOCYTES NFR BLD AUTO: 1 % (ref 0–0.5)
INR BLD: 1.5 (ref 0.9–1.2)
INR PPP: 1.1 (ref 0.8–1.2)
INR PPP: 1.1 (ref 0.8–1.2)
IRON SATN MFR SERPL: 11 % (ref 15–55)
IRON SATN MFR SERPL: 12 % (ref 15–55)
IRON SERPL-MCNC: 25 UG/DL (ref 38–169)
IRON SERPL-MCNC: 29 UG/DL (ref 38–169)
KETONES UR QL STRIP.AUTO: NEGATIVE MG/DL
LDH FLD L TO P-CCNC: 117 U/L
LDH FLD L TO P-CCNC: 131 U/L
LDLC SERPL CALC-MCNC: 70.4 MG/DL (ref 0–100)
LEUKOCYTE ESTERASE UR QL STRIP.AUTO: NEGATIVE
LIPID PROFILE,FLP: NORMAL
LYMPHOCYTES # BLD AUTO: 0.3 X10E3/UL (ref 0.7–3.1)
LYMPHOCYTES # BLD AUTO: 0.4 X10E3/UL (ref 0.7–3.1)
LYMPHOCYTES # BLD AUTO: 0.5 X10E3/UL (ref 0.7–3.1)
LYMPHOCYTES # BLD: 0.3 K/UL (ref 0.8–3.5)
LYMPHOCYTES # BLD: 0.4 K/UL (ref 0.8–3.5)
LYMPHOCYTES # BLD: 0.5 K/UL (ref 0.8–3.5)
LYMPHOCYTES # BLD: 0.5 K/UL (ref 0.8–3.5)
LYMPHOCYTES # BLD: 0.6 K/UL (ref 0.8–3.5)
LYMPHOCYTES NFR BLD AUTO: 12 %
LYMPHOCYTES NFR BLD AUTO: 13 %
LYMPHOCYTES NFR BLD AUTO: 41 %
LYMPHOCYTES NFR BLD AUTO: 5 %
LYMPHOCYTES NFR BLD AUTO: 9 %
LYMPHOCYTES NFR BLD: 10 % (ref 12–49)
LYMPHOCYTES NFR BLD: 11 % (ref 12–49)
LYMPHOCYTES NFR BLD: 12 % (ref 12–49)
LYMPHOCYTES NFR BLD: 12 % (ref 12–49)
LYMPHOCYTES NFR BLD: 14 % (ref 12–49)
LYMPHOCYTES NFR BLD: 14 % (ref 12–49)
LYMPHOCYTES NFR BLD: 2 % (ref 12–49)
LYMPHOCYTES NFR BLD: 4 % (ref 12–49)
LYMPHOCYTES NFR BLD: 5 % (ref 12–49)
LYMPHOCYTES NFR BLD: 5 % (ref 12–49)
LYMPHOCYTES NFR BLD: 6 % (ref 12–49)
LYMPHOCYTES NFR BLD: 8 % (ref 12–49)
LYMPHOCYTES NFR BLD: 8 % (ref 12–49)
LYMPHOCYTES NFR BLD: 9 % (ref 12–49)
LYMPHOCYTES NFR BLD: 9 % (ref 12–49)
LYMPHOCYTES NFR FLD: 21 %
LYMPHOCYTES NFR FLD: 26 %
LYMPHOCYTES NFR FLD: 35 %
LYMPHOCYTES NFR FLD: 59 %
LYMPHOCYTES NFR FLD: 9 %
MAGNESIUM SERPL-MCNC: 1.7 MG/DL (ref 1.6–2.4)
MAGNESIUM SERPL-MCNC: 2.1 MG/DL (ref 1.6–2.4)
MCH RBC QN AUTO: 27.8 PG (ref 26.6–33)
MCH RBC QN AUTO: 28 PG (ref 26.6–33)
MCH RBC QN AUTO: 28.1 PG (ref 26.6–33)
MCH RBC QN AUTO: 28.1 PG (ref 26–34)
MCH RBC QN AUTO: 28.4 PG (ref 26–34)
MCH RBC QN AUTO: 28.4 PG (ref 26–34)
MCH RBC QN AUTO: 28.5 PG (ref 26–34)
MCH RBC QN AUTO: 28.9 PG (ref 26–34)
MCH RBC QN AUTO: 28.9 PG (ref 26–34)
MCH RBC QN AUTO: 29 PG (ref 26.6–33)
MCH RBC QN AUTO: 29.1 PG (ref 26–34)
MCH RBC QN AUTO: 29.2 PG (ref 26–34)
MCH RBC QN AUTO: 29.4 PG (ref 26–34)
MCH RBC QN AUTO: 29.5 PG (ref 26–34)
MCH RBC QN AUTO: 29.5 PG (ref 26–34)
MCH RBC QN AUTO: 29.7 PG (ref 26–34)
MCH RBC QN AUTO: 29.9 PG (ref 26–34)
MCH RBC QN AUTO: 30.2 PG (ref 26–34)
MCH RBC QN AUTO: 30.9 PG (ref 26–34)
MCH RBC QN AUTO: 33.8 PG (ref 26.6–33)
MCHC RBC AUTO-ENTMCNC: 29 G/DL (ref 30–36.5)
MCHC RBC AUTO-ENTMCNC: 29.3 G/DL (ref 30–36.5)
MCHC RBC AUTO-ENTMCNC: 29.3 G/DL (ref 30–36.5)
MCHC RBC AUTO-ENTMCNC: 29.4 G/DL (ref 30–36.5)
MCHC RBC AUTO-ENTMCNC: 29.4 G/DL (ref 30–36.5)
MCHC RBC AUTO-ENTMCNC: 29.7 G/DL (ref 30–36.5)
MCHC RBC AUTO-ENTMCNC: 30.3 G/DL (ref 30–36.5)
MCHC RBC AUTO-ENTMCNC: 30.3 G/DL (ref 30–36.5)
MCHC RBC AUTO-ENTMCNC: 30.4 G/DL (ref 30–36.5)
MCHC RBC AUTO-ENTMCNC: 30.5 G/DL (ref 30–36.5)
MCHC RBC AUTO-ENTMCNC: 30.7 G/DL (ref 30–36.5)
MCHC RBC AUTO-ENTMCNC: 30.7 G/DL (ref 31.5–35.7)
MCHC RBC AUTO-ENTMCNC: 31 G/DL (ref 30–36.5)
MCHC RBC AUTO-ENTMCNC: 31.2 G/DL (ref 31.5–35.7)
MCHC RBC AUTO-ENTMCNC: 31.2 G/DL (ref 31.5–35.7)
MCHC RBC AUTO-ENTMCNC: 31.4 G/DL (ref 30–36.5)
MCHC RBC AUTO-ENTMCNC: 31.4 G/DL (ref 30–36.5)
MCHC RBC AUTO-ENTMCNC: 32 G/DL (ref 31.5–35.7)
MCHC RBC AUTO-ENTMCNC: 32.1 G/DL (ref 30–36.5)
MCHC RBC AUTO-ENTMCNC: 33.5 G/DL (ref 31.5–35.7)
MCV RBC AUTO: 101 FL (ref 79–97)
MCV RBC AUTO: 101 FL (ref 80–99)
MCV RBC AUTO: 101.8 FL (ref 80–99)
MCV RBC AUTO: 102.9 FL (ref 80–99)
MCV RBC AUTO: 103.2 FL (ref 80–99)
MCV RBC AUTO: 89 FL (ref 79–97)
MCV RBC AUTO: 89.9 FL (ref 80–99)
MCV RBC AUTO: 90 FL (ref 79–97)
MCV RBC AUTO: 91 FL (ref 79–97)
MCV RBC AUTO: 91 FL (ref 79–97)
MCV RBC AUTO: 91.4 FL (ref 80–99)
MCV RBC AUTO: 93.1 FL (ref 80–99)
MCV RBC AUTO: 93.5 FL (ref 80–99)
MCV RBC AUTO: 93.6 FL (ref 80–99)
MCV RBC AUTO: 93.7 FL (ref 80–99)
MCV RBC AUTO: 93.8 FL (ref 80–99)
MCV RBC AUTO: 94.2 FL (ref 80–99)
MCV RBC AUTO: 98.6 FL (ref 80–99)
MCV RBC AUTO: 99 FL (ref 80–99)
MCV RBC AUTO: 99.3 FL (ref 80–99)
MESOTHL CELL NFR FLD: 1 %
MESOTHL CELL NFR FLD: 14 %
MESOTHL CELL NFR FLD: 2 %
MITOCHONDRIA M2 IGG SER-ACNC: <20 UNITS (ref 0–20)
MONOCYTES # BLD AUTO: 0.1 X10E3/UL (ref 0.1–0.9)
MONOCYTES # BLD AUTO: 0.3 X10E3/UL (ref 0.1–0.9)
MONOCYTES # BLD AUTO: 0.3 X10E3/UL (ref 0.1–0.9)
MONOCYTES # BLD AUTO: 0.4 X10E3/UL (ref 0.1–0.9)
MONOCYTES # BLD AUTO: 0.5 X10E3/UL (ref 0.1–0.9)
MONOCYTES # BLD: 0.2 K/UL (ref 0–1)
MONOCYTES # BLD: 0.3 K/UL (ref 0–1)
MONOCYTES # BLD: 0.4 K/UL (ref 0–1)
MONOCYTES # BLD: 0.5 K/UL (ref 0–1)
MONOCYTES # BLD: 0.6 K/UL (ref 0–1)
MONOCYTES NFR BLD AUTO: 11 %
MONOCYTES NFR BLD AUTO: 12 %
MONOCYTES NFR BLD AUTO: 6 %
MONOCYTES NFR BLD AUTO: 7 %
MONOCYTES NFR BLD AUTO: 7 %
MONOCYTES NFR BLD: 10 % (ref 5–13)
MONOCYTES NFR BLD: 10 % (ref 5–13)
MONOCYTES NFR BLD: 11 % (ref 5–13)
MONOCYTES NFR BLD: 12 % (ref 5–13)
MONOCYTES NFR BLD: 14 % (ref 5–13)
MONOCYTES NFR BLD: 3 % (ref 5–13)
MONOCYTES NFR BLD: 5 % (ref 5–13)
MONOCYTES NFR BLD: 6 % (ref 5–13)
MONOCYTES NFR BLD: 7 % (ref 5–13)
MONOCYTES NFR BLD: 8 % (ref 5–13)
MONOCYTES NFR BLD: 8 % (ref 5–13)
MONOS+MACROS NFR FLD: 14 %
MONOS+MACROS NFR FLD: 58 %
MONOS+MACROS NFR FLD: 6 %
MONOS+MACROS NFR FLD: 65 %
MONOS+MACROS NFR FLD: 76 %
MORPHOLOGY BLD-IMP: ABNORMAL
NEUTROPHILS # BLD AUTO: 0.3 X10E3/UL (ref 1.4–7)
NEUTROPHILS # BLD AUTO: 2.4 X10E3/UL (ref 1.4–7)
NEUTROPHILS # BLD AUTO: 3.3 X10E3/UL (ref 1.4–7)
NEUTROPHILS # BLD AUTO: 4.3 X10E3/UL (ref 1.4–7)
NEUTROPHILS # BLD AUTO: 8.4 X10E3/UL (ref 1.4–7)
NEUTROPHILS NFR BLD AUTO: 44 %
NEUTROPHILS NFR BLD AUTO: 76 %
NEUTROPHILS NFR BLD AUTO: 77 %
NEUTROPHILS NFR BLD AUTO: 83 %
NEUTROPHILS NFR BLD AUTO: 88 %
NEUTROPHILS NFR FLD: 1 %
NEUTROPHILS NFR FLD: 13 %
NEUTROPHILS NFR FLD: 7 %
NEUTROPHILS NFR FLD: 7 %
NEUTROPHILS NFR FLD: 85 %
NEUTS BAND NFR BLD MANUAL: 14 % (ref 0–6)
NEUTS SEG # BLD: 12.4 K/UL (ref 1.8–8)
NEUTS SEG # BLD: 2 K/UL (ref 1.8–8)
NEUTS SEG # BLD: 2.6 K/UL (ref 1.8–8)
NEUTS SEG # BLD: 3 K/UL (ref 1.8–8)
NEUTS SEG # BLD: 3.2 K/UL (ref 1.8–8)
NEUTS SEG # BLD: 3.6 K/UL (ref 1.8–8)
NEUTS SEG # BLD: 3.8 K/UL (ref 1.8–8)
NEUTS SEG # BLD: 3.9 K/UL (ref 1.8–8)
NEUTS SEG # BLD: 4 K/UL (ref 1.8–8)
NEUTS SEG # BLD: 4.5 K/UL (ref 1.8–8)
NEUTS SEG # BLD: 4.6 K/UL (ref 1.8–8)
NEUTS SEG # BLD: 5.9 K/UL (ref 1.8–8)
NEUTS SEG # BLD: 5.9 K/UL (ref 1.8–8)
NEUTS SEG # BLD: 7.6 K/UL (ref 1.8–8)
NEUTS SEG # BLD: 8.6 K/UL (ref 1.8–8)
NEUTS SEG NFR BLD: 66 % (ref 32–75)
NEUTS SEG NFR BLD: 73 % (ref 32–75)
NEUTS SEG NFR BLD: 75 % (ref 32–75)
NEUTS SEG NFR BLD: 76 % (ref 32–75)
NEUTS SEG NFR BLD: 77 % (ref 32–75)
NEUTS SEG NFR BLD: 78 % (ref 32–75)
NEUTS SEG NFR BLD: 79 % (ref 32–75)
NEUTS SEG NFR BLD: 81 % (ref 32–75)
NEUTS SEG NFR BLD: 82 % (ref 32–75)
NEUTS SEG NFR BLD: 83 % (ref 32–75)
NEUTS SEG NFR BLD: 85 % (ref 32–75)
NEUTS SEG NFR BLD: 86 % (ref 32–75)
NEUTS SEG NFR BLD: 88 % (ref 32–75)
NEUTS SEG NFR BLD: 88 % (ref 32–75)
NEUTS SEG NFR BLD: 95 % (ref 32–75)
NITRITE UR QL STRIP.AUTO: NEGATIVE
NRBC # BLD: 0 K/UL (ref 0–0.01)
NRBC BLD-RTO: 0 PER 100 WBC
NUC CELL # FLD: 123 /CU MM
NUC CELL # FLD: 130 /CU MM
NUC CELL # FLD: 54 /CU MM
NUC CELL # FLD: 70 /CU MM
NUC CELL # FLD: 92 /CU MM
P-ANCA ATYPICAL TITR SER IF: NORMAL TITER
P-ANCA TITR SER IF: NORMAL TITER
PATH REV BLD -IMP: ABNORMAL
PH UR STRIP: 6.5 [PH] (ref 5–8)
PLATELET # BLD AUTO: 107 K/UL (ref 150–400)
PLATELET # BLD AUTO: 111 K/UL (ref 150–400)
PLATELET # BLD AUTO: 129 K/UL (ref 150–400)
PLATELET # BLD AUTO: 131 K/UL (ref 150–400)
PLATELET # BLD AUTO: 137 K/UL (ref 150–400)
PLATELET # BLD AUTO: 139 X10E3/UL (ref 150–379)
PLATELET # BLD AUTO: 14 K/UL (ref 150–400)
PLATELET # BLD AUTO: 32 K/UL (ref 150–400)
PLATELET # BLD AUTO: 35 K/UL (ref 150–400)
PLATELET # BLD AUTO: 41 K/UL (ref 150–400)
PLATELET # BLD AUTO: 43 X10E3/UL (ref 150–379)
PLATELET # BLD AUTO: 60 K/UL (ref 150–400)
PLATELET # BLD AUTO: 69 K/UL (ref 150–400)
PLATELET # BLD AUTO: 75 X10E3/UL (ref 150–450)
PLATELET # BLD AUTO: 76 K/UL (ref 150–400)
PLATELET # BLD AUTO: 76 X10E3/UL (ref 150–450)
PLATELET # BLD AUTO: 8 K/UL (ref 150–400)
PLATELET # BLD AUTO: 82 K/UL (ref 150–400)
PLATELET # BLD AUTO: 82 K/UL (ref 150–400)
PLATELET # BLD AUTO: 97 X10E3/UL (ref 150–379)
PLATELET COMMENTS,PCOM: ABNORMAL
PMV BLD AUTO: 10.6 FL (ref 8.9–12.9)
PMV BLD AUTO: 11.1 FL (ref 8.9–12.9)
PMV BLD AUTO: 11.2 FL (ref 8.9–12.9)
PMV BLD AUTO: 11.4 FL (ref 8.9–12.9)
PMV BLD AUTO: 11.5 FL (ref 8.9–12.9)
PMV BLD AUTO: 11.7 FL (ref 8.9–12.9)
PMV BLD AUTO: 11.8 FL (ref 8.9–12.9)
PMV BLD AUTO: 12.3 FL (ref 8.9–12.9)
PMV BLD AUTO: 12.4 FL (ref 8.9–12.9)
PMV BLD AUTO: 12.8 FL (ref 8.9–12.9)
PMV BLD AUTO: ABNORMAL FL (ref 8.9–12.9)
POTASSIUM SERPL-SCNC: 3.5 MMOL/L (ref 3.5–5.1)
POTASSIUM SERPL-SCNC: 3.8 MMOL/L (ref 3.5–5.1)
POTASSIUM SERPL-SCNC: 3.8 MMOL/L (ref 3.5–5.1)
POTASSIUM SERPL-SCNC: 4 MMOL/L (ref 3.5–5.1)
POTASSIUM SERPL-SCNC: 4.1 MMOL/L (ref 3.5–5.1)
POTASSIUM SERPL-SCNC: 4.1 MMOL/L (ref 3.5–5.1)
POTASSIUM SERPL-SCNC: 4.2 MMOL/L (ref 3.5–5.1)
POTASSIUM SERPL-SCNC: 4.3 MMOL/L (ref 3.5–5.1)
POTASSIUM SERPL-SCNC: 4.3 MMOL/L (ref 3.5–5.2)
POTASSIUM SERPL-SCNC: 4.3 MMOL/L (ref 3.5–5.2)
POTASSIUM SERPL-SCNC: 4.4 MMOL/L (ref 3.5–5.2)
POTASSIUM SERPL-SCNC: 4.6 MMOL/L (ref 3.5–5.1)
POTASSIUM SERPL-SCNC: 4.7 MMOL/L (ref 3.5–5.1)
POTASSIUM SERPL-SCNC: 4.7 MMOL/L (ref 3.5–5.2)
POTASSIUM SERPL-SCNC: 4.9 MMOL/L (ref 3.5–5.2)
POTASSIUM SERPL-SCNC: 5.2 MMOL/L (ref 3.5–5.1)
PROT FLD-MCNC: 1.9 G/DL
PROT FLD-MCNC: 2.2 G/DL
PROT SERPL-MCNC: 5.2 G/DL (ref 6.4–8.2)
PROT SERPL-MCNC: 5.4 G/DL (ref 6–8.5)
PROT SERPL-MCNC: 5.5 G/DL (ref 6.4–8.2)
PROT SERPL-MCNC: 5.5 G/DL (ref 6–8.5)
PROT SERPL-MCNC: 5.6 G/DL (ref 6.4–8.2)
PROT SERPL-MCNC: 5.7 G/DL (ref 6.4–8.2)
PROT SERPL-MCNC: 5.7 G/DL (ref 6.4–8.2)
PROT SERPL-MCNC: 5.8 G/DL (ref 6.4–8.2)
PROT SERPL-MCNC: 5.9 G/DL (ref 6–8.5)
PROT SERPL-MCNC: 6 G/DL (ref 6.4–8.2)
PROT SERPL-MCNC: 6 G/DL (ref 6.4–8.2)
PROT SERPL-MCNC: 6.2 G/DL (ref 6.4–8.2)
PROT SERPL-MCNC: 6.2 G/DL (ref 6.4–8.2)
PROT SERPL-MCNC: 6.3 G/DL (ref 6.4–8.2)
PROT SERPL-MCNC: 6.4 G/DL (ref 6–8.5)
PROT SERPL-MCNC: 6.6 G/DL (ref 6.4–8.2)
PROT SERPL-MCNC: 6.8 G/DL (ref 6.4–8.2)
PROT UR STRIP-MCNC: NEGATIVE MG/DL
PROTHROMBIN TIME: 10.9 SEC (ref 9.1–12)
PROTHROMBIN TIME: 10.9 SEC (ref 9.1–12)
PSA SERPL-MCNC: 3 NG/ML (ref 0.01–4)
RBC # BLD AUTO: 2.11 M/UL (ref 4.1–5.7)
RBC # BLD AUTO: 2.63 M/UL (ref 4.1–5.7)
RBC # BLD AUTO: 2.63 X10E6/UL (ref 4.14–5.8)
RBC # BLD AUTO: 2.75 M/UL (ref 4.1–5.7)
RBC # BLD AUTO: 2.84 M/UL (ref 4.1–5.7)
RBC # BLD AUTO: 2.85 M/UL (ref 4.1–5.7)
RBC # BLD AUTO: 2.89 M/UL (ref 4.1–5.7)
RBC # BLD AUTO: 2.91 M/UL (ref 4.1–5.7)
RBC # BLD AUTO: 2.93 X10E6/UL (ref 4.14–5.8)
RBC # BLD AUTO: 2.95 M/UL (ref 4.1–5.7)
RBC # BLD AUTO: 2.96 M/UL (ref 4.1–5.7)
RBC # BLD AUTO: 3.03 M/UL (ref 4.1–5.7)
RBC # BLD AUTO: 3.06 M/UL (ref 4.1–5.7)
RBC # BLD AUTO: 3.13 X10E6/UL (ref 4.14–5.8)
RBC # BLD AUTO: 3.19 M/UL (ref 4.1–5.7)
RBC # BLD AUTO: 3.35 M/UL (ref 4.1–5.7)
RBC # BLD AUTO: 3.36 M/UL (ref 4.1–5.7)
RBC # BLD AUTO: 3.46 M/UL (ref 4.1–5.7)
RBC # BLD AUTO: 3.6 X10E6/UL (ref 4.14–5.8)
RBC # BLD AUTO: 3.83 X10E6/UL (ref 4.14–5.8)
RBC # FLD: >100 /CU MM
RBC #/AREA URNS HPF: ABNORMAL /HPF (ref 0–5)
RBC MORPH BLD: ABNORMAL
SAMPLES BEING HELD,HOLD: NORMAL
SODIUM SERPL-SCNC: 136 MMOL/L (ref 136–145)
SODIUM SERPL-SCNC: 137 MMOL/L (ref 136–145)
SODIUM SERPL-SCNC: 138 MMOL/L (ref 134–144)
SODIUM SERPL-SCNC: 138 MMOL/L (ref 136–145)
SODIUM SERPL-SCNC: 138 MMOL/L (ref 136–145)
SODIUM SERPL-SCNC: 139 MMOL/L (ref 136–145)
SODIUM SERPL-SCNC: 140 MMOL/L (ref 134–144)
SODIUM SERPL-SCNC: 140 MMOL/L (ref 136–145)
SODIUM SERPL-SCNC: 141 MMOL/L (ref 134–144)
SODIUM SERPL-SCNC: 141 MMOL/L (ref 136–145)
SODIUM SERPL-SCNC: 142 MMOL/L (ref 134–144)
SODIUM SERPL-SCNC: 142 MMOL/L (ref 134–144)
SODIUM SERPL-SCNC: 142 MMOL/L (ref 136–145)
SODIUM SERPL-SCNC: 142 MMOL/L (ref 136–145)
SODIUM SERPL-SCNC: 144 MMOL/L (ref 136–145)
SODIUM SERPL-SCNC: 145 MMOL/L (ref 136–145)
SP GR UR REFRACTOMETRY: 1.02 (ref 1–1.03)
SPECIMEN EXP DATE BLD: NORMAL
SPECIMEN SOURCE FLD: ABNORMAL
SPECIMEN SOURCE FLD: NORMAL
STATUS OF UNIT,%ST: NORMAL
TIBC SERPL-MCNC: 230 UG/DL (ref 250–450)
TIBC SERPL-MCNC: 237 UG/DL (ref 250–450)
TRIGL SERPL-MCNC: 108 MG/DL (ref ?–150)
TSH SERPL DL<=0.05 MIU/L-ACNC: 2.24 UIU/ML (ref 0.36–3.74)
TSH SERPL DL<=0.05 MIU/L-ACNC: 29.1 UIU/ML (ref 0.36–3.74)
TSH SERPL DL<=0.05 MIU/L-ACNC: 3.21 UIU/ML (ref 0.36–3.74)
TSH SERPL DL<=0.05 MIU/L-ACNC: 6.66 UIU/ML (ref 0.36–3.74)
UA: UC IF INDICATED,UAUC: ABNORMAL
UIBC SERPL-MCNC: 205 UG/DL (ref 111–343)
UIBC SERPL-MCNC: 208 UG/DL (ref 111–343)
UNIT DIVISION, %UDIV: 0
UROBILINOGEN UR QL STRIP.AUTO: 2 EU/DL (ref 0.2–1)
VLDLC SERPL CALC-MCNC: 21.6 MG/DL
WBC # BLD AUTO: 0.8 X10E3/UL (ref 3.4–10.8)
WBC # BLD AUTO: 13.1 K/UL (ref 4.1–11.1)
WBC # BLD AUTO: 2.7 K/UL (ref 4.1–11.1)
WBC # BLD AUTO: 3.1 X10E3/UL (ref 3.4–10.8)
WBC # BLD AUTO: 3.3 K/UL (ref 4.1–11.1)
WBC # BLD AUTO: 3.7 K/UL (ref 4.1–11.1)
WBC # BLD AUTO: 4.2 K/UL (ref 4.1–11.1)
WBC # BLD AUTO: 4.3 X10E3/UL (ref 3.4–10.8)
WBC # BLD AUTO: 4.5 K/UL (ref 4.1–11.1)
WBC # BLD AUTO: 4.7 K/UL (ref 4.1–11.1)
WBC # BLD AUTO: 4.8 K/UL (ref 4.1–11.1)
WBC # BLD AUTO: 5 K/UL (ref 4.1–11.1)
WBC # BLD AUTO: 5.2 X10E3/UL (ref 3.4–10.8)
WBC # BLD AUTO: 5.5 K/UL (ref 4.1–11.1)
WBC # BLD AUTO: 6 K/UL (ref 4.1–11.1)
WBC # BLD AUTO: 6.9 K/UL (ref 4.1–11.1)
WBC # BLD AUTO: 7 K/UL (ref 4.1–11.1)
WBC # BLD AUTO: 8.6 K/UL (ref 4.1–11.1)
WBC # BLD AUTO: 9.5 X10E3/UL (ref 3.4–10.8)
WBC # BLD AUTO: 9.8 K/UL (ref 4.1–11.1)
WBC MORPH BLD: ABNORMAL
WBC MORPH BLD: ABNORMAL
WBC URNS QL MICRO: ABNORMAL /HPF (ref 0–4)

## 2019-01-01 PROCEDURE — 36415 COLL VENOUS BLD VENIPUNCTURE: CPT

## 2019-01-01 PROCEDURE — 85025 COMPLETE CBC W/AUTO DIFF WBC: CPT

## 2019-01-01 PROCEDURE — 3331090001 HH PPS REVENUE CREDIT

## 2019-01-01 PROCEDURE — 77030012965 HC NDL HUBR BBMI -A

## 2019-01-01 PROCEDURE — 80053 COMPREHEN METABOLIC PANEL: CPT

## 2019-01-01 PROCEDURE — G0156 HHCP-SVS OF AIDE,EA 15 MIN: HCPCS

## 2019-01-01 PROCEDURE — 86900 BLOOD TYPING SEROLOGIC ABO: CPT

## 2019-01-01 PROCEDURE — 84443 ASSAY THYROID STIM HORMONE: CPT

## 2019-01-01 PROCEDURE — 74011250636 HC RX REV CODE- 250/636: Performed by: INTERNAL MEDICINE

## 2019-01-01 PROCEDURE — 84153 ASSAY OF PSA TOTAL: CPT

## 2019-01-01 PROCEDURE — 88342 IMHCHEM/IMCYTCHM 1ST ANTB: CPT

## 2019-01-01 PROCEDURE — 77030037218

## 2019-01-01 PROCEDURE — 77387 GUIDANCE FOR RADJ TX DLVR: CPT

## 2019-01-01 PROCEDURE — 86923 COMPATIBILITY TEST ELECTRIC: CPT

## 2019-01-01 PROCEDURE — 77386 HC IMRT TRMT DLVR COMPL: CPT

## 2019-01-01 PROCEDURE — 71260 CT THORAX DX C+: CPT

## 2019-01-01 PROCEDURE — 72157 MRI CHEST SPINE W/O & W/DYE: CPT

## 2019-01-01 PROCEDURE — 74011000258 HC RX REV CODE- 258: Performed by: RADIOLOGY

## 2019-01-01 PROCEDURE — 3331090002 HH PPS REVENUE DEBIT

## 2019-01-01 PROCEDURE — 77030014137 HC TY PARCNT TELE -B

## 2019-01-01 PROCEDURE — 96366 THER/PROPH/DIAG IV INF ADDON: CPT

## 2019-01-01 PROCEDURE — 96417 CHEMO IV INFUS EACH ADDL SEQ: CPT

## 2019-01-01 PROCEDURE — C1769 GUIDE WIRE: HCPCS

## 2019-01-01 PROCEDURE — HOSPICE MEDICATION HC HH HOSPICE MEDICATION

## 2019-01-01 PROCEDURE — T4541 LARGE DISPOSABLE UNDERPAD: HCPCS

## 2019-01-01 PROCEDURE — A9575 INJ GADOTERATE MEGLUMI 0.1ML: HCPCS | Performed by: RADIOLOGY

## 2019-01-01 PROCEDURE — A4927 NON-STERILE GLOVES: HCPCS

## 2019-01-01 PROCEDURE — 77412 RADIATION TX DELIVERY LVL 3: CPT

## 2019-01-01 PROCEDURE — 96413 CHEMO IV INFUSION 1 HR: CPT

## 2019-01-01 PROCEDURE — 77338 DESIGN MLC DEVICE FOR IMRT: CPT

## 2019-01-01 PROCEDURE — G0299 HHS/HOSPICE OF RN EA 15 MIN: HCPCS

## 2019-01-01 PROCEDURE — 74011250636 HC RX REV CODE- 250/636: Performed by: RADIOLOGY

## 2019-01-01 PROCEDURE — 96372 THER/PROPH/DIAG INJ SC/IM: CPT

## 2019-01-01 PROCEDURE — 49083 ABD PARACENTESIS W/IMAGING: CPT

## 2019-01-01 PROCEDURE — 81001 URINALYSIS AUTO W/SCOPE: CPT

## 2019-01-01 PROCEDURE — C1894 INTRO/SHEATH, NON-LASER: HCPCS

## 2019-01-01 PROCEDURE — A9270 NON-COVERED ITEM OR SERVICE: HCPCS

## 2019-01-01 PROCEDURE — 82042 OTHER SOURCE ALBUMIN QUAN EA: CPT

## 2019-01-01 PROCEDURE — 74177 CT ABD & PELVIS W/CONTRAST: CPT

## 2019-01-01 PROCEDURE — A6260 WOUND CLEANSER ANY TYPE/SIZE: HCPCS

## 2019-01-01 PROCEDURE — C1729 CATH, DRAINAGE: HCPCS

## 2019-01-01 PROCEDURE — 74011636320 HC RX REV CODE- 636/320: Performed by: RADIOLOGY

## 2019-01-01 PROCEDURE — 77030032034 HC SYS EVAC ASEPT DISP BBMI -B

## 2019-01-01 PROCEDURE — 96374 THER/PROPH/DIAG INJ IV PUSH: CPT

## 2019-01-01 PROCEDURE — 74011250636 HC RX REV CODE- 250/636

## 2019-01-01 PROCEDURE — 73723 MRI JOINT LWR EXTR W/O&W/DYE: CPT

## 2019-01-01 PROCEDURE — 74011250636 HC RX REV CODE- 250/636: Performed by: REGISTERED NURSE

## 2019-01-01 PROCEDURE — 88305 TISSUE EXAM BY PATHOLOGIST: CPT

## 2019-01-01 PROCEDURE — 3336500001 HSPC ELECTION

## 2019-01-01 PROCEDURE — 77334 RADIATION TREATMENT AID(S): CPT

## 2019-01-01 PROCEDURE — 83735 ASSAY OF MAGNESIUM: CPT

## 2019-01-01 PROCEDURE — A6250 SKIN SEAL PROTECT MOISTURIZR: HCPCS

## 2019-01-01 PROCEDURE — 88112 CYTOPATH CELL ENHANCE TECH: CPT

## 2019-01-01 PROCEDURE — 74011000258 HC RX REV CODE- 258: Performed by: REGISTERED NURSE

## 2019-01-01 PROCEDURE — 73502 X-RAY EXAM HIP UNI 2-3 VIEWS: CPT

## 2019-01-01 PROCEDURE — 0651 HSPC ROUTINE HOME CARE

## 2019-01-01 PROCEDURE — 3331090004 HSPC SERVICE INTENSITY ADD-ON

## 2019-01-01 PROCEDURE — G0300 HHS/HOSPICE OF LPN EA 15 MIN: HCPCS

## 2019-01-01 PROCEDURE — 82140 ASSAY OF AMMONIA: CPT

## 2019-01-01 PROCEDURE — 84157 ASSAY OF PROTEIN OTHER: CPT

## 2019-01-01 PROCEDURE — 3331090003 HH PPS REVENUE ADJ

## 2019-01-01 PROCEDURE — 77336 RADIATION PHYSICS CONSULT: CPT

## 2019-01-01 PROCEDURE — P9047 ALBUMIN (HUMAN), 25%, 50ML: HCPCS | Performed by: RADIOLOGY

## 2019-01-01 PROCEDURE — P9047 ALBUMIN (HUMAN), 25%, 50ML: HCPCS | Performed by: INTERNAL MEDICINE

## 2019-01-01 PROCEDURE — 77300 RADIATION THERAPY DOSE PLAN: CPT

## 2019-01-01 PROCEDURE — 88313 SPECIAL STAINS GROUP 2: CPT

## 2019-01-01 PROCEDURE — 88307 TISSUE EXAM BY PATHOLOGIST: CPT

## 2019-01-01 PROCEDURE — 77030039266 HC ADH SKN EXOFIN S2SG -A

## 2019-01-01 PROCEDURE — 74011000250 HC RX REV CODE- 250: Performed by: INTERNAL MEDICINE

## 2019-01-01 PROCEDURE — G0155 HHCP-SVS OF CSW,EA 15 MIN: HCPCS

## 2019-01-01 PROCEDURE — 74011250636 HC RX REV CODE- 250/636: Performed by: NURSE PRACTITIONER

## 2019-01-01 PROCEDURE — A6212 FOAM DRG <=16 SQ IN W/BORDER: HCPCS

## 2019-01-01 PROCEDURE — 74011000250 HC RX REV CODE- 250: Performed by: REGISTERED NURSE

## 2019-01-01 PROCEDURE — 99152 MOD SED SAME PHYS/QHP 5/>YRS: CPT

## 2019-01-01 PROCEDURE — A9575 INJ GADOTERATE MEGLUMI 0.1ML: HCPCS | Performed by: INTERNAL MEDICINE

## 2019-01-01 PROCEDURE — HHS10554 SHAMPOO/BODY WASH 8 OZ ALOE VESTA

## 2019-01-01 PROCEDURE — 96411 CHEMO IV PUSH ADDL DRUG: CPT

## 2019-01-01 PROCEDURE — 88333 PATH CONSLTJ SURG CYTO XM 1: CPT

## 2019-01-01 PROCEDURE — C1788 PORT, INDWELLING, IMP: HCPCS

## 2019-01-01 PROCEDURE — 74011000250 HC RX REV CODE- 250: Performed by: STUDENT IN AN ORGANIZED HEALTH CARE EDUCATION/TRAINING PROGRAM

## 2019-01-01 PROCEDURE — 77030040212 HC SYS EVAC ASEPT DISP BBMI -A

## 2019-01-01 PROCEDURE — 88341 IMHCHEM/IMCYTCHM EA ADD ANTB: CPT

## 2019-01-01 PROCEDURE — T4526 ADULT SIZE PULL-ON MED: HCPCS

## 2019-01-01 PROCEDURE — 77030011893 HC TY CUT DN TRIS -B

## 2019-01-01 PROCEDURE — 77301 RADIOTHERAPY DOSE PLAN IMRT: CPT

## 2019-01-01 PROCEDURE — 89050 BODY FLUID CELL COUNT: CPT

## 2019-01-01 PROCEDURE — 77307 TELETHX ISODOSE PLAN CPLX: CPT

## 2019-01-01 PROCEDURE — 83615 LACTATE (LD) (LDH) ENZYME: CPT

## 2019-01-01 PROCEDURE — 74011000258 HC RX REV CODE- 258: Performed by: INTERNAL MEDICINE

## 2019-01-01 PROCEDURE — P9035 PLATELET PHERES LEUKOREDUCED: HCPCS

## 2019-01-01 PROCEDURE — G0151 HHCP-SERV OF PT,EA 15 MIN: HCPCS

## 2019-01-01 PROCEDURE — A6216 NON-STERILE GAUZE<=16 SQ IN: HCPCS

## 2019-01-01 PROCEDURE — 96375 TX/PRO/DX INJ NEW DRUG ADDON: CPT

## 2019-01-01 PROCEDURE — 75970 VASCULAR BIOPSY: CPT

## 2019-01-01 PROCEDURE — 96365 THER/PROPH/DIAG IV INF INIT: CPT

## 2019-01-01 PROCEDURE — 70553 MRI BRAIN STEM W/O & W/DYE: CPT

## 2019-01-01 PROCEDURE — 77030031139 HC SUT VCRL2 J&J -A

## 2019-01-01 PROCEDURE — 96409 CHEMO IV PUSH SNGL DRUG: CPT

## 2019-01-01 PROCEDURE — 99282 EMERGENCY DEPT VISIT SF MDM: CPT

## 2019-01-01 PROCEDURE — P9047 ALBUMIN (HUMAN), 25%, 50ML: HCPCS

## 2019-01-01 PROCEDURE — 77030018870 HC TY PARCNT BD -B

## 2019-01-01 PROCEDURE — 75810000275 HC EMERGENCY DEPT VISIT NO LEVEL OF CARE

## 2019-01-01 PROCEDURE — 74011000250 HC RX REV CODE- 250

## 2019-01-01 PROCEDURE — 77030004561 HC CATH ANGI DX COBRA ANGI -B

## 2019-01-01 PROCEDURE — 72141 MRI NECK SPINE W/O DYE: CPT

## 2019-01-01 PROCEDURE — 75889 VEIN X-RAY LIVER W/HEMODYNAM: CPT

## 2019-01-01 PROCEDURE — A6240 HYDROCOLLD DRG FILLER PASTE: HCPCS

## 2019-01-01 PROCEDURE — 74011250636 HC RX REV CODE- 250/636: Performed by: STUDENT IN AN ORGANIZED HEALTH CARE EDUCATION/TRAINING PROGRAM

## 2019-01-01 PROCEDURE — 74011000250 HC RX REV CODE- 250: Performed by: RADIOLOGY

## 2019-01-01 PROCEDURE — 80061 LIPID PANEL: CPT

## 2019-01-01 PROCEDURE — 78306 BONE IMAGING WHOLE BODY: CPT

## 2019-01-01 PROCEDURE — 96367 TX/PROPH/DG ADDL SEQ IV INF: CPT

## 2019-01-01 PROCEDURE — C2628 CATHETER, OCCLUSION: HCPCS

## 2019-01-01 PROCEDURE — 400013 HH SOC

## 2019-01-01 PROCEDURE — 77295 3-D RADIOTHERAPY PLAN: CPT

## 2019-01-01 PROCEDURE — 36430 TRANSFUSION BLD/BLD COMPNT: CPT

## 2019-01-01 PROCEDURE — 85610 PROTHROMBIN TIME: CPT

## 2019-01-01 PROCEDURE — P9016 RBC LEUKOCYTES REDUCED: HCPCS

## 2019-01-01 PROCEDURE — 77030013169 SET IV BLD ICUM -A

## 2019-01-01 PROCEDURE — 77002 NEEDLE LOCALIZATION BY XRAY: CPT

## 2019-01-01 PROCEDURE — 77030003560 HC NDL HUBR BARD -A

## 2019-01-01 RX ORDER — HEPARIN 100 UNIT/ML
300-500 SYRINGE INTRAVENOUS AS NEEDED
Status: CANCELLED
Start: 2019-01-01

## 2019-01-01 RX ORDER — SODIUM CHLORIDE 9 MG/ML
25 INJECTION, SOLUTION INTRAVENOUS CONTINUOUS
Status: DISPENSED | OUTPATIENT
Start: 2019-01-01 | End: 2019-01-01

## 2019-01-01 RX ORDER — HEPARIN 100 UNIT/ML
500 SYRINGE INTRAVENOUS AS NEEDED
Status: CANCELLED | OUTPATIENT
Start: 2019-01-01

## 2019-01-01 RX ORDER — ALBUTEROL SULFATE 0.83 MG/ML
2.5 SOLUTION RESPIRATORY (INHALATION) AS NEEDED
Status: CANCELLED
Start: 2019-01-01

## 2019-01-01 RX ORDER — SODIUM CHLORIDE 0.9 % (FLUSH) 0.9 %
10 SYRINGE (ML) INJECTION AS NEEDED
Status: CANCELLED
Start: 2019-01-01

## 2019-01-01 RX ORDER — EPINEPHRINE 1 MG/ML
0.3 INJECTION, SOLUTION, CONCENTRATE INTRAVENOUS AS NEEDED
Status: CANCELLED | OUTPATIENT
Start: 2019-01-01

## 2019-01-01 RX ORDER — SODIUM CHLORIDE 9 MG/ML
25 INJECTION, SOLUTION INTRAVENOUS CONTINUOUS
Status: CANCELLED | OUTPATIENT
Start: 2019-01-01

## 2019-01-01 RX ORDER — DIPHENHYDRAMINE HYDROCHLORIDE 50 MG/ML
50 INJECTION, SOLUTION INTRAMUSCULAR; INTRAVENOUS AS NEEDED
Status: CANCELLED
Start: 2019-01-01

## 2019-01-01 RX ORDER — MORPHINE SULFATE 15 MG/1
15 TABLET ORAL
Qty: 60 TAB | Refills: 0 | Status: SHIPPED | OUTPATIENT
Start: 2019-01-01 | End: 2019-01-01

## 2019-01-01 RX ORDER — SODIUM CHLORIDE 9 MG/ML
25 INJECTION, SOLUTION INTRAVENOUS CONTINUOUS
Status: DISCONTINUED | OUTPATIENT
Start: 2019-01-01 | End: 2019-01-01 | Stop reason: HOSPADM

## 2019-01-01 RX ORDER — MIDAZOLAM HYDROCHLORIDE 1 MG/ML
5 INJECTION, SOLUTION INTRAMUSCULAR; INTRAVENOUS
Status: DISCONTINUED | OUTPATIENT
Start: 2019-01-01 | End: 2019-01-01 | Stop reason: HOSPADM

## 2019-01-01 RX ORDER — ONDANSETRON 2 MG/ML
8 INJECTION INTRAMUSCULAR; INTRAVENOUS AS NEEDED
Status: CANCELLED | OUTPATIENT
Start: 2019-01-01

## 2019-01-01 RX ORDER — SODIUM CHLORIDE 9 MG/ML
10 INJECTION INTRAMUSCULAR; INTRAVENOUS; SUBCUTANEOUS AS NEEDED
Status: CANCELLED | OUTPATIENT
Start: 2019-01-01

## 2019-01-01 RX ORDER — FENTANYL CITRATE 50 UG/ML
200 INJECTION, SOLUTION INTRAMUSCULAR; INTRAVENOUS
Status: DISCONTINUED | OUTPATIENT
Start: 2019-01-01 | End: 2019-01-01 | Stop reason: HOSPADM

## 2019-01-01 RX ORDER — SODIUM CHLORIDE 0.9 % (FLUSH) 0.9 %
SYRINGE (ML) INJECTION
Status: DISCONTINUED
Start: 2019-01-01 | End: 2019-01-01 | Stop reason: HOSPADM

## 2019-01-01 RX ORDER — ONDANSETRON 2 MG/ML
8 INJECTION INTRAMUSCULAR; INTRAVENOUS ONCE
Status: CANCELLED | OUTPATIENT
Start: 2019-01-01

## 2019-01-01 RX ORDER — CYANOCOBALAMIN 1000 UG/ML
1000 INJECTION, SOLUTION INTRAMUSCULAR; SUBCUTANEOUS ONCE
Status: COMPLETED | OUTPATIENT
Start: 2019-01-01 | End: 2019-01-01

## 2019-01-01 RX ORDER — DEXAMETHASONE 4 MG/1
2 TABLET ORAL 2 TIMES DAILY WITH MEALS
Qty: 18 TAB | Refills: 0 | Status: SHIPPED | OUTPATIENT
Start: 2019-01-01 | End: 2019-01-01

## 2019-01-01 RX ORDER — SODIUM CHLORIDE 9 MG/ML
250 INJECTION, SOLUTION INTRAVENOUS AS NEEDED
Status: DISCONTINUED | OUTPATIENT
Start: 2019-01-01 | End: 2019-01-01 | Stop reason: HOSPADM

## 2019-01-01 RX ORDER — SODIUM CHLORIDE 0.9 % (FLUSH) 0.9 %
10 SYRINGE (ML) INJECTION
Status: COMPLETED | OUTPATIENT
Start: 2019-01-01 | End: 2019-01-01

## 2019-01-01 RX ORDER — BARIUM SULFATE 20 MG/ML
900 SUSPENSION ORAL
Status: COMPLETED | OUTPATIENT
Start: 2019-01-01 | End: 2019-01-01

## 2019-01-01 RX ORDER — SODIUM BICARBONATE 42 MG/ML
INJECTION, SOLUTION INTRAVENOUS
Status: COMPLETED
Start: 2019-01-01 | End: 2019-01-01

## 2019-01-01 RX ORDER — FLUMAZENIL 0.1 MG/ML
0.5 INJECTION INTRAVENOUS ONCE
Status: DISCONTINUED | OUTPATIENT
Start: 2019-01-01 | End: 2019-01-01 | Stop reason: HOSPADM

## 2019-01-01 RX ORDER — ONDANSETRON 2 MG/ML
8 INJECTION INTRAMUSCULAR; INTRAVENOUS ONCE
Status: COMPLETED | OUTPATIENT
Start: 2019-01-01 | End: 2019-01-01

## 2019-01-01 RX ORDER — HYDROCORTISONE SODIUM SUCCINATE 100 MG/2ML
100 INJECTION, POWDER, FOR SOLUTION INTRAMUSCULAR; INTRAVENOUS AS NEEDED
Status: CANCELLED | OUTPATIENT
Start: 2019-01-01

## 2019-01-01 RX ORDER — NALOXONE HYDROCHLORIDE 0.4 MG/ML
0.4 INJECTION, SOLUTION INTRAMUSCULAR; INTRAVENOUS; SUBCUTANEOUS AS NEEDED
Status: DISCONTINUED | OUTPATIENT
Start: 2019-01-01 | End: 2019-01-01 | Stop reason: HOSPADM

## 2019-01-01 RX ORDER — SPIRONOLACTONE 50 MG/1
100 TABLET, FILM COATED ORAL DAILY
Qty: 90 TAB | Refills: 3 | Status: SHIPPED | OUTPATIENT
Start: 2019-01-01

## 2019-01-01 RX ORDER — HEPARIN 100 UNIT/ML
300 SYRINGE INTRAVENOUS AS NEEDED
Status: DISCONTINUED | OUTPATIENT
Start: 2019-01-01 | End: 2019-01-01 | Stop reason: HOSPADM

## 2019-01-01 RX ORDER — DEXAMETHASONE SODIUM PHOSPHATE 4 MG/ML
8 INJECTION, SOLUTION INTRA-ARTICULAR; INTRALESIONAL; INTRAMUSCULAR; INTRAVENOUS; SOFT TISSUE ONCE
Status: CANCELLED | OUTPATIENT
Start: 2019-01-01

## 2019-01-01 RX ORDER — LIDOCAINE HYDROCHLORIDE AND EPINEPHRINE 10; 10 MG/ML; UG/ML
50 INJECTION, SOLUTION INFILTRATION; PERINEURAL ONCE
Status: COMPLETED | OUTPATIENT
Start: 2019-01-01 | End: 2019-01-01

## 2019-01-01 RX ORDER — DEXAMETHASONE 2 MG/1
TABLET ORAL
Qty: 84 TAB | Refills: 0 | Status: SHIPPED | OUTPATIENT
Start: 2019-01-01 | End: 2019-01-01 | Stop reason: SDUPTHER

## 2019-01-01 RX ORDER — OXYCODONE HYDROCHLORIDE 5 MG/1
10 TABLET ORAL
Qty: 180 TAB | Refills: 0 | Status: SHIPPED | OUTPATIENT
Start: 2019-01-01 | End: 2019-01-01

## 2019-01-01 RX ORDER — SODIUM CHLORIDE 9 MG/ML
25 INJECTION, SOLUTION INTRAVENOUS CONTINUOUS
Status: CANCELLED
Start: 2019-01-01

## 2019-01-01 RX ORDER — ACETAMINOPHEN 325 MG/1
650 TABLET ORAL AS NEEDED
Status: CANCELLED
Start: 2019-01-01

## 2019-01-01 RX ORDER — DEXAMETHASONE 2 MG/1
TABLET ORAL
Qty: 180 TAB | Refills: 0 | Status: SHIPPED | OUTPATIENT
Start: 2019-01-01 | End: 2019-01-01 | Stop reason: DRUGHIGH

## 2019-01-01 RX ORDER — SODIUM CHLORIDE 9 MG/ML
10 INJECTION INTRAMUSCULAR; INTRAVENOUS; SUBCUTANEOUS AS NEEDED
Status: DISCONTINUED | OUTPATIENT
Start: 2019-01-01 | End: 2019-01-01 | Stop reason: HOSPADM

## 2019-01-01 RX ORDER — FLUTICASONE PROPIONATE 50 MCG
2 SPRAY, SUSPENSION (ML) NASAL DAILY
Qty: 1 BOTTLE | Refills: 5 | Status: SHIPPED | OUTPATIENT
Start: 2019-01-01

## 2019-01-01 RX ORDER — SODIUM CHLORIDE 0.9 % (FLUSH) 0.9 %
10 SYRINGE (ML) INJECTION AS NEEDED
Status: DISCONTINUED | OUTPATIENT
Start: 2019-01-01 | End: 2019-01-01 | Stop reason: HOSPADM

## 2019-01-01 RX ORDER — ALBUMIN HUMAN 250 G/1000ML
25 SOLUTION INTRAVENOUS
Status: DISCONTINUED | OUTPATIENT
Start: 2019-01-01 | End: 2019-01-01 | Stop reason: HOSPADM

## 2019-01-01 RX ORDER — GADOTERATE MEGLUMINE 376.9 MG/ML
13 INJECTION INTRAVENOUS
Status: COMPLETED | OUTPATIENT
Start: 2019-01-01 | End: 2019-01-01

## 2019-01-01 RX ORDER — ALBUMIN HUMAN 50 G/1000ML
25 SOLUTION INTRAVENOUS ONCE
Status: DISCONTINUED | OUTPATIENT
Start: 2019-01-01 | End: 2019-01-01 | Stop reason: HOSPADM

## 2019-01-01 RX ORDER — LIDOCAINE HYDROCHLORIDE 10 MG/ML
5 INJECTION INFILTRATION; PERINEURAL
Status: DISCONTINUED | OUTPATIENT
Start: 2019-01-01 | End: 2019-01-01 | Stop reason: HOSPADM

## 2019-01-01 RX ORDER — HEPARIN 100 UNIT/ML
300-500 SYRINGE INTRAVENOUS AS NEEDED
Status: ACTIVE | OUTPATIENT
Start: 2019-01-01 | End: 2019-01-01

## 2019-01-01 RX ORDER — SODIUM CHLORIDE 0.9 % (FLUSH) 0.9 %
10 SYRINGE (ML) INJECTION AS NEEDED
Status: ACTIVE | OUTPATIENT
Start: 2019-01-01 | End: 2019-01-01

## 2019-01-01 RX ORDER — CEFAZOLIN SODIUM/WATER 2 G/20 ML
2 SYRINGE (ML) INTRAVENOUS ONCE
Status: COMPLETED | OUTPATIENT
Start: 2019-01-01 | End: 2019-01-01

## 2019-01-01 RX ORDER — MORPHINE SULFATE 15 MG/1
15 TABLET ORAL
Qty: 120 TAB | Refills: 0 | Status: SHIPPED | OUTPATIENT
Start: 2019-01-01 | End: 2019-01-01

## 2019-01-01 RX ORDER — DEXAMETHASONE 2 MG/1
TABLET ORAL
Qty: 84 TAB | Refills: 0 | OUTPATIENT
Start: 2019-01-01

## 2019-01-01 RX ORDER — DEXAMETHASONE 4 MG/1
4 TABLET ORAL SEE ADMIN INSTRUCTIONS
Qty: 18 TAB | Refills: 0 | Status: SHIPPED | OUTPATIENT
Start: 2019-01-01 | End: 2019-01-01 | Stop reason: ALTCHOICE

## 2019-01-01 RX ORDER — FUROSEMIDE 20 MG/1
20 TABLET ORAL DAILY
Qty: 30 TAB | Refills: 1 | Status: SHIPPED | OUTPATIENT
Start: 2019-01-01 | End: 2019-01-01 | Stop reason: SDUPTHER

## 2019-01-01 RX ORDER — ALBUMIN HUMAN 250 G/1000ML
12.5 SOLUTION INTRAVENOUS ONCE
Status: DISCONTINUED | OUTPATIENT
Start: 2019-01-01 | End: 2019-01-01

## 2019-01-01 RX ORDER — DIPHENHYDRAMINE HYDROCHLORIDE 50 MG/ML
25 INJECTION, SOLUTION INTRAMUSCULAR; INTRAVENOUS ONCE
Status: CANCELLED
Start: 2019-01-01

## 2019-01-01 RX ORDER — LIDOCAINE HYDROCHLORIDE 10 MG/ML
30 INJECTION, SOLUTION EPIDURAL; INFILTRATION; INTRACAUDAL; PERINEURAL ONCE
Status: COMPLETED | OUTPATIENT
Start: 2019-01-01 | End: 2019-01-01

## 2019-01-01 RX ORDER — HEPARIN 100 UNIT/ML
SYRINGE INTRAVENOUS
Status: COMPLETED
Start: 2019-01-01 | End: 2019-01-01

## 2019-01-01 RX ORDER — PANTOPRAZOLE SODIUM 40 MG/1
40 TABLET, DELAYED RELEASE ORAL DAILY
Qty: 30 TAB | Refills: 4 | Status: SHIPPED | OUTPATIENT
Start: 2019-01-01

## 2019-01-01 RX ORDER — ALBUMIN HUMAN 250 G/1000ML
SOLUTION INTRAVENOUS
Status: DISCONTINUED
Start: 2019-01-01 | End: 2019-01-01 | Stop reason: WASHOUT

## 2019-01-01 RX ORDER — OXYCODONE HYDROCHLORIDE 5 MG/1
5 TABLET ORAL
Qty: 60 TAB | Refills: 0 | Status: SHIPPED | OUTPATIENT
Start: 2019-01-01 | End: 2019-01-01 | Stop reason: SDUPTHER

## 2019-01-01 RX ORDER — LIDOCAINE HYDROCHLORIDE 20 MG/ML
20 INJECTION, SOLUTION INFILTRATION; PERINEURAL ONCE
Status: COMPLETED | OUTPATIENT
Start: 2019-01-01 | End: 2019-01-01

## 2019-01-01 RX ORDER — DEXAMETHASONE 4 MG/1
TABLET ORAL
Qty: 30 TAB | Refills: 3 | Status: SHIPPED | OUTPATIENT
Start: 2019-01-01

## 2019-01-01 RX ORDER — AMLODIPINE BESYLATE 10 MG/1
TABLET ORAL
Qty: 30 TAB | Refills: 0 | Status: SHIPPED | OUTPATIENT
Start: 2019-01-01 | End: 2019-01-01

## 2019-01-01 RX ORDER — ONDANSETRON 2 MG/ML
8 INJECTION INTRAMUSCULAR; INTRAVENOUS ONCE
Status: CANCELLED | OUTPATIENT
Start: 2019-01-01 | End: 2019-01-01

## 2019-01-01 RX ORDER — DOXYCYCLINE 100 MG/1
100 CAPSULE ORAL 2 TIMES DAILY
Qty: 60 CAP | Refills: 3 | Status: SHIPPED | OUTPATIENT
Start: 2019-01-01

## 2019-01-01 RX ORDER — LIDOCAINE HYDROCHLORIDE 10 MG/ML
10 INJECTION, SOLUTION EPIDURAL; INFILTRATION; INTRACAUDAL; PERINEURAL ONCE
Status: COMPLETED | OUTPATIENT
Start: 2019-01-01 | End: 2019-01-01

## 2019-01-01 RX ORDER — LIDOCAINE HYDROCHLORIDE 10 MG/ML
INJECTION, SOLUTION EPIDURAL; INFILTRATION; INTRACAUDAL; PERINEURAL
Status: COMPLETED
Start: 2019-01-01 | End: 2019-01-01

## 2019-01-01 RX ORDER — SODIUM CHLORIDE 9 MG/ML
10 INJECTION INTRAMUSCULAR; INTRAVENOUS; SUBCUTANEOUS AS NEEDED
Status: ACTIVE | OUTPATIENT
Start: 2019-01-01 | End: 2019-01-01

## 2019-01-01 RX ORDER — SODIUM BICARBONATE 42 MG/ML
1 INJECTION, SOLUTION INTRAVENOUS
Status: COMPLETED | OUTPATIENT
Start: 2019-01-01 | End: 2019-01-01

## 2019-01-01 RX ORDER — OXYCODONE HYDROCHLORIDE 10 MG/1
10 TABLET ORAL
Qty: 180 TAB | Refills: 0 | Status: SHIPPED | OUTPATIENT
Start: 2019-01-01 | End: 2019-01-01 | Stop reason: ALTCHOICE

## 2019-01-01 RX ORDER — BENZONATATE 100 MG/1
CAPSULE ORAL
Qty: 30 CAP | Refills: 0 | Status: SHIPPED | OUTPATIENT
Start: 2019-01-01

## 2019-01-01 RX ORDER — DEXAMETHASONE 2 MG/1
2 TABLET ORAL 2 TIMES DAILY WITH MEALS
Qty: 28 TAB | Refills: 0 | Status: SHIPPED | OUTPATIENT
Start: 2019-01-01 | End: 2019-01-01 | Stop reason: ALTCHOICE

## 2019-01-01 RX ORDER — LEVOTHYROXINE SODIUM 25 UG/1
TABLET ORAL
Qty: 60 TAB | Refills: 5 | Status: SHIPPED | OUTPATIENT
Start: 2019-01-01

## 2019-01-01 RX ORDER — LIDOCAINE HYDROCHLORIDE 10 MG/ML
INJECTION, SOLUTION EPIDURAL; INFILTRATION; INTRACAUDAL; PERINEURAL
Status: DISCONTINUED
Start: 2019-01-01 | End: 2019-01-01 | Stop reason: WASHOUT

## 2019-01-01 RX ORDER — ONDANSETRON HYDROCHLORIDE 8 MG/1
8 TABLET, FILM COATED ORAL
Qty: 30 TAB | Refills: 6 | Status: SHIPPED | OUTPATIENT
Start: 2019-01-01 | End: 2019-01-01 | Stop reason: SDUPTHER

## 2019-01-01 RX ORDER — LEVOFLOXACIN 500 MG/1
500 TABLET, FILM COATED ORAL DAILY
Qty: 5 TAB | Refills: 0 | Status: SHIPPED | OUTPATIENT
Start: 2019-01-01 | End: 2019-01-01

## 2019-01-01 RX ORDER — SPIRONOLACTONE 50 MG/1
50 TABLET, FILM COATED ORAL DAILY
Qty: 30 TAB | Refills: 1 | Status: SHIPPED | OUTPATIENT
Start: 2019-01-01 | End: 2019-01-01 | Stop reason: SDUPTHER

## 2019-01-01 RX ORDER — LIDOCAINE HYDROCHLORIDE 10 MG/ML
10 INJECTION, SOLUTION EPIDURAL; INFILTRATION; INTRACAUDAL; PERINEURAL
Status: COMPLETED | OUTPATIENT
Start: 2019-01-01 | End: 2019-01-01

## 2019-01-01 RX ORDER — BENZONATATE 200 MG/1
200 CAPSULE ORAL
Qty: 21 CAP | Refills: 0 | Status: SHIPPED | OUTPATIENT
Start: 2019-01-01 | End: 2019-01-01

## 2019-01-01 RX ORDER — BENZONATATE 100 MG/1
100 CAPSULE ORAL
Qty: 30 CAP | Refills: 0 | Status: SHIPPED | OUTPATIENT
Start: 2019-01-01 | End: 2019-01-01 | Stop reason: SDUPTHER

## 2019-01-01 RX ORDER — SODIUM CHLORIDE 0.9 % (FLUSH) 0.9 %
5-10 SYRINGE (ML) INJECTION AS NEEDED
Status: CANCELLED | OUTPATIENT
Start: 2019-01-01

## 2019-01-01 RX ORDER — GABAPENTIN 100 MG/1
100 CAPSULE ORAL
Qty: 15 CAP | Refills: 0 | Status: SHIPPED | OUTPATIENT
Start: 2019-01-01 | End: 2019-01-01

## 2019-01-01 RX ORDER — HEPARIN 100 UNIT/ML
300-500 SYRINGE INTRAVENOUS AS NEEDED
Status: DISCONTINUED | OUTPATIENT
Start: 2019-01-01 | End: 2019-01-01 | Stop reason: HOSPADM

## 2019-01-01 RX ORDER — ONDANSETRON HYDROCHLORIDE 8 MG/1
8 TABLET, FILM COATED ORAL
Qty: 30 TAB | Refills: 6 | Status: SHIPPED | OUTPATIENT
Start: 2019-01-01

## 2019-01-01 RX ORDER — CYANOCOBALAMIN 1000 UG/ML
1000 INJECTION, SOLUTION INTRAMUSCULAR; SUBCUTANEOUS
Status: CANCELLED | OUTPATIENT
Start: 2019-01-01

## 2019-01-01 RX ORDER — TRAMADOL HYDROCHLORIDE 50 MG/1
50 TABLET ORAL
Qty: 120 TAB | Refills: 0 | Status: SHIPPED | OUTPATIENT
Start: 2019-01-01 | End: 2019-01-01

## 2019-01-01 RX ORDER — MORPHINE SULFATE 15 MG/1
15 TABLET ORAL
Qty: 84 TAB | Refills: 0 | Status: SHIPPED | OUTPATIENT
Start: 2019-01-01 | End: 2019-01-01

## 2019-01-01 RX ORDER — ALBUMIN HUMAN 250 G/1000ML
SOLUTION INTRAVENOUS
Status: COMPLETED
Start: 2019-01-01 | End: 2019-01-01

## 2019-01-01 RX ORDER — CYANOCOBALAMIN 1000 UG/ML
1000 INJECTION, SOLUTION INTRAMUSCULAR; SUBCUTANEOUS ONCE
Status: CANCELLED
Start: 2019-01-01

## 2019-01-01 RX ORDER — FUROSEMIDE 20 MG/1
40 TABLET ORAL DAILY
Qty: 90 TAB | Refills: 3 | Status: SHIPPED | OUTPATIENT
Start: 2019-01-01

## 2019-01-01 RX ADMIN — MIDAZOLAM HYDROCHLORIDE 1 MG: 1 INJECTION, SOLUTION INTRAMUSCULAR; INTRAVENOUS at 09:20

## 2019-01-01 RX ADMIN — SODIUM CHLORIDE 10 ML: 9 INJECTION, SOLUTION INTRAMUSCULAR; INTRAVENOUS; SUBCUTANEOUS at 11:17

## 2019-01-01 RX ADMIN — Medication 500 UNITS: at 16:40

## 2019-01-01 RX ADMIN — SODIUM CHLORIDE 10 ML: 9 INJECTION, SOLUTION INTRAMUSCULAR; INTRAVENOUS; SUBCUTANEOUS at 10:02

## 2019-01-01 RX ADMIN — SODIUM CHLORIDE 200 MG: 900 INJECTION, SOLUTION INTRAVENOUS at 15:02

## 2019-01-01 RX ADMIN — SODIUM CHLORIDE 200 MG: 900 INJECTION, SOLUTION INTRAVENOUS at 14:40

## 2019-01-01 RX ADMIN — LIDOCAINE HYDROCHLORIDE AND EPINEPHRINE 500 MG: 10; 10 INJECTION, SOLUTION INFILTRATION; PERINEURAL at 08:55

## 2019-01-01 RX ADMIN — ONDANSETRON 8 MG: 2 INJECTION, SOLUTION INTRAMUSCULAR; INTRAVENOUS at 14:30

## 2019-01-01 RX ADMIN — ZOLEDRONIC ACID 4 MG: 0.04 INJECTION, SOLUTION INTRAVENOUS at 13:30

## 2019-01-01 RX ADMIN — DEXAMETHASONE SODIUM PHOSPHATE 12 MG: 4 INJECTION INTRA-ARTICULAR; INTRALESIONAL; INTRAMUSCULAR; INTRAVENOUS; SOFT TISSUE at 14:05

## 2019-01-01 RX ADMIN — ONDANSETRON 8 MG: 2 INJECTION, SOLUTION INTRAMUSCULAR; INTRAVENOUS at 14:39

## 2019-01-01 RX ADMIN — CYANOCOBALAMIN 1000 MCG: 1000 INJECTION INTRAMUSCULAR; SUBCUTANEOUS at 14:09

## 2019-01-01 RX ADMIN — IOPAMIDOL 100 ML: 755 INJECTION, SOLUTION INTRAVENOUS at 10:52

## 2019-01-01 RX ADMIN — SODIUM CHLORIDE 25 ML/HR: 900 INJECTION, SOLUTION INTRAVENOUS at 13:14

## 2019-01-01 RX ADMIN — MIDAZOLAM HYDROCHLORIDE 0.5 MG: 1 INJECTION, SOLUTION INTRAMUSCULAR; INTRAVENOUS at 13:35

## 2019-01-01 RX ADMIN — MIDAZOLAM HYDROCHLORIDE 1 MG: 1 INJECTION, SOLUTION INTRAMUSCULAR; INTRAVENOUS at 08:46

## 2019-01-01 RX ADMIN — ONDANSETRON 8 MG: 2 INJECTION INTRAMUSCULAR; INTRAVENOUS at 14:04

## 2019-01-01 RX ADMIN — Medication 10 ML: at 12:39

## 2019-01-01 RX ADMIN — LIDOCAINE HYDROCHLORIDE 5 ML: 10 INJECTION, SOLUTION EPIDURAL; INFILTRATION; INTRACAUDAL; PERINEURAL at 13:44

## 2019-01-01 RX ADMIN — IOPAMIDOL 30 ML: 612 INJECTION, SOLUTION INTRAVENOUS at 14:00

## 2019-01-01 RX ADMIN — IOHEXOL 50 ML: 240 INJECTION, SOLUTION INTRATHECAL; INTRAVASCULAR; INTRAVENOUS; ORAL at 14:47

## 2019-01-01 RX ADMIN — Medication 10 ML: at 10:56

## 2019-01-01 RX ADMIN — Medication 10 ML: at 16:40

## 2019-01-01 RX ADMIN — SODIUM CHLORIDE 150 MG: 900 INJECTION, SOLUTION INTRAVENOUS at 14:09

## 2019-01-01 RX ADMIN — ONDANSETRON 8 MG: 2 INJECTION INTRAMUSCULAR; INTRAVENOUS at 12:17

## 2019-01-01 RX ADMIN — SODIUM CHLORIDE 100 ML: 900 INJECTION, SOLUTION INTRAVENOUS at 14:16

## 2019-01-01 RX ADMIN — Medication 500 UNITS: at 09:01

## 2019-01-01 RX ADMIN — Medication 10 ML: at 11:17

## 2019-01-01 RX ADMIN — SODIUM CHLORIDE 200 MG: 900 INJECTION, SOLUTION INTRAVENOUS at 13:32

## 2019-01-01 RX ADMIN — FENTANYL CITRATE 50 MCG: 50 INJECTION, SOLUTION INTRAMUSCULAR; INTRAVENOUS at 09:15

## 2019-01-01 RX ADMIN — ZOLEDRONIC ACID 4 MG: 0.04 INJECTION, SOLUTION INTRAVENOUS at 13:32

## 2019-01-01 RX ADMIN — LIDOCAINE HYDROCHLORIDE 8 ML: 10 INJECTION, SOLUTION EPIDURAL; INFILTRATION; INTRACAUDAL; PERINEURAL at 13:20

## 2019-01-01 RX ADMIN — FENTANYL CITRATE 25 MCG: 50 INJECTION, SOLUTION INTRAMUSCULAR; INTRAVENOUS at 13:26

## 2019-01-01 RX ADMIN — SODIUM CHLORIDE 25 ML/HR: 900 INJECTION, SOLUTION INTRAVENOUS at 07:56

## 2019-01-01 RX ADMIN — SODIUM CHLORIDE 473 MG: 900 INJECTION, SOLUTION INTRAVENOUS at 15:39

## 2019-01-01 RX ADMIN — IOHEXOL 50 ML: 240 INJECTION, SOLUTION INTRATHECAL; INTRAVASCULAR; INTRAVENOUS; ORAL at 14:15

## 2019-01-01 RX ADMIN — MIDAZOLAM HYDROCHLORIDE 1 MG: 1 INJECTION, SOLUTION INTRAMUSCULAR; INTRAVENOUS at 08:55

## 2019-01-01 RX ADMIN — Medication 10 ML: at 13:14

## 2019-01-01 RX ADMIN — Medication 10 ML: at 10:02

## 2019-01-01 RX ADMIN — Medication 10 ML: at 14:47

## 2019-01-01 RX ADMIN — DEXAMETHASONE SODIUM PHOSPHATE 12 MG: 4 INJECTION, SOLUTION INTRA-ARTICULAR; INTRALESIONAL; INTRAMUSCULAR; INTRAVENOUS; SOFT TISSUE at 14:50

## 2019-01-01 RX ADMIN — ALBUMIN (HUMAN) 25 G: 0.25 INJECTION, SOLUTION INTRAVENOUS at 15:08

## 2019-01-01 RX ADMIN — CYANOCOBALAMIN 1000 MCG: 1000 INJECTION INTRAMUSCULAR; SUBCUTANEOUS at 15:42

## 2019-01-01 RX ADMIN — GADOTERATE MEGLUMINE 13 ML: 376.9 INJECTION INTRAVENOUS at 12:40

## 2019-01-01 RX ADMIN — LIDOCAINE HYDROCHLORIDE 400 MG: 20 INJECTION, SOLUTION INFILTRATION; PERINEURAL at 08:55

## 2019-01-01 RX ADMIN — LIDOCAINE HYDROCHLORIDE 5 ML: 10 INJECTION, SOLUTION EPIDURAL; INFILTRATION; INTRACAUDAL; PERINEURAL at 09:36

## 2019-01-01 RX ADMIN — SODIUM CHLORIDE 150 MG: 900 INJECTION, SOLUTION INTRAVENOUS at 13:05

## 2019-01-01 RX ADMIN — IOPAMIDOL 100 ML: 755 INJECTION, SOLUTION INTRAVENOUS at 14:16

## 2019-01-01 RX ADMIN — GADOTERATE MEGLUMINE 13 ML: 376.9 INJECTION INTRAVENOUS at 14:53

## 2019-01-01 RX ADMIN — IOPAMIDOL 100 ML: 755 INJECTION, SOLUTION INTRAVENOUS at 14:47

## 2019-01-01 RX ADMIN — SODIUM CHLORIDE 25 ML/HR: 900 INJECTION, SOLUTION INTRAVENOUS at 13:00

## 2019-01-01 RX ADMIN — Medication 10 ML: at 11:00

## 2019-01-01 RX ADMIN — CARBOPLATIN 318 MG: 10 INJECTION, SOLUTION INTRAVENOUS at 15:35

## 2019-01-01 RX ADMIN — FENTANYL CITRATE 25 MCG: 50 INJECTION, SOLUTION INTRAMUSCULAR; INTRAVENOUS at 13:18

## 2019-01-01 RX ADMIN — SODIUM CHLORIDE 473 MG: 900 INJECTION, SOLUTION INTRAVENOUS at 15:20

## 2019-01-01 RX ADMIN — Medication 500 UNITS: at 18:03

## 2019-01-01 RX ADMIN — FENTANYL CITRATE 50 MCG: 50 INJECTION, SOLUTION INTRAMUSCULAR; INTRAVENOUS at 08:42

## 2019-01-01 RX ADMIN — FENTANYL CITRATE 50 MCG: 50 INJECTION, SOLUTION INTRAMUSCULAR; INTRAVENOUS at 08:50

## 2019-01-01 RX ADMIN — IOHEXOL 50 ML: 240 INJECTION, SOLUTION INTRATHECAL; INTRAVASCULAR; INTRAVENOUS; ORAL at 10:52

## 2019-01-01 RX ADMIN — Medication 10 ML: at 18:02

## 2019-01-01 RX ADMIN — Medication 10 ML: at 14:16

## 2019-01-01 RX ADMIN — ALBUMIN (HUMAN) 25 G: 0.25 INJECTION, SOLUTION INTRAVENOUS at 09:34

## 2019-01-01 RX ADMIN — SODIUM CHLORIDE 709 MG: 900 INJECTION, SOLUTION INTRAVENOUS at 14:35

## 2019-01-01 RX ADMIN — SODIUM CHLORIDE 150 MG: 900 INJECTION, SOLUTION INTRAVENOUS at 14:35

## 2019-01-01 RX ADMIN — GADOTERATE MEGLUMINE 13 ML: 376.9 INJECTION INTRAVENOUS at 11:00

## 2019-01-01 RX ADMIN — DEXAMETHASONE SODIUM PHOSPHATE 12 MG: 4 INJECTION, SOLUTION INTRA-ARTICULAR; INTRALESIONAL; INTRAMUSCULAR; INTRAVENOUS; SOFT TISSUE at 12:34

## 2019-01-01 RX ADMIN — SODIUM CHLORIDE 150 MG: 900 INJECTION, SOLUTION INTRAVENOUS at 13:45

## 2019-01-01 RX ADMIN — ZOLEDRONIC ACID 4 MG: 0.04 INJECTION, SOLUTION INTRAVENOUS at 15:39

## 2019-01-01 RX ADMIN — Medication 2 G: at 07:54

## 2019-01-01 RX ADMIN — SODIUM BICARBONATE 210 MG: 42 INJECTION, SOLUTION INTRAVENOUS at 09:24

## 2019-01-01 RX ADMIN — CYANOCOBALAMIN 1000 MCG: 1000 INJECTION INTRAMUSCULAR; SUBCUTANEOUS at 15:16

## 2019-01-01 RX ADMIN — SODIUM CHLORIDE 100 ML: 900 INJECTION, SOLUTION INTRAVENOUS at 10:52

## 2019-01-01 RX ADMIN — BARIUM SULFATE 900 ML: 20 SUSPENSION ORAL at 10:56

## 2019-01-01 RX ADMIN — MIDAZOLAM HYDROCHLORIDE 1 MG: 1 INJECTION, SOLUTION INTRAMUSCULAR; INTRAVENOUS at 08:40

## 2019-01-01 RX ADMIN — SODIUM CHLORIDE 200 MG: 900 INJECTION, SOLUTION INTRAVENOUS at 13:58

## 2019-01-01 RX ADMIN — ALBUMIN (HUMAN) 25 G: 0.25 INJECTION, SOLUTION INTRAVENOUS at 13:50

## 2019-01-01 RX ADMIN — ALBUMIN (HUMAN) 25 G: 0.25 INJECTION, SOLUTION INTRAVENOUS at 14:54

## 2019-01-01 RX ADMIN — LIDOCAINE HYDROCHLORIDE 10 ML: 10 INJECTION, SOLUTION EPIDURAL; INFILTRATION; INTRACAUDAL; PERINEURAL at 14:12

## 2019-01-01 RX ADMIN — SODIUM CHLORIDE 756 MG: 900 INJECTION, SOLUTION INTRAVENOUS at 15:25

## 2019-01-01 RX ADMIN — CARBOPLATIN 311 MG: 10 INJECTION, SOLUTION INTRAVENOUS at 15:58

## 2019-01-01 RX ADMIN — MIDAZOLAM HYDROCHLORIDE 1 MG: 1 INJECTION, SOLUTION INTRAMUSCULAR; INTRAVENOUS at 13:18

## 2019-01-01 RX ADMIN — Medication 10 ML: at 10:52

## 2019-01-01 RX ADMIN — DEXAMETHASONE SODIUM PHOSPHATE 12 MG: 4 INJECTION, SOLUTION INTRA-ARTICULAR; INTRALESIONAL; INTRAMUSCULAR; INTRAVENOUS; SOFT TISSUE at 14:37

## 2019-01-01 RX ADMIN — CARBOPLATIN 531 MG: 10 INJECTION, SOLUTION INTRAVENOUS at 15:04

## 2019-01-01 RX ADMIN — SODIUM BICARBONATE 2 ML: 42 INJECTION, SOLUTION INTRAVENOUS at 09:36

## 2019-01-01 RX ADMIN — SODIUM CHLORIDE 50 ML: 900 INJECTION, SOLUTION INTRAVENOUS at 14:47

## 2019-01-18 NOTE — PROGRESS NOTES
Taya Bautista is a 70 y.o. male here today for follow up, lung cancer. Patient reports discomfort across the top of back 3/10 today. 1. Have you been to the ER, urgent care clinic since your last visit? Hospitalized since your last visit? No     2. Have you seen or consulted any other health care providers outside of the [address] [place] [address] since your last visit? Include any pap smears or colon screening.  No

## 2019-01-18 NOTE — PROGRESS NOTES
Hematology/Oncology progress note    REASON FOR VISIT: Stage IV EGFR mutated NSCLC    HISTORY OF PRESENT ILLNESS: Mr. Anna Wood is a 70 y.o. male with prostate cancer who was diagnosed with stage IV NSCLC- adenocarcinoma (EGFR mutated , PD-L1 low) in May 2017. Here to follow up on Osimertinib. He has had a new upper back pain across his shoulder blades. He takes an advil. This helps. He also states that his R shoulder has a sprain like feeling. He has no SOB. Stable once day coughing. Swelling has gone away. He has no bleeding. He has had no fevers, chills, change in appetite and weight. He is compliant with Tita Chele- he is cutting the 80 mg pill in half as he has not received his 40 mg shipment    Oncologic history   Mr. Anna Wood noticed a new cough and fevers back in March of 2017. He went to Urgent Care and received antibiotics and cough medication. He states this worked for a while, but he began to notice his cough return. This was intermittent. He had some tightness across his anterior chest which he related to the infection. Chest x-ray was abnormal and he was told to proceed to the Emergency Department. Neva Servin had been under surveillance for right lower lobe mass that was initially noted in 2015. Bronch at that time was negative for malignancy. He was evaluated by Dr. Jose Luis Cortez with Thoracic Surgery in 2015 for possible surgical resection. CT chest in November of 2016 with mass size unchanged but some right basilar pleural thickening. CT chest obtained 5/14/17 however showed a new large right pleural effusion with right middle lobe and right lower lobe collapse. Irregular spiculated right lower lobe mass 3.8cm and stable precarinal enlarged lymph nodes were noted. New right posterior second rib lytic lesion and new right hepatic hypodensity consistent with mets. He underwent a therapeutic thoracentesis on 5/15/17 with removal of 1500 cc of serosanguinous fluid. Pathology showed adenocarcinoma.  PET CT showed pleural, bone, liver and flor metastasis. MRI brain 5/20/17: No metastasis. Needed repeat R sided thoracentesis on 5/25/17, had some post procedure hydropneumothorax. He was seen by Dr. Elzbieta Morales at [facility] for Pleurx catheter.      CT guided biopsy of R lung mass done 5/25 which showed adenocarcinoma. EGFR mutation detected Exon 18 and 21    Treatment  6/26/17: Tarceva. Monthly Xgeva. Palliative XRT to R hip   CT CAP 10/2/17: Response  Ct 1/23/18: CT with some growth of LLL mass but stable by RECIST. CT 3/15/18 and Bone scan stable though he had worsening left back pain. MRI results showed extensive disease at L2, S2 and additional lesions as previously known. Received palliative XRT that he completed 4/18/18 5/26/18: Started Osimertinib as he had a T790M mutation. Stopped 6/20/18 due to platelets of 23U  Resumed at 80 mg every other day on 7/6/18   Started 40mg daily on 7/12/18      Past Medical History:   Diagnosis Date    Cancer [facility]) 2017    lung ca    Hypertension        Past Surgical History:   Procedure Laterality Date    HX ORTHOPAEDIC      reconstruction of left little finger       No Known Allergies    Current Outpatient Medications   Medication Sig Dispense Refill    amLODIPine (NORVASC) 10 mg tablet TAKE 1 TABLET BY MOUTH EVERY DAY 30 Tab 0    osimertinib (TAGRISSO) 40 mg tablet Take 1 Tab by mouth daily. 30 Tab 3    levothyroxine (SYNTHROID) 75 mcg tablet TAKE 1/2 TABLET BY MOUTH DAILY BEFORE BREAKFAST 30 Tab 5    cholecalciferol (VITAMIN D3) 1,000 unit tablet Take 1,000 Units by mouth daily.  chlorhexidine (PERIDEX) 0.12 % solution RINSE BID  0    amlodipine besylate (AMLODIPINE PO) Take  by mouth.  ondansetron hcl (ZOFRAN) 8 mg tablet       zoledronic acid (ZOMETA) 4 mg/100 mL pgbk infusion 4 mg by IntraVENous route every three (3) months.          Social History     Socioeconomic History    Marital status:      Spouse name: Not on file    Number of children: Not on file    Years of education: Not on file    Highest education level: Not on file   Tobacco Use    Smoking status: Never Smoker    Smokeless tobacco: Never Used   Substance and Sexual Activity    Alcohol use: Yes     Alcohol/week: 6.0 oz     Types: 10 Glasses of wine per week     Comment: ocassionally    Drug use: No    Sexual activity: Yes     Partners: Female     Comment:  has 2 children       Family History   Problem Relation Age of Onset    Cancer Mother         leukemia    Stroke Father     Cancer Brother         bladder       ROS  Currently on Osimertinib ( started 5/26/18) and dose was decreased to 40mg daily ~ 7/12/18    ECOG PS is 0    Emotional well being addressed and patient is coping well    Physical Examination:   Visit Vitals  /82 (BP 1 Location: Right arm, BP Patient Position: Sitting)   Pulse 60   Temp 97.8 °F (36.6 °C) (Oral)   Resp 16   Ht 6' 1\" (1.854 m)   Wt 150 lb 6.4 oz (68.2 kg)   SpO2 92%   BMI 19.84 kg/m²     General appearance - alert, frail, and in no distress  Mental status - oriented to person, place, and time  Mouth - mucous membranes moist, pharynx normal without lesions. Neck - supple, no significant adenopathy  Lymphatics - no palpable lymphadenopathy, no hepatosplenomegaly  Chest -clear to auscultation  Heart - normal rate, regular rhythm, normal S1, S2, no murmurs, rubs, clicks or gallops; edema has resolved  Abdomen - soft, nontender, nondistended, no masses or organomegaly, bowel sounds present  Neurological - normal speech  Skin: No rashes  MSK- Bilateral pedal and ankle edema    LABS  Lab Results   Component Value Date/Time    WBC 4.3 01/08/2019 09:36 AM    HGB 11.1 (L) 01/08/2019 09:36 AM    HCT 34.7 (L) 01/08/2019 09:36 AM    PLATELET 97 (LL) 29/77/3471 09:36 AM    MCV 91 01/08/2019 09:36 AM    ABS.  NEUTROPHILS 3.3 01/08/2019 09:36 AM     Lab Results   Component Value Date/Time    Sodium 142 01/08/2019 09:36 AM    Potassium 4.3 01/08/2019 09:36 AM    Chloride 105 01/08/2019 09:36 AM    CO2 26 01/08/2019 09:36 AM    Glucose 81 01/08/2019 09:36 AM    BUN 18 01/08/2019 09:36 AM    Creatinine 0.73 (L) 01/08/2019 09:36 AM    GFR est  01/08/2019 09:36 AM    GFR est non-AA 95 01/08/2019 09:36 AM    Calcium 9.4 01/08/2019 09:36 AM     Lab Results   Component Value Date/Time    AST (SGOT) 44 (H) 01/08/2019 09:36 AM    Alk. phosphatase 266 (H) 01/08/2019 09:36 AM    Protein, total 6.4 01/08/2019 09:36 AM    Albumin 3.6 01/08/2019 09:36 AM    Globulin 3.8 11/01/2018 02:12 PM    A-G Ratio 1.3 01/08/2019 09:36 AM     CT Chest- abdomen - pelvis 10/2/17  Decreased right pleural thickening and size of right lower lobe  pulmonary mass with interval sclerosis of multiple osseous metastases as above  compatible with response to therapy    CT CAP and Bone scan- 3/15/18  1. CT of the chest demonstrates no significant change. Right lower lobe mass and  nodule are stable. Right pleural thickening and nodularity and small right  effusion are stable. 2. Bony metastatic disease is stable. 2. CT of the abdomen and pelvis is unchanged. . Subcentimeter low-density in the  liver which is indeterminate is stable. MRI 3/30/18  IMPRESSION:   1. Left T8 transverse process metastasis has extra cortical breakout, extension  into the lamina and pedicle, and extension into the left T8 neural foramen. Mild  neural foraminal stenosis associated. 2. Stable densely sclerotic T5 metastasis. Sclerotic right fifth rib metastasis. 3. Right lower lobe lung carcinoma. 4. Bulky right pleural carcinomatosis. Densely septated small right pleural  effusion. IMPRESSION:   1. Extracortical extension of L2 metastasis, obliterating and expanding the  right neural foramen, and extending into the epidural space. 2. Viable enhancing tumor throughout the L2 vertebral body around the sclerotic  portion in the inferior right posterolateral corner. 3. S2 and S3 vertebral body metastases.  Possible epidural and left S2 neural  foraminal extension. Guardant 360 results under media- T790M present    12/4/18 CT   IMPRESSION  IMPRESSION:  Increase size of dominant right basilar mass and contiguous satellite nodule. 12/4/18  Bone scan  IMPRESSION  IMPRESSION: Stable pattern of osseous metastatic disease.         ASSESSMENT AND PLAN  Mr. Tatiana Almanza is a 70 y.o. male with:    1. Stage IV NSCLC   With R lung mass, mediastinal adenopathy, malignant R pleural effusion, multiple asymptomatic bone metastasis and possibly liver metastasis. EGFR mutated, PD-L1 5%. Found to have T790M mutation    Switched to Osimertininb- 80 mg daily on 5/26/18, dose reduced to 40 mg due to grade 3 thrombocytopenia. Scans reviewed from 8/28/18 and show evidence of response. Scans from 12/18 showed some growth in the lung mass- overall growth is 17% and does not meet criteria from progression. We decided to continue and monitor closely    Now does have new upper back pain ( see no. 3). Otherwise doing really well clinically and tolerating Tagrisso apart from grade 2 thrombocytopenia. We discussed continuing the same treatment with repeat scan 2/5/19. Upon progression will consider Carbo+ Alimta and Keytruda    · Osimertinib - continue 40 mg daily-expedite processing of new prescription  · Labs every 4 weeks,  EKG every 3 months ( last sone 12/18)  · Zometa q3 months - due 2/1/19  · [facility]    2. Kanwal 6 prostate cancer on active surveillance    3. Bone pain:  secondary to osseous metastasis s/p XRT to R ischium  L2. Monitor symptoms at T8 which has a rather aggressive lesion as well. Most recent bone scan showed stability     However with new upper back and shoulder blade pain    Controlled on advil once a day    Will include MRI  Spine with imaging    4. HTN. On amlodipine. 5. Diarrhea- resolved    6. Edema- bilateral  Dopplers ordered and negative for DVT  ECHO ordered and reviewed - EF 60%  Resolved    7.  Elevated liver enzymes  Grade 1 Secondary to Osimertinib  Will continue to monitor. 8. Thrombocytopenia  Osimertininb was previously DR due to gr 3 thrombocytopenia. Plts 91K now. Continue to monitor.        RTC in 2/15/19 with labs and scans - CT, MRI cervical and thoracic spine      Mary Celeste MD

## 2019-02-01 NOTE — PROGRESS NOTES
[facility]fusion [facility] Progress Note    1400  Pt admit to [facility] for labs/zometa ambulatory in stable condition. Assessment completed. No new concerns voiced. Labs drawn and in process. Visit Vitals  /74   Pulse (!) 51   Temp 96.8 °F (36 °C)   Resp 18        Medications:  Zometa     1600  Pt tolerated treatment well. D/c home ambulatory in no distress.  Next appointment is scheduled for 5/1 1400

## 2019-02-13 NOTE — PATIENT INSTRUCTIONS
Decrease tagrisso- take 40 mg every other day for 2 weeks and stop  I am referring you to Dr. Arabella Roldan for radiation to your back  We will arrange for a bone biopsy with radiology  I will have Demario Jain research co ordinator call you to determine if you qualify for [facility] study  Will see you next week to discuss and consent for chemotherapy+ immunotherapy or LUNGMAP study

## 2019-02-13 NOTE — PROGRESS NOTES
Simran Littlejohn is a 70 y.o. male    Exam RM: 1     Chief Complaint   Patient presents with   Yissel Reardon is here for a f/u for lung cancer. 1. Have you been to the ER, urgent care clinic since your last visit? Hospitalized since your last visit? No  2. Have you seen or consulted any other health care providers outside of the [address] [place] [address] since your last visit? Include any pap smears or colon screening.   No     Health Maintenance Due   Topic Date Due    Hepatitis C Screening  1950    DTaP/Tdap/Td series (1 - Tdap) 12/02/1970    Shingrix Vaccine Age 50> (1 of 2) 12/02/1999    GLAUCOMA SCREENING Q2Y  12/02/2014    FOBT Q 1 YEAR AGE 50-75  10/31/2017    Pneumococcal 65+ High/Highest Risk (2 of 2 - PPSV23) 01/08/2019       Visit Vitals  /70 (BP 1 Location: Left arm, BP Patient Position: Sitting)   Pulse 66   Temp 97.5 °F (36.4 °C) (Oral)   Resp 16   Ht 6' 1\" (1.854 m)   Wt 153 lb 1.6 oz (69.4 kg)   SpO2 99%   BMI 20.20 kg/m²

## 2019-02-13 NOTE — Clinical Note
Please reach out to him and assess for Lung MAPHe should qualifyI am getting a biopsy and sending for foundation one

## 2019-02-13 NOTE — PROGRESS NOTES
Hematology/Oncology progress note    REASON FOR VISIT: Stage IV EGFR mutated NSCLC    HISTORY OF PRESENT ILLNESS: Mr. Maribell Seaman is a 70 y.o. male with prostate cancer who was diagnosed with stage IV NSCLC- adenocarcinoma (EGFR mutated , PD-L1 low) in May 2017. Here to follow up on Osimertinib. Had scans. He still has upper mid back and R shoulder pain which is triggered by cold. He rates it at 5/ 10 in severity. He normally does not use any analgesics. He has trouble lifting his R arm,he has no other pain. He has a runny nose as well. No other concerns . He otherwise has stable energy, no weakness, no loss of sensation, no falls, no bleeding no swelling. Breathing is unchanged has no new cough. Oncologic history   Mr. Maribell Seaman noticed a new cough and fevers back in March of 2017. He went to Urgent Care and received antibiotics and cough medication. He states this worked for a while, but he began to notice his cough return. This was intermittent. He had some tightness across his anterior chest which he related to the infection. Chest x-ray was abnormal and he was told to proceed to the Emergency Department. Douglas Holbrook had been under surveillance for right lower lobe mass that was initially noted in 2015. Bronch at that time was negative for malignancy. He was evaluated by Dr. Caterina Mittal with Thoracic Surgery in 2015 for possible surgical resection. CT chest in November of 2016 with mass size unchanged but some right basilar pleural thickening. CT chest obtained 5/14/17 however showed a new large right pleural effusion with right middle lobe and right lower lobe collapse. Irregular spiculated right lower lobe mass 3.8cm and stable precarinal enlarged lymph nodes were noted. New right posterior second rib lytic lesion and new right hepatic hypodensity consistent with mets. He underwent a therapeutic thoracentesis on 5/15/17 with removal of 1500 cc of serosanguinous fluid. Pathology showed adenocarcinoma.  PET CT showed pleural, bone, liver and flor metastasis. MRI brain 5/20/17: No metastasis. Needed repeat R sided thoracentesis on 5/25/17, had some post procedure hydropneumothorax. He was seen by Dr. Eligio Isidro at [facility] for Pleurx catheter.      CT guided biopsy of R lung mass done 5/25 which showed adenocarcinoma. EGFR mutation detected Exon 18 and 21    Treatment  6/26/17: Tarceva. Monthly Xgeva. Palliative XRT to R hip   CT CAP 10/2/17: Response  Ct 1/23/18: CT with some growth of LLL mass but stable by RECIST. CT 3/15/18 and Bone scan stable though he had worsening left back pain. MRI results showed extensive disease at L2, S2 and additional lesions as previously known. Received palliative XRT that he completed 4/18/18 5/26/18: Started Osimertinib as he had a T790M mutation. Stopped 6/20/18 due to platelets of 61I  Resumed at 80 mg every other day on 7/6/18   Started 40mg daily on 7/12/18 2/19: Progressive disease with new liver metastases and progression of thoracic disease    Past Medical History:   Diagnosis Date    Cancer [facility]) 2017    lung ca    Hypertension        Past Surgical History:   Procedure Laterality Date    HX ORTHOPAEDIC      reconstruction of left little finger       No Known Allergies    Current Outpatient Medications   Medication Sig Dispense Refill    amLODIPine (NORVASC) 10 mg tablet TAKE 1 TABLET BY MOUTH EVERY DAY 30 Tab 0    osimertinib (TAGRISSO) 40 mg tablet Take 1 Tab by mouth daily. 30 Tab 3    amlodipine besylate (AMLODIPINE PO) Take  by mouth.  levothyroxine (SYNTHROID) 75 mcg tablet TAKE 1/2 TABLET BY MOUTH DAILY BEFORE BREAKFAST 30 Tab 5    zoledronic acid (ZOMETA) 4 mg/100 mL pgbk infusion 4 mg by IntraVENous route every three (3) months.  cholecalciferol (VITAMIN D3) 1,000 unit tablet Take 1,000 Units by mouth daily.       chlorhexidine (PERIDEX) 0.12 % solution RINSE BID  0    ondansetron hcl (ZOFRAN) 8 mg tablet          Social History     Socioeconomic History    Marital status:      Spouse name: Not on file    Number of children: Not on file    Years of education: Not on file    Highest education level: Not on file   Tobacco Use    Smoking status: Never Smoker    Smokeless tobacco: Never Used   Substance and Sexual Activity    Alcohol use: Yes     Alcohol/week: 6.0 oz     Types: 10 Glasses of wine per week     Comment: ocassionally    Drug use: No    Sexual activity: Yes     Partners: Female     Comment:  has 2 children       Family History   Problem Relation Age of Onset    Cancer Mother         leukemia    Stroke Father     Cancer Brother         bladder       ROS  As reviewed under HP  ECOG PS is 0    Emotional well being addressed and patient is coping well    Physical Examination:   Visit Vitals  /70 (BP 1 Location: Left arm, BP Patient Position: Sitting)   Pulse 66   Temp 97.5 °F (36.4 °C) (Oral)   Resp 16   Ht 6' 1\" (1.854 m)   Wt 153 lb 1.6 oz (69.4 kg)   SpO2 99%   BMI 20.20 kg/m²     General appearance - alert, frail, and in no distress  Mental status - oriented to person, place, and time  Mouth - mucous membranes moist, pharynx normal without lesions. Neck - supple, no significant adenopathy  Lymphatics - no palpable lymphadenopathy, no hepatosplenomegaly  Chest -clear to auscultation  Heart - normal rate, regular rhythm, normal S1, S2, no murmurs, rubs, clicks or gallops; edema has resolved  Abdomen - soft, nontender, nondistended, no masses or organomegaly, bowel sounds present  Neurological - normal speech  Skin: No rashes  MSK- Bilateral pedal and ankle edema    LABS  Lab Results   Component Value Date/Time    WBC 4.2 02/01/2019 02:04 PM    HGB 10.1 (L) 02/01/2019 02:04 PM    HCT 32.6 (L) 02/01/2019 02:04 PM    PLATELET 82 (L) 91/25/2453 02:04 PM    MCV 94.2 02/01/2019 02:04 PM    ABS.  NEUTROPHILS 3.2 02/01/2019 02:04 PM     Lab Results   Component Value Date/Time    Sodium 141 02/01/2019 02:04 PM    Potassium 4.0 02/01/2019 02:04 PM    Chloride 106 02/01/2019 02:04 PM    CO2 30 02/01/2019 02:04 PM    Glucose 80 02/01/2019 02:04 PM    BUN 26 (H) 02/01/2019 02:04 PM    Creatinine 0.71 02/01/2019 02:04 PM    GFR est AA >60 02/01/2019 02:04 PM    GFR est non-AA >60 02/01/2019 02:04 PM    Calcium 9.1 02/01/2019 02:04 PM     Lab Results   Component Value Date/Time    AST (SGOT) 56 (H) 02/01/2019 02:04 PM    Alk. phosphatase 318 (H) 02/01/2019 02:04 PM    Protein, total 6.8 02/01/2019 02:04 PM    Albumin 3.2 (L) 02/01/2019 02:04 PM    Globulin 3.6 02/01/2019 02:04 PM    A-G Ratio 0.9 (L) 02/01/2019 02:04 PM       Guardant 360 results under media- T790M present     2/9/19    Thoracic spine MRI    IMPRESSION  IMPRESSION:  1. At T5 there has been interval progression of metastatic disease with  extension on the left into the epidural space and left T5-6 foramen. 2. Interval progression at T8 of extraosseous tumor on the left with extension  into the left T8-9 foramen and adjacent posterior elements extending out along  the proximal intercostal space. 3. Stable findings at T1.  4. No abnormal intradural enhancement with normal signal in the spinal cord. Cervical spine MRI    IMPRESSION  IMPRESSION:  1. Accentuated cervical lordosis. 2. Chronic degenerative changes at the odontoid and C1.  3. Sclerosis right lateral mass C1 may represent metastasis. 4. Congenital segmentation anomaly/fusion C4 and C5.  5. Chronic degenerative changes C3-4, C5-6 and to lesser extent C6-7 with  minimal to mild stenosis and no cord compression. Variable foramina stenosis in  association with facet arthrosis. 6. Abnormal marrow signal T1 consistent with chronic metastasis to this  Vertebra. Ct cap 2/9/19    IMPRESSION:   1. Stable right lower lobe lung mass and adjacent subcentimeter satellite  nodule. Right pleural nodularity and a chronic small right pleural effusion are  unchanged.     2.  New groundglass attenuation in the lingula may represent nonspecific  infection or inflammation. Attention on follow-up CT is suggested.     3. Several low-density liver lesions are more conspicuous on the current exam  suspicious for metastatic disease. The largest in the right liver measures 1.3 x  1.8 cm.      4. Stable subcentimeter left adrenal nodule.      5. Stable scattered bony metastases. Extra osseous progression at T5 and T8 are  better seen on the concurrent MR thoracic spine.     6. New splenomegaly measuring 13.9 cm in length.     7. Increased small amount of intraperitoneal ascites. ASSESSMENT AND PLAN  Mr. Jerri Thornton is a 70 y.o. male with:    1. Stage IV NSCLC   With R lung mass, mediastinal adenopathy, malignant R pleural effusion, multiple asymptomatic bone metastasis and possibly liver metastasis. EGFR mutated, PD-L1 5%. Found to have T790M mutation    Switched to Osimertininb- 80 mg daily on 5/26/18, dose reduced to 40 mg due to grade 3 thrombocytopenia. Scans reviewed from 8/28/18 and show evidence of response. Most recent CT scans and MRI of the thorax reviewed in cancer conference this week. He does have a new liver metastases and has progressive disease in the spine. He is symptomatic from this. Hence he has progressive disease as per RECIST    We discussed options down the road that include clinical trials such as the biomarker driven LUNGMAP study that we have available with us, versus chemotherapy plus immunotherapy versus other clinical trials    He understands that a tissue biopsy and foundation one analysis would be a requirement for the above-mentioned study and perhaps is a good idea anyway to identify any additional potential targets for palliative treatment since he is very reluctant to go on chemotherapy.     After discussion today we decided to do the following    · Taper down osimertinib -take 40 mg every other day for 2 weeks and then stop  · Research coordinator to reach out to him regarding eligibility for LUNGMAP study  · IR bone biopsy and send for Foundation analysis  · We will consent for carboplatin+ Alimta+ Keytruda of not elgible for Lung map  · Will hold off on port for now  · Palliative RT as below  · Zometa q3 months      2. [place] 6 prostate cancer on active surveillance    3. Bone pain:  secondary to osseous metastasis s/p XRT to R ischium and L2. Now with moderate pain at upper back radiating to the right. This is likely from lesions at T5-T8. He denies needing any analgesics for now  Does not have any symptoms of cord compression or refer him to Dr. Zulma Briscoe to start palliative radiation      4. HTN. On amlodipine. 5. Diarrhea- resolved    6. Edema- bilateral  Dopplers ordered and negative for DVT  ECHO ordered and reviewed - EF 60%  Resolved    7. Elevated liver enzymes  Grade 1 Secondary to Osimertinib  Will continue to monitor. 8. Thrombocytopenia  Osimertininb was previously DR due to gr 3 thrombocytopenia. Plts 82K today. Continue to monitor. RTC 1 week for teaching and consenting.     I spent more than 50% of this 40-minute visit in counseling care coordination      Lorena Magana MD

## 2019-02-13 NOTE — PROGRESS NOTES
This note will not be viewable in [address] Oncology Navigator Psychosocial AssessmentReason for Assessment:   
[]Depression  []Anxiety  []Caregiver [place]  []Maladaptive Coping with Serious Illness   [x]Other: Initial assessment Sources of Information:   
[x]Patient  []Family  []Staff  []Medical Record Advance Care Planning: 
Advance Care Planning 2/1/2019 Patient's Healthcare Decision Maker is: Verbal statement (Legal Next of Kin remains as decision maker) Confirm Advance Directive None Patient states that his has an Advanced Medical Directive and plans to provide a copy at his next visit. Mental Status:   
[x]Alert  []Lethargic  []Unresponsive Oriented to:  [x]Person  [x]Place  [x]Time  [x]Situation Barriers to Learning:   
[]Language  []Developmental  []Cognitive  []Altered Mental Status  []Visual/Hearing Impairment  []Unable to Read/Write  []Motivational   [x]No Barriers Identified  []Other: 
 
Relationship Status: 
[]Single  [x]  []Significant Other/Life Partner  []  []  [] Living Circumstances: 
[]Lives Alone  [x]Family/Significant Other in Household  []Roommates  []Children in the Home  []Paid Caregivers  []Assisted Living Facility/Group Home  []Skilled [address]  []Homeless  []Incarcerated  []Environmental/Care Concerns  []Other: 
 
Support System:   
[x]Strong  []Fair  []Limited Financial/Legal Concerns:   
[]Uninsured  []Limited Income/Resources  []Non-Citizen  [x]No Concerns Identified  []Financial POA:   
[]Other: 
 
Confucianism/Spiritual/Existential: 
[x]Strong Sense of Spirituality  []Involved in [facility] []Request  Visit  []Expressing Spiritual/Existential Angst  []No Concerns Identified Coping with Illness:       
 Patient: Family/Caregiver:  
Understanding and Acceptance of Illness/Prognosis  [x] [] Strong Sense of Resilience [x] [] Self Reflection [x] [] Engaged Support System [x] [] Does not Readily Discuss Illness [] [] Denial of Terminal Status [] [] Anger [] [] Depression [] [] Anxiety/Fear [] []  
Bargaining [] [] Recent Diagnosis/Prognosis [] [] Difficulties with Body Image [] [] Loss of Identity [] [] Excessive Substance Use [] [] Mental Health History [] []  
Enmeshed Relationships [] [] History of Loss [x] [] Anticipatory Grief [] [] Concern for Complicated Grief [] [] Suicidal Ideation or Plan [] [] Unable to assess [] []  
           
Narrative: Met with the patient during his office visit today. The patient was discussed during cancer conference on 19. Note that the patient is being seen for \"Stage IV NSCLC with R lung mass, mediastinal adenopathy, malignant R pleural effusion, multiple asymptomatic bone metastasis and possibly liver metastasis. \"  Patient explained that radiation, chemotherapy, immunotherapy, and lung mapping were discussed during his office visit today. The patient has taken oral chemotherapy in the past.   
 
The patient lives with his wife. He is retired from a career in international business. He grew up in [place], but moved to [place] at age 24. He stayed in [place] for 16 years, where he met his wife. They have two daughters that were both born in [place] and are bilingual.  The patient's daughters live in [place] and [place]. The patient's parents are both . He is the eldest of 4 children; his siblings do not live locally. When asked about coping methods, the patient stated \"Well I learned TM (Transendental Meditation) in the 70's and I practice it regularly. \" Provided active listening and emotional support as the patient began to process the information from his visit today. Offered continued support as needed. Referrals:  
 
I. Transportation Medicaid (Ladi Peon) [] [facility] [] Regional organization  [] Financial Assistance/Medication Access Patient assistance program (Care Card) [] Co-pay assistance  [] Leukemia & Lymphoma Society [] [address]  [] [facility] Yahir Estrada [] CancerCare  [] Emotional support Peer support group [] Local counseling [] Online support group [] Coordination of psychiatry consult [] Goals/Plan:  
1. Introduced self and role of this  in the [facility] [facility] [address]. 2.  Informed the patient of the [facility] and available resources there. 3.  Continue to meet with the patient when he returns to the clinic for ongoing assessment of the patients adjustment to his diagnosis and treatment. 4.  Ongoing psychosocial support as desired by patient.    
RODRI Lopez

## 2019-02-19 NOTE — PROGRESS NOTES
Outpatient Infusion Center - Chemotherapy Progress Note 1510 Pt admit to [facility] for Vitamin B12 injection ambulatory in stable condition. Assessment completed. No new concerns voiced. Visit Vitals /75 Pulse (!) 53 Temp 97.9 °F (36.6 °C) Resp 18 Medications: 
Vitamin B12 injection (IM R arm) 1520 Pt tolerated treatment well. D/c home ambulatory in no distress. Pt aware that he will be contacted regarding further [facility] appointments.

## 2019-02-20 NOTE — PROGRESS NOTES
Hematology/Oncology progress note    REASON FOR VISIT: Stage IV EGFR mutated NSCLC    HISTORY OF PRESENT ILLNESS: Mr. Ольга Tellez is a 70 y.o. male with prostate cancer who was diagnosed with stage IV NSCLC- adenocarcinoma (EGFR mutated , PD-L1 low) in May 2017. Recently progressed on Osimertinib and is weaning himself off. Saw Dr. Shila Harman for palliative RT and is to start 2/25. He still has upper mid back and R shoulder pain which is triggered by cold. He rates it at 5/ 10 in severity but yesterday he had to take a Norco as it was 10/10 in severity. He normally does not use any analgesics. He otherwise has stable energy, no weakness, no loss of sensation, no falls, no bleeding no swelling. Breathing is unchanged has no new cough. Oncologic history   Mr. Ольга Tellez noticed a new cough and fevers back in March of 2017. He went to Urgent Care and received antibiotics and cough medication. He states this worked for a while, but he began to notice his cough return. This was intermittent. He had some tightness across his anterior chest which he related to the infection. Chest x-ray was abnormal and he was told to proceed to the Emergency Department. Lennox Dies had been under surveillance for right lower lobe mass that was initially noted in 2015. Bronch at that time was negative for malignancy. He was evaluated by Dr. Cecilia Bryan with Thoracic Surgery in 2015 for possible surgical resection. CT chest in November of 2016 with mass size unchanged but some right basilar pleural thickening. CT chest obtained 5/14/17 however showed a new large right pleural effusion with right middle lobe and right lower lobe collapse. Irregular spiculated right lower lobe mass 3.8cm and stable precarinal enlarged lymph nodes were noted. New right posterior second rib lytic lesion and new right hepatic hypodensity consistent with mets. He underwent a therapeutic thoracentesis on 5/15/17 with removal of 1500 cc of serosanguinous fluid.  Pathology showed adenocarcinoma. PET CT showed pleural, bone, liver and flor metastasis. MRI brain 5/20/17: No metastasis. Needed repeat R sided thoracentesis on 5/25/17, had some post procedure hydropneumothorax. He was seen by Dr. Temple Hodgkins at [facility] for Pleurx catheter.      CT guided biopsy of R lung mass done 5/25 which showed adenocarcinoma. EGFR mutation detected Exon 18 and 21    Treatment  6/26/17: Tarceva. Monthly Xgeva. Palliative XRT to R hip   CT CAP 10/2/17: Response  Ct 1/23/18: CT with some growth of LLL mass but stable by RECIST. CT 3/15/18 and Bone scan stable though he had worsening left back pain. MRI results showed extensive disease at L2, S2 and additional lesions as previously known. Received palliative XRT that he completed 4/18/18 5/26/18: Started Osimertinib as he had a T790M mutation. Stopped 6/20/18 due to platelets of 36P  Resumed at 80 mg every other day on 7/6/18   Started 40mg daily on 7/12/18 2/19: Progressive disease with new liver metastases and progression of thoracic disease    Past Medical History:   Diagnosis Date    Cancer [facility]) 2017    lung ca    Hypertension        Past Surgical History:   Procedure Laterality Date    HX ORTHOPAEDIC      reconstruction of left little finger       No Known Allergies    Current Outpatient Medications   Medication Sig Dispense Refill    amLODIPine (NORVASC) 10 mg tablet TAKE 1 TABLET BY MOUTH EVERY DAY 30 Tab 0    levothyroxine (SYNTHROID) 75 mcg tablet TAKE 1/2 TABLET BY MOUTH DAILY BEFORE BREAKFAST 30 Tab 5    cholecalciferol (VITAMIN D3) 1,000 unit tablet Take 1,000 Units by mouth daily.  osimertinib (TAGRISSO) 40 mg tablet Take 1 Tab by mouth daily. 30 Tab 3    chlorhexidine (PERIDEX) 0.12 % solution RINSE BID  0    amlodipine besylate (AMLODIPINE PO) Take  by mouth.  ondansetron hcl (ZOFRAN) 8 mg tablet       zoledronic acid (ZOMETA) 4 mg/100 mL pgbk infusion 4 mg by IntraVENous route every three (3) months.          Social History Socioeconomic History    Marital status:      Spouse name: Not on file    Number of children: Not on file    Years of education: Not on file    Highest education level: Not on file   Tobacco Use    Smoking status: Never Smoker    Smokeless tobacco: Never Used   Substance and Sexual Activity    Alcohol use: Yes     Alcohol/week: 6.0 oz     Types: 10 Glasses of wine per week     Comment: ocassionally    Drug use: No    Sexual activity: Yes     Partners: Female     Comment:  has 2 children       Family History   Problem Relation Age of Onset    Cancer Mother         leukemia    Stroke Father     Cancer Brother         bladder       ROS  As reviewed under [facility]  ECOG PS is 0    Emotional well being addressed and patient is coping well    Physical Examination:   Visit Vitals  /83 (BP 1 Location: Right arm, BP Patient Position: Sitting)   Pulse (!) 56   Temp 97.6 °F (36.4 °C) (Oral)   Resp 16   Ht 6' 1\" (1.854 m)   Wt 150 lb 3.2 oz (68.1 kg)   SpO2 97%   BMI 19.82 kg/m²     General appearance - alert, frail, and in no distress  Mental status - oriented to person, place, and time  Mouth - mucous membranes moist, pharynx normal without lesions. Neck - supple, no significant adenopathy  Lymphatics - no palpable lymphadenopathy, no hepatosplenomegaly  Chest -clear to auscultation  Heart - normal rate, regular rhythm, normal S1, S2, no murmurs, rubs, clicks or gallops; edema has resolved  Abdomen - soft, nontender, nondistended, no masses or organomegaly, bowel sounds present  Neurological - normal speech  Skin: No rashes  MSK- Bilateral pedal and ankle edema    LABS  Lab Results   Component Value Date/Time    WBC 4.2 02/01/2019 02:04 PM    HGB 10.1 (L) 02/01/2019 02:04 PM    HCT 32.6 (L) 02/01/2019 02:04 PM    PLATELET 82 (L) 90/14/2224 02:04 PM    MCV 94.2 02/01/2019 02:04 PM    ABS.  NEUTROPHILS 3.2 02/01/2019 02:04 PM     Lab Results   Component Value Date/Time    Sodium 141 02/01/2019 02:04 PM    Potassium 4.0 02/01/2019 02:04 PM    Chloride 106 02/01/2019 02:04 PM    CO2 30 02/01/2019 02:04 PM    Glucose 80 02/01/2019 02:04 PM    BUN 26 (H) 02/01/2019 02:04 PM    Creatinine 0.71 02/01/2019 02:04 PM    GFR est AA >60 02/01/2019 02:04 PM    GFR est non-AA >60 02/01/2019 02:04 PM    Calcium 9.1 02/01/2019 02:04 PM     Lab Results   Component Value Date/Time    AST (SGOT) 56 (H) 02/01/2019 02:04 PM    Alk. phosphatase 318 (H) 02/01/2019 02:04 PM    Protein, total 6.8 02/01/2019 02:04 PM    Albumin 3.2 (L) 02/01/2019 02:04 PM    Globulin 3.6 02/01/2019 02:04 PM    A-G Ratio 0.9 (L) 02/01/2019 02:04 PM       Guardant 360 results under media- T790M present     2/9/19    Thoracic spine MRI    IMPRESSION  IMPRESSION:  1. At T5 there has been interval progression of metastatic disease with  extension on the left into the epidural space and left T5-6 foramen. 2. Interval progression at T8 of extraosseous tumor on the left with extension  into the left T8-9 foramen and adjacent posterior elements extending out along  the proximal intercostal space. 3. Stable findings at T1.  4. No abnormal intradural enhancement with normal signal in the spinal cord. Cervical spine MRI    IMPRESSION  IMPRESSION:  1. Accentuated cervical lordosis. 2. Chronic degenerative changes at the odontoid and C1.  3. Sclerosis right lateral mass C1 may represent metastasis. 4. Congenital segmentation anomaly/fusion C4 and C5.  5. Chronic degenerative changes C3-4, C5-6 and to lesser extent C6-7 with  minimal to mild stenosis and no cord compression. Variable foramina stenosis in  association with facet arthrosis. 6. Abnormal marrow signal T1 consistent with chronic metastasis to this  Vertebra. Ct cap 2/9/19    IMPRESSION:   1. Stable right lower lobe lung mass and adjacent subcentimeter satellite  nodule. Right pleural nodularity and a chronic small right pleural effusion are  unchanged.     2.  New groundglass attenuation in the lingula may represent nonspecific  infection or inflammation. Attention on follow-up CT is suggested.     3. Several low-density liver lesions are more conspicuous on the current exam  suspicious for metastatic disease. The largest in the right liver measures 1.3 x  1.8 cm.      4. Stable subcentimeter left adrenal nodule.      5. Stable scattered bony metastases. Extra osseous progression at T5 and T8 are  better seen on the concurrent MR thoracic spine.     6. New splenomegaly measuring 13.9 cm in length.     7. Increased small amount of intraperitoneal ascites. ASSESSMENT AND PLAN  Mr. Balbir Diaz is a 70 y.o. male with:    1. Stage IV NSCLC   With R lung mass, mediastinal adenopathy, malignant R pleural effusion, multiple asymptomatic bone metastasis and possibly liver metastasis. EGFR mutated, PD-L1 5%. Found to have T790M mutation    Switched to Osimertininb- 80 mg daily on 5/26/18, dose reduced to 40 mg due to grade 3 thrombocytopenia. Scans reviewed from 8/28/18 and show evidence of response. Most recent CT scans and MRI of the thorax reviewed in cancer conference. He does have a new liver metastases and has progressive disease in the spine. He is symptomatic from this. Hence he has progressive disease as per RECIST    We discussed options down the road that include clinical trials such as the biomarker driven LUNGMAP study that we have available with us, versus chemotherapy plus immunotherapy versus other clinical trials. IT appears that due to a h/o Prostate cancer he may not be eligible for LUNGMAP. He understands that a tissue biopsy and foundation one analysis may be a  good idea to identify any additional potential targets for palliative treatment    However I suggest we move forward with plans to start palliative Carboplatin+ Alimta and Keytruda as his disease is indeed aggressive    We discussed the chemotherapy regimen, it's logistics, and potential toxicities in detail.  Potential side effects include, but are not limited to, nausea, vomiting, diarrhea, taste changes, myelosuppression, infection, fatigue, allergic reactions, rash, edema, neuropathy, and rarely, death. The patient asked several well thought out questions which I answered to the best of my ability and to their apparent satisfaction. The patient has given consent for chemotherapy. After discussion today we decided to do the following    · Stop osimertinib in 2 days  · IR bone biopsy and send for Foundation analysis and port placement  · We will consent for carboplatin+ Alimta+ Keytruda today. Plan to start Crboplatin at AUC 4 or 5 depending on counts, Alimta at 500 mg/m2 and Keytruda on day one of every 3 week cycle. Plan on 4 cycles followed by mk. · B12 shot today. Start Folic acid  · Zofran called in  · Decadron on day 2  · Start after completion of Palliative RT  · Zometa q3 months      2. [place] prostate cancer on active surveillance    3. Bone pain:  secondary to osseous metastasis s/p XRT to R ischium and L2. Now with moderate pain at upper back radiating to the right. This is likely from lesions at T5-T8. He denies needing any analgesics for now  Does not have any symptoms of cord compression or refer him to Dr. Cheryle Lover to start palliative radiation      4. HTN. On amlodipine. 5. Diarrhea- resolved    6. Edema- bilateral  Dopplers ordered and negative for DVT  ECHO ordered and reviewed - EF 60%  Resolved    7. Elevated liver enzymes  Grade 1 Secondary to Osimertinib  Will continue to monitor. 8. Thrombocytopenia  Osimertininb was previously DR due to gr 3 thrombocytopenia. Plts 82K today. Continue to monitor.        I spent more than 50% of this 40-minute visit in counseling care coordination    RTC ~ 2 weeks to start Cycle 1    Miguelangel Rothman MD

## 2019-02-20 NOTE — PROGRESS NOTES
Florecita Avery is a 70 y.o. male here today for follow up, NSCLC. Patient reports discomfort to top of back across shoulders, 3/10 today. 1. Have you been to the ER, urgent care clinic since your last visit? Hospitalized since your last visit? No     2. Have you seen or consulted any other health care providers outside of the [address] [place] [address] since your last visit? Include any pap smears or colon screening.  No

## 2019-02-20 NOTE — PROGRESS NOTES
Had the opportunity to meet with patient to discuss port placement, OPIC and chemotherapy expectaions. (carboplatin, alimta and [place] q21day.)    Cancer institute education material provided including chemo and you pamphlet. Reivewed management of possible side effects. All questions answered. Home medications reviewed including folic acid. Chemo consent obtained. Encouraged to review material provided and to call with any ongoing questions/concerns.

## 2019-02-22 NOTE — H&P
Radiology History and Physical    Patient: Norberta Oppenheim 70 y.o. male       Chief Complaint: No chief complaint on file. History of Present Illness: Port placement and thoracic spine bone bx. History:    Past Medical History:   Diagnosis Date    Cancer ([facility] [address] 2017    lung ca    Hypertension      Family History   Problem Relation Age of Onset    Cancer Mother         leukemia    Stroke Father     Cancer Brother         bladder     Social History     Socioeconomic History    Marital status:      Spouse name: Not on file    Number of children: Not on file    Years of education: Not on file    Highest education level: Not on file   Social Needs    Financial resource strain: Not on file    Food insecurity - worry: Not on file    Food insecurity - inability: Not on file   [employer] needs - medical: Not on file   [employer] needs - non-medical: Not on file   Occupational History    Not on file   Tobacco Use    Smoking status: Never Smoker    Smokeless tobacco: Never Used   Substance and Sexual Activity    Alcohol use: Yes     Alcohol/week: 6.0 oz     Types: 10 Glasses of wine per week     Comment: ocassionally    Drug use: No    Sexual activity: Yes     Partners: Female     Comment:  has 2 children   Other Topics Concern    Not on file   Social History Narrative    Not on file       Allergies: No Known Allergies    Current Medications:  Current Facility-Administered Medications   Medication Dose Route Frequency    0.9% sodium chloride infusion  25 mL/hr IntraVENous CONTINUOUS    fentaNYL citrate (PF) injection 200 mcg  200 mcg IntraVENous RAD PRN    midazolam (VERSED) injection 5 mg  5 mg IntraVENous RAD PRN    ceFAZolin (ANCEF) 2 g/20 mL in sterile water IV syringe  2 g IntraVENous ONCE        Physical Exam:  Blood pressure 141/64, pulse (!) 55, temperature 97.6 °F (36.4 °C), resp. rate 16, height 6' 1\" (1.854 m), weight 67.1 kg (148 lb), SpO2 97 %.   GENERAL: alert, cooperative, no distress, appears stated age  LUNG: clear to auscultation bilaterally  HEART: regular rate and rhythm  ABD: Non tender, non distended. Alerts:    Hospital Problems  Date Reviewed: 2/20/2019    None          Laboratory:    No results for input(s): HGB, HCT, WBC, PLT, INR, BUN, CREA, K, CRCLT, HGBEXT, HCTEXT, PLTEXT in the last 72 hours. No lab exists for component: PTT, PT, INREXT      Plan of Care/Planned Procedure:  Risks, benefits, and alternatives reviewed with patient and he agrees to proceed with the procedure. Deemed appropriate or moderate sedation with versed and fentanyl. Sara Rowley MD      Addendum: After further review of the imaging, enlarging right lobe liver lesion is more amenable to biopsy with less morbidity, therefore liver lesion bx will be attempted.

## 2019-02-22 NOTE — PROGRESS NOTES
Pt discharged via wheelchair escorted by wife, discharge instructions and port package given, both verbalize understanding

## 2019-02-22 NOTE — DISCHARGE INSTRUCTIONS
Implanted Port Discharge Instructions      General Instructions:   A port is like an implanted IV. They are usually ordered for patients who will be getting chemotherapy, but can also be used as an IV for long term antibiotics, large amounts of fluids, and/or blood products. Your blood can be drawn from your port for labs also. Those patients who do not have good veins find the ports convenient as they can get the IV they need with one stick. The port can be used long term, and the care is easy. The device is under the skin, and once the skin heals, care is minimal. All that is required is the nurse who accesses the port will need to flush it with heparinized saline after each use. Ports are usually placed in the chest wall, usually on the right side. But they can be place in the arms and in the abdomen. Home Care Instructions:    Watch for signs of infection:    1. Redness,   2. Fever, chills,   3. Increased pain, and/or drainage from the site. If this occurs, call your physician at once. Keep your dressing clean and dry. Leave the dressing in place until seen here next week. The dressing may be changed in your physicians office. Continue your previous diet and follow the medication reconciliation list.    You may take Tylenol, as directed on the label, for pain. Avoid ibuprofen (Advil, Motrin) and aspirin as they may cause you to bleed. Because you received sedation, you are not to drive or sign any legal documents for the next 24 hours. Do not lift anything heavier than 5 pounds with the affected arm for the next week.     If you have any questions or concerns, please call [phone] between 0730 am and 10 pm.  After hours, [phone],ask the  to page the x-ray technologist and describe the problem to the technologist.    [address] Procedures/Radiology Department    Radiologist: Tigist Wheeler    Date:2/22/19  Liver Biopsy Discharge Instructions    You may have an aching pain in the biopsy site tonight. Take Tylenol, as directed on the label, for pain or discomfort. Avoid ibuprofen (Advil, Motrin) and aspirin for the next 48 hours as these drugs may cause you to bleed. Resume your previous diet and follow the medication reconciliation form. Rest today. Do not drive or sign any legal documents activity for 24 hours because you received sedation medications. Avoid any strenuous activity for 24 hours. Do not lift anything heavier than a small grocery bag (10 pounds) and avoid twisting for the next 5 days. If you experience severe sweating, severe abdominal pain, dizziness or faintness, go to the nearest Emergency Room immediately. Pain under the left collar bone is normal.    Watch for signs of infection at biopsy site:  redness, pain, drainage, fever chills. If this occurs, call you doctor. Contact your physician after 5 business days for test results. If you have any questions or concerns, please call [phone] and ask to speak to the nurse on-call. Pt educated on moderate sedation and its purpose; medications and side effects described and patient verbalized understanding.

## 2019-02-25 NOTE — TELEPHONE ENCOUNTER
Call to radiation to confirm start date of palliative. Per Mya, not developed yet but anticipated 10 days of radiation once start. Chemotherapy to start post radiation. Call to patient HIPAA verified to advise of cancellation tomorrow. He is requesting to re-confirm that he is unable to have chemo tomorrow. Spoke with Gonzalo. He is unable to have chemotherapy within 10 days of radiation. She is to check with Dr Zulma Briscoe and advise. Chin not ready to start but could start next week. We would need to hold chemo within 7 days of radiation. Patient would like to start tomorrow and hold his 2nd cycle to 7 days after radiation complete. OK with Gonzalo. He is still in a great deal of pain. Was hoping radiation could start. We will see him tomorrow in office.

## 2019-02-26 NOTE — PROGRESS NOTES
[facility] Note:  1000Pt arrived at [facility] ambulatory and in no distress for C1. Assessment stable, no new complaints voiced. Has had B12 injection. Went to MD appt and had chemo teaching. Reviewed and reinforced issues such as s/, nausea, fatigue. Medications received:  Dose reduced due to low Plt ct. Emend  Decadron  Zofran  Keytruda  Alimta  Carboplatin over a half hour    1545 Tolerated treatment well, no adverse reaction noted. D/Cd from [facility] ambulatory and in no distress accompanied by spouse. Next appt 3/19  0900  Visit Vitals  /82   Pulse 60   Temp 97 °F (36.1 °C)   Resp 16   Ht 6' 1\" (1.854 m)   Wt 69.9 kg (154 lb)   BMI 20.32 kg/m²     Recent Results (from the past 12 hour(s))   CBC WITH AUTOMATED DIFF    Collection Time: 02/26/19 10:12 AM   Result Value Ref Range    WBC 5.5 4.1 - 11.1 K/uL    RBC 3.19 (L) 4.10 - 5.70 M/uL    HGB 9.4 (L) 12.1 - 17.0 g/dL    HCT 29.9 (L) 36.6 - 50.3 %    MCV 93.7 80.0 - 99.0 FL    MCH 29.5 26.0 - 34.0 PG    MCHC 31.4 30.0 - 36.5 g/dL    RDW 16.3 (H) 11.5 - 14.5 %    PLATELET 82 (L) 863 - 400 K/uL    MPV 11.1 8.9 - 12.9 FL    NRBC 0.0 0  WBC    ABSOLUTE NRBC 0.00 0.00 - 0.01 K/uL    NEUTROPHILS 81 (H) 32 - 75 %    LYMPHOCYTES 9 (L) 12 - 49 %    MONOCYTES 8 5 - 13 %    EOSINOPHILS 2 0 - 7 %    BASOPHILS 0 0 - 1 %    IMMATURE GRANULOCYTES 0 0.0 - 0.5 %    ABS. NEUTROPHILS 4.5 1.8 - 8.0 K/UL    ABS. LYMPHOCYTES 0.5 (L) 0.8 - 3.5 K/UL    ABS. MONOCYTES 0.4 0.0 - 1.0 K/UL    ABS. EOSINOPHILS 0.1 0.0 - 0.4 K/UL    ABS. BASOPHILS 0.0 0.0 - 0.1 K/UL    ABS. IMM.  GRANS. 0.0 0.00 - 0.04 K/UL    DF SMEAR SCANNED      RBC COMMENTS OVALOCYTES  PRESENT        RBC COMMENTS HELMET CELLS  PRESENT        RBC COMMENTS ANISOCYTOSIS  1+       METABOLIC PANEL, COMPREHENSIVE    Collection Time: 02/26/19 10:12 AM   Result Value Ref Range    Sodium 142 136 - 145 mmol/L    Potassium 3.8 3.5 - 5.1 mmol/L    Chloride 109 (H) 97 - 108 mmol/L    CO2 25 21 - 32 mmol/L    Anion gap 8 5 - 15 mmol/L    Glucose 91 65 - 100 mg/dL    BUN 27 (H) 6 - 20 MG/DL    Creatinine 0.64 (L) 0.70 - 1.30 MG/DL    BUN/Creatinine ratio 42 (H) 12 - 20      GFR est AA >60 >60 ml/min/1.73m2    GFR est non-AA >60 >60 ml/min/1.73m2    Calcium 9.1 8.5 - 10.1 MG/DL    Bilirubin, total 1.2 (H) 0.2 - 1.0 MG/DL    ALT (SGPT) 41 12 - 78 U/L    AST (SGOT) 64 (H) 15 - 37 U/L    Alk.  phosphatase 313 (H) 45 - 117 U/L    Protein, total 6.6 6.4 - 8.2 g/dL    Albumin 2.8 (L) 3.5 - 5.0 g/dL    Globulin 3.8 2.0 - 4.0 g/dL    A-G Ratio 0.7 (L) 1.1 - 2.2     TSH 3RD GENERATION    Collection Time: 02/26/19 10:12 AM   Result Value Ref Range    TSH 3.21 0.36 - 3.74 uIU/mL

## 2019-02-26 NOTE — PROGRESS NOTES
Jewel Ochoa is a 70 y.o. male here today for follow up, lung cancer. Patient reports discomfort to upper back 5/10 today. 1. Have you been to the ER, urgent care clinic since your last visit? Hospitalized since your last visit? No     2. Have you seen or consulted any other health care providers outside of the [address] [place] [address] since your last visit? Include any pap smears or colon screening.  No

## 2019-02-26 NOTE — TELEPHONE ENCOUNTER
----- Message from Emmanuelle Martinez MD sent at 2/25/2019  9:12 PM EST -----  Please process for foundation one testing as soon as we can    Patient aware  ----- Message -----  From: [facility]3 Results  Sent: 2/25/2019   5:38 PM  To: Emmanuelle Martinez MD

## 2019-03-15 NOTE — TELEPHONE ENCOUNTER
Call to [address], they will send over patients final report to our office for review. Thanked for assistance.

## 2019-03-18 NOTE — PROGRESS NOTES
Asked to screen pt by Dr. Haley Kumar for lung cancer trial-LungMap and genetic alteration trial-EAY131-Match. Unfortunately because of pt's history of prostate cancer diagnosed in 2016 that has not been treated and is followed by surveillance, the patient does not qualify for either of these trials.

## 2019-03-19 NOTE — PROGRESS NOTES
Hematology/Oncology progress note    REASON FOR VISIT: Stage IV EGFR mutated NSCLC    HISTORY OF PRESENT ILLNESS: Mr. Vidal Peterson is a 70 y.o. male with prostate cancer who has stage IV NSCLC- adenocarcinoma (EGFR mutated , PD-L1 low) in May 2017. He progressed on Tarceva and then Osimertinib. Comes to follow up after cycle 1 of Carboplatin+ Alimta+ Keytruda. He is receiving palliative RT. Day 2 after chemotherapy he felt well. Day 3 he had chills, sweats, low grade temperatures. Although for the most part his temp goes way down he says ( 96 F). He had body aches. He had a cough as well. He took some antitussives and cough was improved. He has noted abdominal distention, has constipation and is weak. As for his upper back or R shoulder is not sharp anymore- it is 4/10. Otherwise, he feels well. Denies SOB, CP. Has a dry cough. No fever/chills. No bleeding. Breathing is unchanged. Oncologic history   Mr. Vidal Peterson noticed a new cough and fevers back in March of 2017. He went to Urgent Care and received antibiotics and cough medication. He states this worked for a while, but he began to notice his cough return. This was intermittent. He had some tightness across his anterior chest which he related to the infection. Chest x-ray was abnormal and he was told to proceed to the Emergency Department. Denise Sandhu had been under surveillance for right lower lobe mass that was initially noted in 2015. Bronch at that time was negative for malignancy. He was evaluated by Dr. Amanda Sales with Thoracic Surgery in 2015 for possible surgical resection. CT chest in November of 2016 with mass size unchanged but some right basilar pleural thickening. CT chest obtained 5/14/17 however showed a new large right pleural effusion with right middle lobe and right lower lobe collapse. Irregular spiculated right lower lobe mass 3.8cm and stable precarinal enlarged lymph nodes were noted.  New right posterior second rib lytic lesion and new right hepatic hypodensity consistent with mets. He underwent a therapeutic thoracentesis on 5/15/17 with removal of 1500 cc of serosanguinous fluid. Pathology showed adenocarcinoma. PET CT showed pleural, bone, liver and flor metastasis. MRI brain 5/20/17: No metastasis. Needed repeat R sided thoracentesis on 5/25/17, had some post procedure hydropneumothorax. He was seen by Dr. Madina Burns at [facility] for Pleurx catheter.      CT guided biopsy of R lung mass done 5/25 which showed adenocarcinoma. EGFR mutation detected Exon 18 and 21    Treatment  6/26/17: Tarceva. Monthly Xgeva. Palliative XRT to R hip   CT CAP 10/2/17: Response  Ct 1/23/18: CT with some growth of LLL mass but stable by RECIST. CT 3/15/18 and Bone scan stable though he had worsening left back pain. MRI results showed extensive disease at L2, S2 and additional lesions as previously known. Received palliative XRT that he completed 4/18/18 5/26/18: Started Osimertinib as he had a T790M mutation. Stopped 6/20/18 due to platelets of 68R  Resumed at 80 mg every other day on 7/6/18   Started 40mg daily on 7/12/18 2/19: Progressive disease with new liver metastases and progression of thoracic disease  2/22/19: liver biopsy pathology shows metastatic adenocarcinoma, consistent with lung primary- foundation sent  2/26/19: Cycle 1 of Carboplatin+ Alimta+ Keytruda.   3/6/19- 3/13/19: 30 Gy to Rt axilla and T spine      Past Medical History:   Diagnosis Date    Cancer ([address] 2017    lung ca    Hypertension        Past Surgical History:   Procedure Laterality Date    HX ORTHOPAEDIC      reconstruction of left little finger    IR BX LIVER PERCUTANEOUS  2/22/2019    IR INSERT TUNL CVC W PORT OVER 5 YEARS  2/22/2019       No Known Allergies    Current Outpatient Medications   Medication Sig Dispense Refill    levothyroxine (SYNTHROID) 75 mcg tablet TAKE 1/2 TABLET BY MOUTH DAILY BEFORE BREAKFAST 30 Tab 5    ondansetron hcl (ZOFRAN) 8 mg tablet Take 1 Tab by mouth every eight (8) hours as needed for Nausea. 30 Tab 6    dexamethasone (DECADRON) 4 mg tablet Take 4 mg by mouth See Admin Instructions. Take one tab by mouth on day 2 of chemotherapy 18 Tab 0    amLODIPine (NORVASC) 10 mg tablet TAKE 1 TABLET BY MOUTH EVERY DAY 30 Tab 0    zoledronic acid (ZOMETA) 4 mg/100 mL pgbk infusion 4 mg by IntraVENous route every three (3) months.  cholecalciferol (VITAMIN D3) 1,000 unit tablet Take 1,000 Units by mouth daily.  chlorhexidine (PERIDEX) 0.12 % solution RINSE BID  0    amlodipine besylate (AMLODIPINE PO) Take  by mouth. Social History     Socioeconomic History    Marital status:      Spouse name: Not on file    Number of children: Not on file    Years of education: Not on file    Highest education level: Not on file   Tobacco Use    Smoking status: Never Smoker    Smokeless tobacco: Never Used   Substance and Sexual Activity    Alcohol use: Yes     Alcohol/week: 6.0 oz     Types: 10 Glasses of wine per week     Comment: ocassionally    Drug use: No    Sexual activity: Yes     Partners: Female     Comment:  has 2 children       Family History   Problem Relation Age of Onset    Cancer Mother         leukemia    Stroke Father     Cancer Brother         bladder       ROS  As reviewed under HP  ECOG PS is 0    Emotional well being addressed and patient is coping well    Physical Examination:   Visit Vitals  /87 (BP 1 Location: Left arm, BP Patient Position: Sitting)   Pulse 69   Temp 97.9 °F (36.6 °C) (Oral)   Resp 16   Ht 6' 1\" (1.854 m)   Wt 161 lb 9.6 oz (73.3 kg)   SpO2 93%   BMI 21.32 kg/m²     General appearance - alert, frail, and in no distress  Mental status - oriented to person, place, and time  Mouth - mucous membranes moist, pharynx normal without lesions.   Neck - supple, no significant adenopathy  Lymphatics - no palpable lymphadenopathy, no hepatosplenomegaly  Chest -clear to auscultation  Heart - normal rate, regular rhythm, normal S1, S2, no murmurs, rubs, clicks or gallops; edema has resolved  Abdomen - soft, nontender,distended  Neurological - normal speech  Skin: No rashes  MSK- no LE edema    LABS  Lab Results   Component Value Date/Time    WBC 5.5 02/26/2019 10:12 AM    HGB 9.4 (L) 02/26/2019 10:12 AM    HCT 29.9 (L) 02/26/2019 10:12 AM    PLATELET 82 (L) 60/46/4941 10:12 AM    MCV 93.7 02/26/2019 10:12 AM    ABS. NEUTROPHILS 4.5 02/26/2019 10:12 AM     Lab Results   Component Value Date/Time    Sodium 142 02/26/2019 10:12 AM    Potassium 3.8 02/26/2019 10:12 AM    Chloride 109 (H) 02/26/2019 10:12 AM    CO2 25 02/26/2019 10:12 AM    Glucose 91 02/26/2019 10:12 AM    BUN 27 (H) 02/26/2019 10:12 AM    Creatinine 0.64 (L) 02/26/2019 10:12 AM    GFR est AA >60 02/26/2019 10:12 AM    GFR est non-AA >60 02/26/2019 10:12 AM    Calcium 9.1 02/26/2019 10:12 AM     Lab Results   Component Value Date/Time    AST (SGOT) 64 (H) 02/26/2019 10:12 AM    Alk. phosphatase 313 (H) 02/26/2019 10:12 AM    Protein, total 6.6 02/26/2019 10:12 AM    Albumin 2.8 (L) 02/26/2019 10:12 AM    Globulin 3.8 02/26/2019 10:12 AM    A-G Ratio 0.7 (L) 02/26/2019 10:12 AM       Guardant 360 results under media- T790M present     2/9/19    Thoracic spine MRI  IMPRESSION:  1. At T5 there has been interval progression of metastatic disease with  extension on the left into the epidural space and left T5-6 foramen. 2. Interval progression at T8 of extraosseous tumor on the left with extension  into the left T8-9 foramen and adjacent posterior elements extending out along  the proximal intercostal space. 3. Stable findings at T1.  4. No abnormal intradural enhancement with normal signal in the spinal cord. Cervical spine MRI  IMPRESSION:  1. Accentuated cervical lordosis. 2. Chronic degenerative changes at the odontoid and C1.  3. Sclerosis right lateral mass C1 may represent metastasis.   4. Congenital segmentation anomaly/fusion C4 and C5.  5. Chronic degenerative changes C3-4, C5-6 and to lesser extent C6-7 with  minimal to mild stenosis and no cord compression. Variable foramina stenosis in  association with facet arthrosis. 6. Abnormal marrow signal T1 consistent with chronic metastasis to this  Vertebra. Ct cap 2/9/19    IMPRESSION:   1. Stable right lower lobe lung mass and adjacent subcentimeter satellite  nodule. Right pleural nodularity and a chronic small right pleural effusion are  unchanged.     2. New groundglass attenuation in the lingula may represent nonspecific  infection or inflammation. Attention on follow-up CT is suggested.     3. Several low-density liver lesions are more conspicuous on the current exam  suspicious for metastatic disease. The largest in the right liver measures 1.3 x  1.8 cm.      4. Stable subcentimeter left adrenal nodule.      5. Stable scattered bony metastases. Extra osseous progression at T5 and T8 are  better seen on the concurrent MR thoracic spine.     6. New splenomegaly measuring 13.9 cm in length.     7. Increased small amount of intraperitoneal ascites. ASSESSMENT AND PLAN  Mr. Val Al is a 70 y.o. male with:    1. Stage IV NSCLC   With R lung mass, mediastinal adenopathy, malignant R pleural effusion, multiple asymptomatic bone metastasis and possibly liver metastasis. EGFR mutated, PD-L1 5%. Found to have T790M mutation but most recently progressed on Osimertinib with progressive painful bone metastasis. We discussed options down the road that include clinical trials such as the biomarker driven LUNGMAP study that we have available with us, versus chemotherapy plus immunotherapy versus other clinical trials. He was not found to be eligible for [facility] due to his prostate cancer. Underwent a tissue biopsy of his liver and pathology shows metastatic adenocarcinoma and foundation one is reviewed today. This shows a BRCA 2 mutation.  Not eligible for NCI match due to his other malignancy    Unfortunately he is excluded from multiple trials due to his prostate cancer (I also reached out to Dr. Dennis Drew at [facility] and [id number]). I have reached out to [employer] for the Dimasvelin BRCA/MILTON: Avelumab Plus Talazoparib in Patients With BRCA or MILTON Mutant Solid Tumors and await their response    He has in the meantime started palliative Carboplatin+ Alimta and Keytruda as his disease is indeed aggressive. After cycle 1 he developed sweats, weakness and progressive ascites    He declines treatment for another few weeks    If he decided against current regimen will try to obtain off label Olaparib based on his mutational profiling    · Plan on cycle 2 of  carboplatin (AUC 4) + Alimta (dose reduced 25% to 375mg/m2 for grade 1 thrombocytopenia) + Keytruda given every 3 weeks on 2/6/31  · Continue Folic acid  · Zofran PRN  · Decadron on day 2  · Zometa q3 months    2. Kanwal 6 prostate cancer on active surveillance    3. Bone pain:  secondary to osseous metastasis s/p XRT to R ischium and L2. With moderate pain at upper back radiating to the right. This is likely from lesions at T5-T8. Does not have any symptoms of cord compression     S/P 30 Gy to Rt axilla and T spine  Completed 3/13/19      4. HTN. On amlodipine. 5. Diarrhea- resolved    6. Edema- bilateral  Dopplers ordered and negative for DVT  ECHO ordered and reviewed - EF 60%  Resolved    7. Elevated liver enzymes  Grade 1 Secondary to Osimertinib  Will continue to monitor. 8. Thrombocytopenia  Osimertininb was previously DR due to gr 3 thrombocytopenia. Plts 82K today.  Dose reduce Alimta 25% to 325mg/m2 and Carbo AUC 4.    9. Ascites  On exam today  Diagnostic and therapeutic tap    RTC 4/9/19    Emmanuelle Martinez MD

## 2019-03-19 NOTE — PROGRESS NOTES
Florecita Avery is a 70 y.o. male here today for follow up, lung cancer. 1. Have you been to the ER, urgent care clinic since your last visit? Hospitalized since your last visit? No     2. Have you seen or consulted any other health care providers outside of the [address] [place] [address] since your last visit? Include any pap smears or colon screening.  No

## 2019-03-19 NOTE — PROGRESS NOTES
[facility] Lab Visit: 
 
 
 5927 Pt. Admit to [facility] ambulatory in stable condition. No new concerns voiced. Lab drawn peripherally from left AC arm. Tolerated well. 1230 Pt. D/c [facility] ambulatory in no distress. Visit Vitals /87 (BP 1 Location: Left arm, BP Patient Position: Sitting) Pulse 68 Temp 97.9 °F (36.6 °C) Resp 18 SpO2 94% Labs available in CC once resulted.

## 2019-03-22 NOTE — DISCHARGE INSTRUCTIONS
Alexandra Russell [address] [facility]  Department of Interventional Radiology    PARACENTESIS DISCHARGE INSTRUCTIONS    General Information:  During this procedure, the doctor will insert a needle into the abdomen to drain fluid. After the procedure, you will be able to take a deep breath much easier. The site of the puncture may ooze the first day. This will decrease and eventually stop. Paracentesis (draining fluid from the abdomen) sometimes makes patients hypotensive (low blood pressure). Your doctor may order for you to receive fluids or albumin (a volume booster) during the procedure through an IV site. Home Care Instructions:  Keep the puncture site clean and dry. No tub baths or swimming until puncture site heals. Showering is acceptable. Resume your normal diet, and resume your normal activity slowly and as you tolerate. If you are short of breath, rest. If shortness of breath does not ease, please call your ordering doctor. Fluid can re-accumulate in the chest and/or in the abdomen. If this should occur, your doctor needs to know as you may need to have the procedure done again. Call If:     You should call your Physician and/or the Radiology Nurse if you notice any signs of infection, like pus draining, or if it is swollen or reddened. Also call if you have a fever, or if you are bleeding from the puncture site more than a small amount on the dressing. Call if the puncture site keeps draining fluid. Some oozing is to be expected, but should slow and then stop. Call if you feel like you have pressure in your abdomen. SEEK IMMEDIATE CARE OR CALL 911 IF YOU SUDDENLY HAVE TROUBLE BREATHING, OR IF YOUR LIPS TURN BLUE, OR IF YOU NOTICE BLOOD IN YOUR SPUTUM. Follow-Up Instructions: Please see your ordering doctor as he/she has requested. To Reach Us: For problems related to your paracentesis, call [phone] 24/7 and ask for an xray tech.  A doctor or nurse can be paged  You had 3,100 cc's fluid removed from your abdomen today    Date: 3/22/2019  Discharging Nurse: Remedios Carbajal RN

## 2019-03-22 NOTE — ROUTINE PROCESS
Pt. Discharged  to home and transported to d/c lot via w/c. Verbalized understanding of d/c instructions.

## 2019-03-25 PROBLEM — R18.8 OTHER ASCITES: Status: ACTIVE | Noted: 2019-01-01

## 2019-04-02 NOTE — DISCHARGE INSTRUCTIONS
[address]  Radiology Department  [phone]    [address] [facility]  Date:4/2/19      Paracentesis Discharge Instructions    You may have an aching pain in your abdomen at the puncture site tonight as the numbing medicine wears off. You may take Tylenol, as directed on the label, for  pain or discomfort. Resume your previous diet and medications. Rest the remainder of today. If we have removed a large volume of fluid, you be lightheaded or dizzy when making position changes. You may shower in 24 hours. Keep the dressing clean and dry until the site has healed. Watch for signs of infection at the puncture site. .. redness, swelling, pus, fever or chills. Contact your physician immediately if this occurs. If you experience severe sweating and new, severe abdominal pain seek emergency medical treatment. Follow up with your physician as previously discussed.

## 2019-04-09 NOTE — PROGRESS NOTES
Hematology/Oncology progress note    REASON FOR VISIT: Stage IV EGFR mutated NSCLC    HISTORY OF PRESENT ILLNESS: Mr. Dulce Salinas is a 70 y.o. male with prostate cancer who has stage IV NSCLC- adenocarcinoma (EGFR mutated , PD-L1 low) in May 2017. He progressed on Tarceva and then Osimertinib. Comes to follow up after cycle 1 of Carboplatin+ Alimta+ Keytruda. Palliative RT completed 2 weeks ago and pain has resolved in the upper back and the R shoulder. He had 4 L of ascites tapped on 4/2/19. He now has recurrent abdominal bloating , decreased appetite and SOB. His chills have resolved. He has no HA. Oncologic history   Mr. Dulce Salinas noticed a new cough and fevers back in March of 2017. He went to Urgent Care and received antibiotics and cough medication. He states this worked for a while, but he began to notice his cough return. This was intermittent. He had some tightness across his anterior chest which he related to the infection. Chest x-ray was abnormal and he was told to proceed to the Emergency Department. Sae Huitron had been under surveillance for right lower lobe mass that was initially noted in 2015. Bronch at that time was negative for malignancy. He was evaluated by Dr. Marlon Garcia with Thoracic Surgery in 2015 for possible surgical resection. CT chest in November of 2016 with mass size unchanged but some right basilar pleural thickening. CT chest obtained 5/14/17 however showed a new large right pleural effusion with right middle lobe and right lower lobe collapse. Irregular spiculated right lower lobe mass 3.8cm and stable precarinal enlarged lymph nodes were noted. New right posterior second rib lytic lesion and new right hepatic hypodensity consistent with mets. He underwent a therapeutic thoracentesis on 5/15/17 with removal of 1500 cc of serosanguinous fluid. Pathology showed adenocarcinoma. PET CT showed pleural, bone, liver and flor metastasis. MRI brain 5/20/17: No metastasis.  Needed repeat R sided thoracentesis on 5/25/17, had some post procedure hydropneumothorax. He was seen by Dr. Chato Sweeney at [facility] for Pleurx catheter.      CT guided biopsy of R lung mass done 5/25 which showed adenocarcinoma. EGFR mutation detected Exon 18 and 21    Treatment  6/26/17: Tarceva. Monthly Xgeva. Palliative XRT to R hip   CT CAP 10/2/17: Response  Ct 1/23/18: CT with some growth of LLL mass but stable by RECIST. CT 3/15/18 and Bone scan stable though he had worsening left back pain. MRI results showed extensive disease at L2, S2 and additional lesions as previously known. Received palliative XRT that he completed 4/18/18 5/26/18: Started Osimertinib as he had a T790M mutation. Stopped 6/20/18 due to platelets of 17G  Resumed at 80 mg every other day on 7/6/18   Started 40mg daily on 7/12/18 2/19: Progressive disease with new liver metastases and progression of thoracic disease  2/22/19: liver biopsy pathology shows metastatic adenocarcinoma, consistent with lung primary- foundation sent  2/26/19: Cycle 1 of Carboplatin+ Alimta+ Keytruda. 3/6/19- 3/13/19: 30 Gy to Rt axilla and T spine      Past Medical History:   Diagnosis Date    Cancer [facility]) 2017    lung ca    Hypertension        Past Surgical History:   Procedure Laterality Date    HX ORTHOPAEDIC      reconstruction of left little finger    IR BX LIVER PERCUTANEOUS  2/22/2019    IR INSERT TUNL CVC W PORT OVER 5 YEARS  2/22/2019       No Known Allergies    Current Outpatient Medications   Medication Sig Dispense Refill    levothyroxine (SYNTHROID) 75 mcg tablet TAKE 1/2 TABLET BY MOUTH DAILY BEFORE BREAKFAST 30 Tab 5    dexamethasone (DECADRON) 4 mg tablet Take 4 mg by mouth See Admin Instructions. Take one tab by mouth on day 2 of chemotherapy 18 Tab 0    amLODIPine (NORVASC) 10 mg tablet TAKE 1 TABLET BY MOUTH EVERY DAY 30 Tab 0    zoledronic acid (ZOMETA) 4 mg/100 mL pgbk infusion 4 mg by IntraVENous route every three (3) months.       cholecalciferol (VITAMIN D3) 1,000 unit tablet Take 1,000 Units by mouth daily.  ondansetron hcl (ZOFRAN) 8 mg tablet Take 1 Tab by mouth every eight (8) hours as needed for Nausea. 30 Tab 6    chlorhexidine (PERIDEX) 0.12 % solution RINSE BID  0    amlodipine besylate (AMLODIPINE PO) Take  by mouth. Social History     Socioeconomic History    Marital status:      Spouse name: Not on file    Number of children: Not on file    Years of education: Not on file    Highest education level: Not on file   Tobacco Use    Smoking status: Never Smoker    Smokeless tobacco: Never Used   Substance and Sexual Activity    Alcohol use: Yes     Alcohol/week: 6.0 oz     Types: 10 Glasses of wine per week     Comment: ocassionally    Drug use: No    Sexual activity: Yes     Partners: Female     Comment:  has 2 children       Family History   Problem Relation Age of Onset    Cancer Mother         leukemia    Stroke Father     Cancer Brother         bladder       ROS  As reviewed under HP  ECOG PS is 0    Emotional well being addressed and patient is coping well    Physical Examination:   Visit Vitals  /82 (BP 1 Location: Left arm, BP Patient Position: Sitting)   Pulse 65   Temp 97.5 °F (36.4 °C) (Oral)   Resp 16   Ht 6' 1\" (1.854 m)   Wt 159 lb 12.8 oz (72.5 kg)   SpO2 98%   BMI 21.08 kg/m²     General appearance - alert, frail, and in no distress  Mental status - oriented to person, place, and time  Mouth - mucous membranes moist, pharynx normal without lesions.   Neck - supple, no significant adenopathy  Lymphatics - no palpable lymphadenopathy, no hepatosplenomegaly  Chest -clear to auscultation  Heart - normal rate, regular rhythm, normal S1, S2, no murmurs, rubs, clicks or gallops; edema has resolved  Abdomen - soft, nontender,distended  Neurological - normal speech  Skin: No rashes  MSK- no LE edema    LABS  Lab Results   Component Value Date/Time    WBC 3.3 (L) 03/19/2019 12:32 PM    HGB 8.7 (L) 03/19/2019 12:32 PM    HCT 27.7 (L) 03/19/2019 12:32 PM    PLATELET 988 (L) 64/53/4805 12:32 PM    MCV 93.6 03/19/2019 12:32 PM    ABS. NEUTROPHILS 2.6 03/19/2019 12:32 PM     Lab Results   Component Value Date/Time    Sodium 139 03/19/2019 12:32 PM    Potassium 4.3 03/19/2019 12:32 PM    Chloride 104 03/19/2019 12:32 PM    CO2 29 03/19/2019 12:32 PM    Glucose 81 03/19/2019 12:32 PM    BUN 19 03/19/2019 12:32 PM    Creatinine 0.71 03/19/2019 12:32 PM    GFR est AA >60 03/19/2019 12:32 PM    GFR est non-AA >60 03/19/2019 12:32 PM    Calcium 9.2 03/19/2019 12:32 PM     Lab Results   Component Value Date/Time    AST (SGOT) 69 (H) 03/19/2019 12:32 PM    Alk. phosphatase 473 (H) 03/19/2019 12:32 PM    Protein, total 6.3 (L) 03/19/2019 12:32 PM    Albumin 2.3 (L) 03/19/2019 12:32 PM    Globulin 4.0 03/19/2019 12:32 PM    A-G Ratio 0.6 (L) 03/19/2019 12:32 PM       Guardant 360 results under media- T790M present     2/9/19    Thoracic spine MRI  IMPRESSION:  1. At T5 there has been interval progression of metastatic disease with  extension on the left into the epidural space and left T5-6 foramen. 2. Interval progression at T8 of extraosseous tumor on the left with extension  into the left T8-9 foramen and adjacent posterior elements extending out along  the proximal intercostal space. 3. Stable findings at T1.  4. No abnormal intradural enhancement with normal signal in the spinal cord. Cervical spine MRI  IMPRESSION:  1. Accentuated cervical lordosis. 2. Chronic degenerative changes at the odontoid and C1.  3. Sclerosis right lateral mass C1 may represent metastasis. 4. Congenital segmentation anomaly/fusion C4 and C5.  5. Chronic degenerative changes C3-4, C5-6 and to lesser extent C6-7 with  minimal to mild stenosis and no cord compression. Variable foramina stenosis in  association with facet arthrosis. 6. Abnormal marrow signal T1 consistent with chronic metastasis to this  Vertebra.     Ct cap 2/9/19    IMPRESSION: 1. Stable right lower lobe lung mass and adjacent subcentimeter satellite  nodule. Right pleural nodularity and a chronic small right pleural effusion are  unchanged.     2. New groundglass attenuation in the lingula may represent nonspecific  infection or inflammation. Attention on follow-up CT is suggested.     3. Several low-density liver lesions are more conspicuous on the current exam  suspicious for metastatic disease. The largest in the right liver measures 1.3 x  1.8 cm.      4. Stable subcentimeter left adrenal nodule.      5. Stable scattered bony metastases. Extra osseous progression at T5 and T8 are  better seen on the concurrent MR thoracic spine.     6. New splenomegaly measuring 13.9 cm in length.     7. Increased small amount of intraperitoneal ascites. ASSESSMENT AND PLAN  Mr. Suraj Garcia is a 70 y.o. male with:    1. Stage IV NSCLC   With R lung mass, mediastinal adenopathy, malignant R pleural effusion, multiple asymptomatic bone metastasis and possibly liver metastasis. EGFR mutated, PD-L1 5%. Found to have T790M mutation but most recently progressed on Osimertinib with progressive painful bone metastasis. We discussed options down the road that include clinical trials such as the biomarker driven LUNGMAP study that we have available with us, versus chemotherapy plus immunotherapy versus other clinical trials. He was not found to be eligible for [facility] due to his prostate cancer. Underwent a tissue biopsy of his liver and pathology shows metastatic adenocarcinoma and foundation one is reviewed today. This shows a BRCA 2 mutation. Not eligible for NCI match due to his other malignancy    Unfortunately he is excluded from multiple trials due to his prostate cancer (I also reached out to Dr. Alicia Singer at [facility] and [id number]). I have reached out to [employer] for the [place] BRCA/MILTON: Avelumab Plus Talazoparib in Patients With BRCA or MILTON Mutant Solid Tumors and await their response.  He has an appointment on 4/18/19 to determine eligibility. If he does not qualify will plan on resuming Carboplatin+ Alimta and Keytruda . Will dose reduce by 25% as he poorly tolerated cycle 1  If he decided against current regimen will try to obtain off label Olaparib based on his mutational profiling    · Plan on cycle 2 of  carboplatin (AUC 4) + Alimta (dose reduced 25% to 375mg/m2 for grade 1 thrombocytopenia) + Keytruda given every 3 weeks on 4/23/19 if he does not qualify for the above mentioned trial  · Continue Folic acid  · Zofran PRN  · Decadron on day 2  · Zometa q3 months    2. Kanwal 6 prostate cancer on active surveillance    3. Bone pain:  secondary to osseous metastasis s/p XRT to R ischium and L2. With moderate pain at upper back radiating to the right. This is likely from lesions at T5-T8. Does not have any symptoms of cord compression     S/P 30 Gy to Rt axilla and T spine  Completed 3/13/19    Pain has nearly resolved    4. HTN. On amlodipine. 5. Diarrhea- resolved    6.  Recurrent ascites  S/P Paracentesis on 3/22  4L of transudative fluid removed  Suspect he is developing portal HTN and has already re accumulated ascites  Will re order paracentecis and repeat cytology ( first one was negative)  Start Lasix and aldactone  Refer to hepatology    RTC 4/23/19    Nisha Rocha MD

## 2019-04-09 NOTE — PROGRESS NOTES
Yamila Whipple is a 70 y.o. male here today for follow up, lung cancer. 1. Have you been to the ER, urgent care clinic since your last visit? Hospitalized since your last visit? No     2. Have you seen or consulted any other health care providers outside of the [address] [place] [address] since your last visit? Include any pap smears or colon screening.  No

## 2019-04-09 NOTE — PROGRESS NOTES
Patient disc ready for  in office as requested, patient updated and aware. Will  tomorrow. Thanked for assistance.

## 2019-04-10 NOTE — DISCHARGE INSTRUCTIONS
[address] [facility]  Department of Interventional Radiology  [facility]    PARACENTESIS DISCHARGE INSTRUCTIONS    General Information:  During this procedure, the doctor will insert a needle into the abdomen to drain fluid. After the procedure, you will be able to take a deep breath much easier. The site of the puncture may ooze the first day. This will decrease and eventually stop. Paracentesis (draining fluid from the abdomen) sometimes makes patients hypotensive (low blood pressure). Your doctor may order for you to receive fluids or albumin (a volume booster) during the procedure through an IV site. Home Care Instructions:  Keep the puncture site clean and dry. No tub baths or swimming until puncture site heals. Showering is acceptable. Resume your normal diet, and resume your normal activity slowly and as you tolerate. If you are short of breath, rest. If shortness of breath does not ease, please call your ordering doctor. Fluid can re-accumulate in the chest and/or in the abdomen. If this should occur, your doctor needs to know as you may need to have the procedure done again. Call If:     You should call your Physician and/or the Radiology Nurse if you notice any signs of infection, like pus draining, or if it is swollen or reddened. Also call if you have a fever, or if you are bleeding from the puncture site more than a small amount on the dressing. Call if the puncture site keeps draining fluid. Some oozing is to be expected, but should slow and then stop. Call if you feel like you have pressure in your abdomen. SEEK IMMEDIATE CARE OR CALL 911 IF YOU SUDDENLY HAVE TROUBLE BREATHING, OR IF YOUR LIPS TURN BLUE, OR IF YOU NOTICE BLOOD IN YOUR SPUTUM. Follow-Up Instructions: Please see your ordering doctor as he/she has requested.      To Reach Us: Side effects of sedation medications and other medications used today have been reviewed. Notify us of nausea, itching, hives, dizziness, or anything else out of the ordinary. Should you experience any of these significant changes, please call [phone] between the hours of 7:30 am and 10 pm or [phone] after hours.  After hours, ask the  to page the [address] Technologist, and describe the problem to the technologist.            Date: 4/10/2019  Discharging Nurse: Matty Viera RN

## 2019-04-10 NOTE — PROGRESS NOTES
Dr Jonah Hernandez performed ultrasound guided paracentesis, draining 4800 ml clear tone fluid. No albumin required per protocol. Discharge instructions reviewed with patient and copy given prior to discharge home.

## 2019-04-15 NOTE — TELEPHONE ENCOUNTER
Returned call from STAFFQASIM with Dr. Alejandra Hernandez office regarding patient for appointment with Dr. Colten Domínguez for non-malignant ascites. Left vm for STAFFANSTORP to return call. [phone].

## 2019-04-17 NOTE — TELEPHONE ENCOUNTER
Patient called and stated that he would like a call back as soon as possible to speak with someone    [phone]

## 2019-04-17 NOTE — PROGRESS NOTES
Faxed needed information to [facility] attn:  Sarah Gleason. We are attempting to keep his appointment at this clinic. They have not yet received records. Confirmed with Ryan Siddiqui at 5:00     [facility] [phone]  Phone [phone]    [employer] message to patient. Rescheduled for 4/23/19 May 23 at 2:00  At patient request we will schedule paracentesis. See mychart encounter.

## 2019-04-19 NOTE — PROGRESS NOTES
Dr Brett Tripp performed ultrasound guided paracentesis and drained 5950 ml clear yellow fluid. Patient received 25 grams IV albumin per protocol. Discharge instructions reviewed with patient and copy given prior to discharge home.

## 2019-04-19 NOTE — DISCHARGE INSTRUCTIONS
[address] [facility]  Department of Interventional Radiology  [facility]    PARACENTESIS DISCHARGE INSTRUCTIONS    General Information:  During this procedure, the doctor will insert a needle into the abdomen to drain fluid. After the procedure, you will be able to take a deep breath much easier. The site of the puncture may ooze the first day. This will decrease and eventually stop. Paracentesis (draining fluid from the abdomen) sometimes makes patients hypotensive (low blood pressure). Your doctor may order for you to receive fluids or albumin (a volume booster) during the procedure through an IV site. Home Care Instructions:  Keep the puncture site clean and dry. No tub baths or swimming until puncture site heals. Showering is acceptable. Resume your normal diet, and resume your normal activity slowly and as you tolerate. If you are short of breath, rest. If shortness of breath does not ease, please call your ordering doctor. Fluid can re-accumulate in the chest and/or in the abdomen. If this should occur, your doctor needs to know as you may need to have the procedure done again. Call If:     You should call your Physician and/or the Radiology Nurse if you notice any signs of infection, like pus draining, or if it is swollen or reddened. Also call if you have a fever, or if you are bleeding from the puncture site more than a small amount on the dressing. Call if the puncture site keeps draining fluid. Some oozing is to be expected, but should slow and then stop. Call if you feel like you have pressure in your abdomen. SEEK IMMEDIATE CARE OR CALL 911 IF YOU SUDDENLY HAVE TROUBLE BREATHING, OR IF YOUR LIPS TURN BLUE, OR IF YOU NOTICE BLOOD IN YOUR SPUTUM. Follow-Up Instructions: Please see your ordering doctor as he/she has requested.      To Reach Us:    Side effects of sedation medications and other medications used today have been reviewed. Notify us of nausea, itching, hives, dizziness, or anything else out of the ordinary. Should you experience any of these significant changes, please call [phone] between the hours of 7:30 am and 10 pm or [phone] after hours.  After hours, ask the  to page the [address] Technologist, and describe the problem to the technologist.     Date: 4/19/2019  Discharging Nurse: Sobeida Antoine RN

## 2019-04-25 NOTE — PROGRESS NOTES
Hematology/Oncology progress note    REASON FOR VISIT: Stage IV EGFR mutated NSCLC    HISTORY OF PRESENT ILLNESS: Mr. Mima Randolph is a 70 y.o. male with prostate cancer who has stage IV NSCLC- adenocarcinoma (EGFR mutated , PD-L1 low) in May 2017. He progressed on Tarceva and then Osimertinib. Started Carboplatin+ Alimta+ Keytruda but felt poorly after cycle 1. He has had recurrent non malignant ascites requiring therapeutic taps and also saw [facility] for evaluation of potential clinical studies and comes for a follow up. Last paracentesis kaplan 4/19/19. R shoulder pain improved after palliative RT. His abdomen is distended though diuretics have helped. He has some upper back pain. No LE weakness. No new cough or HA       Oncologic history   Mr. Mima Randolph noticed a new cough and fevers back in March of 2017. He went to Urgent Care and received antibiotics and cough medication. He states this worked for a while, but he began to notice his cough return. This was intermittent. He had some tightness across his anterior chest which he related to the infection. Chest x-ray was abnormal and he was told to proceed to the Emergency Department. [facility] for right lower lobe mass that was initially noted in 2015. Bronch at that time was negative for malignancy. He was evaluated by Dr. Magnolia Wolf with Thoracic Surgery in 2015 for possible surgical resection. CT chest in November of 2016 with mass size unchanged but some right basilar pleural thickening. CT chest obtained 5/14/17 however showed a new large right pleural effusion with right middle lobe and right lower lobe collapse. Irregular spiculated right lower lobe mass 3.8cm and stable precarinal enlarged lymph nodes were noted. New right posterior second rib lytic lesion and new right hepatic hypodensity consistent with mets. He underwent a therapeutic thoracentesis on 5/15/17 with removal of 1500 cc of serosanguinous fluid. Pathology showed adenocarcinoma. PET CT showed pleural, bone, liver and flor metastasis. MRI brain 5/20/17: No metastasis. Needed repeat R sided thoracentesis on 5/25/17, had some post procedure hydropneumothorax. He was seen by Dr. Sharon Mendoza at [facility] for Pleurx catheter.      CT guided biopsy of R lung mass done 5/25 which showed adenocarcinoma. EGFR mutation detected Exon 18 and 21    Treatment  6/26/17: Tarceva. Monthly Xgeva. Palliative XRT to R hip   CT CAP 10/2/17: Response  Ct 1/23/18: CT with some growth of LLL mass but stable by RECIST. CT 3/15/18 and Bone scan stable though he had worsening left back pain. MRI results showed extensive disease at L2, S2 and additional lesions as previously known. Received palliative XRT that he completed 4/18/18 5/26/18: Started Osimertinib as he had a T790M mutation. Stopped 6/20/18 due to platelets of 34X  Resumed at 80 mg every other day on 7/6/18   Started 40mg daily on 7/12/18 2/19: Progressive disease with new liver metastases and progression of thoracic disease  2/22/19: liver biopsy pathology shows metastatic adenocarcinoma, consistent with lung primary- foundation sent  2/26/19: Cycle 1 of Carboplatin+ Alimta+ Keytruda. 3/6/19- 3/13/19: 30 Gy to Rt axilla and T spine      Past Medical History:   Diagnosis Date    Cancer ([address] 2017    lung ca    Hypertension        Past Surgical History:   Procedure Laterality Date    HX ORTHOPAEDIC      reconstruction of left little finger    IR BX LIVER PERCUTANEOUS  2/22/2019    IR INSERT TUNL CVC W PORT OVER 5 YEARS  2/22/2019       No Known Allergies    Current Outpatient Medications   Medication Sig Dispense Refill    furosemide (LASIX) 20 mg tablet Take 1 Tab by mouth daily. 30 Tab 1    spironolactone (ALDACTONE) 50 mg tablet Take 1 Tab by mouth daily.  30 Tab 1    levothyroxine (SYNTHROID) 75 mcg tablet TAKE 1/2 TABLET BY MOUTH DAILY BEFORE BREAKFAST 30 Tab 5    amLODIPine (NORVASC) 10 mg tablet TAKE 1 TABLET BY MOUTH EVERY DAY 30 Tab 0    zoledronic acid (ZOMETA) 4 mg/100 mL pgbk infusion 4 mg by IntraVENous route every three (3) months.  cholecalciferol (VITAMIN D3) 1,000 unit tablet Take 1,000 Units by mouth daily.  ondansetron hcl (ZOFRAN) 8 mg tablet Take 1 Tab by mouth every eight (8) hours as needed for Nausea. 30 Tab 6    dexamethasone (DECADRON) 4 mg tablet Take 4 mg by mouth See Admin Instructions. Take one tab by mouth on day 2 of chemotherapy 18 Tab 0    chlorhexidine (PERIDEX) 0.12 % solution RINSE BID  0    amlodipine besylate (AMLODIPINE PO) Take  by mouth. Social History     Socioeconomic History    Marital status:      Spouse name: Not on file    Number of children: Not on file    Years of education: Not on file    Highest education level: Not on file   Tobacco Use    Smoking status: Never Smoker    Smokeless tobacco: Never Used   Substance and Sexual Activity    Alcohol use: Yes     Alcohol/week: 6.0 oz     Types: 10 Glasses of wine per week     Comment: ocassionally    Drug use: No    Sexual activity: Yes     Partners: Female     Comment:  has 2 children       Family History   Problem Relation Age of Onset    Cancer Mother         leukemia    Stroke Father     Cancer Brother         bladder       ROS  As reviewed under HP  ECOG PS is 0    Emotional well being addressed and patient is coping well    Physical Examination:   Visit Vitals  /72 (BP 1 Location: Left arm, BP Patient Position: Sitting)   Pulse 66   Temp 97.3 °F (36.3 °C) (Oral)   Resp 16   Ht 6' 1\" (1.854 m)   Wt 158 lb 12.8 oz (72 kg)   SpO2 98%   BMI 20.95 kg/m²     General appearance - alert, frail, and in no distress  Mental status - oriented to person, place, and time  Mouth - mucous membranes moist, pharynx normal without lesions.   Neck - supple, no significant adenopathy  Lymphatics - no palpable lymphadenopathy, no hepatosplenomegaly  Chest -clear to auscultation  Heart - normal rate, regular rhythm, normal S1, S2, no murmurs, rubs, clicks or gallops; edema has resolved  Abdomen - soft, nontender,distended  Neurological - normal speech  Skin: No rashes  MSK- no LE edema    LABS  Lab Results   Component Value Date/Time    WBC 3.3 (L) 03/19/2019 12:32 PM    HGB 8.7 (L) 03/19/2019 12:32 PM    HCT 27.7 (L) 03/19/2019 12:32 PM    PLATELET 169 (L) 91/77/5571 12:32 PM    MCV 93.6 03/19/2019 12:32 PM    ABS. NEUTROPHILS 2.6 03/19/2019 12:32 PM     Lab Results   Component Value Date/Time    Sodium 139 03/19/2019 12:32 PM    Potassium 4.3 03/19/2019 12:32 PM    Chloride 104 03/19/2019 12:32 PM    CO2 29 03/19/2019 12:32 PM    Glucose 81 03/19/2019 12:32 PM    BUN 19 03/19/2019 12:32 PM    Creatinine 0.71 03/19/2019 12:32 PM    GFR est AA >60 03/19/2019 12:32 PM    GFR est non-AA >60 03/19/2019 12:32 PM    Calcium 9.2 03/19/2019 12:32 PM     Lab Results   Component Value Date/Time    AST (SGOT) 69 (H) 03/19/2019 12:32 PM    Alk. phosphatase 473 (H) 03/19/2019 12:32 PM    Protein, total 6.3 (L) 03/19/2019 12:32 PM    Albumin 2.3 (L) 03/19/2019 12:32 PM    Globulin 4.0 03/19/2019 12:32 PM    A-G Ratio 0.6 (L) 03/19/2019 12:32 PM       Guardant 360 results under media- T790M present     2/9/19    Thoracic spine MRI  IMPRESSION:  1. At T5 there has been interval progression of metastatic disease with  extension on the left into the epidural space and left T5-6 foramen. 2. Interval progression at T8 of extraosseous tumor on the left with extension  into the left T8-9 foramen and adjacent posterior elements extending out along  the proximal intercostal space. 3. Stable findings at T1.  4. No abnormal intradural enhancement with normal signal in the spinal cord. Cervical spine MRI  IMPRESSION:  1. Accentuated cervical lordosis. 2. Chronic degenerative changes at the odontoid and C1.  3. Sclerosis right lateral mass C1 may represent metastasis.   4. Congenital segmentation anomaly/fusion C4 and C5.  5. Chronic degenerative changes C3-4, C5-6 and to lesser extent C6-7 with  minimal to mild stenosis and no cord compression. Variable foramina stenosis in  association with facet arthrosis. 6. Abnormal marrow signal T1 consistent with chronic metastasis to this  Vertebra. Ct cap 2/9/19    IMPRESSION:   1. Stable right lower lobe lung mass and adjacent subcentimeter satellite  nodule. Right pleural nodularity and a chronic small right pleural effusion are  unchanged.     2. New groundglass attenuation in the lingula may represent nonspecific  infection or inflammation. Attention on follow-up CT is suggested.     3. Several low-density liver lesions are more conspicuous on the current exam  suspicious for metastatic disease. The largest in the right liver measures 1.3 x  1.8 cm.      4. Stable subcentimeter left adrenal nodule.      5. Stable scattered bony metastases. Extra osseous progression at T5 and T8 are  better seen on the concurrent MR thoracic spine.     6. New splenomegaly measuring 13.9 cm in length.     7. Increased small amount of intraperitoneal ascites. ASSESSMENT AND PLAN  Mr. Adrianna Li is a 70 y.o. male with:    1. Stage IV NSCLC   With R lung mass, mediastinal adenopathy, malignant R pleural effusion, multiple asymptomatic bone metastasis and possibly liver metastasis. EGFR mutated, PD-L1 5%. Found to have T790M mutation but most recently progressed on Osimertinib with progressive painful bone metastasis. We discussed options down the road that include clinical trials such as the biomarker driven LUNGMAP study that we have available with us, versus chemotherapy plus immunotherapy versus other clinical trials. He was not found to be eligible for [facility] due to his prostate cancer. Underwent a tissue biopsy of his liver and pathology shows metastatic adenocarcinoma and foundation one is reviewed today. This shows a BRCA 2 mutation.  Not eligible for NCI match due to his other malignancy    Unfortunately he is excluded from multiple trials due to his prostate cancer. Excluded from [place] BRCA/MILTON: Avelumab Plus Talazoparib in Patients With BRCA or MILTON Mutant Solid Tumors due to exposure to Jocelin Boy. Hence we decided to move forward with cycle 2 of Carbo+ Alimta and Keytruda after repeating scans as he has had a long treatment break. If he does not tolerate this will consider Taxotere/ off label Olaparib    · CT CAP  · Plan on cycle 2 of  carboplatin (AUC 3) + Alimta (dose reduced 250 mg/m2 for grade 1 thrombocytopenia) + Keytruda given every 3 weeks   · Continue Folic acid  · Zofran PRN  · Decadron on day 2  · Zometa q3 months    2. [place] 6 prostate cancer on active surveillance  Records reviewed   Will add PSA to labs next week    3. Bone pain:  secondary to osseous metastasis s/p XRT to R ischium and L2. With moderate pain at upper back radiating to the right. This is likely from lesions at T5-T8. S/P 30 Gy to Rt axilla and T spine  Completed 3/13/19    Pain has nearly resolved    4. HTN. On amlodipine. 5. Diarrhea- resolved    6. Recurrent ascites  S/P Paracentesis on 3/22, 4/10 and 4/19  Large volume  Non malignant  Unclear cause?  He is seeing Dr. Jluis Major with hepatology next week  4L of transudative fluid removed  Suspect he is developing portal HTN   continue Lasix and aldactone      RTC 5/1/19    Jessika Platt MD

## 2019-04-25 NOTE — Clinical Note
Plan on cycle 2 of  carboplatin (AUC 3) + Alimta (dose reduced 50%) + Keytruda given every 3 weeks on 5/1/19 when he comes for Zometa

## 2019-04-25 NOTE — PROGRESS NOTES
Tobin Engel is a 70 y.o. male    Chief Complaint   Patient presents with    Follow-up     1. Have you been to the ER, urgent care clinic since your last visit? Hospitalized since your last visit? No    2. Have you seen or consulted any other health care providers outside of the [address] [place] [address] since your last visit? Include any pap smears or colon screening.  No     Visit Vitals  /72 (BP 1 Location: Left arm, BP Patient Position: Sitting)   Pulse 66   Temp 97.3 °F (36.3 °C) (Oral)   Resp 16   Ht 6' 1\" (1.854 m)   Wt 158 lb 12.8 oz (72 kg)   SpO2 98%   BMI 20.95 kg/m²     Adeel Richard LPN

## 2019-04-26 NOTE — PROGRESS NOTES
Chief Complaint Patient presents with  New Patient Visit Vitals /66 (BP 1 Location: Left arm, BP Patient Position: Sitting) Pulse 63 Temp 96.9 °F (36.1 °C) (Tympanic) Ht 6' 1\" (1.854 m) Wt 151 lb 6.4 oz (68.7 kg) SpO2 98% BMI 19.97 kg/m² 3 most recent PHQ Screens 4/26/2019 Little interest or pleasure in doing things Not at all Feeling down, depressed, irritable, or hopeless Not at all Total Score PHQ 2 0 Fall Risk Assessment, last 12 mths 4/26/2019 Able to walk? Yes Fall in past 12 months? No  
 
Learning Assessment 4/26/2019 PRIMARY LEARNER Patient BARRIERS PRIMARY LEARNER NONE  
CO-LEARNER CAREGIVER No  
PRIMARY LANGUAGE ENGLISH  
LEARNER PREFERENCE PRIMARY LISTENING  
ANSWERED BY patient RELATIONSHIP SELF Abuse Screening Questionnaire 4/26/2019 Do you ever feel afraid of your partner? Anastacio Poles Are you in a relationship with someone who physically or mentally threatens you? Anastacio Poles Is it safe for you to go home?  Sánchez Kessler

## 2019-04-26 NOTE — PROGRESS NOTES
[address] [facility] 137 [facility] Karina Navin Zepeda MD, Marivel Coburn, NATALIIA Kingsley, ALVERTO Bosch, WANDA Villar, NANCY-SOHAN Keenan, NP Carmen Ram, ALVERTO Bailey [facility] [address] 
  at [address] [place], [address] [address] 
  [phone] FAX: [address] 
  [facility] 
  [address], [address] [place], [address] 
  [phone] FAX: [phone] Patient Care Team: 
Jcarlos Flowers DO as PCP - General (Internal Medicine) Ravi Patrick MD as Surgeon (Thoracic (non-cardiac) Surgery) Robinson Howell MD (Pulmonary Disease) Eduardo Suarez MD (Urology) Joel Frost MD (Hematology and Oncology) Problem List  Date Reviewed: 4/9/2019 Codes Class Noted Ascites ICD-10-CM: R18.8 ICD-9-CM: 789.59  3/25/2019 Edema ICD-10-CM: R60.9 ICD-9-CM: 782.3  7/12/2018 Elevated liver enzymes ICD-10-CM: R74.8 ICD-9-CM: 790.5  11/8/2017 Adenocarcinoma of lung, stage 4, right ([facility] ICD-10-CM: C34.91 
ICD-9-CM: 162.9  11/8/2017 Rash and nonspecific skin eruption ICD-10-CM: R21 
ICD-9-CM: 782.1  7/18/2017 Cough ICD-10-CM: R05 ICD-9-CM: 786.2  6/5/2017 Adenocarcinoma of prostate [facility]) ICD-10-CM: S94 ICD-9-CM: 185  5/19/2017 Non-small cell cancer of right lung [facility]) ICD-10-CM: C34.91 
ICD-9-CM: 162.9  5/18/2017 Bone metastases ([facility] ICD-10-CM: C79.51 
ICD-9-CM: 198.5  5/18/2017 Malignant neoplasm of lung (HCC) ICD-10-CM: C34.90 ICD-9-CM: 162.9  5/18/2017 Pleural effusion ICD-10-CM: J90 ICD-9-CM: 511.9  5/14/2017 Prostate cancer [facility]) ICD-10-CM: I29 ICD-9-CM: 185  3/11/2016 Acquired hypothyroidism ICD-10-CM: E03.9 ICD-9-CM: 244.9  1/19/2016 Vitamin D deficiency ICD-10-CM: E55.9 ICD-9-CM: 268.9  1/19/2016 Elevated PSA ICD-10-CM: R97.20 ICD-9-CM: 790.93  1/19/2016 Lung nodules ICD-10-CM: R91.8 ICD-9-CM: 793.19  12/22/2015 Essential hypertension ICD-10-CM: I10 
ICD-9-CM: 401.9  12/22/2015 Thoracic scoliosis ICD-10-CM: M41.9 ICD-9-CM: 737.30  12/22/2015 Lesion of right lung ICD-10-CM: R91.1 ICD-9-CM: 518.89  3/17/2015 The clinicians listed above have asked me to see Danna Calvo in consultation regarding management of ascites. All medical records sent by the referring physicians were reviewed including imaging studies and pathology. The patient is a 70 y.o.  male without any history of liver disease. He was found to have adenocarcinoma of the lung in 5/2017. He has developed metastasis to the liver and bones. He has received several courses of chemotherapy. He developed ascites for the first time in 3/2019. He has required paracentesis every week. An assessment of liver fibrosis with biopsy or elastography has not been performed. ECHO was performed in 7/2018. This was normal. 
 
Serologic evaluation for markers of chronic liver disease has either not been performed or the results are not available to me. The patient notes fatigue, swelling of the abdomen, The patient has not experienced fevers, chills, The patient has limitations in functional activities which can be attributed to ascites and to other medical problems that are not related to the liver disease. ASSESSMENT AND PLAN: 
Ascites Ascites is either secondary to chemotherapy induced portal hypertension or peritoneal carcinomatosis. A SAAG from 3/2019 was 2.3 - 0.9 = 1.4 which is consistent with portal HTn as the etiology for cirrhosis. This suggests that the cause of the ascites was chemotherapy induced vascular injury to the liver. Paracentesis will be repeated next week. Will obtain albumin, total protein, LDH and cytology. The need to measure hepatic venous pressures and perform transjugular liver biopsy  
was discussed. The risks of performing the liver biopsy including pain, heart arythmias, puncture the liver, kidney and bleeding were discussed. The patient has decided to have the procedure. This will be scheduled. Will increase the dose of diuretics to step 1. The renal function is normal and ascites should be able to be controled with diuretics. The patient was counseled regarding the need to maintain sodium restriction and the types of foods containing high amounts of sodium to be avoided. Lower extremity edema Edema has resolved with current dose of diuretics. Thrombocytopenia This is secondary to chemotherapy and bone marrow suppression. Neutropenia This is secondary to chemotherapy and bone marrow suppression Anemia This is secondary to chemotherapy and bone marrow suppression. ALLERGIES No Known Allergies MEDICATIONS Current Outpatient Medications Medication Sig  furosemide (LASIX) 20 mg tablet Take 1 Tab by mouth daily.  spironolactone (ALDACTONE) 50 mg tablet Take 1 Tab by mouth daily.  levothyroxine (SYNTHROID) 75 mcg tablet TAKE 1/2 TABLET BY MOUTH DAILY BEFORE BREAKFAST  dexamethasone (DECADRON) 4 mg tablet Take 4 mg by mouth See Admin Instructions. Take one tab by mouth on day 2 of chemotherapy  amLODIPine (NORVASC) 10 mg tablet TAKE 1 TABLET BY MOUTH EVERY DAY  cholecalciferol (VITAMIN D3) 1,000 unit tablet Take 1,000 Units by mouth daily.  ondansetron hcl (ZOFRAN) 8 mg tablet Take 1 Tab by mouth every eight (8) hours as needed for Nausea.  chlorhexidine (PERIDEX) 0.12 % solution RINSE BID  amlodipine besylate (AMLODIPINE PO) Take  by mouth.  zoledronic acid (ZOMETA) 4 mg/100 mL pgbk infusion 4 mg by IntraVENous route every three (3) months. No current facility-administered medications for this visit. SYSTEM REVIEW NOT RELATED TO LIVER DISEASE OR REVIEWED ABOVE: 
Constitution systems: Negative for fever, chills, weight gain, weight loss. Eyes: Negative for visual changes. ENT: Negative for sore throat, painful swallowing. Respiratory: Negative for cough, hemoptysis, SOB. Cardiology: Negative for chest pain, palpitations. GI:  Negative for constipation or diarrhea. : Negative for urinary frequency, dysuria, hematuria, nocturia. Skin: Negative for rash. Hematology: Negative for easy bruising, blood clots. Musculo-skelatal: Negative for back pain, muscle pain, weakness. Neurologic: Negative for headaches, dizziness, vertigo, memory problems not related to HE. Psychology: Negative for anxiety, depression. FAMILY HISTORY: 
The father  of leukemia. The mother  at age 80 years. There is no family history of liver disease. SOCIAL HISTORY: 
The patient is . The patient has 2 children, and 4 grandchildren. The patient has never used tobacco products. The patient consumes 1-2 alcoholic beverages per day The patient used to work in business management. The patient retired in . PHYSICAL EXAMINATION: 
Visit Vitals /66 (BP 1 Location: Left arm, BP Patient Position: Sitting) Pulse 63 Temp 96.9 °F (36.1 °C) (Tympanic) Ht 6' 1\" (1.854 m) Wt 151 lb 6.4 oz (68.7 kg) SpO2 98% BMI 19.97 kg/m² General: Ill appearing. Eyes: Sclera anicteric. ENT: No oral lesions. Thyroid normal. 
Nodes: No adenopathy. Skin: No spider angiomata. No jaundice. No palmar erythema. Respiratory: Lungs clear to auscultation. Cardiovascular: Regular heart rate. No murmurs. No JVD. Abdomen: Distended with obvious ascites. Extremities: No edema. Muscle wasting. Neurologic: Alert and oriented. Cranial nerves grossly intact. No asterixis.  
 
 
LABORATORY STUDIES: 
 Liver [place] [address] Units 2019 WBC 3.4 - 10.8 x10E3/uL 5.2 ANC 1.4 - 7.0 x10E3/uL 4.3 HGB 13.0 - 17.7 g/dL 10.1 (L)  - 379 x10E3/uL 139 (L) INR 0.8 - 1.2 1.1 AST 0 - 40 IU/L 47 (H) ALT 0 - 44 IU/L 28 Alk Phos 39 - 117 IU/L 333 (H) Bili, Total 0.0 - 1.2 mg/dL 1.3 (H) Bili, Direct 0.00 - 0.40 mg/dL 0.53 (H) Albumin 3.6 - 4.8 g/dL 2.9 (L) BUN 8 - 27 mg/dL 31 (H) Creat 0.76 - 1.27 mg/dL 0.83 Na 134 - 144 mmol/L 142  
K 3.5 - 5.2 mmol/L 4.7 Cl 96 - 106 mmol/L 105 CO2 20 - 29 mmol/L 26 Glucose 65 - 99 mg/dL 94 SEROLOGIES: 
Serologies Latest Ref Rng & Units 2019 Hep B Surface Ag Negative Negative Hep C Ab 0.0 - 0.9 s/co ratio <0.1 Ferritin 30 - 400 ng/mL 329 Iron % Saturation 15 - 55 % 11 (L)  
WHIT, IFA  Negative C-ANCA Neg:<1:20 titer <1:20  
P-ANCA Neg:<1:20 titer <1:20  
ANCA Neg:<1:20 titer <1:20  
ASMCA 0 - 19 Units 17  
M2 Ab 0.0 - 20.0 Units <20.0 Alpha-1 antitrypsin level 90 - 200 mg/dL 279 (H) LIVER HISTOLOGY: 
2019. Biopsy of liver mass. Adenocarcinoma. ENDOSCOPIC PROCEDURES: 
Not available or performed RADIOLOGY: 
2019. Several enlarging liver nodules measuring 1.3 x 1.8 cm in the right lobe. No mention of surface nodularity or varices consistent with portal HTN or cirrhosis. Asites. OTHER TESTIN2018. ECHO heart. LVEF 60%,  RV normal.  No TR.   
 
FOLLOW-UP: 
All of the issues listed above in the Assessment and Plan were discussed with the patient. All questions were answered. The patient expressed a clear understanding of the above. [address] [address] in 4 weeks to review all data and determine the treatment plan. MD Rudy Mead [facility] [address] [address], [address] [facility] 22. 
[phone] [address] [place] [address]

## 2019-04-26 NOTE — Clinical Note
4/30/19 Patient: Adalberto Allen YOB: 1950 Date of Visit: 4/26/2019 Susana Begum DO 
[address] [facility] [address] [facility] 7 [address] VIA In Basket Nida Michele MD 
[address] [facility] [address] [facility] 7 [address] VIA In Basket Dear DO Ndia Aguilera MD, Thank you for referring Mr. Young Small to [address] [place] [address],[address] for evaluation. My notes for this consultation are attached. If you have questions, please do not hesitate to call me. I look forward to following your patient along with you. Sincerely, Veda Salas MD

## 2019-04-27 PROBLEM — R74.8 ELEVATED LIVER ENZYMES: Status: ACTIVE | Noted: 2017-11-08

## 2019-04-27 PROBLEM — J90 PLEURAL EFFUSION, RIGHT: Status: RESOLVED | Noted: 2017-05-14 | Resolved: 2019-01-01

## 2019-04-27 PROBLEM — C34.90 ADENOCARCINOMA OF LUNG, STAGE 4 (HCC): Status: RESOLVED | Noted: 2017-05-19 | Resolved: 2019-01-01

## 2019-04-27 PROBLEM — R06.02 SOB (SHORTNESS OF BREATH): Status: RESOLVED | Noted: 2018-07-12 | Resolved: 2019-01-01

## 2019-04-30 NOTE — DISCHARGE INSTRUCTIONS
PARACENTESIS DISCHARGE INSTRUCTIONS    General Information:     During these procedures, the doctor will insert a needle into the body to drain fluid from either the abdomen or the chest. After the procedure, you will be able to take a deep breath much easier. The site of the puncture may ooze the first day. This will decrease and eventually stop. With the Thoracentesis (draining fluid from the chest), there is a risk of air leaking into the chest around the lung, and risk of bleeding into the chest, with the resulting pressure on the lung possibly making it collapse. A chest x-ray is done after the procedure to detect possible complications. Paracentesis (draining fluid from the abdomen) sometimes makes patients hypotensive (low blood pressure). Your doctor may order for you to receive fluids or albumin (a volume booster) during the procedure through an IV site. Home Care Instructions:     Keep the puncture site clean and dry. No tub baths or swimming until puncture site heals. Showering is acceptable. Resume your normal diet, and resume your normal activity slowly and as you tolerate. If you are short of breath, rest. If shortness of breath does not ease, please call your ordering doctor. Fluid can re-accumulate in the chest and/or in the abdomen. If this should occur, your doctor needs to know as you may need to have the procedure done again. Call If:     You should call your Physician and/or the Radiology Nurse if you notice any signs of infection, like pus draining, or if it is swollen or reddened. Also call if you have a fever, or if you are bleeding from the puncture site more than a small amount on the dressing. Call if the puncture site keeps draining fluid. Some oozing is to be expected, but should slow and then stop. Call if you feel like your have pressure in your abdomen.  SEEK IMMEDIATE CARE OR CALL 911 IF YOU SUDDENLY HAVE TROUBLE BREATHING, OR IF YOUR LIPS TURN BLUE, OR IF YOU NOTICE BLOOD IN YOUR SPUTUM. Follow-Up Instructions: Please see your ordering doctor as he/she has requested.      To Reach Us:  [phone] to 10 pm then [phone] after 10 pm       Patient Signature:  Date: 4/30/2019  Discharging Nurse: Ruthann Colby RN

## 2019-04-30 NOTE — PROGRESS NOTES
Erika Cardona 55 port accessed per policy using sterile technique per Dr. Peña [place] order. Patient tolerated well. 1432 patient discharged to CT. Report given to [facility]. [facility] verbalizes to call spouse to take home. [facility] to de-access port per policy. I have reviewed discharge instructions with the patient. The patient verbalized understanding.

## 2019-05-01 NOTE — PROGRESS NOTES
Jeri Harden is a 70 y.o. male    Exam RM: 5     Chief Complaint   Patient presents with    IV Med     Pt is being seen for infusion. 1. Have you been to the ER, urgent care clinic since your last visit? Hospitalized since your last visit? No     2. Have you seen or consulted any other health care providers outside of the [address] [place] [address] since your last visit? Include any pap smears or colon screening.   No       Health Maintenance Due   Topic Date Due    DTaP/Tdap/Td series (1 - Tdap) 12/02/1970    Shingrix Vaccine Age 50> (1 of 2) 12/02/1999    GLAUCOMA SCREENING Q2Y  12/02/2014    FOBT Q 1 YEAR AGE 50-75  10/31/2017         Visit Vitals  /65 (BP 1 Location: Left arm, BP Patient Position: Sitting)   Pulse 66   Temp 98.2 °F (36.8 °C) (Oral)   Resp 16   Ht 6' 1\" (1.854 m)   Wt 144 lb 3.2 oz (65.4 kg)   SpO2 98%   BMI 19.02 kg/m²

## 2019-05-01 NOTE — PROGRESS NOTES
Outpatient [facility]fusion [facility] - Chemotherapy Progress Note    5424 Pt admit to [facility] for Cycle 2 Carbo/Keytruda/Alimta ambulatory in stable condition. Assessment completed. No new concerns voiced. Port accessed with positive blood return. Labs drawn and sent for processing. Port flushed and capped for MD appointment. 1118 Patient upstairs to MD appointment. 1314 Patient back in [facility]. Port flushed with positive blood return and NS started at [facility]. Awaiting call back from MD office regarding treatment plan today. 26 OK per Dr. Edmond Schilling to give all chemotherapy today. Medications ordered. Chemotherapy Flowsheet 5/1/2019   Cycle C2   Date 5/1/2019   Drug / Regimen Carbo/Keytruda/Alimta   Pre Meds given   Notes given         Visit Vitals  BP 96/61 (BP 1 Location: Left arm, BP Patient Position: Sitting)   Pulse (!) 53   Temp 97.2 °F (36.2 °C)   Resp 18   Ht 6' 1\" (1.854 m)   Wt 65.4 kg (144 lb 3.2 oz)   BMI 19.02 kg/m²       Medications:  NS KVO  Zometa  Decadron  Emend  Zofran  Vitamin B12 IM to left deltoid  Keytruda  Alimta  Carboplatin  NS flushes  Heparin    1640 Pt tolerated treatment well. Port maintained positive blood return throughout treatment. Flushed, heparinized and de-accessed per protocol. D/c home ambulatory in no distress. Pt aware of next appointment scheduled for 5/22/19 for Cycle 3 Carbo/Keytruda/Alimta.     Recent Results (from the past 12 hour(s))   CBC WITH AUTOMATED DIFF    Collection Time: 05/01/19 11:17 AM   Result Value Ref Range    WBC 4.5 4.1 - 11.1 K/uL    RBC 3.36 (L) 4.10 - 5.70 M/uL    HGB 9.7 (L) 12.1 - 17.0 g/dL    HCT 30.2 (L) 36.6 - 50.3 %    MCV 89.9 80.0 - 99.0 FL    MCH 28.9 26.0 - 34.0 PG    MCHC 32.1 30.0 - 36.5 g/dL    RDW 15.9 (H) 11.5 - 14.5 %    PLATELET 824 (L) 409 - 400 K/uL    MPV 11.4 8.9 - 12.9 FL    NRBC 0.0 0  WBC    ABSOLUTE NRBC 0.00 0.00 - 0.01 K/uL    NEUTROPHILS 83 (H) 32 - 75 %    LYMPHOCYTES 9 (L) 12 - 49 %    MONOCYTES 7 5 - 13 %    EOSINOPHILS 1 0 - 7 %    BASOPHILS 0 0 - 1 %    IMMATURE GRANULOCYTES 0 0.0 - 0.5 %    ABS. NEUTROPHILS 3.8 1.8 - 8.0 K/UL    ABS. LYMPHOCYTES 0.4 (L) 0.8 - 3.5 K/UL    ABS. MONOCYTES 0.3 0.0 - 1.0 K/UL    ABS. EOSINOPHILS 0.0 0.0 - 0.4 K/UL    ABS. BASOPHILS 0.0 0.0 - 0.1 K/UL    ABS. IMM. GRANS. 0.0 0.00 - 0.04 K/UL    DF SMEAR SCANNED      RBC COMMENTS ANISOCYTOSIS  1+        RBC COMMENTS POIKILOCYTOSIS  1+        WBC COMMENTS RBC FRAGMENTS     METABOLIC PANEL, COMPREHENSIVE    Collection Time: 05/01/19 11:17 AM   Result Value Ref Range    Sodium 138 136 - 145 mmol/L    Potassium 4.6 3.5 - 5.1 mmol/L    Chloride 105 97 - 108 mmol/L    CO2 28 21 - 32 mmol/L    Anion gap 5 5 - 15 mmol/L    Glucose 142 (H) 65 - 100 mg/dL    BUN 30 (H) 6 - 20 MG/DL    Creatinine 0.82 0.70 - 1.30 MG/DL    BUN/Creatinine ratio 37 (H) 12 - 20      GFR est AA >60 >60 ml/min/1.73m2    GFR est non-AA >60 >60 ml/min/1.73m2    Calcium 9.0 8.5 - 10.1 MG/DL    Bilirubin, total 1.0 0.2 - 1.0 MG/DL    ALT (SGPT) 29 12 - 78 U/L    AST (SGOT) 47 (H) 15 - 37 U/L    Alk.  phosphatase 336 (H) 45 - 117 U/L    Protein, total 5.7 (L) 6.4 - 8.2 g/dL    Albumin 2.1 (L) 3.5 - 5.0 g/dL    Globulin 3.6 2.0 - 4.0 g/dL    A-G Ratio 0.6 (L) 1.1 - 2.2     PSA, DIAGNOSTIC (PROSTATE SPECIFIC AG)    Collection Time: 05/01/19 11:17 AM   Result Value Ref Range    Prostate Specific Ag 3.0 0.01 - 4.0 ng/mL

## 2019-05-01 NOTE — PROGRESS NOTES
Hematology/Oncology progress note    REASON FOR VISIT: Stage IV EGFR mutated NSCLC    HISTORY OF PRESENT ILLNESS: Mr. Laith Krueger is a 70 y.o. male with prostate cancer who has stage IV NSCLC- adenocarcinoma (EGFR mutated , PD-L1 low) in May 2017. He progressed on Tarceva and then Osimertinib. Started Carboplatin+ Alimta+ Keytruda but felt poorly after cycle 1. He has had recurrent non malignant ascites requiring therapeutic taps and also saw [facility] for evaluation of potential clinical studies. Comes in today for follow-up and cycle 2 of Yecenia + alimta + [place] Last paracentesis was yesterday, 4/30/19. He feels more comfortable but states he notices fluid already re-accumulating. He saw Dr. Kisha Hollis last week who is running additional lab work on him and he has follow-up with him on 5/14/19. Denies LE weakness. R shoulder pain is controlled. Reports SOB on exertion. Denies CP, cough. Denies diarrhea/constipation or nausea/vomiting. No bleeding. Has trip scheduled in June to [facility]. Oncologic history   Mr. Laith Krueger noticed a new cough and fevers back in March of 2017. He went to Urgent Care and received antibiotics and cough medication. He states this worked for a while, but he began to notice his cough return. This was intermittent. He had some tightness across his anterior chest which he related to the infection. Chest x-ray was abnormal and he was told to proceed to the Emergency Department. [facility] had been under surveillance for right lower lobe mass that was initially noted in 2015. Bronch at that time was negative for malignancy. He was evaluated by Dr. Cheri Bunn with Thoracic Surgery in 2015 for possible surgical resection. CT chest in November of 2016 with mass size unchanged but some right basilar pleural thickening. CT chest obtained 5/14/17 however showed a new large right pleural effusion with right middle lobe and right lower lobe collapse.  Irregular spiculated right lower lobe mass 3.8cm and stable precarinal enlarged lymph nodes were noted. New right posterior second rib lytic lesion and new right hepatic hypodensity consistent with mets. He underwent a therapeutic thoracentesis on 5/15/17 with removal of 1500 cc of serosanguinous fluid. Pathology showed adenocarcinoma. PET CT showed pleural, bone, liver and flor metastasis. MRI brain 5/20/17: No metastasis. Needed repeat R sided thoracentesis on 5/25/17, had some post procedure hydropneumothorax. He was [facility] by [facility]. Allen Naidu [facility] for Pleurx catheter.      CT guided biopsy of R lung mass done 5/25 which showed adenocarcinoma. EGFR mutation detected Exon 18 and 21    Treatment  6/26/17: Tarceva. Monthly Xgeva. Palliative XRT to R hip   CT CAP 10/2/17: Response  Ct 1/23/18: CT with some growth of LLL mass but stable by RECIST. CT 3/15/18 and Bone scan stable though he had worsening left back pain. MRI results showed extensive disease at L2, S2 and additional lesions as previously known. Received palliative XRT that he completed 4/18/18 5/26/18: Started Osimertinib as he had a T790M mutation. Stopped 6/20/18 due to platelets of 11B  Resumed at 80 mg every other day on 7/6/18   Started 40mg daily on 7/12/18 2/19: Progressive disease with new liver metastases and progression of thoracic disease  2/22/19: liver biopsy pathology shows metastatic adenocarcinoma, consistent with lung primary- foundation sent  2/26/19: Cycle 1 of Carboplatin+ Alimta+ Keytruda.   3/6/19- 3/13/19: 30 Gy to Rt axilla and T spine    4/30/19: CT chest stable    Past Medical History:   Diagnosis Date    Cancer [facility]) 2017    lung ca    Hypertension        Past Surgical History:   Procedure Laterality Date    HX ORTHOPAEDIC      reconstruction of left little finger    IR BX LIVER PERCUTANEOUS  2/22/2019    IR INSERT TUNL CVC W PORT OVER 5 YEARS  2/22/2019       No Known Allergies    Current Outpatient Medications   Medication Sig Dispense Refill    furosemide (LASIX) 20 mg tablet Take 1 Tab by mouth daily. 30 Tab 1    spironolactone (ALDACTONE) 50 mg tablet Take 1 Tab by mouth daily. 30 Tab 1    levothyroxine (SYNTHROID) 75 mcg tablet TAKE 1/2 TABLET BY MOUTH DAILY BEFORE BREAKFAST 30 Tab 5    zoledronic acid (ZOMETA) 4 mg/100 mL pgbk infusion 4 mg by IntraVENous route every three (3) months.  cholecalciferol (VITAMIN D3) 1,000 unit tablet Take 1,000 Units by mouth daily.  ondansetron hcl (ZOFRAN) 8 mg tablet Take 1 Tab by mouth every eight (8) hours as needed for Nausea. 30 Tab 6    dexamethasone (DECADRON) 4 mg tablet Take 4 mg by mouth See Admin Instructions. Take one tab by mouth on day 2 of chemotherapy 18 Tab 0    chlorhexidine (PERIDEX) 0.12 % solution RINSE BID  0    amlodipine besylate (AMLODIPINE PO) Take  by mouth.        Facility-Administered Medications Ordered in Other Visits   Medication Dose Route Frequency Provider Last Rate Last Dose    saline peripheral flush soln 10 mL  10 mL InterCATHeter PRN Palmira Garrido NP   10 mL at 05/01/19 1117    sodium chloride 0.9% injection 10 mL  10 mL IntraVENous PRN Palmira Garrido NP   10 mL at 05/01/19 1117    heparin (porcine) pf 300-500 Units  300-500 Units InterCATHeter PRN Palmira Garrido NP        albumin human 25% (BUMINATE) solution 25 g  25 g IntraVENous Multiple Yusef Nick MD   25 g at 04/30/19 1350       Social History     Socioeconomic History    Marital status:      Spouse name: Not on file    Number of children: Not on file    Years of education: Not on file    Highest education level: Not on file   Tobacco Use    Smoking status: Never Smoker    Smokeless tobacco: Never Used   Substance and Sexual Activity    Alcohol use: Not Currently     Alcohol/week: 6.0 oz     Types: 10 Glasses of wine per week     Comment: ocassionally    Drug use: No    Sexual activity: Yes     Partners: Female     Comment:  has 2 children       Family History   Problem Relation Age of Onset    Cancer Mother leukemia    Stroke Father     Cancer Brother         bladder       ROS  As reviewed under HP  ECOG PS is 0    Emotional well being addressed and patient is coping well    Physical Examination:   Visit Vitals  /65 (BP 1 Location: Left arm, BP Patient Position: Sitting)   Pulse 66   Temp 98.2 °F (36.8 °C) (Oral)   Resp 16   Ht 6' 1\" (1.854 m)   Wt 144 lb 3.2 oz (65.4 kg)   SpO2 98%   BMI 19.02 kg/m²     General appearance - alert, frail, and in no distress  Mental status - oriented to person, place, and time  Mouth - mucous membranes moist, pharynx normal without lesions. Neck - supple, no significant adenopathy  Lymphatics - no palpable lymphadenopathy, no hepatosplenomegaly  Chest -clear to auscultation  Heart - normal rate, regular rhythm, normal S1, S2, no murmurs, rubs, clicks or gallops; edema has resolved  Abdomen - soft, nontender,distended  Neurological - normal speech  Skin: No rashes  MSK- mild LE edema    LABS  Lab Results   Component Value Date/Time    WBC 4.5 05/01/2019 11:17 AM    HGB 9.7 (L) 05/01/2019 11:17 AM    HCT 30.2 (L) 05/01/2019 11:17 AM    PLATELET 441 (L) 67/76/2285 11:17 AM    MCV 89.9 05/01/2019 11:17 AM    ABS. NEUTROPHILS 3.8 05/01/2019 11:17 AM     Lab Results   Component Value Date/Time    Sodium 138 05/01/2019 11:17 AM    Potassium 4.6 05/01/2019 11:17 AM    Chloride 105 05/01/2019 11:17 AM    CO2 28 05/01/2019 11:17 AM    Glucose 142 (H) 05/01/2019 11:17 AM    BUN 30 (H) 05/01/2019 11:17 AM    Creatinine 0.82 05/01/2019 11:17 AM    GFR est AA >60 05/01/2019 11:17 AM    GFR est non-AA >60 05/01/2019 11:17 AM    Calcium 9.0 05/01/2019 11:17 AM     Lab Results   Component Value Date/Time    AST (SGOT) 47 (H) 05/01/2019 11:17 AM    Alk.  phosphatase 336 (H) 05/01/2019 11:17 AM    Protein, total 5.7 (L) 05/01/2019 11:17 AM    Albumin 2.1 (L) 05/01/2019 11:17 AM    Globulin 3.6 05/01/2019 11:17 AM    A-G Ratio 0.6 (L) 05/01/2019 11:17 AM       Guardant 360 results under media- T790M present     2/9/19    Thoracic spine MRI  IMPRESSION:  1. At T5 there has been interval progression of metastatic disease with  extension on the left into the epidural space and left T5-6 foramen. 2. Interval progression at T8 of extraosseous tumor on the left with extension  into the left T8-9 foramen and adjacent posterior elements extending out along  the proximal intercostal space. 3. Stable findings at T1.  4. No abnormal intradural enhancement with normal signal in the spinal cord. Cervical spine MRI  IMPRESSION:  1. Accentuated cervical lordosis. 2. Chronic degenerative changes at the odontoid and C1.  3. Sclerosis right lateral mass C1 may represent metastasis. 4. Congenital segmentation anomaly/fusion C4 and C5.  5. Chronic degenerative changes C3-4, C5-6 and to lesser extent C6-7 with  minimal to mild stenosis and no cord compression. Variable foramina stenosis in  association with facet arthrosis. 6. Abnormal marrow signal T1 consistent with chronic metastasis to this  Vertebra. Ct cap 2/9/19    IMPRESSION:   1. Stable right lower lobe lung mass and adjacent subcentimeter satellite  nodule. Right pleural nodularity and a chronic small right pleural effusion are  unchanged.     2. New groundglass attenuation in the lingula may represent nonspecific  infection or inflammation. Attention on follow-up CT is suggested.     3. Several low-density liver lesions are more conspicuous on the current exam  suspicious for metastatic disease. The largest in the right liver measures 1.3 x  1.8 cm.      4. Stable subcentimeter left adrenal nodule.      5. Stable scattered bony metastases. Extra osseous progression at T5 and T8 are  better seen on the concurrent MR thoracic spine.     6. New splenomegaly measuring 13.9 cm in length.     7. Increased small amount of intraperitoneal ascites. CT chest 4/30/19:   IMPRESSION:  1. Stable volume loss in the right hemithorax with stable right lower lobe mass  thickening and nodularity. 2. Cirrhotic appearing liver. There are 2 small hypodensities, one is stable,  one slightly larger. 3. Mild ascites, slightly increased from the prior examination. 4. Stable sclerotic bone metastases. ASSESSMENT AND PLAN  Mr. Brandy Sewell is a 70 y.o. male with:    1. Stage IV NSCLC   With R lung mass, mediastinal adenopathy, malignant R pleural effusion, multiple asymptomatic bone metastasis and possibly liver metastasis. EGFR mutated, PD-L1 5%. Found to have T790M mutation but most recently progressed on Osimertinib with progressive painful bone metastasis. We discussed options down the road that include clinical trials such as the biomarker driven LUNGMAP study that we have available with us, versus chemotherapy plus immunotherapy versus other clinical trials. He was not found to be eligible for [facility] due to his prostate cancer. Underwent a tissue biopsy of his liver and pathology shows metastatic adenocarcinoma and foundation one is reviewed today. This shows a BRCA 2 mutation. Not eligible for [facility] match due to his other malignancy. Unfortunately he is excluded from multiple trials due to his prostate cancer. Excluded from [facility] BRCA/MILTON: Avelumab Plus Talazoparib in Patients With BRCA or MILTON Mutant Solid Tumors due to exposure to [facility]. Hence we decided to move forward with cycle 2 of Carbo+ Alimta and Keytruda today. If he does not tolerate this will consider Taxotere/ off label Olaparib. CT scans 4/30/19 were reviewed and stable. · Proceed today with cycle 2 of carboplatin (AUC 3) + Alimta (dose reduced 250 mg/m2 for grade 1 thrombocytopenia) + Keytruda given every 3 weeks   · Continue Folic acid  · Vitamin X96 re-load today  · Zofran PRN  · Decadron on day 2  · Zometa q3 months, due today    2. [place] 6 prostate cancer on active surveillance  Records reviewed   PSA 3 today    3. Bone pain:  secondary to osseous metastasis s/p XRT to R ischium and L2. With moderate pain at upper back radiating to the right. This is likely from lesions at T5-T8. S/P 30 Gy to Rt axilla and T spine  Completed 3/13/19    Pain has nearly resolved    4. HTN. Now normotensive. 5. Diarrhea- resolved    6. Recurrent ascites  S/P Paracentesis on 3/22, 4/10 and 4/19, 4/30  Large volume  Non malignant  Unclear cause? Following with Dr. Jayy Ireland who is doing additional testing. Suspect he is developing portal HTN   Continue Lasix and aldactone. Cannot increase diuretics due to hypotension.      RTC in 3 weeks for cycle #3    Connie Zelaya MD

## 2019-05-02 NOTE — PROGRESS NOTES
Lab orders for 5/3/19 draw faxed complete to [facility]. [facility] message to [facility] for scheduling patient.

## 2019-05-03 NOTE — PROGRESS NOTES
[address] [facility] @ [facility] VISIT NOTE 
 
6567 Patient arrives for Labs without acute problems. Please see connect care for complete assessment and education provided. Vital signs stable throughout and prior to discharge, Pt. Tolerated treatment well and discharged without incident. Patient/parent is aware of next [facility] appointment on 5/22/2019. Appointment card given to patient/parents. VITAL SIGNS Patient Vitals for the past 12 hrs: 
 Temp Pulse Resp BP SpO2  
05/03/19 1259 97.4 °F (36.3 °C) (!) 58 18 136/76 98 % LAB WORK Lab results pending, please see Connect Care for results.

## 2019-05-06 NOTE — PROGRESS NOTES
1205   Reviewed sedation plan to include medicating under conscious sedation using versed and fentanyl IV. Pt educated on moderate sedation and its purpose; medications and side effects described and patient verbalized understanding. Georges Sandy drained off 5600cc yellow peritoneal fluid with slight sediment; samples sent to lab for cell count, albumin, total protein, and LDH. Liver biopsy sample taken to lab. Pt tolerated peritoneal fluid drainage well; received 25gm albumin IV.     1620   Pt discharged to home with belongings and instructions via wheelchair with wife picking up. Right abdominal dsg dry and intact; where peritoneal catheter discontinued. Right neck bandage dry and intact. Pt verbalized understanding of instructions and followup care.

## 2019-05-06 NOTE — DISCHARGE INSTRUCTIONS
[address] [place] [address] BIOPSY DISCHARGE INSTRUCTIONS       Home Care Instructions: You may resume your regular diet and medication regimen. Do not drink alcohol, drive, or make any important legal decisions in the next 24 hours. Do not lift anything heavier than a gallon of milk until the soreness goes away. You may use over the counter acetaminophen or ibuprofen for the soreness. You may apply an ice pack to the affected area for 20-30 minutes at time for the first 24 hours. After that, you may apply a heat pack. You may shower tomorrow and let warm soap and water wash over puncture site in right neck. Call If: You should call your Physician and/or the Radiology Nurse if you have any questions or concerns about the biopsy site. Call if you should have increased pain, fever, redness, drainage, or bleeding more than a small spot on the bandage. Call if you have dizziness or abdominal pain. If you have dizziness,fever, and abdominal pain, go to ER and tell them what you had done. Follow-Up Instructions: Please see your ordering doctor as he/she has requested. To Reach Us:  Side effects of sedation medications and other medications used today have been reviewed. Notify us of nausea, itching, hives, dizziness, or anything else out of the ordinary. Should you experience any of these significant changes, please call [phone] between the hours of 7:30 am and 10 pm or [phone] after hours.  After hours, ask the  to page the [address] Technologist, and describe the problem to the technologist.           Patient Signature:  Date: 5/6/2019  Discharging Nurse: Eric Balbuena RN

## 2019-05-06 NOTE — H&P
Radiology History and Physical    Patient: Chanel Chavez 70 y.o. male       Chief Complaint: No chief complaint on file.       History of Present Illness: for liver pressure/bx    History:    Past Medical History:   Diagnosis Date    [facility]    lung ca    Hypertension      Family History   Problem Relation Age of Onset    Cancer Mother         leukemia    Stroke Father     Cancer Brother         bladder     Social History     Socioeconomic History    Marital status:      Spouse name: Not on file    Number of children: Not on file    Years of education: Not on file    Highest education level: Not on file   Occupational History    Not on file   Social Needs    Financial resource strain: Not on file    Food insecurity:     Worry: Not on file     Inability: Not on file    Transportation needs:     Medical: Not on file     Non-medical: Not on file   Tobacco Use    Smoking status: Never Smoker    Smokeless tobacco: Never Used   Substance and Sexual Activity    Alcohol use: Not Currently     Alcohol/week: 6.0 oz     Types: 10 Glasses of wine per week     Comment: ocassionally    Drug use: No    Sexual activity: Yes     Partners: Female     Comment:  has 2 children   Lifestyle    Physical activity:     Days per week: Not on file     Minutes per session: Not on file    Stress: Not on file   Relationships    Social connections:     Talks on phone: Not on file     Gets together: Not on file     Attends Alevism service: Not on file     Active member of club or organization: Not on file     Attends meetings of clubs or organizations: Not on file     Relationship status: Not on file    Intimate partner violence:     Fear of current or ex partner: Not on file     Emotionally abused: Not on file     Physically abused: Not on file     Forced sexual activity: Not on file   Other Topics Concern    Not on file   Social History Narrative    Not on file       Allergies: No Known Allergies    Current Medications:  Current Facility-Administered Medications   Medication Dose Route Frequency    lidocaine (PF) (XYLOCAINE) 10 mg/mL (1 %) injection 30 mL  30 mL SubCUTAneous ONCE    iopamidol (ISOVUE 300) 61 % contrast injection 50 mL  50 mL IntraCATHeter RAD ONCE    fentaNYL citrate (PF) injection 200 mcg  200 mcg IntraVENous Multiple    midazolam (VERSED) injection 5 mg  5 mg IntraVENous Multiple    flumazenil (ROMAZICON) 0.1 mg/mL injection 0.5 mg  0.5 mg IntraVENous ONCE    naloxone (NARCAN) injection 0.4 mg  0.4 mg IntraVENous PRN    0.9% sodium chloride infusion  25 mL/hr IntraVENous CONTINUOUS    albumin human 25% (BUMINATE) solution 25 g  25 g IntraVENous Multiple        Physical Exam:  Blood pressure 135/79, pulse 73, temperature 98.1 °F (36.7 °C), resp. rate 20, weight 68 kg (150 lb), SpO2 100 %. LUNG: clear to auscultation bilaterally, HEART: regular rate and rhythm      Alerts:    Hospital Problems  Date Reviewed: 5/2/2019    None          Laboratory:      Recent Labs     05/03/19  1300   HGB 9.5*   HCT 31.2*   WBC 13.1*   *   BUN 41*   CREA 0.79   K 5.2*         Plan of Care/Planned Procedure:  Risks, benefits, and alternatives reviewed with patient and he agrees to proceed with the procedure.        Lisbet Reynoso MD

## 2019-05-13 NOTE — PROGRESS NOTES
HISTORY OF PRESENT ILLNESS Tobin Engel is a 70 y.o. male. Pt. comes in for f/u. Has multiple medical problems. Followed by oncologist for metastatic adenocarcinoma of the lung. Has been receiving chemotherapy. Also followed by hepatologist for ascites. Recent CT scan showed evidence of cirrhosis. Patient is getting paracentesis every few weeks. Reports having a chronic cough which he thinks is from his sinuses. No chest pain. Reports dyspnea and AVENDANO. He has lost a lot of weight. Reports compliance with medications and diet. Med list and most recent labs/studies reviewed with pt. Trying to be active physically as tolerated. Reports no other new c/o. HPI Review of Systems Constitutional: Positive for weight loss. HENT: Negative. Eyes: Negative. Respiratory: Positive for cough and shortness of breath (AVENDANO). Negative for hemoptysis, sputum production and wheezing. Cardiovascular: Positive for leg swelling. Negative for chest pain. Gastrointestinal: Positive for abdominal pain. Ascites Genitourinary: Negative for dysuria. Musculoskeletal: Negative for back pain, falls and joint pain. Skin: Negative. Neurological: Negative for dizziness, sensory change, focal weakness and headaches. Psychiatric/Behavioral: Negative for depression. The patient is not nervous/anxious and does not have insomnia. All other systems reviewed and are negative. Physical Exam  
Constitutional: He is oriented to person, place, and time. He appears well-developed and well-nourished. No distress. Cachectic thin man Underweight HENT:  
Head: Normocephalic and atraumatic. Nose: Nose normal.  
Mouth/Throat: Oropharynx is clear and moist. No oropharyngeal exudate. PND with thick mucus Eyes: Conjunctivae are normal. No scleral icterus. Neck: Normal range of motion. Neck supple. No JVD present. No thyromegaly present. Cardiovascular: Normal rate, regular rhythm, normal heart sounds and intact distal pulses. No murmur heard. Pulmonary/Chest: Effort normal. No respiratory distress. He has no wheezes. He has no rales. Dec sounds mostly on the right side Abdominal: Soft. Bowel sounds are normal. He exhibits no distension. There is tenderness (Tight abdomen with ascites). Musculoskeletal: He exhibits edema (Bilateral ankles, chronic) and tenderness (Back, minimal). Thoracic scoliosis, chronic Neurological: He is alert and oriented to person, place, and time. Coordination normal.  
Skin: Skin is warm and dry. No rash noted. Psychiatric: He has a normal mood and affect. His behavior is normal.  
Seems chronically depressed Nursing note and vitals reviewed. ASSESSMENT and PLAN Diagnoses and all orders for this visit: 1. Cough 2. Non-small cell cancer of right lung ([address] 3. Acquired hypothyroidism 4. Other ascites Other orders 
-     benzonatate (TESSALON) 100 mg capsule; Take 1 Cap by mouth three (3) times daily as needed for Cough for up to 7 days. -     fluticasone propionate (FLONASE) 50 mcg/actuation nasal spray; 2 Sprays by Both Nostrils route daily. -     levothyroxine (SYNTHROID) 25 mcg tablet; Alternate 1 with 2 every other day Follow-up and Dispositions · Return in about 6 months (around 11/13/2019). lab results and schedule of future lab studies reviewed with patient 
reviewed diet, exercise and weight control 
reviewed medications and side effects in detail F/u with other MD's as scheduled

## 2019-05-13 NOTE — PROGRESS NOTES
Health Maintenance Due Topic Date Due  
 DTaP/Tdap/Td series (1 - Tdap) 12/02/1970  Shingrix Vaccine Age 50> (1 of 2) 12/02/1999  GLAUCOMA SCREENING Q2Y  12/02/2014  
 FOBT Q 1 YEAR AGE 50-75  10/31/2017 Chief Complaint Patient presents with  Hypothyroidism 6 mo f/u  Hypertension 1. Have you been to the ER, urgent care clinic since your last visit? Hospitalized since your last visit? No 
 
2. Have you seen or consulted any other health care providers outside of the [address] [place] [address] since your last visit? Include any pap smears or colon screening. No 
 
3) Do you have an Advance Directive on file? no 
 
4) Are you interested in receiving information on Advance Directives? NO Patient is accompanied by self I have received verbal consent from Wally Chopra to discuss any/all medical information while they are present in the room.

## 2019-05-14 NOTE — Clinical Note
5/18/19 Patient: Adalberto Allen YOB: 1950 Date of Visit: 5/14/2019 Susana Begum DO 
[address] [facility] [address] [facility] 7 [address] VIA In Basket Dear Susana Begum DO, Thank you for referring Mr. Young Small to [address] [place] [address] for evaluation. My notes for this consultation are attached. If you have questions, please do not hesitate to call me. I look forward to following your patient along with you. Sincerely, Veda Salas MD

## 2019-05-14 NOTE — PROGRESS NOTES
[address] [facility] 137 [facility] Jan Yang MD, Arpit Burt, [facility] April ALVERTO Armando, WANDA Serna, NANCY-BC   ALVERTO Rodriguez NP [facility] 
  at [address], [address] [address]  [address]. [phone] FAX: [address] 
  at [facility] 
  [address], [address] 98 [facility], [address] 
  [phone] FAX: [phone] Patient Care Team: 
Radha Hernandez DO as PCP - General (Internal Medicine) Hari Maria MD as Surgeon (Thoracic (non-cardiac) Surgery) Kevyn Gudino MD (Pulmonary Disease) Vianney Lin MD (Urology) Marjorie Verdin MD (Hematology and Oncology) Problem List  Date Reviewed: 5/13/2019 Codes Class Noted Other ascites ICD-10-CM: R18.8 ICD-9-CM: 789.59  3/25/2019 Edema ICD-10-CM: R60.9 ICD-9-CM: 782.3  7/12/2018 Elevated liver enzymes ICD-10-CM: R74.8 ICD-9-CM: 790.5  11/8/2017 Adenocarcinoma of lung, stage 4, right ([facility] ICD-10-CM: C34.91 
ICD-9-CM: 162.9  11/8/2017 Rash and nonspecific skin eruption ICD-10-CM: R21 
ICD-9-CM: 782.1  7/18/2017 Cough ICD-10-CM: R05 ICD-9-CM: 786.2  6/5/2017 Adenocarcinoma of prostate [facility]) ICD-10-CM: Z13 ICD-9-CM: 185  5/19/2017 Non-small cell cancer of right lung [facility]) ICD-10-CM: C34.91 
ICD-9-CM: 162.9  5/18/2017 Bone metastases ([facility] ICD-10-CM: C79.51 
ICD-9-CM: 198.5  5/18/2017 Malignant neoplasm of lung (HCC) ICD-10-CM: C34.90 ICD-9-CM: 162.9  5/18/2017 Pleural effusion ICD-10-CM: J90 ICD-9-CM: 511.9  5/14/2017 Prostate cancer [facility]) ICD-10-CM: N04 ICD-9-CM: 185  3/11/2016 Acquired hypothyroidism ICD-10-CM: E03.9 ICD-9-CM: 244.9  1/19/2016 Vitamin D deficiency ICD-10-CM: E55.9 ICD-9-CM: 268.9  1/19/2016 Elevated PSA ICD-10-CM: R97.20 ICD-9-CM: 790.93  1/19/2016 Lung nodules ICD-10-CM: R91.8 ICD-9-CM: 793.19  12/22/2015 Essential hypertension ICD-10-CM: I10 
ICD-9-CM: 401.9  12/22/2015 Thoracic scoliosis ICD-10-CM: M41.9 ICD-9-CM: 737.30  12/22/2015 Lesion of right lung ICD-10-CM: R91.1 ICD-9-CM: 518.89  3/17/2015 Chanel Chavez returns to the [place] [address] for management of non-cirrhotic portal hypertension. The active problem list, all pertinent past medical history, medications, liver histology, radiologic findings  
and laboratory findings related to the liver disorder were reviewed with the patient. The patient is a 70 y.o.  male without any history of liver disease. He was found to have adenocarcinoma of the lung in 5/2017. He has developed metastasis to the liver and bones. He has received several courses of chemotherapy. Liver mets were treated with Y-90 TARE. He developed ascites for the first time in 3/2019. He has required paracentesis every week. The patient underwent a transjugular liver biopsy with hepatic venous pressure measurements in 5/2019. I have personally reviewed the liver biopsy slides. This demonstrates no cirrhosis. There is mild non-specific poral inflamamtion and focal portal fibrosis. Hepatic vein pressures were. RA 3, free hepatic vein 3, wedge hepatic vein 20. HVPG = 17. ECHO was performed in 7/2018. This was normal. 
 
Serologic evaluation for markers of chronic liver disease was negative. The patient notes fatigue, swelling of the abdomen, The patient has not experienced fevers, chills, The patient has limitations in functional activities which can be attributed to ascites and to other medical problems that are not related to the liver disease. He has a vacation to [facility] planned in early June. ASSESSMENT AND PLAN: 
Ascites Ascites secondary to Nodular Regenerative Hyperplasia. The liver appears nodular on imaging. The SAAG from 3/2019 was 2.3 - 0.9 = 1.4 The hepatic venous pressure gradient is elevated at 17 mm Hg The liver biopsy does not show cirrhosis. Only mild portal fibrosis. Will icnrease the dose of diuretics to step 2. If he does not show any response to this will place TIPS. Lower extremity edema Edema has resolved with current dose of diuretics. Thrombocytopenia This is secondary to chemotherapy and bone marrow suppression. Neutropenia This is secondary to chemotherapy and bone marrow suppression Anemia This is secondary to chemotherapy and bone marrow suppression. ALLERGIES No Known Allergies MEDICATIONS Current Outpatient Medications Medication Sig  
 benzonatate (TESSALON) 100 mg capsule Take 1 Cap by mouth three (3) times daily as needed for Cough for up to 7 days.  levothyroxine (SYNTHROID) 25 mcg tablet Alternate 1 with 2 every other day  furosemide (LASIX) 20 mg tablet Take 1 Tab by mouth daily.  chlorhexidine (PERIDEX) 0.12 % solution RINSE BID  fluticasone propionate (FLONASE) 50 mcg/actuation nasal spray 2 Sprays by Both Nostrils route daily.  spironolactone (ALDACTONE) 50 mg tablet Take 1 Tab by mouth daily.  ondansetron hcl (ZOFRAN) 8 mg tablet Take 1 Tab by mouth every eight (8) hours as needed for Nausea.  dexamethasone (DECADRON) 4 mg tablet Take 4 mg by mouth See Admin Instructions. Take one tab by mouth on day 2 of chemotherapy  zoledronic acid (ZOMETA) 4 mg/100 mL pgbk infusion 4 mg by IntraVENous route every three (3) months.  cholecalciferol (VITAMIN D3) 1,000 unit tablet Take 1,000 Units by mouth daily. No current facility-administered medications for this visit.    
 
 
SYSTEM REVIEW NOT RELATED TO LIVER DISEASE OR REVIEWED ABOVE: 
 Constitution systems: Negative for fever, chills, weight gain, weight loss. Eyes: Negative for visual changes. ENT: Negative for sore throat, painful swallowing. Respiratory: Negative for cough, hemoptysis, SOB. Cardiology: Negative for chest pain, palpitations. GI:  Negative for constipation or diarrhea. : Negative for urinary frequency, dysuria, hematuria, nocturia. Skin: Negative for rash. Hematology: Negative for easy bruising, blood clots. Musculo-skelatal: Negative for back pain, muscle pain, weakness. Neurologic: Negative for headaches, dizziness, vertigo, memory problems not related to HE. Psychology: Negative for anxiety, depression. FAMILY HISTORY: 
The father  of leukemia. The mother  at age 80 years. There is no family history of liver disease. SOCIAL HISTORY: 
The patient is . The patient has 2 children, and 4 grandchildren. The patient has never used tobacco products. The patient consumes 1-2 alcoholic beverages per day The patient used to work in business management. The patient retired in . PHYSICAL EXAMINATION: 
Visit Vitals /74 (BP 1 Location: Left arm, BP Patient Position: Sitting) Pulse 83 Temp 98.1 °F (36.7 °C) (Tympanic) Resp 16 Ht 6' 1\" (1.854 m) Wt 145 lb 12.8 oz (66.1 kg) SpO2 99% BMI 19.24 kg/m² General: Ill appearing. Eyes: Sclera anicteric. ENT: No oral lesions. Thyroid normal. 
Nodes: No adenopathy. Skin: No spider angiomata. No jaundice. No palmar erythema. Respiratory: Lungs clear to auscultation. Cardiovascular: Regular heart rate. No murmurs. No JVD. Abdomen: Distended with obvious ascites. Extremities: No edema. Muscle wasting. Neurologic: Alert and oriented. Cranial nerves grossly intact. No asterixis. LABORATORY STUDIES: 
[facility] of [address] [place] [address] & Units 2019 WBC 3.4 - 10.8 x10E3/uL 5.2 ANC 1.4 - 7.0 x10E3/uL 4.3 HGB 13.0 - 17.7 g/dL 10.1 (L)  - 379 x10E3/uL 139 (L) INR 0.8 - 1.2 1.1 AST 0 - 40 IU/L 47 (H) ALT 0 - 44 IU/L 28 Alk Phos 39 - 117 IU/L 333 (H) Bili, Total 0.0 - 1.2 mg/dL 1.3 (H) Bili, Direct 0.00 - 0.40 mg/dL 0.53 (H) Albumin 3.6 - 4.8 g/dL 2.9 (L) BUN 8 - 27 mg/dL 31 (H) Creat 0.76 - 1.27 mg/dL 0.83 Na 134 - 144 mmol/L 142  
K 3.5 - 5.2 mmol/L 4.7 Cl 96 - 106 mmol/L 105 CO2 20 - 29 mmol/L 26 Glucose 65 - 99 mg/dL 94 SEROLOGIES: 
Serologies Latest Ref Rng & Units 2019 Hep B Surface Ag Negative Negative Hep C Ab 0.0 - 0.9 s/co ratio <0.1 Ferritin 30 - 400 ng/mL 329 Iron % Saturation 15 - 55 % 11 (L)  
WHIT, IFA  Negative C-ANCA Neg:<1:20 titer <1:20  
P-ANCA Neg:<1:20 titer <1:20  
ANCA Neg:<1:20 titer <1:20  
ASMCA 0 - 19 Units 17  
M2 Ab 0.0 - 20.0 Units <20.0 Alpha-1 antitrypsin level 90 - 200 mg/dL 279 (H) LIVER HISTOLOGY: 
2019. Biopsy of liver mass. Adenocarcinoma. 2019. Slides reviewed by MLS. Minimal inflammation in some portal tracts. No lobular inflammation. No steatosis. Focal portal fibrosis. ENDOSCOPIC PROCEDURES: 
Not available or performed RADIOLOGY: 
2019. Several enlarging liver nodules measuring 1.3 x 1.8 cm in the right lobe. No mention of surface nodularity or varices consistent with portal HTN or cirrhosis. Asites. 2019. CT scan abdomen with IV contrast.  Changes consistent with cirrhosis. No liver mass lesions. No dilated bile ducts. Moderate ascites. OTHER TESTIN2018. ECHO heart. LVEF 60%,  RV normal.  No TR.   
2019. HVP measurements. RA 3,  Free HV 3, Wedged HV 20, HVPG=17 
 
FOLLOW-UP: 
All of the issues listed above in the Assessment and Plan were discussed with the patient. All questions were answered. The patient expressed a clear understanding of the above. [address] [place] [address] in 2 weeks MD Rudy Arboleda [place] [address] [address] [place] [address], [address] [facility], [facility] 22. 
[phone] [address] [facility]

## 2019-05-14 NOTE — PROGRESS NOTES
Agustin Roberson is a 70 y.o. male Exam RM: 2 Chief Complaint Patient presents with  Follow Up Chronic Condition Pt is here for a f/u for liver disease. 1. Have you been to the ER, urgent care clinic since your last visit? Hospitalized since your last visit? No  
 
2. Have you seen or consulted any other health care providers outside of the [address] [place] [address] since your last visit? Include any pap smears or colon screening. No  
 
 
Health Maintenance Due Topic Date Due  
 DTaP/Tdap/Td series (1 - Tdap) 12/02/1970  Shingrix Vaccine Age 50> (1 of 2) 12/02/1999  GLAUCOMA SCREENING Q2Y  12/02/2014  
 FOBT Q 1 YEAR AGE 50-75  10/31/2017 Visit Vitals /74 (BP 1 Location: Left arm, BP Patient Position: Sitting) Pulse 83 Temp 98.1 °F (36.7 °C) (Tympanic) Resp 16 Ht 6' 1\" (1.854 m) Wt 145 lb 12.8 oz (66.1 kg) SpO2 99% BMI 19.24 kg/m²

## 2019-05-15 NOTE — PROGRESS NOTES
Reviewed record in preparation for visit and have obtained necessary documentation. Identified pt with two pt identifiers(name and ). Health Maintenance Due Topic  DTaP/Tdap/Td series (1 - Tdap)  Shingrix Vaccine Age 50> (1 of 2)  GLAUCOMA SCREENING Q2Y   
 FOBT Q 1 YEAR AGE 50-75 Chief Complaint Patient presents with  Follow-up Wt Readings from Last 3 Encounters:  
05/15/19 145 lb 8 oz (66 kg) 19 145 lb 12.8 oz (66.1 kg) 19 143 lb 9.6 oz (65.1 kg) Temp Readings from Last 3 Encounters:  
19 98.1 °F (36.7 °C) (Tympanic) 19 97.9 °F (36.6 °C) (Oral) 19 98.1 °F (36.7 °C) BP Readings from Last 3 Encounters:  
19 116/74  
19 128/87  
19 103/54 Pulse Readings from Last 3 Encounters:  
19 83  
19 81  
19 65 Learning Assessment: 
:  
 
Learning Assessment 2019 5/3/2018 3/17/2015 PRIMARY LEARNER Patient Patient Patient BARRIERS PRIMARY LEARNER NONE NONE -  
CO-LEARNER CAREGIVER No - - PRIMARY LANGUAGE ENGLISH ENGLISH ENGLISH  
LEARNER PREFERENCE PRIMARY LISTENING READING VIDEOS  
ANSWERED BY patient patient patient RELATIONSHIP SELF SELF SELF Depression Screening: 
:  
 
3 most recent PHQ Screens 5/15/2019 Little interest or pleasure in doing things Not at all Feeling down, depressed, irritable, or hopeless Not at all Total Score PHQ 2 0 Fall Risk Assessment: 
:  
 
Fall Risk Assessment, last 12 mths 5/15/2019 Able to walk? Yes Fall in past 12 months? No  
 
 
Abuse Screening: 
:  
 
Abuse Screening Questionnaire 2017 Do you ever feel afraid of your partner? N N N N Are you in a relationship with someone who physically or mentally threatens you? N N N N Is it safe for you to go home? Courtney Mora Coordination of Care Questionnaire: 
:  
 
1) Have you been to an emergency room, urgent care clinic since your last visit? no  
Hospitalized since your last visit? no          
 
2) Have you seen or consulted any other health care providers outside of [address] [place] [address] since your last visit? no  (Include any pap smears or colon screenings in this section.) 3) Do you have an Advance Directive on file? no 
 
4) Are you interested in receiving information on Advance Directives? NO Patient is accompanied by self I have received verbal consent from Liyah Judd to discuss any/all medical information while they are present in the room.

## 2019-05-15 NOTE — PROGRESS NOTES
Hematology/Oncology progress note REASON FOR VISIT: Stage IV EGFR mutated NSCLC HISTORY OF PRESENT ILLNESS: Mr. Piper Erickson is a 70 y.o. male with prostate cancer who has stage IV NSCLC- adenocarcinoma (EGFR mutated , PD-L1 low) in May 2017. He progressed on Tarceva and then Osimertinib. Started Carboplatin+ Alimta+ Keytruda but felt poorly after cycle 1. He has had recurrent non malignant ascites requiring therapeutic taps and also saw [facility] for evaluation of potential clinical studies. Comes in today for follow-up and cycle 2 of Yecenia + alimta + [place] Last paracentesis was 4/30/19. His diurects are doubled. He is being evaluated for TIPS. His abdomen is swelling up again. Overall cycle 2 was tolerated far better he says. His R shoulder pain is \" gone\". He has no other pain. Cough and SOB is no different. He is going on a trip from June 2-16 Oncologic history Mr. Piper Erickson noticed a new cough and fevers back in March of 2017. He went to Urgent Care and received antibiotics and cough medication. He states this worked for a while, but he began to notice his cough return. This was intermittent. He had some tightness across his anterior chest which he related to the infection. Chest x-ray was abnormal and he was told to proceed to the Emergency Department. Xavier Chahal had been under surveillance for right lower lobe mass that was initially noted in 2015. Bronch at that time was negative for malignancy. He was evaluated by Dr. Rupa Ramos with Thoracic Surgery in 2015 for possible surgical resection. CT chest in November of 2016 with mass size unchanged but some right basilar pleural thickening. CT chest obtained 5/14/17 however showed a new large right pleural effusion with right middle lobe and right lower lobe collapse. Irregular spiculated right lower lobe mass 3.8cm and stable precarinal enlarged lymph nodes were noted.  New right posterior second rib lytic lesion and new right hepatic hypodensity consistent with mets. He underwent a therapeutic thoracentesis on 5/15/17 with removal of 1500 cc of serosanguinous fluid. Pathology showed adenocarcinoma. PET CT showed pleural, bone, liver and flor metastasis. MRI brain 5/20/17: No metastasis. Needed repeat R sided thoracentesis on 5/25/17, had some post procedure hydropneumothorax. He was seen by Dr. Catherine Solorio at [address] [place] [address] for Pleurx catheter.  
  
CT guided biopsy of R lung mass done 5/25 which showed adenocarcinoma. EGFR mutation detected Exon 18 and 21 Treatment 6/26/17: Tarceva. Monthly Xgeva. Palliative XRT to R hip CT CAP 10/2/17: Response Ct 1/23/18: CT with some growth of LLL mass but stable by RECIST. CT 3/15/18 and Bone scan stable though he had worsening left back pain. MRI results showed extensive disease at L2, S2 and additional lesions as previously known. Received palliative XRT that he completed 4/18/18 5/26/18: Started Osimertinib as he had a T790M mutation. Stopped 6/20/18 due to platelets of 90F Resumed at 80 mg every other day on 7/6/18 Started 40mg daily on 7/12/18 2/19: Progressive disease with new liver metastases and progression of thoracic disease 2/22/19: liver biopsy pathology shows metastatic adenocarcinoma, consistent with lung primary- foundation sent 2/26/19: Cycle 1 of Carboplatin+ Alimta+ Keytruda. 3/6/19- 3/13/19: 30 Gy to Rt axilla and T spine 4/30/19: CT chest abdomen and pelvis stable Past Medical History:  
Diagnosis Date  Ascites 02/2019  Cancer ([address] 2017  
 lung ca  Hypertension Past Surgical History:  
Procedure Laterality Date  HX ORTHOPAEDIC    
 reconstruction of left little finger  IR BX LIVER PERCUTANEOUS  2/22/2019  IR BX TRANSCATHETER  5/6/2019  IR INSERT TUNL CVC W PORT OVER 5 YEARS  2/22/2019  IR PARACENTESIS ABD INIT  03/2019  
 6 total to date, 5/13/2019  IR PARACENTESIS ABD W IMAGE  5/6/2019 No Known Allergies Current Outpatient Medications Medication Sig Dispense Refill  furosemide (LASIX) 20 mg tablet Take 2 Tabs by mouth daily. 90 Tab 3  
 benzonatate (TESSALON) 100 mg capsule Take 1 Cap by mouth three (3) times daily as needed for Cough for up to 7 days. 30 Cap 0  
 fluticasone propionate (FLONASE) 50 mcg/actuation nasal spray 2 Sprays by Both Nostrils route daily. 1 Bottle 5  
 levothyroxine (SYNTHROID) 25 mcg tablet Alternate 1 with 2 every other day 60 Tab 5  
 dexamethasone (DECADRON) 4 mg tablet Take 4 mg by mouth See Admin Instructions. Take one tab by mouth on day 2 of chemotherapy 18 Tab 0  
 zoledronic acid (ZOMETA) 4 mg/100 mL pgbk infusion 4 mg by IntraVENous route every three (3) months.  cholecalciferol (VITAMIN D3) 1,000 unit tablet Take 1,000 Units by mouth daily.  spironolactone (ALDACTONE) 50 mg tablet Take 2 Tabs by mouth daily. 90 Tab 3  
 ondansetron hcl (ZOFRAN) 8 mg tablet Take 1 Tab by mouth every eight (8) hours as needed for Nausea. 30 Tab 6  chlorhexidine (PERIDEX) 0.12 % solution RINSE BID  0 Social History Socioeconomic History  Marital status:  Spouse name: Not on file  Number of children: Not on file  Years of education: Not on file  Highest education level: Not on file Tobacco Use  Smoking status: Never Smoker  Smokeless tobacco: Never Used Substance and Sexual Activity  Alcohol use: Not Currently Alcohol/week: 6.0 oz Types: 10 Glasses of wine per week Comment: ocassionally  Drug use: No  
 Sexual activity: Yes  
  Partners: Female Comment:  has 2 children Family History Problem Relation Age of Onset  Cancer Mother   
     leukemia  Stroke Father  Cancer Brother   
     bladder ROS As reviewed under HP 
ECOG PS is 0 Emotional well being addressed and patient is coping well Physical Examination:  
Visit Vitals /76 (BP 1 Location: Left arm, BP Patient Position: Sitting) Pulse 89 Temp 98.5 °F (36.9 °C) (Oral) Resp 18 Ht 6' 1\" (1.854 m) Wt 145 lb 8 oz (66 kg) SpO2 97% BMI 19.20 kg/m² General appearance - alert, frail, and in no distress Mental status - oriented to person, place, and time Mouth - mucous membranes moist, pharynx normal without lesions. Neck - supple, no significant adenopathy Lymphatics - no palpable lymphadenopathy, no hepatosplenomegaly Abdomen - soft, nontender,distended Neurological - normal speech Skin: No rashes MSK- no edema LABS Lab Results Component Value Date/Time WBC 3.1 (L) 05/14/2019 09:56 AM  
 HGB 8.7 (L) 05/14/2019 09:56 AM  
 HCT 27.9 (L) 05/14/2019 09:56 AM  
 PLATELET 43 (LL) 26/40/8844 09:56 AM  
 MCV 89 05/14/2019 09:56 AM  
 ABS. NEUTROPHILS 2.4 05/14/2019 09:56 AM  
 
Lab Results Component Value Date/Time Sodium 140 05/14/2019 09:56 AM  
 Potassium 4.4 05/14/2019 09:56 AM  
 Chloride 104 05/14/2019 09:56 AM  
 CO2 21 05/14/2019 09:56 AM  
 Glucose 102 (H) 05/14/2019 09:56 AM  
 BUN 27 05/14/2019 09:56 AM  
 Creatinine 0.67 (L) 05/14/2019 09:56 AM  
 GFR est  05/14/2019 09:56 AM  
 GFR est non-AA 98 05/14/2019 09:56 AM  
 Calcium 8.3 (L) 05/14/2019 09:56 AM  
 
Lab Results Component Value Date/Time AST (SGOT) 44 (H) 05/14/2019 09:56 AM  
 Alk. phosphatase 455 (H) 05/14/2019 09:56 AM  
 Protein, total 5.4 (L) 05/14/2019 09:56 AM  
 Albumin 2.7 (L) 05/14/2019 09:56 AM  
 Globulin 3.8 05/03/2019 01:00 PM  
 A-G Ratio 0.6 (L) 05/03/2019 01:00 PM  
 
 
Guardant 360 results under media- T790M present 
  
2/9/19 Thoracic spine MRI IMPRESSION: 
1. At T5 there has been interval progression of metastatic disease with 
extension on the left into the epidural space and left T5-6 foramen.  
2. Interval progression at T8 of extraosseous tumor on the left with extension 
into the left T8-9 foramen and adjacent posterior elements extending out along 
the proximal intercostal space. 3. Stable findings at T1. 
4. No abnormal intradural enhancement with normal signal in the spinal cord. Cervical spine MRI IMPRESSION: 
1. Accentuated cervical lordosis. 2. Chronic degenerative changes at the odontoid and C1. 
3. Sclerosis right lateral mass C1 may represent metastasis. 4. Congenital segmentation anomaly/fusion C4 and C5. 
5. Chronic degenerative changes C3-4, C5-6 and to lesser extent C6-7 with 
minimal to mild stenosis and no cord compression. Variable foramina stenosis in 
association with facet arthrosis. 6. Abnormal marrow signal T1 consistent with chronic metastasis to this Vertebra. Ct cap 2/9/19 IMPRESSION:  
1. Stable right lower lobe lung mass and adjacent subcentimeter satellite 
nodule. Right pleural nodularity and a chronic small right pleural effusion are 
unchanged. 
  
2. New groundglass attenuation in the lingula may represent nonspecific 
infection or inflammation. Attention on follow-up CT is suggested. 
  
3. Several low-density liver lesions are more conspicuous on the current exam 
suspicious for metastatic disease. The largest in the right liver measures 1.3 x 
1.8 cm.  
  
4. Stable subcentimeter left adrenal nodule.  
  
5. Stable scattered bony metastases. Extra osseous progression at T5 and T8 are 
better seen on the concurrent MR thoracic spine. 
  
6. New splenomegaly measuring 13.9 cm in length. 
  
7. Increased small amount of intraperitoneal ascites. CT chest 4/30/19: IMPRESSION: 
1. Stable volume loss in the right hemithorax with stable right lower lobe mass 
thickening and nodularity. 2. Cirrhotic appearing liver. There are 2 small hypodensities, one is stable, 
one slightly larger. 3. Mild ascites, slightly increased from the prior examination. 4. Stable sclerotic bone metastases. ASSESSMENT AND PLAN Mr. Disha Purcell is a 70 y.o. male with: 
 
1. Stage IV NSCLC  
 With R lung mass, mediastinal adenopathy, malignant R pleural effusion, multiple asymptomatic bone metastasis and possibly liver metastasis. EGFR mutated, PD-L1 5%. Found to have T790M mutation but most recently progressed on Osimertinib with progressive painful bone metastasis. We discussed options down the road that include clinical trials such as the biomarker driven LUNGMAP study that we have available with us, versus chemotherapy plus immunotherapy versus other clinical trials. He was not found to be eligible for [facility] due to his prostate cancer. Underwent a tissue biopsy of his liver and pathology shows metastatic adenocarcinoma and foundation one is reviewed today. This shows a BRCA 2 mutation. Not eligible for NCI match due to his other malignancy. Unfortunately he is excluded from multiple trials due to his prostate cancer. Excluded from ledy BRCA/MILTON: Avelumab Plus Talazoparib in Patients With BRCA or MILTON Mutant Solid Tumors due to exposure to [facility]. Hence we decided to move forward with cycle 2 of Carbo+ Alimta and Keytruda today. If he does not tolerate this will consider Taxotere/ off label Olaparib. CT scans 4/30/19 were reviewed and stable. He received cycle 2 of Carbo+ Alimta at a 50% dose reduction due to intolerance to cycle 1 and tolerated this well. He does have grade 3 thrombocytopenia though. If his paltelets are over 100 on 5/22/19 will plan to deliver the same dose for cycle 3. Plan to to continue combination chemotherapy + immunotherapy through cycle 4 and then continue [place] and Alimta · Proceed today with cycle 3 of carboplatin (AUC 3) + Alimta (dose reduced 250 mg/m2 for grade 1 thrombocytopenia) + Keytruda given every 3 weeks as scheduled on 5/22/19 · Continue Folic acid · Vitamin b12 re-load today · Zofran PRN 
· Decadron on day 2 · Zometa q3 months 2. Kanwal 6 prostate cancer on active surveillance Records reviewed PSA 3 today 3. Bone pain:  secondary to osseous metastasis s/p XRT to R ischium and L2. With moderate pain at upper back radiating to the right. This is likely from lesions at T5-T8. S/P 30 Gy to Rt axilla and T spine  Completed 3/13/19 Pain hasresolved 4. HTN. Now normotensive. 5. Diarrhea- resolved 6. Recurrent ascites S/P Paracentesis on 3/22, 4/10 and 4/19, 4/30 Large volume Non malignant Due to portal HTN, there is no cirrhosis on liver biopsy and this is thought to be due to drug related injury I am uncertain of the prognosis of his liver disease It does make palliative treatments for #1 harder to tolerate and in that respect worsens his overall prognosis Diuretics and management of liver disease per Dr. Welsh Sports He does need another paracentecis though RTC with each cycle- next on 5/22/19 when we will review labs only and then on 6/19/19 due to his vacation Mulugeta Still MD

## 2019-05-15 NOTE — Clinical Note
Gama Hunter his next cycle is on 5/22/19We will have to review labs and make sure platelets are > [id number] wants to delay cycle 4 to 6/19/19 due to his trip

## 2019-05-17 NOTE — DISCHARGE INSTRUCTIONS
PARACENTESIS DISCHARGE INSTRUCTIONS    General Information:     During these procedures, the doctor will insert a needle into the body to drain fluid from either the abdomen or the chest. After the procedure, you will be able to take a deep breath much easier. The site of the puncture may ooze the first day. This will decrease and eventually stop. With the Thoracentesis (draining fluid from the chest), there is a risk of air leaking into the chest around the lung, and risk of bleeding into the chest, with the resulting pressure on the lung possibly making it collapse. A chest x-ray is done after the procedure to detect possible complications. Paracentesis (draining fluid from the abdomen) sometimes makes patients hypotensive (low blood pressure). Your doctor may order for you to receive fluids or albumin (a volume booster) during the procedure through an IV site. Home Care Instructions:     Keep the puncture site clean and dry. No tub baths or swimming until puncture site heals. Showering is acceptable. Resume your normal diet, and resume your normal activity slowly and as you tolerate. If you are short of breath, rest. If shortness of breath does not ease, please call your ordering doctor. Fluid can re-accumulate in the chest and/or in the abdomen. If this should occur, your doctor needs to know as you may need to have the procedure done again. Call If:     You should call your Physician and/or the Radiology Nurse if you notice any signs of infection, like pus draining, or if it is swollen or reddened. Also call if you have a fever, or if you are bleeding from the puncture site more than a small amount on the dressing. Call if the puncture site keeps draining fluid. Some oozing is to be expected, but should slow and then stop. Call if you feel like your have pressure in your abdomen.  SEEK IMMEDIATE CARE OR CALL 911 IF YOU SUDDENLY HAVE TROUBLE BREATHING, OR IF YOUR LIPS TURN BLUE, OR IF YOU NOTICE BLOOD IN YOUR SPUTUM. Follow-Up Instructions: Please see your ordering doctor as he/she has requested.      To Reach Us:  [phone] to 10 pm then [phone] after 10 pm      Patient Signature:  Date: 5/17/2019  Discharging Nurse: Miracle Diaz RN

## 2019-05-17 NOTE — PROGRESS NOTES
Patient tolerated paracentesis well.  5500 ml off. Received 25 g albumin per Dr. Carranza Stands standing orders. I have reviewed discharge instructions with the patient. The patient verbalized understanding.

## 2019-05-22 NOTE — PROGRESS NOTES
[facility] - Chemotherapy Progress Note    4773 Pt admit to [facility] for Cycle 3 Carbo/Keytruda/Alimta ambulatory in stable condition. Assessment completed. No new concerns voiced. Port accessed with positive blood return. Labs drawn and sent for processing. 1325 Labs result, chemo ordered. Chemotherapy Flowsheet 5/22/2019   Cycle C3   Date 5/22/2019   Drug / Regimen Carbo/Keytruda/Alimta   Pre Meds -   Notes -         Visit Vitals  /78   Pulse 67   Temp 96.5 °F (35.8 °C)   Resp 18   Ht 6' 0.99\" (1.854 m)   Wt 60.7 kg (133 lb 12.8 oz)   SpO2 98%   BMI 17.66 kg/m²       Medications:  NS KVO  Decadron  Emend  Zofran  Keytruda  Alimta  Carboplatin    1615 Pt tolerated treatment well. Port maintained positive blood return throughout treatment. Flushed, heparinized and de-accessed per protocol. D/c home ambulatory in no distress. Pt aware of next appointment scheduled for 06/19/19. Recent Results (from the past 12 hour(s))   CBC WITH AUTOMATED DIFF    Collection Time: 05/22/19 11:17 AM   Result Value Ref Range    WBC 2.7 (L) 4.1 - 11.1 K/uL    RBC 3.03 (L) 4.10 - 5.70 M/uL    HGB 8.5 (L) 12.1 - 17.0 g/dL    HCT 27.7 (L) 36.6 - 50.3 %    MCV 91.4 80.0 - 99.0 FL    MCH 28.1 26.0 - 34.0 PG    MCHC 30.7 30.0 - 36.5 g/dL    RDW 17.4 (H) 11.5 - 14.5 %    PLATELET 475 (L) 060 - 400 K/uL    MPV 10.6 8.9 - 12.9 FL    NRBC 0.0 0  WBC    ABSOLUTE NRBC 0.00 0.00 - 0.01 K/uL    NEUTROPHILS 73 32 - 75 %    LYMPHOCYTES 12 12 - 49 %    MONOCYTES 14 (H) 5 - 13 %    EOSINOPHILS 0 0 - 7 %    BASOPHILS 0 0 - 1 %    IMMATURE GRANULOCYTES 1 (H) 0.0 - 0.5 %    ABS. NEUTROPHILS 2.0 1.8 - 8.0 K/UL    ABS. LYMPHOCYTES 0.3 (L) 0.8 - 3.5 K/UL    ABS. MONOCYTES 0.4 0.0 - 1.0 K/UL    ABS. EOSINOPHILS 0.0 0.0 - 0.4 K/UL    ABS. BASOPHILS 0.0 0.0 - 0.1 K/UL    ABS. IMM.  GRANS. 0.0 0.00 - 0.04 K/UL    DF SMEAR SCANNED      RBC COMMENTS ANISOCYTOSIS  1+        RBC COMMENTS RBC FRAGMENTS     METABOLIC PANEL, COMPREHENSIVE    Collection Time: 05/22/19 11:17 AM   Result Value Ref Range    Sodium 139 136 - 145 mmol/L    Potassium 4.2 3.5 - 5.1 mmol/L    Chloride 107 97 - 108 mmol/L    CO2 28 21 - 32 mmol/L    Anion gap 4 (L) 5 - 15 mmol/L    Glucose 135 (H) 65 - 100 mg/dL    BUN 33 (H) 6 - 20 MG/DL    Creatinine 0.74 0.70 - 1.30 MG/DL    BUN/Creatinine ratio 45 (H) 12 - 20      GFR est AA >60 >60 ml/min/1.73m2    GFR est non-AA >60 >60 ml/min/1.73m2    Calcium 8.7 8.5 - 10.1 MG/DL    Bilirubin, total 0.6 0.2 - 1.0 MG/DL    ALT (SGPT) 31 12 - 78 U/L    AST (SGOT) 42 (H) 15 - 37 U/L    Alk.  phosphatase 471 (H) 45 - 117 U/L    Protein, total 5.8 (L) 6.4 - 8.2 g/dL    Albumin 1.9 (L) 3.5 - 5.0 g/dL    Globulin 3.9 2.0 - 4.0 g/dL    A-G Ratio 0.5 (L) 1.1 - 2.2     TSH 3RD GENERATION    Collection Time: 05/22/19 11:17 AM   Result Value Ref Range    TSH 6.66 (H) 0.36 - 3.74 uIU/mL

## 2019-05-24 NOTE — PROGRESS NOTES
[facility], MD, [address], [facility], [place]       ALVERTO Andrews PA-C Raj Castor, NANCY-SOHAN Chávez, ALVERTO Solorzano [facility] 136    at [address], 81 [facility], [facility] 22.    [phone]    FAX: [address] [place] [address], [address], [address]    [phone]    FAX: [phone]         Patient Care Team:  Tiarra Mcfadden DO as PCP - General (Internal Medicine)  Janak Dumont MD as Surgeon (Thoracic (non-cardiac) Surgery)  Tim Piper MD (Pulmonary Disease)  Elizabeth Potter MD (Urology)  Jena Gomez MD (Hematology and Oncology)      Problem List  Date Reviewed: 5/18/2019          Codes Class Noted    Other ascites ICD-10-CM: R18.8  ICD-9-CM: 789.59  3/25/2019        Edema ICD-10-CM: R60.9  ICD-9-CM: 782.3  7/12/2018        Elevated liver enzymes ICD-10-CM: R74.8  ICD-9-CM: 790.5  11/8/2017        Adenocarcinoma of lung, stage 4, right ([facility] ICD-10-CM: C34.91  ICD-9-CM: 162.9  11/8/2017        Rash and nonspecific skin eruption ICD-10-CM: R21  ICD-9-CM: 782.1  7/18/2017        Cough ICD-10-CM: R05  ICD-9-CM: 786.2  6/5/2017        Adenocarcinoma of prostate ([facility] ICD-10-CM: C61  ICD-9-CM: 185  5/19/2017        Non-small cell cancer of right lung [facility]) ICD-10-CM: C34.91  ICD-9-CM: 162.9  5/18/2017        Bone metastases ([facility] ICD-10-CM: C79.51  ICD-9-CM: 198.5  5/18/2017        Malignant neoplasm of lung ([facility] 75.) ICD-10-CM: C34.90  ICD-9-CM: 162.9  5/18/2017        Pleural effusion ICD-10-CM: J90  ICD-9-CM: 511.9  5/14/2017        Prostate cancer ([facility] 75.) ICD-10-CM: C61  ICD-9-CM: 185  3/11/2016        Acquired hypothyroidism ICD-10-CM: E03.9  ICD-9-CM: 244.9  1/19/2016 Vitamin D deficiency ICD-10-CM: E55.9  ICD-9-CM: 268.9  1/19/2016        Elevated PSA ICD-10-CM: R97.20  ICD-9-CM: 790.93  1/19/2016        Lung nodules ICD-10-CM: R91.8  ICD-9-CM: 793.19  12/22/2015        Essential hypertension ICD-10-CM: I10  ICD-9-CM: 401.9  12/22/2015        Thoracic scoliosis ICD-10-CM: M41.9  ICD-9-CM: 737.30  12/22/2015        Lesion of right lung ICD-10-CM: R91.1  ICD-9-CM: 518.89  3/17/2015              Manas Caballero returns to the [facility] for management of non-cirrhotic portal hypertension. The active problem list, all pertinent past medical history, medications, liver histology, radiologic findings and laboratory findings related to the liver disorder were reviewed with the patient. The patient is a 70 y.o.  male without any history of liver disease. He was found to have adenocarcinoma of the lung in 5/2017. He has developed metastasis to the liver and bones. He has received several courses of chemotherapy. Liver mets were treated with Y-90 TARE. He developed refractory ascites for the first time in 3/2019. The patient underwent a transjugular liver biopsy with hepatic venous pressure measurements in 5/2019. The biopsy demonstrates no evidence for fibrosis/cirrhosis. Hepatic vein pressures were. RA 3, free hepatic vein 3, wedge hepatic vein 20. HVPG = 17. ECHO was performed in 7/2018. This was normal.    Serologic evaluation for markers of chronic liver disease was negative. At the last appointment he dose of diuretics was increased to step 1. The patient notes fatigue, but nearly all ascites resolved. He still feels he has a little swelling of the abdomne. The patient has not experienced fevers, chills,     The patient has limitations in functional activities which can be attributed to ascites and to other medical problems that are not related to the liver disease.     He has a vacation to [place] planned in early June.      ASSESSMENT AND PLAN:  Ascites   Ascites secondary to Nodular Regenerative Hyperplasia. The liver appears nodular on imaging. The SAAG from 3/2019 was 2.3 - 0.9 = 1.4   The hepatic venous pressure gradient is elevated at 17 mm Hg  The liver biopsy does not show cirrhosis. Only mild portal fibrosis. At the last appintment we increased the dose of diuretics to step 1. He had an excellent response. Ascitess has nearly resolved. Will increase diuretics to lasix step 2 and leave aldactone at step 1. We should be able to control ascites on diuretics without the need for TIPS. Lower extremity edema  Edema has resolved with current dose of diuretics. Thrombocytopenia   This is secondary to chemotherapy and bone marrow suppression. Neutropenia   This is secondary to chemotherapy and bone marrow suppression    Anemia   This is secondary to chemotherapy and bone marrow suppression. ALLERGIES  No Known Allergies    MEDICATIONS  Current Outpatient Medications   Medication Sig    furosemide (LASIX) 20 mg tablet Take 2 Tabs by mouth daily.  spironolactone (ALDACTONE) 50 mg tablet Take 2 Tabs by mouth daily.  levothyroxine (SYNTHROID) 25 mcg tablet Alternate 1 with 2 every other day    zoledronic acid (ZOMETA) 4 mg/100 mL pgbk infusion 4 mg by IntraVENous route every three (3) months.  cholecalciferol (VITAMIN D3) 1,000 unit tablet Take 1,000 Units by mouth daily.  fluticasone propionate (FLONASE) 50 mcg/actuation nasal spray 2 Sprays by Both Nostrils route daily.  ondansetron hcl (ZOFRAN) 8 mg tablet Take 1 Tab by mouth every eight (8) hours as needed for Nausea.  dexamethasone (DECADRON) 4 mg tablet Take 4 mg by mouth See Admin Instructions. Take one tab by mouth on day 2 of chemotherapy    chlorhexidine (PERIDEX) 0.12 % solution RINSE BID     No current facility-administered medications for this visit.         SYSTEM REVIEW NOT RELATED TO LIVER DISEASE OR REVIEWED ABOVE:  Constitution systems: Negative for fever, chills, weight gain, weight loss. Eyes: Negative for visual changes. ENT: Negative for sore throat, painful swallowing. Respiratory: Negative for cough, hemoptysis, SOB. Cardiology: Negative for chest pain, palpitations. GI:  Negative for constipation or diarrhea. : Negative for urinary frequency, dysuria, hematuria, nocturia. Skin: Negative for rash. Hematology: Negative for easy bruising, blood clots. Musculo-skelatal: Negative for back pain, muscle pain, weakness. Neurologic: Negative for headaches, dizziness, vertigo, memory problems not related to HE. Psychology: Negative for anxiety, depression. FAMILY HISTORY:  The father  of leukemia. The mother  at age 80 years. There is no family history of liver disease. SOCIAL HISTORY:  The patient is . The patient has 2 children, and 4 grandchildren. The patient has never used tobacco products. The patient consumes 1-2 alcoholic beverages per day   The patient used to work in business management. The patient retired in . PHYSICAL EXAMINATION:  Visit Vitals  /67 (BP 1 Location: Left arm, BP Patient Position: Sitting)   Pulse 61   Temp 97.3 °F (36.3 °C) (Tympanic)   Ht 6' (1.829 m)   Wt 141 lb 6.4 oz (64.1 kg)   SpO2 99%   BMI 19.18 kg/m²     General: Ill appearing. Eyes: Sclera anicteric. ENT: No oral lesions. Thyroid normal.  Nodes: No adenopathy. Skin: No spider angiomata. No jaundice. No palmar erythema. Respiratory: Lungs clear to auscultation. Cardiovascular: Regular heart rate. No murmurs. No JVD. Abdomen: Distended with obvious ascites. Extremities: No edema. Muscle wasting. Neurologic: Alert and oriented. Cranial nerves grossly intact. No asterixis.       LABORATORY STUDIES:  Liver [place] of [address] Units 2019   WBC 4.1 - 11.1 K/uL  2.7 (L)   ANC 1.8 - 8.0 K/UL  2.0   HGB 12.1 - 17.0 g/dL  8.5 (L)    - 400 K/uL  111 (L)   INR 0.8 - 1.2     AST 15 - 37 U/L  42 (H)   ALT 12 - 78 U/L  31   Alk Phos 45 - 117 U/L  471 (H)   Bili, Total 0.2 - 1.0 MG/DL  0.6   Bili, Direct 0.00 - 0.40 mg/dL     Albumin 3.5 - 5.0 g/dL  1.9 (L)   BUN 8 - 27 mg/dL 34 (H) 33 (H)   Creat 0.76 - 1.27 mg/dL 0.70 (L) 0.74   Na 134 - 144 mmol/L 141 139   K 3.5 - 5.2 mmol/L 4.9 4.2   Cl 96 - 106 mmol/L 105 107   CO2 20 - 29 mmol/L 24 28   Glucose 65 - 99 mg/dL 95 135 (H)     SEROLOGIES:  Serologies Latest Ref Rng & Units 2019   Hep B Surface Ag Negative Negative   Hep C Ab 0.0 - 0.9 s/co ratio <0.1   Ferritin 30 - 400 ng/mL 329   Iron % Saturation 15 - 55 % 11 (L)   WHIT, IFA  Negative   C-ANCA Neg:<1:20 titer <1:20   P-ANCA Neg:<1:20 titer <1:20   ANCA Neg:<1:20 titer <1:20   ASMCA 0 - 19 Units 17   M2 Ab 0.0 - 20.0 Units <20.0   Alpha-1 antitrypsin level 90 - 200 mg/dL 279 (H)     LIVER HISTOLOGY:  2019. Biopsy of liver mass. Adenocarcinoma. 2019. Slides reviewed by MLS. Minimal inflammation in some portal tracts. No lobular inflammation. No steatosis. Focal portal fibrosis. ENDOSCOPIC PROCEDURES:  Not available or performed    RADIOLOGY:  2019. Several enlarging liver nodules measuring 1.3 x 1.8 cm in the right lobe. No mention of surface nodularity or varices consistent with portal HTN or cirrhosis. Asites. 2019. CT scan abdomen with IV contrast.  Changes consistent with cirrhosis. No liver mass lesions. No dilated bile ducts. Moderate ascites. OTHER TESTIN2018. ECHO heart. LVEF 60%,  RV normal.  No TR.    2019. HVP measurements. RA 3,  Free HV 3, Wedged HV 20, HVPG=17    FOLLOW-UP:  All of the issues listed above in the Assessment and Plan were discussed with the patient. All questions were answered. The patient expressed a clear understanding of the above.     [address] [address] in 1 week to check before he goes to [facility] for vacation.       MD Conner Arboleda [facility] [address] [place] [address] A, [address] [place] [address].  [phone]  [address]

## 2019-05-24 NOTE — Clinical Note
5/25/19 Patient: Poncho Smith YOB: 1950 Date of Visit: 5/24/2019 Taya Lester DO 
[address] [facility] [address] [facility] 7 [address] VIA In Basket Dear Taya Lester DO, Thank you for referring Mr. Marko Valentine to [address] [facility] for evaluation. My notes for this consultation are attached. If you have questions, please do not hesitate to call me. I look forward to following your patient along with you. Sincerely, Nikita Rea MD

## 2019-05-24 NOTE — PROGRESS NOTES
Chief Complaint   Patient presents with    Follow-up     Visit Vitals  /67 (BP 1 Location: Left arm, BP Patient Position: Sitting)   Pulse 61   Temp 97.3 °F (36.3 °C) (Tympanic)   Ht 6' (1.829 m)   Wt 141 lb 6.4 oz (64.1 kg)   SpO2 99%   BMI 19.18 kg/m²     3 most recent PHQ Screens 5/15/2019   Little interest or pleasure in doing things Not at all   Feeling down, depressed, irritable, or hopeless Not at all   Total Score PHQ 2 0     Abuse Screening Questionnaire 4/26/2019   Do you ever feel afraid of your partner? N   Are you in a relationship with someone who physically or mentally threatens you? N   Is it safe for you to go home? Y     1. Have you been to the ER, urgent care clinic since your last visit? Hospitalized since your last visit? No    2. Have you seen or consulted any other health care providers outside of the [address] [place] [address] since your last visit? Include any pap smears or colon screening.  No

## 2019-05-29 NOTE — PROGRESS NOTES
[address] [facility] 137 [facility] Cristopher Galindo MD, [facility] MD Miguel Montez, WANDA Argueta, [facility] April ALVERTO Dunaway NP Rip Midget, NP [address] 136 
  at [address], [address] [address]  22. 
  [phone] FAX: [address] [place] [address] Avenue 
  [facility] 
  [address], [address] Zaid Greenler, [address] 
  [phone] FAX: [phone] Patient Care Team: 
Sonal Kellogg DO as PCP - General (Internal Medicine) Kadi Crain MD as Surgeon (Thoracic (non-cardiac) Surgery) Aneta Chris MD (Pulmonary Disease) Geno Garica MD (Urology) Clyde Estrada MD (Hematology and Oncology) Problem List  Date Reviewed: 6/9/2019 Codes Class Noted Ascites ICD-10-CM: R18.8 ICD-9-CM: 789.59  3/25/2019 Elevated liver enzymes ICD-10-CM: R74.8 ICD-9-CM: 790.5  11/8/2017 Adenocarcinoma of lung, stage 4, right ([facility] ICD-10-CM: C34.91 
ICD-9-CM: 162.9  11/8/2017 Bone metastases ([facility] ICD-10-CM: C79.51 
ICD-9-CM: 198.5  5/18/2017 Pleural effusion ICD-10-CM: J90 ICD-9-CM: 511.9  5/14/2017 Prostate cancer [facility]) ICD-10-CM: I34 ICD-9-CM: 185  3/11/2016 Acquired hypothyroidism ICD-10-CM: E03.9 ICD-9-CM: 244.9  1/19/2016 Vitamin D deficiency ICD-10-CM: E55.9 ICD-9-CM: 268.9  1/19/2016 Essential hypertension ICD-10-CM: I10 
ICD-9-CM: 401.9  12/22/2015 Thoracic scoliosis ICD-10-CM: M41.9 ICD-9-CM: 737.30  12/22/2015 Evelio Jacinto returns to the [place] [address] for management of non-cirrhotic portal hypertension.   The active problem list, all pertinent past medical history, medications, liver histology, radiologic findings and laboratory findings related to the liver disorder were reviewed with the patient. The patient is a 70 y.o.  male without any history of liver disease. He was found to have adenocarcinoma of the lung in 5/2017. He has developed metastasis to the liver and bones. He has received several courses of chemotherapy. He developed refractory ascites for the first time in 3/2019. The patient underwent a transjugular liver biopsy with hepatic venous pressure measurements in 5/2019. The biopsy demonstrates no evidence for fibrosis/cirrhosis. Hepatic vein pressures were. RA 3, free hepatic vein 3, wedge hepatic vein 20. HVPG = 17. At the last appointment he dose of diuretics was increased to step 2. The patient notes fatigue, and ascites is fully resolved. The patient has not experienced fevers, chills, The patient has limitations in functional activities which can be attributed to ascites and to other medical problems that are not related to the liver disease. He is leaving for vacation to [facility] next week. ASSESSMENT AND PLAN: 
Ascites Ascites secondary to Nodular Regenerative Hyperplasia. The liver appears nodular on imaging. The SAAG from 3/2019 was 2.3 - 0.9 = 1.4 The hepatic venous pressure gradient is elevated at 17 mm Hg The liver biopsy does not show cirrhosis. Only mild portal fibrosis. At the last appintment we increased the dose of diuretics to step 2. He had an excellent response. Ascitess has resolved. Will continue the current dose of diuretics at step 2. Lower extremity edema Edema has resolved with current dose of diuretics. Thrombocytopenia This is secondary to chemotherapy and bone marrow suppression. Neutropenia This is secondary to chemotherapy and bone marrow suppression Anemia This is secondary to chemotherapy and bone marrow suppression. ALLERGIES No Known Allergies MEDICATIONS Current Outpatient Medications Medication Sig  furosemide (LASIX) 20 mg tablet Take 2 Tabs by mouth daily.  spironolactone (ALDACTONE) 50 mg tablet Take 2 Tabs by mouth daily.  fluticasone propionate (FLONASE) 50 mcg/actuation nasal spray 2 Sprays by Both Nostrils route daily. (Patient taking differently: 2 Sprays by Both Nostrils route as needed.)  levothyroxine (SYNTHROID) 25 mcg tablet Alternate 1 with 2 every other day  ondansetron hcl (ZOFRAN) 8 mg tablet Take 1 Tab by mouth every eight (8) hours as needed for Nausea.  dexamethasone (DECADRON) 4 mg tablet Take 4 mg by mouth See Admin Instructions. Take one tab by mouth on day 2 of chemotherapy  zoledronic acid (ZOMETA) 4 mg/100 mL pgbk infusion 4 mg by IntraVENous route every three (3) months.  cholecalciferol (VITAMIN D3) 1,000 unit tablet Take 1,000 Units by mouth daily.  chlorhexidine (PERIDEX) 0.12 % solution RINSE BID No current facility-administered medications for this visit. SYSTEM REVIEW NOT RELATED TO LIVER DISEASE OR REVIEWED ABOVE: 
Constitution systems: Negative for fever, chills, weight gain, weight loss. Eyes: Negative for visual changes. ENT: Negative for sore throat, painful swallowing. Respiratory: Negative for cough, hemoptysis, SOB. Cardiology: Negative for chest pain, palpitations. GI:  Negative for constipation or diarrhea. : Negative for urinary frequency, dysuria, hematuria, nocturia. Skin: Negative for rash. Hematology: Negative for easy bruising, blood clots. Musculo-skelatal: Negative for back pain, muscle pain, weakness. Neurologic: Negative for headaches, dizziness, vertigo, memory problems not related to HE. Psychology: Negative for anxiety, depression. FAMILY HISTORY: 
The father  of leukemia. The mother  at age 80 years. There is no family history of liver disease. SOCIAL HISTORY: 
The patient is . The patient has 2 children, and 4 grandchildren. The patient has never used tobacco products. The patient consumes 1-2 alcoholic beverages per day The patient used to work in business management. The patient retired in 2015. PHYSICAL EXAMINATION: 
Visit Vitals /81 (BP 1 Location: Right arm, BP Patient Position: Sitting) Pulse 88 Temp 98.2 °F (36.8 °C) (Oral) Resp 14 Ht 6' (1.829 m) Wt 135 lb (61.2 kg) SpO2 97% BMI 18.31 kg/m² General: Ill appearing. Eyes: Sclera anicteric. ENT: No oral lesions. Thyroid normal. 
Nodes: No adenopathy. Skin: No spider angiomata. No jaundice. No palmar erythema. Respiratory: Lungs clear to auscultation. Cardiovascular: Regular heart rate. No murmurs. No JVD. Abdomen: Distended with obvious ascites. Extremities: No edema. Muscle wasting. Neurologic: Alert and oriented. Cranial nerves grossly intact. No asterixis. LABORATORY STUDIES: 
Liver [place] of [address] Units 5/24/2019 5/22/2019 WBC 4.1 - 11.1 K/uL  2.7 (L) ANC 1.8 - 8.0 K/UL  2.0 HGB 12.1 - 17.0 g/dL  8.5 (L)  - 400 K/uL  111 (L) INR 0.8 - 1.2 AST 15 - 37 U/L  42 (H) ALT 12 - 78 U/L  31 Alk Phos 45 - 117 U/L  471 (H) Bili, Total 0.2 - 1.0 MG/DL  0.6 Bili, Direct 0.00 - 0.40 mg/dL Albumin 3.5 - 5.0 g/dL  1.9 (L) BUN 8 - 27 mg/dL 34 (H) 33 (H) Creat 0.76 - 1.27 mg/dL 0.70 (L) 0.74 Na 134 - 144 mmol/L 141 139  
K 3.5 - 5.2 mmol/L 4.9 4.2 Cl 96 - 106 mmol/L 105 107 CO2 20 - 29 mmol/L 24 28 Glucose 65 - 99 mg/dL 95 135 (H) SEROLOGIES: 
Serologies Latest Ref Rng & Units 4/26/2019 Hep B Surface Ag Negative Negative Hep C Ab 0.0 - 0.9 s/co ratio <0.1 Ferritin 30 - 400 ng/mL 329 Iron % Saturation 15 - 55 % 11 (L)  
WHIT, IFA  Negative C-ANCA Neg:<1:20 titer <1:20  
P-ANCA Neg:<1:20 titer <1:20  
ANCA Neg:<1:20 titer <1:20  
ASMCA 0 - 19 Units 17  
M2 Ab 0.0 - 20.0 Units <20.0 Alpha-1 antitrypsin level 90 - 200 mg/dL 279 (H) LIVER HISTOLOGY: 
2019. Biopsy of liver mass. Adenocarcinoma. 2019. Slides reviewed by MLS. Minimal inflammation in some portal tracts. No lobular inflammation. No steatosis. Focal portal fibrosis. ENDOSCOPIC PROCEDURES: 
Not available or performed RADIOLOGY: 
2019. Several enlarging liver nodules measuring 1.3 x 1.8 cm in the right lobe. No mention of surface nodularity or varices consistent with portal HTN or cirrhosis. Asites. 2019. CT scan abdomen with IV contrast.  Changes consistent with cirrhosis. No liver mass lesions. No dilated bile ducts. Moderate ascites. OTHER TESTIN2018. ECHO heart. LVEF 60%,  RV normal.  No TR.   
2019. HVP measurements. RA 3,  Free HV 3, Wedged HV 20, HVPG=17 
 
FOLLOW-UP: 
All of the issues listed above in the Assessment and Plan were discussed with the patient. All questions were answered. The patient expressed a clear understanding of the above. [address] [facility] in 8 weeks after he retunrs from [place]. MD Rudy Booker [address] [address], [address] [facility] 22. 
[phone] [address] [facility]

## 2019-05-29 NOTE — PROGRESS NOTES
Wally Chopra is a 70 y.o. male Chief Complaint Patient presents with  Follow Up Chronic Condition 1. Have you been to the ER, urgent care clinic since your last visit? Hospitalized since your last visit? No 
 
2. Have you seen or consulted any other health care providers outside of the [address] [place] [address] since your last visit? Include any pap smears or colon screening.  No

## 2019-05-29 NOTE — Clinical Note
6/9/19 Patient: Karin Pereira YOB: 1950 Date of Visit: 5/29/2019 Kenton Aceves DO 
[address] [facility] [address] [facility] 7 [address] VIA In Basket Dear Kenton Aceves DO, Thank you for referring Mr. James Dubois to [address] [place] [address],[address] for evaluation. My notes for this consultation are attached. If you have questions, please do not hesitate to call me. I look forward to following your patient along with you. Sincerely, Jet Csat MD

## 2019-05-30 NOTE — PROGRESS NOTES
Pt tolerated procedure well.  3200cc paracentesis fluid removed. Specimen collected and carried to lab.

## 2019-05-30 NOTE — TELEPHONE ENCOUNTER
Critical lab values given to this nurse from front office staff. Reviewed lab work in [facility]. Lab values given to Marine Silva RN with Dr. Alok [place] office. [place] Text results to Dr. Karime Rodriguez.

## 2019-05-30 NOTE — PROGRESS NOTES
Discharge instructions have been reviewed with patient and present family. Copy given to patient. Pt verbalized understanding of instructions and was given  the opportunity to ask questions and receive answers prior to leaving. Pt discharged with family and assisted out by RN in wheelchair.

## 2019-05-30 NOTE — DISCHARGE INSTRUCTIONS
[facility]  Radiology Department  [phone]    Radiologist:  Dr. Mallory Judd    Date:  05/30/2019      Paracentesis Discharge Instructions    You may have an aching pain in your abdomen at the puncture site tonight as the numbing medicine wears off. You may take Tylenol, as directed on the label, for  pain or discomfort. Resume your previous diet and medications. Rest the remainder of today. If we have removed a large volume of fluid, you be lightheaded or dizzy when making position changes. You may shower in 24 hours. Keep the dressing clean and dry until the site has healed. Watch for signs of infection at the puncture site. .. redness, swelling, pus, fever or chills. Contact your physician immediately if this occurs. If you experience severe sweating and new, severe abdominal pain seek emergency medical treatment. Follow up with your physician as previously discussed.

## 2019-06-09 PROBLEM — R05.9 COUGH: Status: RESOLVED | Noted: 2017-06-05 | Resolved: 2019-01-01

## 2019-06-09 PROBLEM — R60.9 EDEMA: Status: RESOLVED | Noted: 2018-07-12 | Resolved: 2019-01-01

## 2019-06-09 PROBLEM — C34.90 MALIGNANT NEOPLASM OF LUNG (HCC): Status: RESOLVED | Noted: 2017-05-18 | Resolved: 2019-01-01

## 2019-06-09 PROBLEM — R21 RASH AND NONSPECIFIC SKIN ERUPTION: Status: RESOLVED | Noted: 2017-07-18 | Resolved: 2019-01-01

## 2019-06-09 PROBLEM — C61 ADENOCARCINOMA OF PROSTATE (HCC): Status: RESOLVED | Noted: 2017-05-19 | Resolved: 2019-01-01

## 2019-06-09 PROBLEM — C34.91 NON-SMALL CELL CANCER OF RIGHT LUNG (HCC): Status: RESOLVED | Noted: 2017-05-18 | Resolved: 2019-01-01

## 2019-06-19 NOTE — PROGRESS NOTES
Hematology/Oncology progress note REASON FOR VISIT: Stage IV EGFR mutated NSCLC HISTORY OF PRESENT ILLNESS: Mr. Rafat Brian is a 70 y.o. male with prostate cancer who has stage IV NSCLC- adenocarcinoma (EGFR mutated , PD-L1 low) in May 2017. He progressed on Tarceva and then Osimertinib. Started Carboplatin+ Alimta+ Keytruda but felt poorly after cycle 1. He has had recurrent non malignant ascites requiring therapeutic taps and also saw [facility] for evaluation of potential clinical studies. Comes in today for follow-up and cycle 4 of carbo + alimta + [place] His last paracentesis was 5/30/19. On diuretics per hepatology. States he re-accumulated some on his return flight home from [place] but he does not feel he needs a paracentesis at this time. Has follow-up with Dr. Kiesha Neal on Friday. Right before his trip he developed severe right hip pain that radiates down his right thigh. This is causing an unsteady gait and he now has to use a cane. He denies falls or bowel/bldader incontinence. He is taking ibuprofen intermittently for the pain as Tramadol is not helping at all. His pain in his upper back and right shoulder is at baseline and tolerable. Cough and SOB is no different. Has some lightheadedness when he stands and states he has been sleeping more during the day. Oncologic history Mr. Rafat Brian noticed a new cough and fevers back in March of 2017. He went to Urgent Care and received antibiotics and cough medication. He states this worked for a while, but he began to notice his cough return. This was intermittent. He had some tightness across his anterior chest which he related to the infection. Chest x-ray was abnormal and he was told to proceed to the Emergency Department. Keila Celeste had been under surveillance for right lower lobe mass that was initially noted in 2015. Bronch at that time was negative for malignancy.  He was evaluated by Dr. Johnny Red with Thoracic Surgery in 2015 for possible surgical resection. CT chest in November of 2016 with mass size unchanged but some right basilar pleural thickening. CT chest obtained 5/14/17 however showed a new large right pleural effusion with right middle lobe and right lower lobe collapse. Irregular spiculated right lower lobe mass 3.8cm and stable precarinal enlarged lymph nodes were noted. New right posterior second rib lytic lesion and new right hepatic hypodensity consistent with mets. He underwent a therapeutic thoracentesis on 5/15/17 with removal of 1500 cc of serosanguinous fluid. Pathology showed adenocarcinoma. PET CT showed pleural, bone, liver and flor metastasis. MRI brain 5/20/17: No metastasis. Needed repeat R sided thoracentesis on 5/25/17, had some post procedure hydropneumothorax. He was seen by Dr. Missy Yoder at [facility] for Pleurx catheter.  
  
CT guided biopsy of R lung mass done 5/25 which showed adenocarcinoma. EGFR mutation detected Exon 18 and 21 Treatment 6/26/17: Tarceva. Monthly Xgeva. Palliative XRT to R hip CT CAP 10/2/17: Response Ct 1/23/18: CT with some growth of LLL mass but stable by RECIST. CT 3/15/18 and Bone scan stable though he had worsening left back pain. MRI results showed extensive disease at L2, S2 and additional lesions as previously known. Received palliative XRT that he completed 4/18/18 5/26/18: Started Osimertinib as he had a T790M mutation. Stopped 6/20/18 due to platelets of 97L Resumed at 80 mg every other day on 7/6/18 Started 40mg daily on 7/12/18 2/19: Progressive disease with new liver metastases and progression of thoracic disease 2/22/19: liver biopsy pathology shows metastatic adenocarcinoma, consistent with lung primary- foundation sent 2/26/19: Cycle 1 of Carboplatin+ Alimta+ Keytruda. 3/6/19- 3/13/19: 30 Gy to Rt axilla and T spine 4/30/19: CT chest abdomen and pelvis stable Past Medical History:  
Diagnosis Date  Ascites 02/2019  Cancer ([facility] [address] 2017  
 lung ca  Hypertension Past Surgical History:  
Procedure Laterality Date  HX ORTHOPAEDIC    
 reconstruction of left little finger  IR BX LIVER PERCUTANEOUS  2/22/2019  IR BX TRANSCATHETER  5/6/2019  IR INSERT TUNL CVC W PORT OVER 5 YEARS  2/22/2019  IR PARACENTESIS ABD INIT  03/2019  
 6 total to date, 5/13/2019  IR PARACENTESIS ABD W IMAGE  5/6/2019 No Known Allergies Current Outpatient Medications Medication Sig Dispense Refill  morphine IR (MS IR) 15 mg tablet Take 1 Tab by mouth every four (4) hours as needed for Pain for up to 14 days. Max Daily Amount: 90 mg. 84 Tab 0  
 furosemide (LASIX) 20 mg tablet Take 2 Tabs by mouth daily. 90 Tab 3  
 spironolactone (ALDACTONE) 50 mg tablet Take 2 Tabs by mouth daily. 90 Tab 3  
 fluticasone propionate (FLONASE) 50 mcg/actuation nasal spray 2 Sprays by Both Nostrils route daily. (Patient taking differently: 2 Sprays by Both Nostrils route as needed.) 1 Bottle 5  
 levothyroxine (SYNTHROID) 25 mcg tablet Alternate 1 with 2 every other day 60 Tab 5  
 ondansetron hcl (ZOFRAN) 8 mg tablet Take 1 Tab by mouth every eight (8) hours as needed for Nausea. 30 Tab 6  
 dexamethasone (DECADRON) 4 mg tablet Take 4 mg by mouth See Admin Instructions. Take one tab by mouth on day 2 of chemotherapy 18 Tab 0  chlorhexidine (PERIDEX) 0.12 % solution RINSE BID  0  
 zoledronic acid (ZOMETA) 4 mg/100 mL pgbk infusion 4 mg by IntraVENous route every three (3) months.  cholecalciferol (VITAMIN D3) 1,000 unit tablet Take 1,000 Units by mouth daily. Social History Socioeconomic History  Marital status:  Spouse name: Not on file  Number of children: Not on file  Years of education: Not on file  Highest education level: Not on file Tobacco Use  Smoking status: Never Smoker  Smokeless tobacco: Never Used Substance and Sexual Activity  Alcohol use: Not Currently Alcohol/week: 6.0 oz Types: 10 Glasses of wine per week Comment: ocassionally  Drug use: No  
 Sexual activity: Yes  
  Partners: Female Comment:  has 2 children Family History Problem Relation Age of Onset  Cancer Mother   
     leukemia  Stroke Father  Cancer Brother   
     bladder ROS As reviewed under HP 
ECOG PS is 0 Emotional well being addressed and patient is coping well Physical Examination:  
Visit Vitals /80 Pulse 69 Temp 97.5 °F (36.4 °C) Ht 6' 1\" (1.854 m) Wt 145 lb (65.8 kg) SpO2 90% BMI 19.13 kg/m² General appearance - alert, frail, and in no distress Mental status - oriented to person, place, and time Mouth - mucous membranes moist, pharynx normal without lesions. Neck - supple, no significant adenopathy Lymphatics - no palpable lymphadenopathy, no hepatosplenomegaly Resp-CTAB, normal respiratory effort CV-s1s2, 1+ LE edema Abdomen - soft, nontender,distended Neurological - normal speech Skin: No rashes MSK- no edema, using cane due to right hip/thigh pain LABS Lab Results Component Value Date/Time WBC 4.8 06/19/2019 11:13 AM  
 HGB 7.5 (L) 06/19/2019 11:13 AM  
 HCT 24.6 (L) 06/19/2019 11:13 AM  
 PLATELET 186 (L) 40/78/2255 11:13 AM  
 MCV 93.5 06/19/2019 11:13 AM  
 ABS. NEUTROPHILS PENDING 06/19/2019 11:13 AM  
 
Lab Results Component Value Date/Time Sodium 141 06/19/2019 11:13 AM  
 Potassium 4.1 06/19/2019 11:13 AM  
 Chloride 106 06/19/2019 11:13 AM  
 CO2 29 06/19/2019 11:13 AM  
 Glucose 99 06/19/2019 11:13 AM  
 BUN 22 (H) 06/19/2019 11:13 AM  
 Creatinine 0.69 (L) 06/19/2019 11:13 AM  
 GFR est AA >60 06/19/2019 11:13 AM  
 GFR est non-AA >60 06/19/2019 11:13 AM  
 Calcium 8.4 (L) 06/19/2019 11:13 AM  
 
Lab Results Component Value Date/Time AST (SGOT) 37 06/19/2019 11:13 AM  
 Alk.  phosphatase 331 (H) 06/19/2019 11:13 AM  
 Protein, total 5.2 (L) 06/19/2019 11:13 AM  
 Albumin 2.0 (L) 06/19/2019 11:13 AM  
 Globulin 3.2 06/19/2019 11:13 AM  
 A-G Ratio 0.6 (L) 06/19/2019 11:13 AM  
 
 
Guardant 360 results under media- T790M present 
  
2/9/19 Thoracic spine MRI IMPRESSION: 
1. At T5 there has been interval progression of metastatic disease with 
extension on the left into the epidural space and left T5-6 foramen. 2. Interval progression at T8 of extraosseous tumor on the left with extension 
into the left T8-9 foramen and adjacent posterior elements extending out along 
the proximal intercostal space. 3. Stable findings at T1. 
4. No abnormal intradural enhancement with normal signal in the spinal cord. Cervical spine MRI IMPRESSION: 
1. Accentuated cervical lordosis. 2. Chronic degenerative changes at the odontoid and C1. 
3. Sclerosis right lateral mass C1 may represent metastasis. 4. Congenital segmentation anomaly/fusion C4 and C5. 
5. Chronic degenerative changes C3-4, C5-6 and to lesser extent C6-7 with 
minimal to mild stenosis and no cord compression. Variable foramina stenosis in 
association with facet arthrosis. 6. Abnormal marrow signal T1 consistent with chronic metastasis to this Vertebra. Ct cap 2/9/19 IMPRESSION:  
1. Stable right lower lobe lung mass and adjacent subcentimeter satellite 
nodule. Right pleural nodularity and a chronic small right pleural effusion are 
unchanged. 
  
2. New groundglass attenuation in the lingula may represent nonspecific 
infection or inflammation. Attention on follow-up CT is suggested. 
  
3. Several low-density liver lesions are more conspicuous on the current exam 
suspicious for metastatic disease. The largest in the right liver measures 1.3 x 
1.8 cm.  
  
4. Stable subcentimeter left adrenal nodule.  
  
5. Stable scattered bony metastases. Extra osseous progression at T5 and T8 are 
better seen on the concurrent MR thoracic spine. 
  
6.  New splenomegaly measuring 13.9 cm in length. 
  
 7. Increased small amount of intraperitoneal ascites. CT chest 4/30/19: IMPRESSION: 
1. Stable volume loss in the right hemithorax with stable right lower lobe mass 
thickening and nodularity. 2. Cirrhotic appearing liver. There are 2 small hypodensities, one is stable, 
one slightly larger. 3. Mild ascites, slightly increased from the prior examination. 4. Stable sclerotic bone metastases. ASSESSMENT AND PLAN Mr. Katelynn Brar is a 70 y.o. male with: 
 
1. Stage IV NSCLC With R lung mass, mediastinal adenopathy, malignant R pleural effusion, multiple asymptomatic bone metastasis and possibly liver metastasis. EGFR mutated, PD-L1 5%. Found to have T790M mutation but most recently progressed on Osimertinib with progressive painful bone metastasis. We discussed options down the road that include clinical trials such as the biomarker driven LUNGMAP study that we have available with us, versus chemotherapy plus immunotherapy versus other clinical trials. He was not found to be eligible for [facility] due to his prostate cancer. Underwent a tissue biopsy of his liver and pathology shows metastatic adenocarcinoma and foundation one is reviewed today. This shows a BRCA 2 mutation. Not eligible for NCI match due to his other malignancy. Unfortunately he is excluded from multiple trials due to his prostate cancer. Excluded from [facility] BRCA/MILTON: Avelumab Plus Talazoparib in Patients With BRCA or MILTON Mutant Solid Tumors due to exposure to [facility]. Hence we decided to move forward with Carbo + Alimta and Keytruda. CT scans 4/30/19 were reviewed and stable. He received cycle 2 of Carbo+ Alimta at a 50% dose reduction due to intolerance to cycle 1 and tolerated this well. He does have grade 3 thrombocytopenia though. His PLT recovered to grade 1 and was given the same dose for cycle 3. Labs obtained on 5/29 by hepatology show grade 1 thrombocytopenia and grade 4 neutropenia.  Hence, moving forward will drop Carboplatin today for cycle 4 and increase dose of Alimta back to 400mg/m2. Increased right hip pain is also concerning for worsening disease in right hip. If imaging suggests disease progression options moving forward will include olaparib given BRCA 2 mutation if we able to get him free drug vs chemotherapy ( docetaxel) · Proceed today with cycle 4 of Alimta (increase back to 400mg/m2 for cycle 4) + Keytruda given every 3 weeks. Dropping carboplatin moving forward. · Continue Folic acid · Vitamin b12 per protocol · Zofran PRN 
· Zometa q3 months · CT CAP and MRI of right hip to be obtained prior to next cycle 2. Gorin 6 prostate cancer on active surveillance Records reviewed 3. Bone pain:  secondary to osseous metastasis s/p XRT to R ischium and L2. Shoulder pain improved after radiation S/P 30 Gy to Rt axilla and T spine  Completed 3/13/19 Now with new right hip/thigh pain concerning for worsening of known right iliac crest lesion noted on previous CT scan Will repeat imaging with CT CAP and MRI of right hip in anticipation he may need radiation to area Advised him he must stop Ibuprofen given risk of increased toxicity of Alimta Will trial Morphine 15mg IR q4 hours and refer to palliative care 4. HTN. Now normotensive. 5. Diarrhea- resolved 6. Recurrent ascites S/P Paracentesis on 3/22, 4/10 and 4/19, 4/30 Large volume Non malignant Due to portal HTN, there is no cirrhosis on liver biopsy and this is thought to be due to drug related injury I am uncertain of the prognosis of his liver disease It does make palliative treatments for #1 harder to tolerate and in that respect worsens his overall prognosis Diuretics and management of liver disease per Dr. Welsh Sports. He does not feel he needs paracentesis at this time. RTC in 3 weeks, next on 7/11/19 for scan review and further management Mulugeta Still MD

## 2019-06-19 NOTE — PROGRESS NOTES
[facility] - Chemotherapy Progress Note    [id number] Pt admit to [facility] for Keytruda + Alimta + B12  ambulatory in stable condition. Assessment completed. No new concerns voiced. Port with positive blood return. Labs drawn and reviewed and treatment started. Chemotherapy Flowsheet 6/19/2019   Cycle C4   Date 6/19/2019   Drug / Regimen Keytruda + Alimta+ B12   Pre Meds given   Notes given         Visit Vitals  /80   Pulse 62   Temp 97.9 °F (36.6 °C)   Resp 18   Ht 6' 1\" (1.854 m)   Wt 65.8 kg (145 lb)   BMI 19.13 kg/m²       Medications:  Decadron  Emend  Zofran  Keytruda  Alimta  B12 IM left arm      1545 Pt tolerated treatment well. Port maintained positive blood return throughout treatment. Flushed, heparinized and de-accessed per protocol. D/c home ambulatory in no distress. Pt aware of next appointment scheduled for 7/10/19. Recent Results (from the past 12 hour(s))   CBC WITH AUTOMATED DIFF    Collection Time: 06/19/19 11:13 AM   Result Value Ref Range    WBC 4.8 4.1 - 11.1 K/uL    RBC 2.63 (L) 4.10 - 5.70 M/uL    HGB 7.5 (L) 12.1 - 17.0 g/dL    HCT 24.6 (L) 36.6 - 50.3 %    MCV 93.5 80.0 - 99.0 FL    MCH 28.5 26.0 - 34.0 PG    MCHC 30.5 30.0 - 36.5 g/dL    RDW 21.1 (H) 11.5 - 14.5 %    PLATELET 977 (L) 173 - 400 K/uL    MPV 11.5 8.9 - 12.9 FL    NRBC 0.0 0  WBC    ABSOLUTE NRBC 0.00 0.00 - 0.01 K/uL    NEUTROPHILS 79 (H) 32 - 75 %    LYMPHOCYTES 8 (L) 12 - 49 %    MONOCYTES 11 5 - 13 %    EOSINOPHILS 1 0 - 7 %    BASOPHILS 0 0 - 1 %    IMMATURE GRANULOCYTES 1 (H) 0.0 - 0.5 %    ABS. NEUTROPHILS 3.9 1.8 - 8.0 K/UL    ABS. LYMPHOCYTES 0.4 (L) 0.8 - 3.5 K/UL    ABS. MONOCYTES 0.5 0.0 - 1.0 K/UL    ABS. EOSINOPHILS 0.0 0.0 - 0.4 K/UL    ABS. BASOPHILS 0.0 0.0 - 0.1 K/UL    ABS. IMM.  GRANS. 0.0 0.00 - 0.04 K/UL    DF SMEAR SCANNED      PLATELET COMMENTS Large Platelets      RBC COMMENTS ANISOCYTOSIS  2+        RBC COMMENTS TEARDROP CELLS  PRESENT        RBC COMMENTS RBC FRAGMENTS METABOLIC PANEL, COMPREHENSIVE    Collection Time: 06/19/19 11:13 AM   Result Value Ref Range    Sodium 141 136 - 145 mmol/L    Potassium 4.1 3.5 - 5.1 mmol/L    Chloride 106 97 - 108 mmol/L    CO2 29 21 - 32 mmol/L    Anion gap 6 5 - 15 mmol/L    Glucose 99 65 - 100 mg/dL    BUN 22 (H) 6 - 20 MG/DL    Creatinine 0.69 (L) 0.70 - 1.30 MG/DL    BUN/Creatinine ratio 32 (H) 12 - 20      GFR est AA >60 >60 ml/min/1.73m2    GFR est non-AA >60 >60 ml/min/1.73m2    Calcium 8.4 (L) 8.5 - 10.1 MG/DL    Bilirubin, total 0.9 0.2 - 1.0 MG/DL    ALT (SGPT) 20 12 - 78 U/L    AST (SGOT) 37 15 - 37 U/L    Alk.  phosphatase 331 (H) 45 - 117 U/L    Protein, total 5.2 (L) 6.4 - 8.2 g/dL    Albumin 2.0 (L) 3.5 - 5.0 g/dL    Globulin 3.2 2.0 - 4.0 g/dL    A-G Ratio 0.6 (L) 1.1 - 2.2     MAGNESIUM    Collection Time: 06/19/19 11:13 AM   Result Value Ref Range    Magnesium 1.7 1.6 - 2.4 mg/dL

## 2019-06-21 NOTE — Clinical Note
8/17/19 Patient: Tim Dumont YOB: 1950 Date of Visit: 6/21/2019 Linda Fitzpatrick DO 
[address] [facility] [facility] [id number] VIA In Basket Dear Linda Fitzpatrick DO, [facility]. My notes for this consultation are attached. If you have questions, please do not hesitate to call me. I look forward to following your patient along with you. Sincerely, Nano Pena MD

## 2019-06-21 NOTE — PROGRESS NOTES
Chief Complaint   Patient presents with    Follow-up     Visit Vitals  /62 (BP 1 Location: Left arm, BP Patient Position: Sitting)   Pulse 64   Temp 97.2 °F (36.2 °C) (Tympanic)   Ht 6' 1\" (1.854 m)   Wt 148 lb 3.2 oz (67.2 kg)   SpO2 94%   BMI 19.55 kg/m²     3 most recent PHQ Screens 6/19/2019   Little interest or pleasure in doing things Not at all   Feeling down, depressed, irritable, or hopeless Not at all   Total Score PHQ 2 0     Abuse Screening Questionnaire 6/19/2019   Do you ever feel afraid of your partner? N   Are you in a relationship with someone who physically or mentally threatens you? N   Is it safe for you to go home? Y     1. Have you been to the ER, urgent care clinic since your last visit? Hospitalized since your last visit? No    2. Have you seen or consulted any other health care providers outside of the [address] [place] [address] since your last visit? Include any pap smears or colon screening.  No

## 2019-06-21 NOTE — PROGRESS NOTES
[address] [place] [address], MD, Orion Osier, Bernardino Shone, MD Ary Quinones, WANDA Morrow, [facility]     April Sylvie Griffin, ALVERTO Peterson, ALVERTO Perez, ALVERTO Hendrickson 136    at [address], [address] [place] [address], [facility] 22.    [phone]    FAX: [address] [place] [address], [address]    [phone]    FAX: [phone]       Patient Care Team:  Tonia Albrecht DO as PCP - General (Internal Medicine)  Kristen Henriquez MD as Surgeon (Thoracic (non-cardiac) Surgery)  Susanne Husain MD (Pulmonary Disease)  Selma Mays MD (Urology)  Wen Del Angel MD (Hematology and Oncology)      Problem List  Date Reviewed: 6/20/2019          Codes Class Noted    Ascites ICD-10-CM: R18.8  ICD-9-CM: 789.59  3/25/2019        Elevated liver enzymes ICD-10-CM: R74.8  ICD-9-CM: 790.5  11/8/2017        Adenocarcinoma of lung, stage 4, right ([facility] ICD-10-CM: C34.91  ICD-9-CM: 162.9  11/8/2017        Bone metastases ([facility] ICD-10-CM: C79.51  ICD-9-CM: 198.5  5/18/2017        Pleural effusion ICD-10-CM: J90  ICD-9-CM: 511.9  5/14/2017        Prostate cancer ([facility] ICD-10-CM: C61  ICD-9-CM: 185  3/11/2016        Acquired hypothyroidism ICD-10-CM: E03.9  ICD-9-CM: 244.9  1/19/2016        Vitamin D deficiency ICD-10-CM: E55.9  ICD-9-CM: 268.9  1/19/2016        Essential hypertension ICD-10-CM: I10  ICD-9-CM: 401.9  12/22/2015        Thoracic scoliosis ICD-10-CM: M41.9  ICD-9-CM: 737.30  12/22/2015              [facility] for management of non-cirrhotic portal hypertension.   The active problem list, all pertinent past medical history, medications, liver histology, radiologic findings and laboratory findings related to the liver disorder were reviewed with the patient. The patient is a 70 y.o.  male without any history of liver disease. He was found to have adenocarcinoma of the lung in 5/2017. He has developed metastasis to the liver and bones. He has received several courses of chemotherapy. He developed refractory ascites for the first time in 3/2019. The patient underwent a transjugular liver biopsy with hepatic venous pressure measurements in 5/2019. The biopsy demonstrates no evidence for fibrosis/cirrhosis. Hepatic vein pressures were. RA 3, free hepatic vein 3, wedge hepatic vein 20. HVPG = 17. Ascites has been well controlled on step 3 diuretic therapy. Patient began to experience severe hand cramping and decreased to step 2 last week. He has now developed lower extremity edema. The creatinine has remained stable with the increased dose of diuretics. While in [place] he developed severe right hip pain which is most likely a new metastases. Oncology started Morphine which has improved the pain allowing him to ambulate without assistance. The patient notes fatigue, and ascites is fully resolved. The patient has not experienced fevers, or chills. The patient has limitations in functional activities which can be attributed to ascites and to other medical problems that are not related to the liver disease. ASSESSMENT AND PLAN:  Ascites   Ascites secondary to Nodular Regenerative Hyperplasia. The liver appears nodular on imaging. The SAAG from 3/2019 was 2.3 - 0.9 = 1.4   The hepatic venous pressure gradient is elevated at 17 mm Hg  The liver biopsy does not show cirrhosis. Only mild portal fibrosis. At the last appointment we increased the dose of diuretics to step 2. He had an excellent response. Ascitess has resolved. Will continue the current dose of diuretics at step 2.  He will increase to step 3 intermittently if needed. Lower extremity edema  Increased lower extremity edema began ~ 1 week ago. Will increase to step 3 diuretic therapy. If cramping occurs he was given OTC recommendations or decrease back to step 2. Thrombocytopenia   This is secondary to chemotherapy and bone marrow suppression. Neutropenia   This is secondary to chemotherapy and bone marrow suppression    Anemia   This is secondary to chemotherapy and bone marrow suppression. ALLERGIES  No Known Allergies    MEDICATIONS  Current Outpatient Medications   Medication Sig    morphine IR (MS IR) 15 mg tablet Take 1 Tab by mouth every four (4) hours as needed for Pain for up to 14 days. Max Daily Amount: 90 mg.    furosemide (LASIX) 20 mg tablet Take 2 Tabs by mouth daily.  spironolactone (ALDACTONE) 50 mg tablet Take 2 Tabs by mouth daily.  fluticasone propionate (FLONASE) 50 mcg/actuation nasal spray 2 Sprays by Both Nostrils route daily. (Patient taking differently: 2 Sprays by Both Nostrils route as needed.)    levothyroxine (SYNTHROID) 25 mcg tablet Alternate 1 with 2 every other day    ondansetron hcl (ZOFRAN) 8 mg tablet Take 1 Tab by mouth every eight (8) hours as needed for Nausea.  dexamethasone (DECADRON) 4 mg tablet Take 4 mg by mouth See Admin Instructions. Take one tab by mouth on day 2 of chemotherapy    zoledronic acid (ZOMETA) 4 mg/100 mL pgbk infusion 4 mg by IntraVENous route every three (3) months.  cholecalciferol (VITAMIN D3) 1,000 unit tablet Take 1,000 Units by mouth daily.  chlorhexidine (PERIDEX) 0.12 % solution RINSE BID     No current facility-administered medications for this visit. SYSTEM REVIEW NOT RELATED TO LIVER DISEASE OR REVIEWED ABOVE:  Constitution systems: Negative for fever, chills, weight gain, weight loss. Eyes: Negative for visual changes. ENT: Negative for sore throat, painful swallowing. Respiratory: Negative for cough, hemoptysis, SOB.    Cardiology: Negative for chest pain, palpitations. GI:  Negative for constipation or diarrhea. : Negative for urinary frequency, dysuria, hematuria, nocturia. Skin: Negative for rash. Hematology: Negative for easy bruising, blood clots. Musculo-skelatal: Negative for back pain, muscle pain, weakness. Neurologic: Negative for headaches, dizziness, vertigo, memory problems not related to HE. Psychology: Negative for anxiety, depression. FAMILY HISTORY:  The father  of leukemia. The mother  at age 80 years. There is no family history of liver disease. SOCIAL HISTORY:  The patient is . The patient has 2 children, and 4 grandchildren. The patient has never used tobacco products. The patient consumes 1-2 alcoholic beverages per day   The patient used to work in business management. The patient retired in . PHYSICAL EXAMINATION:  Visit Vitals  /62 (BP 1 Location: Left arm, BP Patient Position: Sitting)   Pulse 64   Temp 97.2 °F (36.2 °C) (Tympanic)   Ht 6' 1\" (1.854 m)   Wt 148 lb 3.2 oz (67.2 kg)   SpO2 94%   BMI 19.55 kg/m²     General: Ill appearing. Eyes: Sclera anicteric. ENT: No oral lesions. Thyroid normal.  Nodes: No adenopathy. Skin: No spider angiomata. No jaundice. No palmar erythema. Respiratory: Lungs clear to auscultation. Cardiovascular: Regular heart rate. No murmurs. No JVD. Abdomen: Distended with obvious ascites. Extremities: 3+ pitting edema, Muscle wasting. Neurologic: Alert and oriented. Cranial nerves grossly intact. No asterixis.       LABORATORY STUDIES:  Liver [place] of [address] Units 2019   WBC 4.1 - 11.1 K/uL 4.8   ANC 1.8 - 8.0 K/UL 3.9   HGB 12.1 - 17.0 g/dL 7.5 (L)    - 400 K/uL 129 (L)   INR 0.8 - 1.2    AST 15 - 37 U/L 37   ALT 12 - 78 U/L 20   Alk Phos 45 - 117 U/L 331 (H)   Bili, Total 0.2 - 1.0 MG/DL 0.9   Bili, Direct 0.00 - 0.40 mg/dL    Albumin 3.5 - 5.0 g/dL 2.0 (L)   BUN 6 - 20 MG/DL 22 (H)   Creat 0.70 - 1.30 MG/DL 0.69 (L)   Na 136 - 145 mmol/L 141   K 3.5 - 5.1 mmol/L 4.1   Cl 97 - 108 mmol/L 106   CO2 21 - 32 mmol/L 29   Glucose 65 - 100 mg/dL 99   Magnesium 1.6 - 2.4 mg/dL 1.7     Liver [place] of [address] Units 2019   WBC 4.1 - 11.1 K/uL  2.7 (L)   ANC 1.8 - 8.0 K/UL  2.0   HGB 12.1 - 17.0 g/dL  8.5 (L)    - 400 K/uL  111 (L)   INR 0.8 - 1.2     AST 15 - 37 U/L  42 (H)   ALT 12 - 78 U/L  31   Alk Phos 45 - 117 U/L  471 (H)   Bili, Total 0.2 - 1.0 MG/DL  0.6   Bili, Direct 0.00 - 0.40 mg/dL     Albumin 3.5 - 5.0 g/dL  1.9 (L)   BUN 8 - 27 mg/dL 34 (H) 33 (H)   Creat 0.76 - 1.27 mg/dL 0.70 (L) 0.74   Na 134 - 144 mmol/L 141 139   K 3.5 - 5.2 mmol/L 4.9 4.2   Cl 96 - 106 mmol/L 105 107   CO2 20 - 29 mmol/L 24 28   Glucose 65 - 99 mg/dL 95 135 (H)     SEROLOGIES:  Serologies Latest Ref Rng & Units 2019   Hep B Surface Ag Negative Negative   Hep C Ab 0.0 - 0.9 s/co ratio <0.1   Ferritin 30 - 400 ng/mL 329   Iron % Saturation 15 - 55 % 11 (L)   WHIT, IFA  Negative   C-ANCA Neg:<1:20 titer <1:20   P-ANCA Neg:<1:20 titer <1:20   ANCA Neg:<1:20 titer <1:20   ASMCA 0 - 19 Units 17   M2 Ab 0.0 - 20.0 Units <20.0   Alpha-1 antitrypsin level 90 - 200 mg/dL 279 (H)     LIVER HISTOLOGY:  2019. Biopsy of liver mass. Adenocarcinoma. 2019. Slides reviewed by MLS. Minimal inflammation in some portal tracts. No lobular inflammation. No steatosis. Focal portal fibrosis. ENDOSCOPIC PROCEDURES:  Not available or performed    RADIOLOGY:  2019. Several enlarging liver nodules measuring 1.3 x 1.8 cm in the right lobe. No mention of surface nodularity or varices consistent with portal HTN or cirrhosis. Asites. 2019. CT scan abdomen with IV contrast.  Changes consistent with cirrhosis. No liver mass lesions. No dilated bile ducts. Moderate ascites. OTHER TESTIN2018. ECHO heart. LVEF 60%,  RV normal.  No TR.    2019.   HVP measurements. RA 3,  Free HV 3, Wedged HV 20, HVPG=17    FOLLOW-UP:  All of the issues listed above in the Assessment and Plan were discussed with the patient. All questions were answered. The patient expressed a clear understanding of the above. [facility] 87 in 1 month. MICHELLE Vu-BC  Liver [place] of [facility], [address] [place] [address]  [phone]    SUPERVISING MD  I have personally interviewed and examined the patient during the encounter. I have explained the key findings related to the liver disease and other pertinent diagnoses as noted above to the patient and answered all questions. I have reviewed and agree with the findings documented above, including all diagnostic interpretations, and plans. I have edited the original note as appropriate for clarity.     MD Conner Mondragon 13  [address] [facility], [address] [facility] 22.  [phone]  [address] [place] [address]

## 2019-06-28 NOTE — TELEPHONE ENCOUNTER
[employer] Palliative Medicine Office  Nursing Note  [phone]  Fax [phone]      Name:  Karin Pereira  YOB: 1950    Received outpatient Palliative Medicine referral from Kirby Montalvo NP to see pt for symptom management and supportive care. Chart  reviewed. Karin Pereira is a 70 y.o. male with bone and liver mets  Pt's most recent office visit with Dr. Meghna Whyte was on 6/19/19. See her note for complete HPI, treatment history, and plan. ACP:   Not on file                                                                                                                                                     Nurse called pt and explained Palliative Medicine services.   Appt scheduled for first available at [facility] office, 7/9/19 at 2:30pm.  Nurse will call pt if we have any cancellations next week.  BIBIANA JarrettN, RN-BC  Clinical Referral Navigator  [phone]

## 2019-07-02 NOTE — PROGRESS NOTES
Hematology/Oncology progress note    REASON FOR VISIT: Stage IV EGFR mutated NSCLC    HISTORY OF PRESENT ILLNESS: Mr. Yusef Madsen is a 70 y.o. male with prostate cancer who has stage IV NSCLC- adenocarcinoma (EGFR mutated , PD-L1 low) in May 2017. He progressed on Tarceva and then Osimertinib. Started Carboplatin+ Alimta+ Keytruda but felt poorly after cycle 1. He has had recurrent non malignant ascites requiring therapeutic taps and also saw [facility] for evaluation of potential clinical studies. Comes in today for follow-up with scans after cycle 4 of carbo + alimta + [place] His last paracentesis was 5/30/19. On diuretics per hepatology. He has noted SOB with moderate exertion. His right hip pain is 9/10- persistent , no radiation. Morphine helps - however takes 1 hours , never completely relieves it, takes it only once a day- makes him too sleepy. He can walk. His back does not hurt. He has been eating. Abdomen helps. He has some orthostatic dizziness. Oncologic history   Mr. Yusef Madsen noticed a new cough and fevers back in March of 2017. He went to Urgent Care and received antibiotics and cough medication. He states this worked for a while, but he began to notice his cough return. This was intermittent. He had some tightness across his anterior chest which he related to the infection. Chest x-ray was abnormal and he was told to proceed to the Emergency Department. Liza Arce had been under surveillance for right lower lobe mass that was initially noted in 2015. Bronch at that time was negative for malignancy. He was evaluated by Dr. Amadeo Piedra with Thoracic Surgery in 2015 for possible surgical resection. CT chest in November of 2016 with mass size unchanged but some right basilar pleural thickening. CT chest obtained 5/14/17 however showed a new large right pleural effusion with right middle lobe and right lower lobe collapse.  Irregular spiculated right lower lobe mass 3.8cm and stable precarinal enlarged lymph nodes were noted. New right posterior second rib lytic lesion and new right hepatic hypodensity consistent with mets. He underwent a therapeutic thoracentesis on 5/15/17 with removal of 1500 cc of serosanguinous fluid. Pathology showed adenocarcinoma. PET CT showed pleural, bone, liver and flor metastasis. MRI brain 5/20/17: No metastasis. Needed repeat R sided thoracentesis on 5/25/17, had some post procedure hydropneumothorax. He was seen by Dr. Harrison Little at [address] [place] [address] for Pleurx catheter.      CT guided biopsy of R lung mass done 5/25 which showed adenocarcinoma. EGFR mutation detected Exon 18 and 21    Treatment  6/26/17: Tarceva. Monthly Xgeva. Palliative XRT to R hip   CT CAP 10/2/17: Response  Ct 1/23/18: CT with some growth of LLL mass but stable by RECIST. CT 3/15/18 and Bone scan stable though he had worsening left back pain. MRI results showed extensive disease at L2, S2 and additional lesions as previously known. Received palliative XRT that he completed 4/18/18 5/26/18: Started Osimertinib as he had a T790M mutation. Stopped 6/20/18 due to platelets of 12G  Resumed at 80 mg every other day on 7/6/18   Started 40mg daily on 7/12/18 2/19: Progressive disease with new liver metastases and progression of thoracic disease  2/22/19: liver biopsy pathology shows metastatic adenocarcinoma, consistent with lung primary- foundation sent  2/26/19: Cycle 1 of Carboplatin+ Alimta+ Keytruda.   3/6/19- 3/13/19: 30 Gy to Rt axilla and T spine    4/30/19: CT chest abdomen and pelvis stable  6/2019: CT and MRI with disease progression    Past Medical History:   Diagnosis Date    Ascites 02/2019    Cancer ([address] 2017    lung ca    Hypertension        Past Surgical History:   Procedure Laterality Date    HX ORTHOPAEDIC      reconstruction of left little finger    IR BX LIVER PERCUTANEOUS  2/22/2019    IR BX TRANSCATHETER  5/6/2019    IR INSERT TUNL CVC W PORT OVER 5 YEARS  2/22/2019    IR PARACENTESIS ABD INIT  03/2019 6 total to date, 5/13/2019    IR PARACENTESIS ABD W IMAGE  5/6/2019       No Known Allergies    Current Outpatient Medications   Medication Sig Dispense Refill    morphine IR (MS IR) 15 mg tablet Take 1 Tab by mouth every four (4) hours as needed for Pain for up to 14 days. Max Daily Amount: 90 mg. 84 Tab 0    furosemide (LASIX) 20 mg tablet Take 2 Tabs by mouth daily. 90 Tab 3    spironolactone (ALDACTONE) 50 mg tablet Take 2 Tabs by mouth daily. 90 Tab 3    fluticasone propionate (FLONASE) 50 mcg/actuation nasal spray 2 Sprays by Both Nostrils route daily. (Patient taking differently: 2 Sprays by Both Nostrils route as needed.) 1 Bottle 5    levothyroxine (SYNTHROID) 25 mcg tablet Alternate 1 with 2 every other day 60 Tab 5    zoledronic acid (ZOMETA) 4 mg/100 mL pgbk infusion 4 mg by IntraVENous route every three (3) months.  cholecalciferol (VITAMIN D3) 1,000 unit tablet Take 1,000 Units by mouth daily.  benzonatate (TESSALON) 100 mg capsule TAKE 1 CAPSULE BY MOUTH THREE TIMES DAILY FOR UP TO 7 DAYS AS NEEDED FOR COUGH 30 Cap 0    ondansetron hcl (ZOFRAN) 8 mg tablet Take 1 Tab by mouth every eight (8) hours as needed for Nausea. 30 Tab 6    dexamethasone (DECADRON) 4 mg tablet Take 4 mg by mouth See Admin Instructions.  Take one tab by mouth on day 2 of chemotherapy 18 Tab 0    chlorhexidine (PERIDEX) 0.12 % solution RINSE BID  0       Social History     Socioeconomic History    Marital status:      Spouse name: Not on file    Number of children: Not on file    Years of education: Not on file    Highest education level: Not on file   Tobacco Use    Smoking status: Never Smoker    Smokeless tobacco: Never Used   Substance and Sexual Activity    Alcohol use: Not Currently     Alcohol/week: 6.0 oz     Types: 10 Glasses of wine per week     Comment: ocassionally    Drug use: No    Sexual activity: Yes     Partners: Female     Comment:  has 2 children       Family History Problem Relation Age of Onset    Cancer Mother         leukemia    Stroke Father     Cancer Brother         bladder       ROS  As reviewed under HP      Emotional well being addressed and patient is coping well    Physical Examination:   Visit Vitals  /81 (BP 1 Location: Right arm, BP Patient Position: Sitting)   Pulse 77   Temp 97.9 °F (36.6 °C) (Oral)   Resp 12   Ht 6' 1\" (1.854 m)   Wt 139 lb 14.4 oz (63.5 kg)   SpO2 97%   BMI 18.46 kg/m²     ECOG `1  General appearance - alert, frail, and in no distress, Looks pale  Mental status - oriented to person, place, and time  Mouth - mucous membranes moist, pharynx normal without lesions. Neck - supple, no significant adenopathy  Lymphatics - no palpable lymphadenopathy, no hepatosplenomegaly  Resp-CTAB, normal respiratory effort  CV-no edema  Abdomen - soft, nontender,distended  Neurological - normal speech  Skin: No rashes  MSK- no edema, using cane due to right hip/thigh pain    LABS  Lab Results   Component Value Date/Time    WBC 4.8 06/19/2019 11:13 AM    HGB 7.5 (L) 06/19/2019 11:13 AM    HCT 24.6 (L) 06/19/2019 11:13 AM    PLATELET 439 (L) 53/14/6506 11:13 AM    MCV 93.5 06/19/2019 11:13 AM    ABS. NEUTROPHILS 3.9 06/19/2019 11:13 AM     Lab Results   Component Value Date/Time    Sodium 141 06/19/2019 11:13 AM    Potassium 4.1 06/19/2019 11:13 AM    Chloride 106 06/19/2019 11:13 AM    CO2 29 06/19/2019 11:13 AM    Glucose 99 06/19/2019 11:13 AM    BUN 22 (H) 06/19/2019 11:13 AM    Creatinine 0.69 (L) 06/19/2019 11:13 AM    GFR est AA >60 06/19/2019 11:13 AM    GFR est non-AA >60 06/19/2019 11:13 AM    Calcium 8.4 (L) 06/19/2019 11:13 AM     Lab Results   Component Value Date/Time    AST (SGOT) 37 06/19/2019 11:13 AM    Alk.  phosphatase 331 (H) 06/19/2019 11:13 AM    Protein, total 5.2 (L) 06/19/2019 11:13 AM    Albumin 2.0 (L) 06/19/2019 11:13 AM    Globulin 3.2 06/19/2019 11:13 AM    A-G Ratio 0.6 (L) 06/19/2019 11:13 AM       CT 6/2019  IMPRESSION:     1. Chronic right lung volume loss with no definite change in right lower lobe  masslike lesion and right infrahilar lymph node. Pleural thickening and pleural  fluid in the right lung base also unchanged. 2. Increased small to moderate left pleural effusion. Unchanged 6 mm left lower  lobe pulmonary nodule. 3. Increased, now large volume ascites. Cirrhotic morphology of the liver with  no definite change in 2 hypodense lesions as above. Mild splenomegaly. 4.  Osseous metastatic disease, unchanged    MRI 6/25/19    IMPRESSION  IMPRESSION:     Right ischium and the inferior right acetabulum measures 3.7 cm in craniocaudal  diameter, unchanged since April but new or more conspicuous since 2018. 3.7 cm  hypointense T1 bone lesion in the left iliac wing is new since 2018 and more  conspicuous since April. 1.6 cm hypointense T1 lesion in the left femoral  intertrochanteric region is new since both comparison exams. 1.4 cm hypointense  T1 lesion in the central S1 segment of the sacrum is new since both comparison  studies. Right iliac crest 3 cm partially sclerotic hypointense T1 lesion is  slightly increased since April and new versus more conspicuous since 2018. No  pathologic fracture. No right femur lesion. 1. Multiple osseous metastatic lesions, many new since 2018. Some are new, more  conspicuous, or increased since April. No pathologic fracture. The metastatic  lesion between the right ischium and the right acetabulum is most likely the  cause of the right hip pain. No pathologic fracture. 2. Nondisplaced degeneration of the right acetabular labrum. 3. Ascites. 4. Nonspecific muscle edema. EPIC email sent to Dr. Jeet Diaz at the time of the dictation. ASSESSMENT AND PLAN  Mr. Heather Palacio is a 70 y.o. male with:    1. Stage IV NSCLC ([id number])  With R lung mass, mediastinal adenopathy, malignant R pleural effusion, multiple asymptomatic bone metastasis and possibly liver metastasis.  EGFR mutated, PD-L1 5%. Found to have T790M mutation but most recently progressed on Osimertinib with progressive painful bone metastasis. Poorly tolerated 4 cycles of Carboplatin+ Alimta and Keytruda due to cytopenias and fatigue requiring dose reduction. CT 6/2019 reviewed and overall appears unchanged. However he has progressive R hip pain, weight loss and MRI of Rt hip shows worsening bone metastasis in the pelvis / sacrum and Femur. Overall he is considered to have progressive disease. We discussed options hereon    A) RT to hip with continuation of maintenance Keytruda+ Alimta  B) Clinical trials such as the biomarker driven LUNGMAP study for which he is now eligible. He is interested in this and was screened and consented today. Foundation one under media had shown BRCA mutations- tissue to be sent for screening   C) Docetaxel  B) Supportive care    Unfortunately he is excluded from multiple trials due to his prostate cancer. Excluded from [facility] BRCA/MILTON: Avelumab Plus Talazoparib in Patients With BRCA or MILTON Mutant Solid Tumors due to exposure to [facility]. · Stop Alimta and Keytruda  · Screening for LUNGMAP  · Zofran PRN  · Zometa q3 months      2. Kanwal 6 prostate cancer on active surveillance  Records reviewed     3. Bone pain:  secondary to osseous metastasis s/p XRT to R ischium and L2. S/P 30 Gy to Rt axilla and T spine  Completed 3/13/19  Now with new right hip/thigh pain with worsening metastasis to the sacrum, ilium and new lesion in the femur  Intolerant to minimal doses of Morphine and is only taking 15 mg only once  I will start him on dexamethasone 2 mg BID, OK to take Ibuprofen 400 mg bid, palliative referral, continue Morphine prn    Referral to RT  Xray Rt femur to r/o potential for fracture    4. HTN. Now normotensive. 5. Diarrhea- resolved    6.  Recurrent ascites  S/P Paracentesis on 3/22, 4/10 and 4/19, 4/30  Large volume  Non malignant  Due to portal HTN, there is no cirrhosis on liver biopsy and this is thought to be due to drug related injury  I am uncertain of the prognosis of his liver disease   It does make palliative treatments for #1 harder to tolerate and in that respect worsens his overall prognosis    Diuretics and management of liver disease per Dr. Lisbeth Howell. He does not feel he needs paracentesis at this time.      7) Anemia  Secondary to bone metastasis and chemotherapy  He is symptomatic   Will transfuse if < 8 g/dl    RTC every 2 weeks with labs in [facility] with possible transfusion    > 50% of this 40 min viist spent in counseling and care co Jhoan Brewer MD

## 2019-07-02 NOTE — PROGRESS NOTES
[address] at [address] 
 
1200 Patient arrives for Labs/T&C without acute problems. Please see connect care for complete assessment and education provided. Vital signs stable throughout and prior to discharge, Pt. Tolerated treatment well and discharged without incident. Patient is aware of next [facility] appointment on 7/3/2019 at the [facility] location. 1445 Received a call from Simi in Hematology that patient has a very critical lab of Plts <10. The lab is requesting a redraw. Dr Luzmaria Torres notified at [phone]. No new orders received as of 1625. VITAL SIGNS Patient Vitals for the past 12 hrs: 
 Temp Pulse Resp BP SpO2  
07/02/19 1203 97.6 °F (36.4 °C) 78 14 115/66 97 % LAB WORK Recent Results (from the past 12 hour(s)) CBC WITH AUTOMATED DIFF Collection Time: 07/02/19 12:05 PM  
Result Value Ref Range WBC 6.9 4.1 - 11.1 K/uL  
 RBC 2.11 (L) 4.10 - 5.70 M/uL HGB 6.0 (L) 12.1 - 17.0 g/dL HCT 19.8 (L) 36.6 - 50.3 % MCV 93.8 80.0 - 99.0 FL  
 MCH 28.4 26.0 - 34.0 PG  
 MCHC 30.3 30.0 - 36.5 g/dL RDW 20.3 (H) 11.5 - 14.5 % PLATELET 8 (LL) 826 - 400 K/uL MPV ABNORMAL 8.9 - 12.9 FL  
 NRBC 0.0 0  WBC ABSOLUTE NRBC 0.00 0.00 - 0.01 K/uL NEUTROPHILS 86 (H) 32 - 75 % LYMPHOCYTES 5 (L) 12 - 49 % MONOCYTES 8 5 - 13 % EOSINOPHILS 0 0 - 7 % BASOPHILS 0 0 - 1 % IMMATURE GRANULOCYTES 1 (H) 0.0 - 0.5 % ABS. NEUTROPHILS 5.9 1.8 - 8.0 K/UL  
 ABS. LYMPHOCYTES 0.3 (L) 0.8 - 3.5 K/UL  
 ABS. MONOCYTES 0.6 0.0 - 1.0 K/UL  
 ABS. EOSINOPHILS 0.0 0.0 - 0.4 K/UL  
 ABS. BASOPHILS 0.0 0.0 - 0.1 K/UL  
 ABS. IMM. GRANS. 0.1 (H) 0.00 - 0.04 K/UL  
 DF AUTOMATED    
 RBC COMMENTS HYPOCHROMIA 1+ 
    
 RBC COMMENTS ANISOCYTOSIS 
1+ METABOLIC PANEL, COMPREHENSIVE Collection Time: 07/02/19 12:05 PM  
Result Value Ref Range Sodium 140 136 - 145 mmol/L  Potassium 3.5 3.5 - 5.1 mmol/L  
 Chloride 103 97 - 108 mmol/L  
 CO2 30 21 - 32 mmol/L Anion gap 7 5 - 15 mmol/L Glucose 93 65 - 100 mg/dL BUN 28 (H) 6 - 20 MG/DL Creatinine 0.94 0.70 - 1.30 MG/DL  
 BUN/Creatinine ratio 30 (H) 12 - 20 GFR est AA >60 >60 ml/min/1.73m2 GFR est non-AA >60 >60 ml/min/1.73m2 Calcium 8.8 8.5 - 10.1 MG/DL Bilirubin, total 1.9 (H) 0.2 - 1.0 MG/DL  
 ALT (SGPT) 15 12 - 78 U/L  
 AST (SGOT) 35 15 - 37 U/L Alk. phosphatase 370 (H) 45 - 117 U/L Protein, total 5.8 (L) 6.4 - 8.2 g/dL Albumin 2.0 (L) 3.5 - 5.0 g/dL Globulin 3.8 2.0 - 4.0 g/dL A-G Ratio 0.5 (L) 1.1 - 2.2 TYPE + CROSSMATCH Collection Time: 07/02/19 12:20 PM  
Result Value Ref Range Crossmatch Expiration 07/05/2019 ABO/Rh(D) O POSITIVE Antibody screen NEG Unit number [id number] Blood component type [facility] Unit division 00 Status of unit ALLOCATED Crossmatch result Compatible Unit number [id number] Blood component type [facility] Unit division 00 Status of unit ALLOCATED Crossmatch result Compatible

## 2019-07-02 NOTE — Clinical Note
Starting today please start every 2 week cbc, cmp, type and cross with possible transfusion in opicStop chemo

## 2019-07-02 NOTE — PROGRESS NOTES
Marissa Bonilla is a 70 y.o. male    Chief Complaint   Patient presents with    Lung Cancer     1. Have you been to the ER, urgent care clinic since your last visit? Hospitalized since your last visit? No    2. Have you seen or consulted any other health care providers outside of the [address] [place] [address] since your last visit? Include any pap smears or colon screening.  No

## 2019-07-02 NOTE — PROGRESS NOTES
Pt has been screened for all eligibility/ineligibility criteria and has been determined eligible for this protocol. Informed consent was reviewed by RN with patient prior to signing. All questions were answered adequately by RN or Dr. Denver Has. Patient signed consent today for study [id number]. A copy of ICF given to patient. No additional questions at this time. Pt has been screened for all eligibility/ineligibility criteria and has been determined eligible for this protocol. Informed consent was reviewed by RN with patient prior to signing. All questions were answered adequately by RN or Dr. Denver Has. Patient signed consent today for study [id number]. A copy of ICF given to patient. No additional questions at this time.

## 2019-07-03 NOTE — PROGRESS NOTES
0 Pt arrived at [facility] ambulatory and in no acute distress for transfusion of 1 unit PRBCs and I unit platelets. Assessment completed, no new complaints voiced. Port accessed without difficulty, brisk blood return. Signs/symptoms of adverse blood reaction discussed with pt, voiced understanding. Patient Vitals for the past 12 hrs: 
 Temp Pulse Resp BP SpO2  
07/03/19 1411 97.6 °F (36.4 °C) 60 18 125/66   
07/03/19 1340 98.3 °F (36.8 °C) 64 18 117/73   
07/03/19 1240 97.9 °F (36.6 °C) 65 18 112/65   
07/03/19 1210 98.1 °F (36.7 °C) 65 16 108/64   
07/03/19 1155 98.2 °F (36.8 °C) 65  108/68   
07/03/19 1125 98 °F (36.7 °C) 72 18 107/64   
07/03/19 1101 98.4 °F (36.9 °C) 74 16 124/69 97 % Medications received: 
 
 
1110:  1st unit PRBCs started and infusing without difficulty, observed x 15 minutes 1130:  1st unit completed without adverse reaction noted, NS flushing line. 1140:  2nd unit PRBCs started and infusing without difficulty, observed x 15 minutes 1350:  2nd unit completed without adverse reaction noted, NS flushing line. 1520 Tolerated transfusion  well, no adverse reaction noted. D/C instructions reviewed, copy to pt, voiced understanding. Patient declined 1 hour post transfusion observation. D/Cd from [facility] ambulatory and in no acute distress. Future Appointments Date Time Provider Malika Reina 7/9/2019  2:30 PM Andrew Coker MD [address] [facility]  
7/11/2019 11:00 AM [facility]  
7/11/2019 11:15 AM Vern Johnson  N [facility]  
7/25/2019 10:20 AM Kevon Raymond MD [facility]  
7/31/2019 11:00 AM [facility] INFUSION NURSE 4 [facility]  
11/12/2019  9:45 AM Hunter Lancaster DO [address] [facility]

## 2019-07-08 NOTE — TELEPHONE ENCOUNTER
Called patient to advise/confirm upcoming appt with Dr. Raegan Farrell on  7/9/19    at  2:15pm at [facility]. No answer so left voicemail and will send a my chart message. Also advised to please bring in your Drivers License and Insurance Card with you to appointment as well as any prescription pain medication in the original container with you to appt.

## 2019-07-09 NOTE — PROGRESS NOTES
Palliative Medicine Office Visit Palliative Medicine Nurse Check In 
[phone] Patient Name: Willis Cockayne YOB: 1950 Date of Office Visit: 7/9/2019 Patient states: Patient has pain right hip and thigh. From Check In Sheet (scanned in Media): 
Has a medical provider talked with you about cardiopulmonary resuscitation (CPR)? [x] Yes   [] No   [] Unable to obtain Nurse reminder to complete or update ACP FlowSheet: 
 
Is ACP on the Problem List?    [] Yes    [x] No 
IF ACP Document is ON FILE; Nurse to place ACP on Problem List  
 
Is there an ACP Note in Chart Review/Note? [] Yes    [x] No  
If NO: ALERT PROVIDER Advance Care Planning 7/2/2019 Patient's Healthcare Decision Maker is: Verbal statement (Legal Next of Kin remains as decision maker) Confirm Advance Directive None Primary Decision MakeMeryl Padilla - [phone] Advance Care Planning 7/9/2019 Patient's Healthcare Decision Maker is: Verbal statement (Legal Next of Kin remains as decision maker) Confirm Advance Directive Yes, not on file Is there anything that we should know about you as a person in order to provide you the best care possible? Have you been to the ER, urgent care clinic since your last visit? [] Yes   [x] No   [] Unable to obtain Have you been hospitalized since your last visit? [] Yes   [x] No   [] Unable to obtain Have you seen or consulted any other health care providers outside of the [address] [place] [address] since your last visit? [] Yes   [x] No   [] Unable to obtain Functional status (describe): Ambulates freely without any assistance. Last BM: yesterday  accessed (date):  7/8/19 Bottle review (for opioid pain medication): 
Medication 1:  
Date filled:  
Directions:  
# filled: # left: # pills taking per day: 
Last dose taken: 
 
Medication 2:  
Date filled:  
Directions:  
# filled: # left: # pills taking per day: 
Last dose taken: 
 
Medication 3:  
Date filled:  
Directions:  
# filled: # left: # pills taking per day: 
Last dose taken: 
 
Medication 4:  
Date filled:  
Directions:  
# filled: # left: # pills taking per day: 
Last dose taken:

## 2019-07-09 NOTE — PATIENT INSTRUCTIONS
Dear Gunner Valerio , It was a pleasure seeing you today at [facility] office We will see you again in 2 weeks Your stated goal: - Better pain control This is the plan we talked about: 1. Right hip pain - This is cancer related - You have been taking Morphine instant release half tab of 15 mg once a day without pain relief but causes sleepiness. This could be because of its poor clearance from your system. - Let us switch to Oxycodone 5 mg- take half- one tab every 6 hours for pain. It is okay if you are a bit sleepy with the medicine, just pay attention if the pain is better. - Let us talk in a few days to see if this regimen is helping you. 
- Dr. Erinn Canales started you on Dexamethasone but you are not taking it. We talked about how important this medicine is and how it will help you with pain. - Please re start Dexamethasone 2 mg in the morning and afternoon. - please take Protonix 40 mg daily in the morning with breakfast to help reduce heart burn. 
 
- Dr. Erinn Canales made a referral to Dr. Vinny Spangler for consideration of RT to the right hip. Please call her office today and see when she can see you. 2. Constipation 
-Constipation is a common side effect of pain medications as they slow your bowels. -Please start Pericolace 2 tabs daily and increase to 2 tabs two times a day if needed. This is necessary to keep your bowels soft. - Add Miralax every other day as a laxative. - Goal is to have soft bowel movements every day or every other day. - Please call us if you do not have bowel movements for more than 3 days. 3. ACP 
- You have an AMD, please bring us a copy during next visit. You want your wife and oldest daughter as your mPOA. 4. You have a good understanding of your disease, you are in a lot of pain right now. We talked about palliative medicine's role in your care and how we are here to advocate for your wishes. We will talk more during next visit when your pain is better controlled. This is what you have shared with us about Advance Care Planning: 
 
 
Advance Care Planning 7/2/2019 Patient's Healthcare Decision Maker is: Verbal statement (Legal Next of Kin remains as decision maker) Confirm Advance Directive None The Palliative Medicine Team is here to support you and your family.   
 
 
 
Sincerely, 
 
 
Po Zurita MD and the Palliative Medicine Team

## 2019-07-09 NOTE — PROGRESS NOTES
Palliative Medicine Outpatient Services [place]: [phone] ([phone]) Patient Name: Felipe Marcus YOB: 1950 Date of Current Visit: 07/09/19 Location of Current Visit:   
[x] [facility] Office 
[] [facility] Office [] [address] [place] [address] 
[] Home 
[] Other:   
 
Date of Initial Visit: 7/9/2019 Referral from: Dr. Nohelia Gutierrez Primary Care Physician: Austin Ochoa DO 
  
 SUMMARY:  
Felipe Marcus is a 70y.o. year old with a  history of prostate cancer, now with stage IV adenocarcinoma of the lung with progression of disease on Tarceva and poor tolerance to combination chemotherapy with Keytruda, recurrent nonmalignant ascites requiring therapeutic taps from portal hypertension, who was referred to Palliative Medicine by Dr. Nohelia Gutierrez for management of symptoms and psychosocial support. The patients social history includes retired, lived in [facility] for a long time where he met his wife, his wife teaches [employer] for living, they have 2 daughters, one daughter lives in [place] and 1 in [place]. Palliative Medicine is addressing the following current patient/family concerns: Intractable right hip pain Initial Referral Intake note from Antoine Chaudhry RN reviewed prior to visit PALLIATIVE DIAGNOSES:  
 
  ICD-10-CM ICD-9-CM 1. Right hip pain M25.551 719.45 oxyCODONE IR (ROXICODONE) 5 mg immediate release tablet 2. Cancer associated pain G89.3 338.3 3. Drug-induced constipation K59.03 564.09   
  E980.5 4. Lung cancer metastatic to bone (HCC) C34.90 162.9   
 C79.51 198.5 5. Portal hypertension (HCC) K76.6 572.3 6. Anorexia R63.0 783.0 PLAN:  
Patient Instructions Dear Felipe Marcus , It was a pleasure seeing you today at [facility] office We will see you again in 2 weeks Your stated goal: - Better pain control This is the plan we talked about: 1. Right hip pain - This is cancer related - You have been taking Morphine instant release half tab of 15 mg once a day without pain relief but causes sleepiness. This could be because of its poor clearance from your system. - Let us switch to Oxycodone 5 mg- take half- one tab every 6 hours for pain. It is okay if you are a bit sleepy with the medicine, just pay attention if the pain is better. - Let us talk in a few days to see if this regimen is helping you. 
- Dr. Vern Schwartz started you on Dexamethasone but you are not taking it. We talked about how important this medicine is and how it will help you with pain. - Please re start Dexamethasone 2 mg in the morning and afternoon. - please take Protonix 40 mg daily in the morning with breakfast to help reduce heart burn. 
 
- Dr. Vern Schwartz made a referral to Dr. Silverio Tapia for consideration of RT to the right hip. Please call her office today and see when she can see you. 2. Constipation 
-Constipation is a common side effect of pain medications as they slow your bowels. -Please start Pericolace 2 tabs daily and increase to 2 tabs two times a day if needed. This is necessary to keep your bowels soft. - Add Miralax every other day as a laxative. - Goal is to have soft bowel movements every day or every other day. - Please call us if you do not have bowel movements for more than 3 days. 3. ACP 
- You have an AMD, please bring us a copy during next visit. You want your wife and oldest daughter as your mPOA. 4. You have a good understanding of your disease, you are in a lot of pain right now. We talked about palliative medicine's role in your care and how we are here to advocate for your wishes. We will talk more during next visit when your pain is better controlled. This is what you have shared with us about Advance Care Planning: 
 
 
Advance Care Planning 7/2/2019 Patient's Healthcare Decision Maker is: Verbal statement (Legal Next of Kin remains as decision maker) Confirm Advance Directive None The Palliative Medicine Team is here to support you and your family. Sincerely, 
 
 
Brant Armendariz MD and the Palliative Medicine Team 
 
 
 GOALS OF CARE / TREATMENT PREFERENCES:  
[====Goals of Care====] GOALS OF CARE: 
Patient / health care proxy stated goals: See Patient Instructions / Summary TREATMENT PREFERENCES:  
Code Status:  [x] Attempt Resuscitation       [] Do Not Attempt Resuscitation Advance Care Planning: 
[x] The Pall [facility] Interdisciplinary Team has updated the ACP Navigator with Decision Maker and Patient Capacity Advance Care Planning 7/2/2019 Patient's Healthcare Decision Maker is: Verbal statement (Legal Next of Kin remains as decision maker) Confirm Advance Directive None Other: 
(If patient appropriate for POST, consider using PALLPOST smart phrase here) The palliative care team has discussed with patient / health care proxy about goals of care / treatment preferences for patient. 
[====Goals of Care====] PRESCRIPTIONS GIVEN:  
 
Medications Ordered Today Medications  oxyCODONE IR (ROXICODONE) 5 mg immediate release tablet Sig: Take 1 Tab by mouth every six (6) hours as needed for Pain for up to 15 days. Max Daily Amount: 20 mg. Dispense:  60 Tab Refill:  0  
 pantoprazole (PROTONIX) 40 mg tablet Sig: Take 1 Tab by mouth daily. Dispense:  30 Tab Refill:  4 FOLLOW UP: Future Appointments Date Time Provider Malika Schmidi 7/11/2019 11:00 AM BREMO INFUSION NURSE 5 [facility]  
7/25/2019 10:20 AM Jackson Poon MD [facility]  
7/31/2019 11:00 AM BREMO INFUSION NURSE 4 [facility]  
11/12/2019  9:45 AM DO CHHAYA Macias PHYSICIANS INVOLVED IN CARE:  
Patient Care Team: 
Lu Diamond DO as PCP - General (Internal Medicine) Edna Titus MD as Surgeon (Thoracic (non-cardiac) Surgery) Jovan Cherry MD (Pulmonary Disease) Isaiah Tse MD (Urology) Kerry Rodriguez MD (Hematology and Oncology) HISTORY:  
Reviewed patient-completed ESAS and advance care planning form. Reviewed patient record in prescription monitoring program. 
 
CHIEF COMPLAINT: No chief complaint on file. HPI/SUBJECTIVE: The patient is: [x] Verbal / [] Nonverbal  
 
Patient here alone. He appears quite uncomfortable due to pain. He drove himself. Right hip pain-started in early June had has worsened but he seems to have gotten used to the intense pain. He is fairly comfortable while sitting or laying down but he has excruciating pain with weightbearing and walking. His physical activity is significantly limited because of this. He was started on morphine 15 mg tablet last week but he only took half a tablet and it made him more sleepy and did not do any thing for the pain so he decided not to take any. However he took 1 tablet today over the last few days because of severe pain. He was also started on dexamethasone 2 mg 2 times a day but he did not take but 1 tablet/day. He is trying to process progression of disease with a recent scans. He wants to do a clinical trial and does not have any questions about this at this time. He wants to start radiation as soon as possible to help with the pain. He has some constipation, not on any regular bowel regimen. Very poor appetite. Abdominal distention every now and then makes eating more difficult. He is able to sleep. Clinical Pain Assessment (nonverbal scale for nonverbal patients):  
[++++ Clinical Pain Assessment++++] [++++Pain Severity++++]: Pain: 8 
[++++Pain Character++++]: sharp, excruciating 
[++++Pain Duration++++]: weeks [++++Pain Effect++++]: functional 
[++++Pain Factors++++]: weightbearing, walking 
[++++Pain Frequency++++]: weightbearing, walking 
[++++Pain Location++++]: right hip, thigh 
[++++ Clinical Pain Assessment++++]  FUNCTIONAL ASSESSMENT:  
 
 Palliative Performance Scale (PPS): PPS: 80 PSYCHOSOCIAL/SPIRITUAL SCREENING:  
 
Any spiritual / Mormon concerns: 
[] Yes /  [x] No 
 
Caregiver Burnout: 
[] Yes /  [x] No /  [] No Caregiver Present Anticipatory grief assessment:  
[x] Normal  / [] Maladaptive ESAS Anxiety: Anxiety: 0 
 
ESAS Depression: Depression: 0 REVIEW OF SYSTEMS:  
 
The following systems were [x] reviewed / [] unable to be reviewed Systems: constitutional, ears/nose/mouth/throat, respiratory, gastrointestinal, genitourinary, musculoskeletal, integumentary, neurologic, psychiatric, endocrine. Positive findings noted below. Modified ESAS Completed by: provider Fatigue: 7 Drowsiness: 5 Depression: 0 Pain: 8 Anxiety: 0 Nausea: 0 Anorexia: 5 Dyspnea: 6 [facility] Well-Bein Constipation: No  
Other Problem (Comment): 0 PHYSICAL EXAM:  
 
Wt Readings from Last 3 Encounters:  
19 138 lb (62.6 kg) 19 139 lb 14.4 oz (63.5 kg) 19 148 lb 3.2 oz (67.2 kg) Blood pressure 138/86, pulse 76, temperature 97.9 °F (36.6 °C), resp. rate 18, height 6' 1\" (1.854 m), weight 138 lb (62.6 kg), SpO2 95 %. Last bowel movement: See Nursing Note Constitutional: Very thin, alert and oriented Eyes: pupils equal, anicteric ENMT: no nasal discharge, moist mucous membranes Cardiovascular: regular rhythm, distal pulses intact Respiratory: breathing not labored, symmetric Gastrointestinal: soft non-tender, +bowel sounds Musculoskeletal: no deformity, tenderness over right hip palpation Skin: warm, dry Neurologic: following commands, moving all extremities Psychiatric: full affect, no hallucinations Other: 
 
 
 HISTORY:  
 
Past Medical History:  
Diagnosis Date  Ascites 2019  Cancer ([facility] [address] 2017  
 lung ca  Hypertension Past Surgical History:  
Procedure Laterality Date  HX ORTHOPAEDIC    
 reconstruction of left little finger  IR BX LIVER PERCUTANEOUS  2019  IR BX TRANSCATHETER  5/6/2019  IR INSERT TUNL CVC W PORT OVER 5 YEARS  2/22/2019  IR PARACENTESIS ABD INIT  03/2019  
 6 total to date, 5/13/2019  IR PARACENTESIS ABD W IMAGE  5/6/2019 Family History Problem Relation Age of Onset  Cancer Mother   
     leukemia  Stroke Father  Cancer Brother   
     bladder History reviewed, no pertinent family history. Social History Tobacco Use  Smoking status: Never Smoker  Smokeless tobacco: Never Used Substance Use Topics  Alcohol use: Not Currently Alcohol/week: 6.0 oz Types: 10 Glasses of wine per week Comment: ocassionally No Known Allergies Current Outpatient Medications Medication Sig  
 oxyCODONE IR (ROXICODONE) 5 mg immediate release tablet Take 1 Tab by mouth every six (6) hours as needed for Pain for up to 15 days. Max Daily Amount: 20 mg.  pantoprazole (PROTONIX) 40 mg tablet Take 1 Tab by mouth daily.  dexAMETHasone (DECADRON) 4 mg tablet Take 4 mg by mouth two (2) times daily (with meals).  benzonatate (TESSALON) 100 mg capsule TAKE 1 CAPSULE BY MOUTH THREE TIMES DAILY FOR UP TO 7 DAYS AS NEEDED FOR COUGH  furosemide (LASIX) 20 mg tablet Take 2 Tabs by mouth daily.  spironolactone (ALDACTONE) 50 mg tablet Take 2 Tabs by mouth daily.  fluticasone propionate (FLONASE) 50 mcg/actuation nasal spray 2 Sprays by Both Nostrils route daily. (Patient taking differently: 2 Sprays by Both Nostrils route as needed.)  levothyroxine (SYNTHROID) 25 mcg tablet Alternate 1 with 2 every other day  ondansetron hcl (ZOFRAN) 8 mg tablet Take 1 Tab by mouth every eight (8) hours as needed for Nausea.  chlorhexidine (PERIDEX) 0.12 % solution RINSE BID  zoledronic acid (ZOMETA) 4 mg/100 mL pgbk infusion 4 mg by IntraVENous route every three (3) months.  cholecalciferol (VITAMIN D3) 1,000 unit tablet Take 1,000 Units by mouth daily. No current facility-administered medications for this visit. LAB DATA REVIEWED:  
 
Lab Results Component Value Date/Time WBC 6.9 07/02/2019 12:05 PM  
 HGB 6.0 (L) 07/02/2019 12:05 PM  
 PLATELET 8 (LL) 44/69/8974 12:05 PM  
 
Lab Results Component Value Date/Time Sodium 140 07/02/2019 12:05 PM  
 Potassium 3.5 07/02/2019 12:05 PM  
 Chloride 103 07/02/2019 12:05 PM  
 CO2 30 07/02/2019 12:05 PM  
 BUN 28 (H) 07/02/2019 12:05 PM  
 Creatinine 0.94 07/02/2019 12:05 PM  
 Calcium 8.8 07/02/2019 12:05 PM  
 Magnesium 1.7 06/19/2019 11:13 AM  
 Phosphorus 2.6 10/04/2018 02:21 PM  
  
Lab Results Component Value Date/Time AST (SGOT) 35 07/02/2019 12:05 PM  
 Alk. phosphatase 370 (H) 07/02/2019 12:05 PM  
 Protein, total 5.8 (L) 07/02/2019 12:05 PM  
 Albumin 2.0 (L) 07/02/2019 12:05 PM  
 Globulin 3.8 07/02/2019 12:05 PM  
 
Lab Results Component Value Date/Time INR 1.1 05/29/2019 09:15 AM  
 Prothrombin time 10.9 05/29/2019 09:15 AM  
  
Lab Results Component Value Date/Time Iron 25 (L) 04/26/2019 09:10 AM  
 TIBC 230 (L) 04/26/2019 09:10 AM  
 Iron % saturation 11 (L) 04/26/2019 09:10 AM  
 Ferritin 329 04/26/2019 09:10 AM  
  
MRI- r hip, 6/25/19 IMPRESSION:  
1. Multiple osseous metastatic lesions, many new since 2018. Some are new, more 
conspicuous, or increased since April. No pathologic fracture. The metastatic 
lesion between the right ischium and the right acetabulum is most likely the 
cause of the right hip pain. No pathologic fracture. 2. Nondisplaced degeneration of the right acetabular labrum. 3. Ascites. 4. Nonspecific muscle edema. EPIC email sent to Dr. Sanaz Martel at the time of the dictation. CY A/P- 6/25/19 IMPRESSION: 
  
1. Chronic right lung volume loss with no definite change in right lower lobe 
masslike lesion and right infrahilar lymph node. Pleural thickening and pleural 
fluid in the right lung base also unchanged. 2. Increased small to moderate left pleural effusion. Unchanged 6 mm left lower 
lobe pulmonary nodule. 3. Increased, now large volume ascites. Cirrhotic morphology of the liver with 
no definite change in 2 hypodense lesions as above. Mild splenomegaly. 4. 
Osseous metastatic disease, unchanged CONTROLLED SUBSTANCES SAFETY ASSESSMENT (IF ON CONTROLLED SUBSTANCES):  
 
Reviewed opioid safety handout:  [] Yes   [] No 
24 hour opioid dose >150mg morphine equivalent/day:  [] Yes   [] No 
Benzodiazepines:  [] Yes   [] No 
Sleep apnea:  [] Yes   [] No 
Urine Toxicology Testing within last 6 months:  [] Yes   [] No 
History of or new aberrant medication taking behaviors:  [] Yes   [] No 
Has Narcan been prescribed [] Yes   [] No 
 
   
 
Total time: 70m Counseling / coordination time:  
> 50% counseling / coordination?:

## 2019-07-09 NOTE — PROGRESS NOTES
[facility] Palliative Medicine Outpatient Psychosocial Assessment [phone] Reason for Assessment:   
Initial visit to PM OP Clinic Sources of Information:   
[x]Patient  []Family  []Staff  [x]Medical Record Narrative:  
Mr. Clair Farah is a 69 y/o man whose PMH includes: 
Past Medical History:  
Diagnosis Date  Ascites 02/2019  Cancer ([address] 2017  
 lung ca  Hypertension His NSCLC was diagnosed in May of 2017. He said he had a healthy life until the diagnosis. It came out of the blue when I thought I had a normal cough.   Wife of 53+ years, Aftab Castellon, lives at home with him. They have 2 adult daughters who are  with children (4 grandchildren), live in [address] and are supportive. Mr. Maude Rod worked in an [employer] business for which he lived in [facility] for 16 years where he met his wife. They relocated to [place] for him to continue with the company until he decided to [facility] a different direction.  Assessment: 1. Pt presented as reserved and very polite. 2. Introduced Palliative Social Work as part of the PM supportive team by providing emotional support, resource referrals, advocacy, assistance with AMDs and counseling. 3. Encouraged expression while actively listening. 4. Assessed coping strengths, with pt saying he just gets through. 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 5. Assessed support system, learning pt and wife both have a good support system in [place]. 6. Re: an AMD, pt says he has an AMD and will bring it to an appointment at [facility]. Advance Care Planning: 
  Primary Decision Maker: Sudha Escoto Spouse - [phone] Advance Care Planning 7/9/2019 Patient's Healthcare Decision Maker is: Verbal statement (Legal Next of Kin remains as decision maker) Confirm Advance Directive Yes, not on file Mental Status:   
[x]Alert  []Lethargic  []Unresponsive Oriented to:  [x]Person  [x]Place  [x]Time  [x]Situation Barriers to Learning:   
[]Language  []Developmental  []Cognitive  []Altered Mental Status  []Forgetful []Visual/Hearing Impairment  []Unable to Read/Write  []Motivational   [x]No Barriers Identified  []Other: 
 
Relationship Status: 
[]Single  [x]  []Significant Other/Life Partner  []  []  [] Living Circumstances: 
[]Lives Alone  [x]Family/Significant Other in Household  []Roommates  []Children in the Home  []Paid Caregivers  []Assisted Living Facility/Group Home  []Skilled [address]  []Homeless  []Incarcerated  []Environmental/Care Concerns  []Transportation Issues  [] Issues  []Domestic Violence []Other: 
 
Support System:   
[x]Strong  []Fair  []Limited Sleep Habits:  
[]Poor []Unsatisfactory [x]Satisfactory []Good []Very Good Specific sleep problems? Financial/Legal Concerns:   
[]Uninsured  []Limited Income/Resources  []Non-Citizen  []Utility Issues  []Food Insecurity [x]No Concerns Identified  []Financial POA:   
[]Other: 
 
Congregational/Spiritual/Existential: 
[]Strong Sense of Spirituality  []Involved in [employer] []Request  Visit  []Expressing Spiritual/Existential Angst  [x]No Concerns Identified Coping with Illness:       
 Patient: Family/Caregiver:  
Understanding and Acceptance of Illness/Prognosis  [x] [] Strong Sense of Resilience [x] [] Self Reflection [x] [] Engaged Support System [] [] Does not Readily Discuss Illness [] [] Denial of Terminal Status [] [] Anger [] [] Depression [] [] Anxiety/Fear [] []  
Bargaining [] [] Recent Diagnosis/Prognosis [] [] Difficulties with Body Image [] [] Loss of Identity [] [] Excessive Substance Use [] [] Mental Health History [] []  
Enmeshed Relationships [] [] History of Loss [] [] Anticipatory Grief [] [] Concern for Complicated Grief [] [] Suicidal Ideation or Plan [] [] Caregiver Stress [] [] Unable to assess [] [x] Goals/Plan:  
Pt to bring his AMD to a [facility] medical appointment for scanning. Ongoing psychosocial services as requested by patient. MICHAEL Moraes, LSW, [facility] Palliative  Respecting Choices ® ACP Facilitator Palliative Medicine 
[phone]

## 2019-07-22 NOTE — TELEPHONE ENCOUNTER
Called patient to advise/confirm upcoming appt with Dr. Cara Kahn on     7/23/19 at 2:30pm at [facility]. Pt confirmed. Also advised to please bring in your Drivers License and Insurance Card with you to appointment as well as any prescription pain medication in the original container with you to appt.

## 2019-07-23 NOTE — ACP (ADVANCE CARE PLANNING)
Advance Care Planning (ACP) Provider Conversation Snapshot    Date of ACP Conversation: 07/23/19  Persons included in Conversation:  patient  Length of ACP Conversation in minutes:  20 minutes    Authorized Decision Maker (if patient is incapable of making informed decisions):    This person is:   Healthcare Agent/Medical Power of  under Advance Directive      Primary Decision Maker: Lyndsay Calles - [phone]        For Patients with Decision Making Capacity:   Values/Goals: Exploration of values, goals, and preferences if recovery is not expected, even with continued medical treatment in the event of:  Imminent death    Conversation Outcomes / Follow-Up Plan:   Entered DNR order (If yes, complete Durable DNR form)   DDNR completed

## 2019-07-23 NOTE — PROGRESS NOTES
Palliative Medicine Office Visit  Palliative Medicine Nurse Check In  [phone]    Patient Name: Donato Cheney  YOB: 1950      Date of Office Visit: 7/23/2019   Patient states: \"  \"    From Check In Sheet (scanned in Media):  Has a medical provider talked with you about cardiopulmonary resuscitation (CPR)? [x] Yes   [] No   [] Unable to obtain    Nurse reminder to complete or update ACP FlowSheet:    Is ACP on the Problem List?    [] Yes    [x] No  IF ACP Document is ON FILE; Nurse to place ACP on Problem List     Is there an ACP Note in Chart Review/Note? [] Yes    [x] No   If NO: ALERT PROVIDER       Primary Decision MakerTkarol Jackman [phone]  Advance Care Planning 7/23/2019   Patient's Healthcare Decision Maker is: Verbal statement (Legal Next of Kin remains as decision maker)   Confirm Advance Directive None   Patient Would Like to Complete Advance Directive Yes       Is there anything that we should know about you as a person in order to provide you the best care possible? Have you been to the ER, urgent care clinic since your last visit? [] Yes   [x] No   [] Unable to obtain    Have you been hospitalized since your last visit? [] Yes   [x] No   [] Unable to obtain    Have you seen or consulted any other health care providers outside of the [address] [place] [address] since your last visit?    [] Yes   [x] No   [] Unable to obtain    Functional status (describe):         Last BM: 7/23/2019      accessed (date): 7/23/2019     Bottle review (for opioid pain medication):  Medication 1:   Date filled:   Directions:   # filled:   # left:   # pills taking per day:  Last dose taken:    Medication 2:   Date filled:   Directions:   # filled:   # left:   # pills taking per day:  Last dose taken:    Medication 3:   Date filled:   Directions:   # filled:   # left:   # pills taking per day:  Last dose taken:    Medication 4:   Date filled:   Directions:   # filled:   # left:   # pills taking per day:  Last dose taken:

## 2019-07-23 NOTE — PROGRESS NOTES
Palliative Medicine Outpatient Services  [place]: [phone] ([phone])    Patient Name: Parul Alfred  YOB: 1950    Date of Current Visit: 07/23/19  Location of Current Visit:    [x] [address] [place] [address] Office  [] [facility]  [] [facility] Office  [] Home  [] Other:      Date of Initial Visit: 7/9/2019  Referral from: Dr. Nisha Pederson  Primary Care Physician: Johana Mendez DO      SUMMARY:   Parul Alfred is a 70y.o. year old with a  history of prostate cancer, now with stage IV adenocarcinoma of the lung with progression of disease on Tarceva and poor tolerance to combination chemotherapy with Keytruda, recurrent nonmalignant ascites requiring therapeutic taps from portal hypertension, who was referred to Palliative Medicine by Dr. Nisha Pederson for management of symptoms and psychosocial support. The patients social history includes retired, lived in [place] for a long time where he met his wife, his wife teaches [employer] for living, they have 2 daughters, one daughter lives in [place] and 1 in [place]. Palliative Medicine is addressing the following current patient/family concerns: Intractable right hip pain       PALLIATIVE DIAGNOSES:       ICD-10-CM ICD-9-CM    1. Right hip pain M25.551 719.45 oxyCODONE IR (ROXICODONE) 5 mg immediate release tablet   2. Cancer associated pain G89.3 338.3 oxyCODONE IR (ROXICODONE) 5 mg immediate release tablet   3. Cachexia ([address] R64 799.4    4. 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 Drug-induced constipation K59.03 564.09      E980.5    6. Portal hypertension (HCC) K76.6 572.3    7. Anorexia R63.0 783.0           PLAN:   Patient Instructions     Dear Parul Alfred ,    It was a pleasure seeing you today at [address] [place] [address] office    We will see you again in 4 weeks    Your stated goal:   - Better pain control    This is the plan we talked about:    1.  Right hip pain  - This is cancer related  - You are doing much better with Oxycodone 5 mg 2-3 times a day. We talked about taking 1-1.5 tabs every 3-4 hours as needed. Your pain level varies through the day based on your activity level, etc.  - Continue Dexamethasone 2 mg in the morning and afternoon, we will start weaning this once you start radiation to the hip.  - Continue Protonix 40 mg daily in the morning with breakfast to help reduce heart burn. 2. Constipation  -Constipation is a common side effect of pain medications as they slow your bowels. -continue stool softeners 2-4 tabs per day  - Add Miralax every other day as a laxative. - Goal is to have soft bowel movements every day or every other day. - Please call us if you do not have bowel movements for more than 3 days. 3. Eyesight  - You have some vision problems while driving. Please see an optometrist to check your eyesight. 4. Weight loss  - You have been loosing weight but loss in the past 2 weeks is pretty significant. You have been eating well. We think fluid loss from Lasix is partly the reason. - You do not like Ensure or Boost. Try protein shakes from [facility] or [employer], they have severe options to choose from. Milk shakes and protein shakes at home can also help with increasing calories per day. 5. ACP  - You have an AMD, please bring us a copy during next visit. You want your wife and oldest daughter as your mPOA. - We discussed DNR in detail and signed DDNR. This is what you have shared with us about Advance Care Planning:      Advance Care Planning 7/9/2019   Patient's Healthcare Decision Maker is: Verbal statement (Legal Next of Kin remains as decision maker)   Confirm Advance Directive Yes, not on file           The Palliative Medicine Team is here to support you and your family.          Sincerely,      Nena Gamez MD and the Palliative Medicine Team       GOALS OF CARE / TREATMENT PREFERENCES:   [====Goals of Care====]  GOALS OF CARE:  Patient / health care proxy stated goals: See Patient Instructions / Summary    TREATMENT PREFERENCES:   Code Status:  [] Attempt Resuscitation       [x] Do Not Attempt Resuscitation    Advance Care Planning:  [x] The Pall Med Interdisciplinary Team has updated the ACP Navigator with Decision Maker and Patient Capacity      Advance Care Planning 7/23/2019   Patient's Healthcare Decision Maker is: Verbal statement (Legal Next of Kin remains as decision maker)   Confirm Advance Directive None   Patient Would Like to Complete Advance Directive Yes       Other:  (If patient appropriate for POST, consider using PALLPOST smart phrase here)    The palliative care team has discussed with patient / health care proxy about goals of care / treatment preferences for patient.  [====Goals of Care====]     PRESCRIPTIONS GIVEN:     Medications Ordered Today   Medications    oxyCODONE IR (ROXICODONE) 5 mg immediate release tablet     Sig: Take 2 Tabs by mouth every four (4) hours as needed for Pain for up to 15 days. Max Daily Amount: 60 mg. Dispense:  180 Tab     Refill:  0           FOLLOW UP:     Future Appointments   Date Time Provider [facility]   7/25/2019 10:20 AM Aly Ovalle MD [address] [place] [address]   7/31/2019 11:00 AM [facility] INFUSION NURSE 4 [facility]   11/12/2019  9:45 AM DO CHHAYA Wade           PHYSICIANS INVOLVED IN CARE:   Patient Care Team:  Daylin Conde DO as PCP - General (Internal Medicine)  Donald Peterson MD as Surgeon (Thoracic (non-cardiac) Surgery)  Joshua Shearer MD (Pulmonary Disease)  Anali Gomez MD (Urology)  Tom Mohs, MD (Hematology and Oncology)       HISTORY:   Reviewed patient-completed ESAS and advance care planning form. Reviewed patient record in prescription monitoring program.    CHIEF COMPLAINT: No chief complaint on file. HPI/SUBJECTIVE:    The patient is: [x] Verbal / [] Nonverbal     Patient here alone. He feels much better with initiation of oxycodone. Takes 5 mg in the morning and another tablet in the afternoon and night. He did try taking 1 tablet every 4 hours on days that he exerts himself too much. He met with Dr. Wade Israel for radiation and had a planning CT done. Not sure when radiation is going to start. We talked about managing tablets according to his pain level per day as it varies. He is willing to modify his dosage according to his pain. He continues to be quite stoic. After a long time, he shared that he is starting to have some \"unusual thoughts \"which seems more like normal emotions given his clinical condition. He admits that he has been a very stoic/constrictive person all his life and never discussed his feelings and this is very difficult for him to be able to talk about his feelings. He admits that he does not discuss any of this with his wife. He does not want to talk about that at this time however he understands clearly that the current clinical trial may be his last option. We talked about hospice very briefly but he did not want to pursue that conversation. He had some complaints about  Poor vision while driving and is willing to see an optometrist.    He is concerned about excessive weight loss. He has been eating fairly well and we went over what he is been eating for the last few days and he seems to be consuming enough calories. We talked about protein supplements. He attributes the excessive weight loss to Lasix for his leg edema. ----  Initial visit    Patient here alone. He appears quite uncomfortable due to pain. He drove himself. Right hip pain-started in early June had has worsened but he seems to have gotten used to the intense pain. He is fairly comfortable while sitting or laying down but he has excruciating pain with weightbearing and walking. His physical activity is significantly limited because of this.   He was started on morphine 15 mg tablet last week but he only took half a tablet and it made him more sleepy and did not do any thing for the pain so he decided not to take any. However he took 1 tablet today over the last few days because of severe pain. He was also started on dexamethasone 2 mg 2 times a day but he did not take but 1 tablet/day. He is trying to process progression of disease with a recent scans. He wants to do a clinical trial and does not have any questions about this at this time. He wants to start radiation as soon as possible to help with the pain. He has some constipation, not on any regular bowel regimen. Very poor appetite. Abdominal distention every now and then makes eating more difficult. He is able to sleep. Clinical Pain Assessment (nonverbal scale for nonverbal patients):   [++++ Clinical Pain Assessment++++]  [++++Pain Severity++++]: Pain: 6  [++++Pain Character++++]: sharp, excruciating  [++++Pain Duration++++]: weeks  [++++Pain Effect++++]: functional  [++++Pain Factors++++]: weightbearing, walking  [++++Pain Frequency++++]: weightbearing, walking  [++++Pain Location++++]: right hip, thigh  [++++ Clinical Pain Assessment++++]       FUNCTIONAL ASSESSMENT:     Palliative Performance Scale (PPS):  PPS: 70       PSYCHOSOCIAL/SPIRITUAL SCREENING:     Any spiritual / [employer] concerns:  [] Yes /  [x] No    Caregiver Burnout:  [] Yes /  [x] No /  [] No Caregiver Present      Anticipatory grief assessment:   [x] Normal  / [] Maladaptive       ESAS Anxiety: Anxiety: 2    ESAS Depression: Depression: 2       REVIEW OF SYSTEMS:     The following systems were [x] reviewed / [] unable to be reviewed  Systems: constitutional, ears/nose/mouth/throat, respiratory, gastrointestinal, genitourinary, musculoskeletal, integumentary, neurologic, psychiatric, endocrine. Positive findings noted below.   Modified ESAS Completed by: provider   Fatigue: 7 Drowsiness: 7   Depression: 2 Pain: 6   Anxiety: 2 Nausea: 4   Anorexia: 2 Dyspnea: 5   Best Well-Bein Constipation: Yes   Other Problem (Comment): 0          PHYSICAL EXAM:     Wt Readings from Last 3 Encounters:   19 122 lb 14.4 oz (55.7 kg)   19 138 lb (62.6 kg)   19 139 lb 14.4 oz (63.5 kg)     Blood pressure 136/86, pulse (!) 59, temperature 97.5 °F (36.4 °C), temperature source Oral, resp. rate 18, height 6' 1\" (1.854 m), weight 122 lb 14.4 oz (55.7 kg), SpO2 96 %. Last bowel movement: See Nursing Note    Constitutional: Very thin, alert and oriented  Eyes: pupils equal, anicteric  ENMT: no nasal discharge, moist mucous membranes  Cardiovascular: regular rhythm, distal pulses intact  Respiratory: breathing not labored, symmetric  Gastrointestinal: soft non-tender, +bowel sounds  Musculoskeletal: no deformity, tenderness over right hip palpation  Skin: warm, dry  Neurologic: following commands, moving all extremities  Psychiatric: full affect, no hallucinations  Other:       HISTORY:     Past Medical History:   Diagnosis Date    Ascites 2019    Cancer [facility] 2017    lung ca    Hypertension       Past Surgical History:   Procedure Laterality Date    HX ORTHOPAEDIC      reconstruction of left little finger    IR BX LIVER PERCUTANEOUS  2019    IR BX TRANSCATHETER  2019    IR INSERT TUNL CVC W PORT OVER 5 YEARS  2019    IR PARACENTESIS ABD INIT  2019    6 total to date, 2019    IR PARACENTESIS ABD W IMAGE  2019      Family History   Problem Relation Age of Onset    Cancer Mother         leukemia    Stroke Father     Cancer Brother         bladder      History reviewed, no pertinent family history.   Social History     Tobacco Use    Smoking status: Never Smoker    Smokeless tobacco: Never Used   Substance Use Topics    Alcohol use: Not Currently     Alcohol/week: 10.0 standard drinks     Types: 10 Glasses of wine per week     Comment: ocassionally     No Known Allergies   Current Outpatient Medications   Medication Sig    oxyCODONE IR (ROXICODONE) 5 mg immediate release tablet Take 2 Tabs by mouth every four (4) hours as needed for Pain for up to 15 days. Max Daily Amount: 60 mg.    dexAMETHasone (DECADRON) 2 mg tablet Take 1 Tab by mouth two (2) times daily (with meals).  pantoprazole (PROTONIX) 40 mg tablet Take 1 Tab by mouth daily.  benzonatate (TESSALON) 100 mg capsule TAKE 1 CAPSULE BY MOUTH THREE TIMES DAILY FOR UP TO 7 DAYS AS NEEDED FOR COUGH    furosemide (LASIX) 20 mg tablet Take 2 Tabs by mouth daily.  spironolactone (ALDACTONE) 50 mg tablet Take 2 Tabs by mouth daily.  levothyroxine (SYNTHROID) 25 mcg tablet Alternate 1 with 2 every other day    ondansetron hcl (ZOFRAN) 8 mg tablet Take 1 Tab by mouth every eight (8) hours as needed for Nausea.  zoledronic acid (ZOMETA) 4 mg/100 mL pgbk infusion 4 mg by IntraVENous route every three (3) months.  cholecalciferol (VITAMIN D3) 1,000 unit tablet Take 1,000 Units by mouth daily.  fluticasone propionate (FLONASE) 50 mcg/actuation nasal spray 2 Sprays by Both Nostrils route daily. (Patient taking differently: 2 Sprays by Both Nostrils route as needed.)    chlorhexidine (PERIDEX) 0.12 % solution RINSE BID     No current facility-administered medications for this visit. LAB DATA REVIEWED:     Lab Results   Component Value Date/Time    WBC 6.9 07/02/2019 12:05 PM    HGB 6.0 (L) 07/02/2019 12:05 PM    PLATELET 8 (LL) 27/12/6517 12:05 PM     Lab Results   Component Value Date/Time    Sodium 140 07/02/2019 12:05 PM    Potassium 3.5 07/02/2019 12:05 PM    Chloride 103 07/02/2019 12:05 PM    CO2 30 07/02/2019 12:05 PM    BUN 28 (H) 07/02/2019 12:05 PM    Creatinine 0.94 07/02/2019 12:05 PM    Calcium 8.8 07/02/2019 12:05 PM    Magnesium 1.7 06/19/2019 11:13 AM    Phosphorus 2.6 10/04/2018 02:21 PM      Lab Results   Component Value Date/Time    AST (SGOT) 35 07/02/2019 12:05 PM    Alk.  phosphatase 370 (H) 07/02/2019 12:05 PM    Protein, total 5.8 (L) 07/02/2019 12:05 PM    Albumin 2.0 (L) 07/02/2019 12:05 PM    Globulin 3.8 07/02/2019 12:05 PM     Lab Results   Component Value Date/Time    INR 1.1 05/29/2019 09:15 AM    Prothrombin time 10.9 05/29/2019 09:15 AM      Lab Results   Component Value Date/Time    Iron 25 (L) 04/26/2019 09:10 AM    TIBC 230 (L) 04/26/2019 09:10 AM    Iron % saturation 11 (L) 04/26/2019 09:10 AM    Ferritin 329 04/26/2019 09:10 AM      MRI- r hip, 6/25/19    IMPRESSION:   1. Multiple osseous metastatic lesions, many new since 2018. Some are new, more  conspicuous, or increased since April. No pathologic fracture. The metastatic  lesion between the right ischium and the right acetabulum is most likely the  cause of the right hip pain. No pathologic fracture. 2. Nondisplaced degeneration of the right acetabular labrum. 3. Ascites. 4. Nonspecific muscle edema. EPIC email sent to Dr. Denver Has at the time of the dictation. CY A/P- 6/25/19  IMPRESSION:     1. Chronic right lung volume loss with no definite change in right lower lobe  masslike lesion and right infrahilar lymph node. Pleural thickening and pleural  fluid in the right lung base also unchanged. 2. Increased small to moderate left pleural effusion. Unchanged 6 mm left lower  lobe pulmonary nodule. 3. Increased, now large volume ascites. Cirrhotic morphology of the liver with  no definite change in 2 hypodense lesions as above. Mild splenomegaly.  4.  Osseous metastatic disease, unchanged     CONTROLLED SUBSTANCES SAFETY ASSESSMENT (IF ON CONTROLLED SUBSTANCES):     Reviewed opioid safety handout:  [] Yes   [] No  24 hour opioid dose >150mg morphine equivalent/day:  [] Yes   [] No  Benzodiazepines:  [] Yes   [] No  Sleep apnea:  [] Yes   [] No  Urine Toxicology Testing within last 6 months:  [] Yes   [] No  History of or new aberrant medication taking behaviors:  [] Yes   [] No  Has Narcan been prescribed [] Yes   [] No          Total time: 70m  Counseling / coordination time: > 50% counseling / coordination?:

## 2019-07-23 NOTE — PATIENT INSTRUCTIONS
Dear Jessy Avilez ,    It was a pleasure seeing you today at [address] [place] [address] office    We will see you again in 4 weeks    Your stated goal:   - Better pain control    This is the plan we talked about:    1. Right hip pain  - This is cancer related  - You are doing much better with Oxycodone 5 mg 2-3 times a day. We talked about taking 1-1.5 tabs every 3-4 hours as needed. Your pain level varies through the day based on your activity level, etc.  - Continue Dexamethasone 2 mg in the morning and afternoon, we will start weaning this once you start radiation to the hip.  - Continue Protonix 40 mg daily in the morning with breakfast to help reduce heart burn. 2. Constipation  -Constipation is a common side effect of pain medications as they slow your bowels. -continue stool softeners 2-4 tabs per day  - Add Miralax every other day as a laxative. - Goal is to have soft bowel movements every day or every other day. - Please call us if you do not have bowel movements for more than 3 days. 3. Eyesight  - You have some vision problems while driving. Please see an optometrist to check your eyesight. 4. Weight loss  - You have been loosing weight but loss in the past 2 weeks is pretty significant. You have been eating well. We think fluid loss from Lasix is partly the reason. - You do not like Ensure or Boost. Try protein shakes from [facility] or [employer], they have severe options to choose from. Milk shakes and protein shakes at home can also help with increasing calories per day. 5. ACP  - You have an AMD, please bring us a copy during next visit. You want your wife and oldest daughter as your mPOA. - We discussed DNR in detail and signed DDNR.     This is what you have shared with us about Advance Care Planning:      Advance Care Planning 7/9/2019   Patient's Healthcare Decision Maker is: Verbal statement (Legal Next of Kin remains as decision maker)   Confirm Advance Directive Yes, not on file           The Palliative Medicine Team is here to support you and your family.          Sincerely,      Allison Arroyo MD and the Palliative Medicine Team

## 2019-07-25 NOTE — PROGRESS NOTES
Chief Complaint   Patient presents with    Follow-up     portal hypertension     Visit Vitals  /75 (BP 1 Location: Left arm, BP Patient Position: Sitting)   Pulse 77   Temp 97.3 °F (36.3 °C) (Tympanic)   Ht 6' 1\" (1.854 m)   Wt 126 lb 9.6 oz (57.4 kg)   SpO2 94%   BMI 16.70 kg/m²     3 most recent PHQ Screens 7/23/2019   Little interest or pleasure in doing things Not at all   Feeling down, depressed, irritable, or hopeless Not at all   Total Score PHQ 2 0       Abuse Screening Questionnaire 7/23/2019   Do you ever feel afraid of your partner? N   Are you in a relationship with someone who physically or mentally threatens you? N   Is it safe for you to go home? Y     1. Have you been to the ER, urgent care clinic since your last visit? Hospitalized since your last visit? No    2. Have you seen or consulted any other health care providers outside of the [address] [place] [address] since your last visit? Include any pap smears or colon screening.  No

## 2019-07-25 NOTE — PROGRESS NOTES
[address] [place] [address], MD, Daniel [facility], MD Vidal Gallagher, WANDA    [facility], [facility]     April Yvonne Peck, ALVERTO Emerson, ALVERTO Bernardo [facility] [address]    at [address], [address] [place] [address], [facility] 22.    [phone]    FAX: [address] [place] [address], [address]    [phone]    FAX: [phone]       Patient Care Team:  Massimo Barrera DO as PCP - General (Internal Medicine)  Chana Kussmaul, MD as Surgeon (Thoracic (non-cardiac) Surgery)  Brigitte Weaver MD (Pulmonary Disease)  Phani Delarosa MD (Urology)  Leonard Hart MD (Hematology and Oncology)      Problem List  Date Reviewed: 7/23/2019          Codes Class Noted    Ascites ICD-10-CM: R18.8  ICD-9-CM: 789.59  3/25/2019        Elevated liver enzymes ICD-10-CM: R74.8  ICD-9-CM: 790.5  11/8/2017        Adenocarcinoma of lung, stage 4, right ([facility] [address] ICD-10-CM: C34.91  ICD-9-CM: 162.9  11/8/2017        Bone metastases ([facility] [address] ICD-10-CM: C79.51  ICD-9-CM: 198.5  5/18/2017        Pleural effusion ICD-10-CM: J90  ICD-9-CM: 511.9  5/14/2017        Prostate cancer ([facility] [address] ICD-10-CM: C61  ICD-9-CM: 185  3/11/2016        Acquired hypothyroidism ICD-10-CM: E03.9  ICD-9-CM: 244.9  1/19/2016        Vitamin D deficiency ICD-10-CM: E55.9  ICD-9-CM: 268.9  1/19/2016        Essential hypertension ICD-10-CM: I10  ICD-9-CM: 401.9  12/22/2015        Thoracic scoliosis ICD-10-CM: M41.9  ICD-9-CM: 737.30  12/22/2015            [facility] management of non-cirrhotic portal hypertension.   The active problem list, all pertinent past medical history, medications, liver histology, radiologic findings and laboratory findings related to the liver disorder were reviewed with the patient. The patient is a 70 y.o.  male without any history of liver disease. He was found to have adenocarcinoma of the lung in 5/2017. He developed metastasis to the liver and bones. He has received several courses of chemotherapy. Oncology is planning on XRT to suspected bone metastases in the right hip. He developed refractory ascites for the first time in 3/2019. The patient underwent a transjugular liver biopsy with hepatic venous pressure measurements in 5/2019. The biopsy demonstrates no evidence for fibrosis/cirrhosis. Hepatic vein pressures were. RA 3, free hepatic vein 3, wedge hepatic vein 20. HVPG = 17. Ascites had been well controlled on step 2 diuretics. He developed increased lower edema and diuretics were increased to step 3. The edema resolved and dose was reduced back to step 2    He developed severe anemia with HB 6.0 gms and required blood transfusion in 7/2019. He continues to be symptomatic with shortness of breath and fatigue. He denies hematemesis or hematochezia. Last iron studies 4/2019 were consistent with iron deficiency. The patient notes fatigue. The patient has not experienced fevers, or chills. Ascites and edema have resolved. The patient has limitations in functional activities which can be attributed to ascites and to other medical problems that are not related to the liver disease. ASSESSMENT AND PLAN:  Ascites   Ascites secondary to Nodular Regenerative Hyperplasia. The liver appears nodular on imaging. The SAAG from 3/2019 was 2.3 - 0.9 = 1.4 which is elevated. The hepatic venous pressure gradient is elevated at 17 mm Hg  The liver biopsy does not show cirrhosis. Only mild portal fibrosis. At the last appointment we increased the dose of diuretics to step 2. Ascites has resolved.    Will continue the current dose of diuretics at step 2. Lower extremity edema  Edema initially resolved with step 2 diuretics. He developed increased edema which responded to step 3 diuretics and is now back to step 2. He can continue to increase diuretics up to step 3 as needed and then back down to step 2 when edema resolves. Thrombocytopenia   This is secondary to chemotherapy and bone marrow suppression. Neutropenia   This is secondary to chemotherapy and bone marrow suppression    Anemia   This is due to chemotherapy and bone marrow repression. 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 This can be addressed by Oncology. ALLERGIES  No Known Allergies    MEDICATIONS  Current Outpatient Medications   Medication Sig    oxyCODONE IR (ROXICODONE) 5 mg immediate release tablet Take 2 Tabs by mouth every four (4) hours as needed for Pain for up to 15 days. Max Daily Amount: 60 mg.    dexAMETHasone (DECADRON) 2 mg tablet Take 1 Tab by mouth two (2) times daily (with meals).  pantoprazole (PROTONIX) 40 mg tablet Take 1 Tab by mouth daily.  benzonatate (TESSALON) 100 mg capsule TAKE 1 CAPSULE BY MOUTH THREE TIMES DAILY FOR UP TO 7 DAYS AS NEEDED FOR COUGH    furosemide (LASIX) 20 mg tablet Take 2 Tabs by mouth daily.  spironolactone (ALDACTONE) 50 mg tablet Take 2 Tabs by mouth daily.  levothyroxine (SYNTHROID) 25 mcg tablet Alternate 1 with 2 every other day    zoledronic acid (ZOMETA) 4 mg/100 mL pgbk infusion 4 mg by IntraVENous route every three (3) months.  cholecalciferol (VITAMIN D3) 1,000 unit tablet Take 1,000 Units by mouth daily.  fluticasone propionate (FLONASE) 50 mcg/actuation nasal spray 2 Sprays by Both Nostrils route daily. (Patient taking differently: 2 Sprays by Both Nostrils route as needed.)    ondansetron hcl (ZOFRAN) 8 mg tablet Take 1 Tab by mouth every eight (8) hours as needed for Nausea.     chlorhexidine (PERIDEX) 0.12 % solution RINSE BID     No current facility-administered medications for this visit. SYSTEM REVIEW NOT RELATED TO LIVER DISEASE OR REVIEWED ABOVE:  Constitution systems: Negative for fever, chills, weight gain, weight loss. Eyes: Negative for visual changes. ENT: Negative for sore throat, painful swallowing. Respiratory: Negative for cough, hemoptysis, SOB. Cardiology: Negative for chest pain, palpitations. GI:  Negative for constipation or diarrhea. : Negative for urinary frequency, dysuria, hematuria, nocturia. Skin: Negative for rash. Hematology: Negative for easy bruising, blood clots. Musculo-skeletal: Negative for back pain, muscle pain, weakness. Right hip pain. Neurologic: Negative for headaches, dizziness, vertigo, memory problems not related to HE. Psychology: Negative for anxiety, depression. FAMILY HISTORY:  The father  of leukemia. The mother  at age 80 years. There is no family history of liver disease. SOCIAL HISTORY:  The patient is . The patient has 2 children, and 4 grandchildren. The patient has never used tobacco products. The patient consumes 1-2 alcoholic beverages per day   The patient used to work in business management. The patient retired in . PHYSICAL EXAMINATION:  Visit Vitals  /75 (BP 1 Location: Left arm, BP Patient Position: Sitting)   Pulse 77   Temp 97.3 °F (36.3 °C) (Tympanic)   Ht 6' 1\" (1.854 m)   Wt 126 lb 9.6 oz (57.4 kg)   SpO2 94%   BMI 16.70 kg/m²     General: Ill appearing. Eyes: Sclera anicteric. ENT: No oral lesions. Thyroid normal.  Nodes: No adenopathy. Skin: No spider angiomata. No jaundice. No palmar erythema. Respiratory: Lungs clear to auscultation. Cardiovascular: Regular heart rate. No murmurs. No JVD. Abdomen: Distended with obvious ascites. Extremities: 3+ pitting edema, Muscle wasting. Neurologic: Alert and oriented. Cranial nerves grossly intact.   No asterixis. LABORATORY STUDIES:  Liver [place] [address] & Units 2019   WBC 4.1 - 11.1 K/uL 9.5 6.9   ANC 1.8 - 8.0 K/UL 8.4 (H) 5.9   HGB 12.1 - 17.0 g/dL 8.9 (L) 6.0 (L)    - 400 K/uL 76 (LL) 8 (LL)   INR 0.8 - 1.2     AST 15 - 37 U/L 31 35   ALT 12 - 78 U/L 22 15   Alk Phos 45 - 117 U/L 305 (H) 370 (H)   Bili, Total 0.2 - 1.0 MG/DL 1.3 (H) 1.9 (H)   Bili, Direct 0.00 - 0.40 mg/dL 0.64 (H)    Albumin 3.5 - 5.0 g/dL 2.9 (L) 2.0 (L)   BUN 6 - 20 MG/DL 28 (H) 28 (H)   Creat 0.70 - 1.30 MG/DL 0.71 (L) 0.94   Na 136 - 145 mmol/L 138 140   K 3.5 - 5.1 mmol/L 4.3 3.5   Cl 97 - 108 mmol/L 101 103   CO2 21 - 32 mmol/L 26 30   Glucose 65 - 100 mg/dL 112 (H) 93     SEROLOGIES:  Serologies Latest Ref Rng & Units 2019   Hep B Surface Ag Negative  Negative   Hep C Ab 0.0 - 0.9 s/co ratio  <0.1   Ferritin 30 - 400 ng/mL 749 (H) 329   Iron % Saturation 15 - 55 % 12 (L) 11 (L)   WHIT, IFA   Negative   C-ANCA Neg:<1:20 titer  <1:20   P-ANCA Neg:<1:20 titer  <1:20   ANCA Neg:<1:20 titer  <1:20   ASMCA 0 - 19 Units  17   M2 Ab 0.0 - 20.0 Units  <20.0   Alpha-1 antitrypsin level 90 - 200 mg/dL  279 (H)     LIVER HISTOLOGY:  2019. Biopsy of liver mass. Adenocarcinoma. 2019. Slides reviewed by MLS. Minimal inflammation in some portal tracts. No lobular inflammation. No steatosis. Focal portal fibrosis. ENDOSCOPIC PROCEDURES:  Not available or performed    RADIOLOGY:  2019. Several enlarging liver nodules measuring 1.3 x 1.8 cm in the right lobe. No mention of surface nodularity or varices consistent with portal HTN or cirrhosis. Asites. 2019. CT scan abdomen with IV contrast.  Changes consistent with cirrhosis. No liver mass lesions. No dilated bile ducts. Moderate ascites. OTHER TESTIN2018. ECHO heart. LVEF 60%,  RV normal.  No TR.    2019. HVP measurements.   RA 3,  Free HV 3, Wedged HV 20, HVPG=17    FOLLOW-UP:  All of the issues listed above in the Assessment and Plan were discussed with the patient. All questions were answered. The patient expressed a clear understanding of the above. [facility] 87 in 1 month. ALVERTO Randhawa 76 [address] Adeel Thomas, [address] [place] [address] 22.  [address], MD  [address] [place] [address], [address] [place] [address] 22.  [phone]  [address] [facility]

## 2019-07-29 NOTE — PROGRESS NOTES
Called patient with blood work results. Will hold on iron infusion. Advised patient to inform oncology we checked the iron levels and for them to review.

## 2019-07-31 NOTE — PROGRESS NOTES
Hematology/Oncology progress note REASON FOR VISIT: Stage IV EGFR mutated NSCLC HISTORY OF PRESENT ILLNESS: Mr. Gunner Burns is a 70 y.o. male with prostate cancer who has stage IV NSCLC- adenocarcinoma (EGFR mutated , PD-L1 low) in May 2017. He progressed on Tarceva and then Osimertinib. Started Carboplatin+ Alimta+ Keytruda but felt poorly after cycle 1. He has had recurrent non malignant ascites requiring therapeutic taps and also saw [facility] for evaluation of potential clinical studies. Comes today for follow-up. Today is day 3/10 sessions planned of radiation to the right hip. He has a trip during therapy so anticipated end date of radiation is August 14th. His hip pain is stable and rates pain 3/10 with use of oxycodone every 4 hours as prescribed by palliative care with relief. He is also on dexamethasone and tapering this as he has started radiation. Reports shortness of breath with exertion and a dry cough. He has had no headaches or dizziness. Denies mouth sores. He believes foot swelling has improved. Does not feel he needs a paracentesis at this time. Oncologic history Mr. Gunner Burns noticed a new cough and fevers back in March of 2017. He went to Urgent Care and received antibiotics and cough medication. He states this worked for a while, but he began to notice his cough return. This was intermittent. He had some tightness across his anterior chest which he related to the infection. Chest x-ray was abnormal and he was told to proceed to the Emergency Department. Leena Macedo had been under surveillance for right lower lobe mass that was initially noted in 2015. Bronch at that time was negative for malignancy. He was evaluated by Dr. Ophelia Rodríguez with Thoracic Surgery in 2015 for possible surgical resection. CT chest in November of 2016 with mass size unchanged but some right basilar pleural thickening.  CT chest obtained 5/14/17 however showed a new large right pleural effusion with right middle lobe and right lower lobe collapse. Irregular spiculated right lower lobe mass 3.8cm and stable precarinal enlarged lymph nodes were noted. New right posterior second rib lytic lesion and new right hepatic hypodensity consistent with mets. He underwent a therapeutic thoracentesis on 5/15/17 with removal of 1500 cc of serosanguinous fluid. Pathology showed adenocarcinoma. PET CT showed pleural, bone, liver and flor metastasis. MRI brain 5/20/17: No metastasis. Needed repeat R sided thoracentesis on 5/25/17, had some post procedure hydropneumothorax. He was seen by Dr. Eliud Moore at [facility] for Pleurx catheter.  
  
CT guided biopsy of R lung mass done 5/25 which showed adenocarcinoma. EGFR mutation detected Exon 18 and 21 Treatment 6/26/17: Tarceva. Monthly Xgeva. Palliative XRT to R hip CT CAP 10/2/17: Response Ct 1/23/18: CT with some growth of LLL mass but stable by RECIST. CT 3/15/18 and Bone scan stable though he had worsening left back pain. MRI results showed extensive disease at L2, S2 and additional lesions as previously known. Received palliative XRT that he completed 4/18/18 5/26/18: Started Osimertinib as he had a T790M mutation. Stopped 6/20/18 due to platelets of 08M Resumed at 80 mg every other day on 7/6/18 Started 40mg daily on 7/12/18 2/19: Progressive disease with new liver metastases and progression of thoracic disease 2/22/19: liver biopsy pathology shows metastatic adenocarcinoma, consistent with lung primary- foundation sent 2/26/19: Cycle 1 of Carboplatin+ Alimta+ Keytruda. 3/6/19- 3/13/19: 30 Gy to Rt axilla and T spine 4/30/19: CT chest abdomen and pelvis stable 6/2019: CT and MRI with disease progression Past Medical History:  
Diagnosis Date  Ascites 02/2019  Cancer ([address] 2017  
 lung ca  Hypertension Past Surgical History:  
Procedure Laterality Date  HX ORTHOPAEDIC    
 reconstruction of left little finger  IR BX LIVER PERCUTANEOUS  2/22/2019  IR BX TRANSCATHETER  5/6/2019  IR INSERT TUNL CVC W PORT OVER 5 YEARS  2/22/2019  IR PARACENTESIS ABD INIT  03/2019  
 6 total to date, 5/13/2019  IR PARACENTESIS ABD W IMAGE  5/6/2019 No Known Allergies Current Outpatient Medications Medication Sig Dispense Refill  oxyCODONE IR (ROXICODONE) 5 mg immediate release tablet Take 2 Tabs by mouth every four (4) hours as needed for Pain for up to 15 days. Max Daily Amount: 60 mg. 180 Tab 0  
 dexAMETHasone (DECADRON) 2 mg tablet Take 1 Tab by mouth two (2) times daily (with meals). 28 Tab 0  
 pantoprazole (PROTONIX) 40 mg tablet Take 1 Tab by mouth daily. 30 Tab 4  
 benzonatate (TESSALON) 100 mg capsule TAKE 1 CAPSULE BY MOUTH THREE TIMES DAILY FOR UP TO 7 DAYS AS NEEDED FOR COUGH 30 Cap 0  
 furosemide (LASIX) 20 mg tablet Take 2 Tabs by mouth daily. 90 Tab 3  
 spironolactone (ALDACTONE) 50 mg tablet Take 2 Tabs by mouth daily. 90 Tab 3  
 fluticasone propionate (FLONASE) 50 mcg/actuation nasal spray 2 Sprays by Both Nostrils route daily. (Patient taking differently: 2 Sprays by Both Nostrils route as needed.) 1 Bottle 5  
 levothyroxine (SYNTHROID) 25 mcg tablet Alternate 1 with 2 every other day 60 Tab 5  
 ondansetron hcl (ZOFRAN) 8 mg tablet Take 1 Tab by mouth every eight (8) hours as needed for Nausea. 30 Tab 6  chlorhexidine (PERIDEX) 0.12 % solution RINSE BID  0  
 zoledronic acid (ZOMETA) 4 mg/100 mL pgbk infusion 4 mg by IntraVENous route every three (3) months.  cholecalciferol (VITAMIN D3) 1,000 unit tablet Take 1,000 Units by mouth daily. Social History Socioeconomic History  Marital status:  Spouse name: Not on file  Number of children: Not on file  Years of education: Not on file  Highest education level: Not on file Tobacco Use  Smoking status: Never Smoker  Smokeless tobacco: Never Used Substance and Sexual Activity  Alcohol use: Not Currently Alcohol/week: 10.0 standard drinks Types: 10 Glasses of wine per week Comment: ocassionally  Drug use: No  
 Sexual activity: Yes  
  Partners: Female Comment:  has 2 children Family History Problem Relation Age of Onset  Cancer Mother   
     leukemia  Stroke Father  Cancer Brother   
     bladder ROS As reviewed under HP Emotional well being addressed and patient is coping well Physical Examination:  
Visit Vitals Ht 6' 1\" (1.854 m) Wt 126 lb (57.2 kg) BMI 16.62 kg/m² ECOG 1 General appearance - alert, frail, and in no distress, Looks pale Mental status - oriented to person, place, and time Mouth - mucous membranes moist, pharynx normal without lesions. Neck - supple, no significant adenopathy Lymphatics - no palpable lymphadenopathy, no hepatosplenomegaly Resp-CTAB, normal respiratory effort CV-1+ pedal edema Abdomen - soft, nontender, distended Neurological - normal speech Skin: No rashes MSK- uses cane LABS Lab Results Component Value Date/Time WBC 9.5 07/25/2019 12:00 AM  
 HGB 8.9 (L) 07/25/2019 12:00 AM  
 HCT 26.6 (L) 07/25/2019 12:00 AM  
 PLATELET 76 (LL) 15/39/8487 12:00 AM  
  (H) 07/25/2019 12:00 AM  
 ABS. NEUTROPHILS 8.4 (H) 07/25/2019 12:00 AM  
 
Lab Results Component Value Date/Time Sodium 138 07/25/2019 12:00 AM  
 Potassium 4.3 07/25/2019 12:00 AM  
 Chloride 101 07/25/2019 12:00 AM  
 CO2 26 07/25/2019 12:00 AM  
 Glucose 112 (H) 07/25/2019 12:00 AM  
 BUN 28 (H) 07/25/2019 12:00 AM  
 Creatinine 0.71 (L) 07/25/2019 12:00 AM  
 GFR est  07/25/2019 12:00 AM  
 GFR est non-AA 96 07/25/2019 12:00 AM  
 Calcium 8.9 07/25/2019 12:00 AM  
 
Lab Results Component Value Date/Time AST (SGOT) 31 07/25/2019 12:00 AM  
 Alk.  phosphatase 305 (H) 07/25/2019 12:00 AM  
 Protein, total 5.9 (L) 07/25/2019 12:00 AM  
 Albumin 2.9 (L) 07/25/2019 12:00 AM  
 Globulin 3.8 07/02/2019 12:05 PM  
 A-G Ratio 0.5 (L) 07/02/2019 12:05 PM  
 
CT 6/2019 IMPRESSION: 
1. Chronic right lung volume loss with no definite change in right lower lobe 
masslike lesion and right infrahilar lymph node. Pleural thickening and pleural 
fluid in the right lung base also unchanged. 2. Increased small to moderate left pleural effusion. Unchanged 6 mm left lower 
lobe pulmonary nodule. 3. Increased, now large volume ascites. Cirrhotic morphology of the liver with 
no definite change in 2 hypodense lesions as above. Mild splenomegaly. 4. 
Osseous metastatic disease, unchanged MRI 6/25/19 IMPRESSION:  
Right ischium and the inferior right acetabulum measures 3.7 cm in craniocaudal 
diameter, unchanged since April but new or more conspicuous since 2018. 3.7 cm 
hypointense T1 bone lesion in the left iliac wing is new since 2018 and more 
conspicuous since April. 1.6 cm hypointense T1 lesion in the left femoral 
intertrochanteric region is new since both comparison exams. 1.4 cm hypointense T1 lesion in the central S1 segment of the sacrum is new since both comparison 
studies. Right iliac crest 3 cm partially sclerotic hypointense T1 lesion is 
slightly increased since April and new versus more conspicuous since 2018. No 
pathologic fracture. No right femur lesion. 1. Multiple osseous metastatic lesions, many new since 2018. Some are new, more 
conspicuous, or increased since April. No pathologic fracture. The metastatic 
lesion between the right ischium and the right acetabulum is most likely the 
cause of the right hip pain. No pathologic fracture. 2. Nondisplaced degeneration of the right acetabular labrum. 3. Ascites. 4. Nonspecific muscle edema. EPIC email sent to Dr. Denver Has at the time of the dictation. ASSESSMENT AND PLAN Mr. Abbey Peralta is a 70 y.o. male with: 
 
1. Stage IV NSCLC ([id number]) With R lung mass, mediastinal adenopathy, malignant R pleural effusion, multiple asymptomatic bone metastasis and possibly liver metastasis. EGFR mutated, PD-L1 5%. Found to have T790M mutation but most recently progressed on Osimertinib with progressive painful bone metastasis. Poorly tolerated 4 cycles of Carboplatin+ Alimta and Keytruda due to cytopenias and fatigue requiring dose reduction. CT 6/2019 reviewed and overall appears unchanged. However he has progressive R hip pain, weight loss and MRI of Rt hip shows worsening bone metastasis in the pelvis / sacrum and Femur. Overall he is considered to have progressive disease. See prior notes regarding discussions on treatment plan. Currently he is undergoing radiation to the right hip, today is day 3/10. Anticipated end date is August 14th. Once he completes radiation he will be consented for the [facility] study which he is now eligible for ( has a somatic BRCA mutation) Alternative option will be Docetaxel. Continue Zometa q3 months 2. Kanwal 6 prostate cancer on active surveillance Records reviewed 3. Bone pain:  secondary to osseous metastasis s/p XRT to R ischium and L2. S/P 30 Gy to Rt axilla and T spine  Completed 3/13/19 Now with right hip/thigh pain with worsening metastasis to the sacrum, ilium and new lesion in the femur Undergoing RT to the right hip 3/10 fx today Following with palliative care, on oxycodone 5mg q4 hours which controls pain 4. HTN. Now normotensive. 5. Diarrhea- resolved 6. Recurrent ascites S/P Paracentesis on 3/22, 4/10 and 4/19, 4/30 Large volume Non malignant Due to portal HTN, there is no cirrhosis on liver biopsy and this is thought to be due to drug related injury I am uncertain of the prognosis of his liver disease It does make palliative treatments for #1 harder to tolerate and in that respect worsens his overall prognosis Diuretics and management of liver disease per Dr. Ghazala Ernandez. He does not feel he needs paracentesis at this time. 7) Anemia Secondary to bone metastasis and chemotherapy Hgb stable at 8.8g/dL today Will transfuse if < 8 g/dl RTC every 2 weeks with labs in [place] with possible transfusion Consent for [facility] 8/14/19 Cecilia Cabrales MD

## 2019-07-31 NOTE — Clinical Note
Patient came to office today for f/u. His last day of radiation is August 14th. He comes back to clinic on 8/14 for labs/office visit after radiation. Would we be able to consent him that day?

## 2019-07-31 NOTE — PROGRESS NOTES
[facility] Lab Visit:        1100  Pt arrived to [facility] ambulatory in stable condition. Non new concerns voiced. Labs drawn peripherally and sent for processing. Visit Vitals  /74 (BP 1 Location: Left arm, BP Patient Position: Sitting)   Pulse 98   Temp 98.5 °F (36.9 °C)   Resp 16   SpO2 99%         1115 tolerated well. D/c home ambulatory in no distress. Pt aware  Of next appointment scheduled. Labs available in CC once resulted.

## 2019-07-31 NOTE — PROGRESS NOTES
Azalia Penny is a 70 y.o. male Chief Complaint Patient presents with  Lung Cancer  
  follow up 1. Have you been to the ER, urgent care clinic since your last visit? Hospitalized since your last visit? No  
2. Have you seen or consulted any other health care providers outside of the [address] [place] [address] since your last visit?  No

## 2019-08-14 NOTE — PROGRESS NOTES
[facility] Lab Visit: 
 
 
 
1458 Pt arrived ambulatory to [facility] in stable condition, for lab visit. Labs drawn peripherally and sent for processing, pt tolerated well. Visit Vitals /70 (BP 1 Location: Left arm, BP Patient Position: Sitting) Pulse 74 Temp 98 °F (36.7 °C) Resp 18 SpO2 97% 1045 No new concerns voiced. D/c [facility] ambulatory in no distress. Pt aware of next appointment scheduled. Labs available in CC once resulted.

## 2019-08-16 NOTE — TELEPHONE ENCOUNTER
Called patient to advise/confirm upcoming appt with Dr. Patrizia Alegria on   8/20/19   at 9:30am at [facility]. Pt confirmed. Also advised to please bring in your Drivers License and Insurance Card with you to appointment as well as any prescription pain medication in the original container with you to appt.

## 2019-08-20 PROBLEM — D69.6 THROMBOCYTOPENIA (HCC): Status: ACTIVE | Noted: 2019-01-01

## 2019-08-20 NOTE — PROGRESS NOTES
Gautam Shen [employer]  [phone]    Narrative  Pt not feeling well today and was not up to talking, but wanted to take care of completing the AMD.      Assessment / Actions  SW assisted pt in completing the AMD (see ACP note).       Primary Decision MakeCliffkelsey Walton - [phone]    Primary Decision Maker: Bethany Altamirano - [place] - [phone]  Advance Care Planning 8/20/2019   Patient's Healthcare Decision Maker is: Named in scanned ACP document   Confirm Advance Directive Yes, on file   Patient Would Like to Complete Advance Directive -   Does the patient have other document types Do Not Resuscitate       Lane Moment, MSSW, LSW, CCM    Palliative   Respecting Choices ® ACP Facilitator  Palliative Medicine  [phone]

## 2019-08-20 NOTE — PROGRESS NOTES
Pt has been screened for all eligibility/ineligibility criteria and has been determined eligible for this protocol. Informed consent was reviewed by RN with patient prior to signing. All questions were answered adequately by RN or Dr. Jeet Diaz. Patient signed consent today for study [id number]: \"A PHASE II STUDY OF RUCAPARIB IN PATIENTS WITH GENOMIC NATHAN HIGH AND/OR DELETERIOUS BRCA1/2 MUTATION STAGE IV OR RECURRENT NON-SMALL CELL LUNG CANCER (Lung-MAP SUB-STUDY)\". A copy of ICF given to patient. No additional questions at this time.

## 2019-08-20 NOTE — PROGRESS NOTES
[facility] Lab Visit:      0830 Pt arrived ambulatory to [facility] in stable condition, for lab visit. Labs drawn peripherally and sent for processing, pt tolerated well. Visit Vitals  /73 (BP 1 Location: Left arm, BP Patient Position: Sitting)   Pulse 86   Temp 99 °F (37.2 °C)   Resp 18   SpO2 93%         0900 No new concerns voiced. D/c [facility] ambulatory in no distress. Pt aware of next appointment scheduled. Labs available in CC once resulted.

## 2019-08-20 NOTE — ACP (ADVANCE CARE PLANNING)
Advance Care Planning (ACP) Provider Conversation Snapshot    Date of ACP Conversation: 08/20/19  Persons included in Conversation:  patient  Length of ACP Conversation in minutes:  16 minutes    Authorized Decision Maker (if patient is incapable of making informed decisions):    This person is:   Healthcare Agent/Medical Power of  under Advance Directive      Primary Decision Maker: Frantz Robert - [phone]        For Patients with Decision Making Capacity:   Values/Goals: Exploration of values, goals, and preferences if recovery is not expected, even with continued medical treatment in the event of:  Imminent death    Conversation Outcomes / Follow-Up Plan:   Completed new Advance Directive     Reviewed D DNR and completed D DNR review DNR and completed D DNR

## 2019-08-20 NOTE — PROGRESS NOTES
Palliative Medicine Office Visit  Palliative Medicine Nurse Check In  [phone]    Patient Name: Man Sanchez  YOB: 1950      Date of Office Visit: 8/20/2019     Patient states: \"  \"    From Check In Sheet (scanned in Media):  Has a medical provider talked with you about cardiopulmonary resuscitation (CPR)? [x] Yes   [] No   [] Unable to obtain    Nurse reminder to complete or update ACP FlowSheet:    Is ACP on the Problem List?    [x] Yes    [] No  IF ACP Document is ON FILE; Nurse to place ACP on Problem List     Is there an ACP Note in Chart Review/Note? [x] Yes    [] No   If NO: ALERT PROVIDER       Primary Decision MakeTita Arevalo [phone]  Advance Care Planning 8/20/2019   Patient's Healthcare Decision Maker is: Named in scanned ACP document   Confirm Advance Directive Yes, on file   Patient Would Like to Complete Advance Directive -   Does the patient have other document types Do Not Resuscitate         Is there anything that we should know about you as a person in order to provide you the best care possible? Have you been to the ER, urgent care clinic since your last visit? [] Yes   [x] No   [] Unable to obtain    Have you been hospitalized since your last visit? [] Yes   [x] No   [] Unable to obtain    Have you seen or consulted any other health care providers outside of the [address] [place] [address] since your last visit?    [] Yes   [x] No   [] Unable to obtain    Functional status (describe):       Last BM: 8/15/2019    accessed (date): 8/20/2019     Bottle review (for opioid pain medication):  Medication 1:   Date filled:   Directions:   # filled:   # left:   # pills taking per day:  Last dose taken:    Medication 2:   Date filled:   Directions:   # filled:   # left:   # pills taking per day:  Last dose taken:    Medication 3:   Date filled:   Directions:   # filled:   # left:   # pills taking per day:  Last dose taken:    Medication 4:   Date filled:   Directions:   # filled:   # left:   # pills taking per day:  Last dose taken:

## 2019-08-20 NOTE — PROGRESS NOTES
Hematology/Oncology progress note    REASON FOR VISIT: Stage IV EGFR mutated NSCLC    HISTORY OF PRESENT ILLNESS: Mr. Vianca Conway is a 70 y.o. male with prostate cancer who has stage IV NSCLC- adenocarcinoma (EGFR mutated , PD-L1 low) in May 2017. He progressed on Tarceva and then Osimertinib. Started Carboplatin+ Alimta+ Keytruda but felt poorly after cycle 1. He has had recurrent non malignant ascites requiring therapeutic taps and also saw [facility] for evaluation of potential clinical studies. Completed radiation to right hip on 8/14. States pain is significantly improved and now rates pain to right hip 2-3/10. He is following with palliative care and taking Oxycodone PRN relieves pain. Pain in right shoulder blade has returned and rates it 7-8/10 but has not noticed this the last few days. He is no longer on steroids. Reports shortness of breath with exertion and a dry cough which is at baseline. Denies headaches or dizziness however went to an optomotrist as he was having left eye bluriness and was told he has swelling behind his retina so now has appointment with retina specialist on Friday. LE swelling is at baseline. He has no abdominal swelling. Oncologic history   Mr. Vianca Conway noticed a new cough and fevers back in March of 2017. He went to Urgent Care and received antibiotics and cough medication. He states this worked for a while, but he began to notice his cough return. This was intermittent. He had some tightness across his anterior chest which he related to the infection. Chest x-ray was abnormal and he was told to proceed to the Emergency Department. Bronwyn Hung had been under surveillance for right lower lobe mass that was initially noted in 2015. Bronch at that time was negative for malignancy. He was evaluated by Dr. Julia Christian with Thoracic Surgery in 2015 for possible surgical resection. CT chest in November of 2016 with mass size unchanged but some right basilar pleural thickening.  CT chest obtained 5/14/17 however showed a new large right pleural effusion with right middle lobe and right lower lobe collapse. Irregular spiculated right lower lobe mass 3.8cm and stable precarinal enlarged lymph nodes were noted. New right posterior second rib lytic lesion and new right hepatic hypodensity consistent with mets. He underwent a therapeutic thoracentesis on 5/15/17 with removal of 1500 cc of serosanguinous fluid. Pathology showed adenocarcinoma. PET CT showed pleural, bone, liver and flor metastasis. MRI brain 5/20/17: No metastasis. Needed repeat R sided thoracentesis on 5/25/17, had some post procedure hydropneumothorax. He was seen by Dr. Neto Francisco at [facility] for Pleurx catheter.      CT guided biopsy of R lung mass done 5/25 which showed adenocarcinoma. EGFR mutation detected Exon 18 and 21    Treatment  6/26/17: Tarceva. Monthly Xgeva. Palliative XRT to R hip   CT CAP 10/2/17: Response  Ct 1/23/18: CT with some growth of LLL mass but stable by RECIST. CT 3/15/18 and Bone scan stable though he had worsening left back pain. MRI results showed extensive disease at L2, S2 and additional lesions as previously known. Received palliative XRT that he completed 4/18/18 5/26/18: Started Osimertinib as he had a T790M mutation. Stopped 6/20/18 due to platelets of 24Q  Resumed at 80 mg every other day on 7/6/18   Started 40mg daily on 7/12/18 2/19: Progressive disease with new liver metastases and progression of thoracic disease  2/22/19: liver biopsy pathology shows metastatic adenocarcinoma, consistent with lung primary- foundation sent  2/26/19: Cycle 1 of Carboplatin+ Alimta+ Keytruda.   3/6/19- 3/13/19: 30 Gy to Rt axilla and T spine    4/30/19: CT chest abdomen and pelvis stable  6/2019: CT and MRI with disease progression    Past Medical History:   Diagnosis Date    Ascites 02/2019    Cancer ([address] 2017    lung ca    Hypertension        Past Surgical History:   Procedure Laterality Date    HX ORTHOPAEDIC reconstruction of left little finger    IR BX LIVER PERCUTANEOUS  2/22/2019    IR BX TRANSCATHETER  5/6/2019    IR INSERT TUNL CVC W PORT OVER 5 YEARS  2/22/2019    IR PARACENTESIS ABD INIT  03/2019    6 total to date, 5/13/2019    IR PARACENTESIS ABD W IMAGE  5/6/2019       No Known Allergies    Current Outpatient Medications   Medication Sig Dispense Refill    oxyCODONE IR (ROXICODONE) 10 mg tab immediate release tablet Take 1 Tab by mouth every four (4) hours as needed for Pain for up to 30 days. Max Daily Amount: 60 mg. 180 Tab 0    pantoprazole (PROTONIX) 40 mg tablet Take 1 Tab by mouth daily. 30 Tab 4    benzonatate (TESSALON) 100 mg capsule TAKE 1 CAPSULE BY MOUTH THREE TIMES DAILY FOR UP TO 7 DAYS AS NEEDED FOR COUGH 30 Cap 0    furosemide (LASIX) 20 mg tablet Take 2 Tabs by mouth daily. 90 Tab 3    spironolactone (ALDACTONE) 50 mg tablet Take 2 Tabs by mouth daily. 90 Tab 3    fluticasone propionate (FLONASE) 50 mcg/actuation nasal spray 2 Sprays by Both Nostrils route daily. (Patient taking differently: 2 Sprays by Both Nostrils route as needed.) 1 Bottle 5    levothyroxine (SYNTHROID) 25 mcg tablet Alternate 1 with 2 every other day 60 Tab 5    zoledronic acid (ZOMETA) 4 mg/100 mL pgbk infusion 4 mg by IntraVENous route every three (3) months.  cholecalciferol (VITAMIN D3) 1,000 unit tablet Take 1,000 Units by mouth daily.  ondansetron hcl (ZOFRAN) 8 mg tablet Take 1 Tab by mouth every eight (8) hours as needed for Nausea.  30 Tab 6    chlorhexidine (PERIDEX) 0.12 % solution RINSE BID  0       Social History     Socioeconomic History    Marital status:      Spouse name: Not on file    Number of children: Not on file    Years of education: Not on file    Highest education level: Not on file   Tobacco Use    Smoking status: Never Smoker    Smokeless tobacco: Never Used   Substance and Sexual Activity    Alcohol use: Not Currently     Alcohol/week: 10.0 standard drinks Types: 10 Glasses of wine per week     Comment: ocassionally    Drug use: No    Sexual activity: Yes     Partners: Female     Comment:  has 2 children       Family History   Problem Relation Age of Onset    Cancer Mother         leukemia    Stroke Father     Cancer Brother         bladder     ROS  As reviewed under HP    Emotional well being addressed and patient is coping well    Physical Examination:   Visit Vitals  /77   Pulse 72   Temp 97.7 °F (36.5 °C) (Oral)   Resp 17   Ht 6' 1\" (1.854 m)   Wt 137 lb (62.1 kg)   SpO2 93%   BMI 18.07 kg/m²     ECOG 1  General appearance - alert, frail, and in no distress, Looks pale  Mental status - oriented to person, place, and time  Mouth - mucous membranes moist, pharynx normal without lesions. Neck - supple, no significant adenopathy  Lymphatics - no palpable lymphadenopathy, no hepatosplenomegaly  Resp-CTAB, normal respiratory effort  CV-1+ pedal edema  Abdomen - soft, nontender, distended  Neurological - normal speech  Skin: No rashes  MSK- uses cane    LABS  Lab Results   Component Value Date/Time    WBC 4.7 08/20/2019 09:35 AM    HGB 8.3 (L) 08/20/2019 09:35 AM    HCT 28.3 (L) 08/20/2019 09:35 AM    PLATELET 41 (LL) 44/76/9078 09:35 AM    .9 (H) 08/20/2019 09:35 AM    ABS. NEUTROPHILS 3.6 08/20/2019 09:35 AM     Lab Results   Component Value Date/Time    Sodium 145 08/20/2019 09:35 AM    Potassium 4.1 08/20/2019 09:35 AM    Chloride 108 08/20/2019 09:35 AM    CO2 31 08/20/2019 09:35 AM    Glucose 86 08/20/2019 09:35 AM    BUN 33 (H) 08/20/2019 09:35 AM    Creatinine 0.76 08/20/2019 09:35 AM    GFR est AA >60 08/20/2019 09:35 AM    GFR est non-AA >60 08/20/2019 09:35 AM    Calcium 9.6 08/20/2019 09:35 AM     Lab Results   Component Value Date/Time    AST (SGOT) 56 (H) 08/20/2019 09:35 AM    Alk.  phosphatase 318 (H) 08/20/2019 09:35 AM    Protein, total 6.2 (L) 08/20/2019 09:35 AM    Albumin 2.1 (L) 08/20/2019 09:35 AM    Globulin 4.1 (H) 08/20/2019 09:35 AM    A-G Ratio 0.5 (L) 08/20/2019 09:35 AM     CT 6/2019  IMPRESSION:  1. Chronic right lung volume loss with no definite change in right lower lobe  masslike lesion and right infrahilar lymph node. Pleural thickening and pleural  fluid in the right lung base also unchanged. 2. Increased small to moderate left pleural effusion. Unchanged 6 mm left lower  lobe pulmonary nodule. 3. Increased, now large volume ascites. Cirrhotic morphology of the liver with  no definite change in 2 hypodense lesions as above. Mild splenomegaly. 4.  Osseous metastatic disease, unchanged    MRI 6/25/19  IMPRESSION:   Right ischium and the inferior right acetabulum measures 3.7 cm in craniocaudal  diameter, unchanged since April but new or more conspicuous since 2018. 3.7 cm  hypointense T1 bone lesion in the left iliac wing is new since 2018 and more  conspicuous since April. 1.6 cm hypointense T1 lesion in the left femoral  intertrochanteric region is new since both comparison exams. 1.4 cm hypointense  T1 lesion in the central S1 segment of the sacrum is new since both comparison  studies. Right iliac crest 3 cm partially sclerotic hypointense T1 lesion is  slightly increased since April and new versus more conspicuous since 2018. No  pathologic fracture. No right femur lesion. 1. Multiple osseous metastatic lesions, many new since 2018. Some are new, more  conspicuous, or increased since April. No pathologic fracture. The metastatic  lesion between the right ischium and the right acetabulum is most likely the  cause of the right hip pain. No pathologic fracture. 2. Nondisplaced degeneration of the right acetabular labrum. 3. Ascites. 4. Nonspecific muscle edema. EPIC email sent to Dr. Sood Mail at the time of the dictation.     ASSESSMENT AND PLAN  Mr. Alix Briscoe is a 70 y.o. male with:    1. Stage IV NSCLC ([id number])  With R lung mass, mediastinal adenopathy, malignant R pleural effusion, multiple asymptomatic bone metastasis and possibly liver metastasis. EGFR mutated, PD-L1 5%. Found to have T790M mutation but most recently progressed on Osimertinib with progressive painful bone metastasis. Poorly tolerated 4 cycles of Carboplatin+ Alimta and Keytruda due to cytopenias and fatigue requiring dose reduction. CT 6/2019 reviewed and overall appears unchanged. However he has progressive R hip pain, weight loss and MRI of Rt hip shows worsening bone metastasis in the pelvis / sacrum and Femur. Overall he is considered to have progressive disease. See prior notes regarding discussions on treatment plan. Completed 10 sessions of radiation to the right hip on 8/16/19. He was consented for [facility] today however his PLT today are 41K. In order to be eligible for the study and get Rucaparib, his PLT must be >100K. He will have his PLT re-checked on 8/28/19. Will start approval process for free Rucaparib 300mg daily as I am unsure his PLT will increase by this much by 8/28/19. He has a somatic BRCA mutation. Alternative option will be Docetaxel if Rucaparib is not approved. Continue Zometa q3 months     2. Kanwal 6 prostate cancer on active surveillance  Records reviewed     3. Bone pain:  secondary to osseous metastasis s/p XRT to R ischium and L2. S/P 30 Gy to Rt axilla and T spine  Completed 3/13/19  Now with right hip/thigh pain with worsening metastasis to the sacrum, ilium and new lesion in the femur  Status post 10 sessions of radiation to the right hip  Following with palliative care, on oxycodone 10mg q4 hours which controls pain     4. HTN. Now normotensive. 5. Diarrhea- resolved    6.  Recurrent ascites  S/P Paracentesis on 3/22, 4/10 and 4/19, 4/30  Large volume  Non malignant  Due to portal HTN, there is no cirrhosis on liver biopsy and this is thought to be due to drug related injury  I am uncertain of the prognosis of his liver disease   It does make palliative treatments for #1 harder to tolerate and in that respect worsens his overall prognosis    Diuretics and management of liver disease per Dr. Clair Garner. He does not feel he needs paracentesis at this time.      7) Anemia  Secondary to bone metastasis  Hgb stable today  Will transfuse if < 8 g/dl    8) Thrombocytopenia   Secondary to bone metastasis, RT , Cirrhosis and prior chemotherapy  Continue to monitor     RTC in 2 weeks    Kevin Quiroga MD

## 2019-08-20 NOTE — PROGRESS NOTES
Palliative Medicine Outpatient Services  Louann: [phone] ([phone])    Patient Name: Tim Dumont  YOB: 1950    Date of Current Visit: 08/20/19  Location of Current Visit:    [x] [facility] Office  [] [facility] Office  [] [facility] Office  [] Home  [] Other:      Date of Initial Visit: 7/9/2019  Referral from: Dr. Jose Pablo  Primary Care Physician: Yasmin Crawford DO      SUMMARY:   Tim Dumont is a 70y.o. year old with a  history of prostate cancer, now with stage IV adenocarcinoma of the lung with progression of disease on Tarceva and poor tolerance to combination chemotherapy with Keytruda, recurrent nonmalignant ascites requiring therapeutic taps from portal hypertension, who was referred to Palliative Medicine by Dr. Jose Pablo for management of symptoms and psychosocial support. The patients social history includes retired, lived in [place] for a long time where he met his wife, his wife teaches [employer] for living, they have 2 daughters, one daughter lives in [place] and 1 in [place]. Palliative Medicine is addressing the following current patient/family concerns: Intractable right hip pain    Patient currently on A PHASE II STUDY OF RUCAPARIB IN PATIENTS WITH GENOMIC NATHAN HIGH AND/OR DELETERIOUS BRCA1/2 MUTATION STAGE IV OR RECURRENT NON-SMALL CELL LUNG CANCER (Lung-MAP SUB-STUDY)     PALLIATIVE DIAGNOSES:       ICD-10-CM ICD-9-CM    1. Right hip pain M25.551 719.45    2. Drug-induced constipation K59.03 564.09      E980.5    3. Cachexia ([facility] [address] R64 799.4    4. Malignant neoplasm of lung, unspecified laterality, unspecified part of lung (HCC) C34.90 162.9 oxyCODONE IR (ROXICODONE) 10 mg tab immediate release tablet      ADVANCE CARE PLANNING FIRST 30 MINS          PLAN:   Patient Instructions     Dear Tim Dumont ,    It was a pleasure seeing you today at [facility] office    We will see you again in 6 weeks    Your stated goal:   - Better pain control    This is the plan we talked about:    1.  Right hip pain  - This is cancer related, you completed RT to the hip with some improvement in pain. - You are doing much better with Oxycodone 5 mg, 2 tabs 2-3 times a day. Your pain level varies through the day based on your activity level, etc. I am providing you with a refill for 10 mg tablets. - You are off steroids.  - Continue Protonix 40 mg daily in the morning with breakfast to help reduce heart burn. 2. Constipation  -Constipation is a common side effect of pain medications as they slow your bowels. - You are consitpated  - You ran out of Senna and taking only Colace. You did not try Miralax  - Please take Miralax today and purchase colace+senna take 2 tabs 2 times daily. 3. Eyesight  - You have some vision problems while driving. You are seeing a retina specialist for the same. 4. Weight loss  - You are now maintaining your weight. I am glad you like the shakes from [facility]. 5. ACP  - You have an AMD, please bring us a copy during next visit. You want your wife and oldest daughter as your mPOA. - We discussed DNR in detail and signed DDNR. This is what you have shared with us about Advance Care Planning:      Advance Care Planning 7/23/2019   Patient's Healthcare Decision Maker is: Verbal statement (Legal Next of Kin remains as decision maker)   Confirm Advance Directive None   Patient Would Like to Complete Advance Directive Yes           The Palliative Medicine Team is here to support you and your family.          Sincerely,      Allison Arroyo MD and the Palliative Medicine Team       GOALS OF CARE / TREATMENT PREFERENCES:   [====Goals of Care====]  GOALS OF CARE:  Patient / health care proxy stated goals: See Patient Instructions / Summary    TREATMENT PREFERENCES:   Code Status:  [] Attempt Resuscitation       [x] Do Not Attempt Resuscitation    Advance Care Planning:  [x] The [place] Interdisciplinary Team has updated the ACP Navigator with Decision Maker and Patient Capacity      Advance Care Planning 7/23/2019   Patient's Healthcare Decision Maker is: Verbal statement (Legal Next of Kin remains as decision maker)   Confirm Advance Directive None   Patient Would Like to Complete Advance Directive Yes       Other:  (If patient appropriate for POST, consider using PALLPOST smart phrase here)    The palliative care team has discussed with patient / health care proxy about goals of care / treatment preferences for patient.  [====Goals of Care====]     PRESCRIPTIONS GIVEN:     Medications Ordered Today   Medications    oxyCODONE IR (ROXICODONE) 10 mg tab immediate release tablet     Sig: Take 1 Tab by mouth every four (4) hours as needed for Pain for up to 30 days. Max Daily Amount: 60 mg. Dispense:  180 Tab     Refill:  0           FOLLOW UP:     Future Appointments   Date Time Provider Malika Huang   8/20/2019 10:30 AM ALVERTO Ruiz [id number]   8/25/2019  2:45 PM [facility] MRI 1 [facility]   8/26/2019 11:00 AM [facility] NM INJ RM 2 [facility] H   8/26/2019  1:30 PM [facility] CT ER 1 SM[facility]CT [facility]   8/26/2019  2:00 PM [facility]   8/28/2019 11:00 AM [facility] INFUSION NURSE 5 [facility]ICB [facility]   8/30/2019 11:20 AM Marie Poon MD [facility] EMILY SCHED   11/12/2019  9:45 AM Zoraida Wilson DO [facility] EMILY SCHED           PHYSICIANS INVOLVED IN CARE:   Patient Care Team:  Zoraida Wilson DO as PCP - General (Internal Medicine)  Judith Alatorre MD as Surgeon (Thoracic (non-cardiac) Surgery)  Moni Waldrop MD (Pulmonary Disease)  Mohini Mtz MD (Urology)  Blaise Morris MD (Hematology and Oncology)       HISTORY:   Reviewed patient-completed ESAS and advance care planning form. Reviewed patient record in prescription monitoring program.    CHIEF COMPLAINT: No chief complaint on file. HPI/SUBJECTIVE:    The patient is: [x] Verbal / [] Nonverbal     Patient here alone. He appears quite weak and tired today.   He is very nauseated and had one episode of mild emesis in the office. He signed up for his lung map study today. He seems quite forgetful, does not remember his medications. He was supposed to wean off of dexamethasone but then stopped it because he did not have any more refills on it. His pain is much better with radiation, it still present but not as excruciating. He is taking oxycodone 5 mg tablets, 2 tablets every 4-6 hours as needed. He takes about 4 doses per day. He is maintained his weight at 126, and increased from 122 pounds. He took my advice and went to vitamin shop where he purchase some protein shakes that is more palatable to him. However he forgets to drink them on most days. He is quite constipated, no bowel movement in 4 days. He forgets to take his stool softeners. We talked about his forgetfulness and my worries about the same. He started putting reminders on his phone and decided that he will try that before we enlist the help of home health. He continues to be quite stoic. After a long time, he shared that he is starting to have some \"unusual thoughts \"which seems more like normal emotions given his clinical condition. He admits that he has been a very stoic/constrictive person all his life and never discussed his feelings and this is very difficult for him to be able to talk about his feelings. He admits that he does not discuss any of this with his wife. He does not want to talk about that at this time however he understands clearly that the current clinical trial may be his last option. We talked about hospice very briefly but he did not want to pursue that conversation. He had some complaints about  Poor vision while driving and is willing to see an optometrist.    He is concerned about excessive weight loss. He has been eating fairly well and we went over what he is been eating for the last few days and he seems to be consuming enough calories.   We talked about protein supplements. He attributes the excessive weight loss to Lasix for his leg edema. ----  Initial visit    Patient here alone. He appears quite uncomfortable due to pain. He drove himself. Right hip pain-started in early June had has worsened but he seems to have gotten used to the intense pain. He is fairly comfortable while sitting or laying down but he has excruciating pain with weightbearing and walking. His physical activity is significantly limited because of this. He was started on morphine 15 mg tablet last week but he only took half a tablet and it made him more sleepy and did not do any thing for the pain so he decided not to take any. However he took 1 tablet today over the last few days because of severe pain. He was also started on dexamethasone 2 mg 2 times a day but he did not take but 1 tablet/day. He is trying to process progression of disease with a recent scans. He wants to do a clinical trial and does not have any questions about this at this time. He wants to start radiation as soon as possible to help with the pain. He has some constipation, not on any regular bowel regimen. Very poor appetite. Abdominal distention every now and then makes eating more difficult. He is able to sleep.     Clinical Pain Assessment (nonverbal scale for nonverbal patients):   [++++ Clinical Pain Assessment++++]  [++++Pain Severity++++]: Pain: 2  [++++Pain Character++++]: sharp, excruciating  [++++Pain Duration++++]: weeks  [++++Pain Effect++++]: functional  [++++Pain Factors++++]: weightbearing, walking  [++++Pain Frequency++++]: weightbearing, walking  [++++Pain Location++++]: right hip, thigh  [++++ Clinical Pain Assessment++++]       FUNCTIONAL ASSESSMENT:     Palliative Performance Scale (PPS):  PPS: 70       PSYCHOSOCIAL/SPIRITUAL SCREENING:     Any spiritual / Denominational concerns:  [] Yes /  [x] No    Caregiver Burnout:  [] Yes /  [x] No /  [] No Caregiver Present Anticipatory grief assessment:   [x] Normal  / [] Maladaptive       ESAS Anxiety: Anxiety: 0    ESAS Depression: Depression: 0       REVIEW OF SYSTEMS:     The following systems were [x] reviewed / [] unable to be reviewed  Systems: constitutional, ears/nose/mouth/throat, respiratory, gastrointestinal, genitourinary, musculoskeletal, integumentary, neurologic, psychiatric, endocrine. Positive findings noted below. Modified ESAS Completed by: provider   Fatigue: 4 Drowsiness: 2   Depression: 0 Pain: 2   Anxiety: 0 Nausea: 1   Anorexia: 0 Dyspnea: 1   Best Well-Bein Constipation: Yes   Other Problem (Comment): 0          PHYSICAL EXAM:     Wt Readings from Last 3 Encounters:   19 126 lb (57.2 kg)   19 126 lb 9.6 oz (57.4 kg)   19 122 lb 14.4 oz (55.7 kg)     There were no vitals taken for this visit.   Last bowel movement: See Nursing Note    Constitutional: Very thin, alert and oriented  Eyes: pupils equal, anicteric  ENMT: no nasal discharge, moist mucous membranes  Cardiovascular: regular rhythm, distal pulses intact  Respiratory: breathing not labored, symmetric  Gastrointestinal: soft non-tender, +bowel sounds  Musculoskeletal: no deformity, tenderness over right hip palpation  Skin: warm, dry  Neurologic: following commands, moving all extremities  Psychiatric: full affect, no hallucinations  Other:       HISTORY:     Past Medical History:   Diagnosis Date    Ascites 2019    Cancer ([address] 2017    lung ca    Hypertension       Past Surgical History:   Procedure Laterality Date    HX ORTHOPAEDIC      reconstruction of left little finger    IR BX LIVER PERCUTANEOUS  2019    IR BX TRANSCATHETER  2019    IR INSERT TUNL CVC W PORT OVER 5 YEARS  2019    IR PARACENTESIS ABD INIT  2019    6 total to date, 2019    IR PARACENTESIS ABD W IMAGE  2019      Family History   Problem Relation Age of Onset    Cancer Mother         leukemia    Stroke Father    Rekha Cancer Brother         bladder      History reviewed, no pertinent family history. Social History     Tobacco Use    Smoking status: Never Smoker    Smokeless tobacco: Never Used   Substance Use Topics    Alcohol use: Not Currently     Alcohol/week: 10.0 standard drinks     Types: 10 Glasses of wine per week     Comment: ocassionally     No Known Allergies   Current Outpatient Medications   Medication Sig    oxyCODONE IR (ROXICODONE) 10 mg tab immediate release tablet Take 1 Tab by mouth every four (4) hours as needed for Pain for up to 30 days. Max Daily Amount: 60 mg.  pantoprazole (PROTONIX) 40 mg tablet Take 1 Tab by mouth daily.  benzonatate (TESSALON) 100 mg capsule TAKE 1 CAPSULE BY MOUTH THREE TIMES DAILY FOR UP TO 7 DAYS AS NEEDED FOR COUGH    furosemide (LASIX) 20 mg tablet Take 2 Tabs by mouth daily.  spironolactone (ALDACTONE) 50 mg tablet Take 2 Tabs by mouth daily.  fluticasone propionate (FLONASE) 50 mcg/actuation nasal spray 2 Sprays by Both Nostrils route daily. (Patient taking differently: 2 Sprays by Both Nostrils route as needed.)    levothyroxine (SYNTHROID) 25 mcg tablet Alternate 1 with 2 every other day    ondansetron hcl (ZOFRAN) 8 mg tablet Take 1 Tab by mouth every eight (8) hours as needed for Nausea.  zoledronic acid (ZOMETA) 4 mg/100 mL pgbk infusion 4 mg by IntraVENous route every three (3) months.  cholecalciferol (VITAMIN D3) 1,000 unit tablet Take 1,000 Units by mouth daily.  chlorhexidine (PERIDEX) 0.12 % solution RINSE BID     No current facility-administered medications for this visit.            LAB DATA REVIEWED:     Lab Results   Component Value Date/Time    WBC 3.7 (L) 08/14/2019 10:45 AM    HGB 8.5 (L) 08/14/2019 10:45 AM    PLATELET 32 (LL) 78/64/6110 10:45 AM     Lab Results   Component Value Date/Time    Sodium 141 08/14/2019 10:45 AM    Potassium 4.2 08/14/2019 10:45 AM    Chloride 104 08/14/2019 10:45 AM    CO2 31 08/14/2019 10:45 AM    BUN 26 (H) 08/14/2019 10:45 AM    Creatinine 0.80 08/14/2019 10:45 AM    Calcium 9.9 08/14/2019 10:45 AM    Magnesium 1.7 06/19/2019 11:13 AM    Phosphorus 2.6 10/04/2018 02:21 PM      Lab Results   Component Value Date/Time    AST (SGOT) 50 (H) 08/14/2019 10:45 AM    Alk. phosphatase 304 (H) 08/14/2019 10:45 AM    Protein, total 5.8 (L) 08/14/2019 10:45 AM    Albumin 2.2 (L) 08/14/2019 10:45 AM    Globulin 3.6 08/14/2019 10:45 AM     Lab Results   Component Value Date/Time    INR 1.1 05/29/2019 09:15 AM    Prothrombin time 10.9 05/29/2019 09:15 AM      Lab Results   Component Value Date/Time    Iron 29 (L) 07/25/2019 12:00 AM    TIBC 237 (L) 07/25/2019 12:00 AM    Iron % saturation 12 (L) 07/25/2019 12:00 AM    Ferritin 749 (H) 07/25/2019 12:00 AM      MRI- r hip, 6/25/19    IMPRESSION:   1. Multiple osseous metastatic lesions, many new since 2018. Some are new, more  conspicuous, or increased since April. No pathologic fracture. The metastatic  lesion between the right ischium and the right acetabulum is most likely the  cause of the right hip pain. No pathologic fracture. 2. Nondisplaced degeneration of the right acetabular labrum. 3. Ascites. 4. Nonspecific muscle edema. EPIC email sent to Dr. Sanaz Martel at the time of the dictation. CY A/P- 6/25/19  IMPRESSION:     1. Chronic right lung volume loss with no definite change in right lower lobe  masslike lesion and right infrahilar lymph node. Pleural thickening and pleural  fluid in the right lung base also unchanged. 2. Increased small to moderate left pleural effusion. Unchanged 6 mm left lower  lobe pulmonary nodule. 3. Increased, now large volume ascites. Cirrhotic morphology of the liver with  no definite change in 2 hypodense lesions as above. Mild splenomegaly.  4.  Osseous metastatic disease, unchanged     CONTROLLED SUBSTANCES SAFETY ASSESSMENT (IF ON CONTROLLED SUBSTANCES):     Reviewed opioid safety handout:  [] Yes   [] No  24 hour opioid dose >150mg morphine equivalent/day:  [] Yes   [] No  Benzodiazepines:  [] Yes   [] No  Sleep apnea:  [] Yes   [] No  Urine Toxicology Testing within last 6 months:  [] Yes   [] No  History of or new aberrant medication taking behaviors:  [] Yes   [] No  Has Narcan been prescribed [] Yes   [] No          Total time: 70m  Counseling / coordination time:   > 50% counseling / coordination?:

## 2019-08-20 NOTE — ACP (ADVANCE CARE PLANNING)
Advance Medical Directive     Today Mr. Rayray Ayala completed an Advance Medical Directive, naming 2 equal healthcare agents:      Primary Decision Maker: Thelma Rizvi Spouse - [phone]    Primary Decision Maker: Denver Pod - Daughter - [phone]  Advance Care Planning 8/20/2019   Patient's [place] is: Named in scanned ACP document   Confirm Advance Directive Yes, on file   Patient Would Like to Complete Advance Directive -   Does the patient have other document types Do Not Resuscitate         Mr. Sandeep Lyons decided in the event death is imminent and medical treatment will not help with recovery, then he does not want treatments to prolong life but wants to be made comfortable. Mr. Sandeep Lyons also decided in the event he is unaware of himself, surroundings, or others and it is reasonably certain awareness will never be recovered , then he does not want treatments to prolong life but wants to be made comfortable. Original AMD provided to Mr. Sandeep Lyons along with copies for healthcare agents. Copy scanned into [employer].     Aniyah Cash, MICHAEL, LSW, CCM    Palliative   Respecting Choices ® ACP Facilitator  Palliative Medicine  [phone]

## 2019-08-20 NOTE — PROGRESS NOTES
Tim Dumont is a 70 y.o. male  Chief Complaint   Patient presents with    Follow-up    Lung Cancer     1. Have you been to the ER, urgent care clinic since your last visit? Hospitalized since your last visit? No    2. Have you seen or consulted any other health care providers outside of the [facility] since your last visit? Include any pap smears or colon screening.  No

## 2019-08-20 NOTE — PATIENT INSTRUCTIONS
Dear Harriet Paige ,    It was a pleasure seeing you today at [facility] office    We will see you again in 6 weeks  If labs or imaging tests have been ordered for you today, please call the office  at [phone] 48 hours after completion to obtain the results. Your stated goal:   - Better pain control    This is the plan we talked about:    1. Right hip pain  - This is cancer related, you completed RT to the hip with some improvement in pain. - You are doing much better with Oxycodone 5 mg, 2 tabs 2-3 times a day. Your pain level varies through the day based on your activity level, etc. I am providing you with a refill for 10 mg tablets. - You are off steroids.  - Continue Protonix 40 mg daily in the morning with breakfast to help reduce heart burn. 2. Constipation  -Constipation is a common side effect of pain medications as they slow your bowels. - You are consitpated  - You ran out of Senna and taking only Colace. You did not try Miralax  - Please take Miralax today and purchase colace+senna take 2 tabs 2 times daily. 3. Eyesight  - You have some vision problems while driving. You are seeing a retina specialist for the same. 4. Weight loss  - You are now maintaining your weight. I am glad you like the shakes from [facility]. 5. ACP  - You have an AMD, please bring us a copy during next visit. You want your wife and oldest daughter as your mPOA. - We discussed DNR in detail and signed DDNR. This is what you have shared with us about Advance Care Planning:      Advance Care Planning 7/23/2019   Patient's Healthcare Decision Maker is: Verbal statement (Legal Next of Kin remains as decision maker)   Confirm Advance Directive None   Patient Would Like to Complete Advance Directive Yes           The Palliative Medicine Team is here to support you and your family.          Sincerely,      Megan Skelton MD and the Palliative Medicine Team

## 2019-08-27 NOTE — PROGRESS NOTES
Dr. Rubalcava Rater office called and stated they have pt on the schedule for this afternoon, 08/27/2019 at 245pm.

## 2019-08-28 NOTE — PROGRESS NOTES
[facility] Lab Visit:          1100 Pt arrived ambulatory to [facility] in stable condition, for lab visit. Labs drawn peripherally and sent for processing, tolerated well. Visit Vitals  /69 (BP 1 Location: Left arm, BP Patient Position: Sitting)   Pulse 86   Temp 98 °F (36.7 °C)   Resp 16   SpO2 94%       1115 No new concerns voiced. D/c [facility] in no distress. Pt aware of next appointment scheduled. Labs available in CC once resulted.

## 2019-08-29 PROBLEM — C79.31 BRAIN METASTASES (HCC): Status: ACTIVE | Noted: 2019-01-01

## 2019-08-29 NOTE — PROGRESS NOTES
Brando Coelho is a 70 y.o. male  Chief Complaint   Patient presents with    Lung Cancer    Follow-up     1. Have you been to the ER, urgent care clinic since your last visit? Hospitalized since your last visit? No     2. Have you seen or consulted any other health care providers outside of the [address] [place] [address] since your last visit?    [facility], Dr Genevieve Mcelroy

## 2019-08-29 NOTE — PROGRESS NOTES
Hematology/Oncology progress note    REASON FOR VISIT: Stage IV EGFR mutated NSCLC    HISTORY OF PRESENT ILLNESS: Mr. Jr Peng is a 70 y.o. male with prostate cancer who has stage IV NSCLC- adenocarcinoma (EGFR mutated , PD-L1 low) in May 2017. He progressed on Tarceva and then Osimertinib. Started Carboplatin+ Alimta+ Keytruda but felt poorly after cycle 1. He has had recurrent non malignant ascites requiring therapeutic taps and also saw [facility] for evaluation of potential clinical studies. He did not qualify for [facility] and comes with scans    R shoulder blade pain is 8/10. Oxycodone does not help for more than 2 hours. Morphine helps more. He has no HA, no vision changes. He has some posterior chamber fluid though and may need surgery. He has no muscle strength hast a hard time getting up. He has no falls. No nausea. However he has had emesis randomly- he thinks his pills trigger this. Ankles are swollen but has no ascites. He had a recent MRI brain and was found to have multiple brain metastasis- has seen Dr. Joaquim Coughlin history   Mr. Jr Peng noticed a new cough and fevers back in March of 2017. He went to Urgent Care and received antibiotics and cough medication. He states this worked for a while, but he began to notice his cough return. This was intermittent. He had some tightness across his anterior chest which he related to the infection. Chest x-ray was abnormal and he was told to proceed to the Emergency Department. Lesli Peterson had been under surveillance for right lower lobe mass that was initially noted in 2015. Bronch at that time was negative for malignancy. He was evaluated by Dr. Ferna Mortimer with Thoracic Surgery in 2015 for possible surgical resection. CT chest in November of 2016 with mass size unchanged but some right basilar pleural thickening. CT chest obtained 5/14/17 however showed a new large right pleural effusion with right middle lobe and right lower lobe collapse.  Irregular spiculated right lower lobe mass 3.8cm and stable precarinal enlarged lymph nodes were noted. New right posterior second rib lytic lesion and new right hepatic hypodensity consistent with mets. He underwent a therapeutic thoracentesis on 5/15/17 with removal of 1500 cc of serosanguinous fluid. Pathology showed adenocarcinoma. PET CT showed pleural, bone, liver and flor metastasis. MRI brain 5/20/17: No metastasis. Needed repeat R sided thoracentesis on 5/25/17, had some post procedure hydropneumothorax. He was seen by Dr. Gray Price at [address] [place] [address] for Pleurx catheter.      CT guided biopsy of R lung mass done 5/25 which showed adenocarcinoma. EGFR mutation detected Exon 18 and 21    Treatment  6/26/17: Tarceva. Monthly Xgeva. Palliative XRT to R hip   CT CAP 10/2/17: Response  Ct 1/23/18: CT with some growth of LLL mass but stable by RECIST. CT 3/15/18 and Bone scan stable though he had worsening left back pain. MRI results showed extensive disease at L2, S2 and additional lesions as previously known. Received palliative XRT that he completed 4/18/18 5/26/18: Started Osimertinib as he had a T790M mutation. Stopped 6/20/18 due to platelets of 56X  Resumed at 80 mg every other day on 7/6/18   Started 40mg daily on 7/12/18 2/19: Progressive disease with new liver metastases and progression of thoracic disease  2/22/19: liver biopsy pathology shows metastatic adenocarcinoma, consistent with lung primary- foundation sent  2/26/19: Cycle 1 of Carboplatin+ Alimta+ Keytruda.   3/6/19- 3/13/19: 30 Gy to Rt axilla and T spine    4/30/19: CT chest abdomen and pelvis stable  6/2019: CT and MRI with disease progression- evaluation for [facility]    Past Medical History:   Diagnosis Date    Ascites 02/2019    Cancer ([address] 2017    lung ca    Hypertension        Past Surgical History:   Procedure Laterality Date    HX ORTHOPAEDIC      reconstruction of left little finger    IR BX LIVER PERCUTANEOUS  2/22/2019    IR BX TRANSCATHETER  5/6/2019    IR INSERT TUNL CVC W PORT OVER 5 YEARS  2/22/2019    IR PARACENTESIS ABD INIT  03/2019    6 total to date, 5/13/2019    IR PARACENTESIS ABD W IMAGE  5/6/2019       No Known Allergies    Current Outpatient Medications   Medication Sig Dispense Refill    dexAMETHasone (DECADRON) 2 mg tablet Take 1 tab (2mg) three times daily 84 Tab 0    oxyCODONE IR (ROXICODONE) 10 mg tab immediate release tablet Take 1 Tab by mouth every four (4) hours as needed for Pain for up to 30 days. Max Daily Amount: 60 mg. 180 Tab 0    rucaparib (RUBRACA) 300 mg tab tablet Take 1 Tab by mouth daily. 30 Tab 3    pantoprazole (PROTONIX) 40 mg tablet Take 1 Tab by mouth daily. 30 Tab 4    benzonatate (TESSALON) 100 mg capsule TAKE 1 CAPSULE BY MOUTH THREE TIMES DAILY FOR UP TO 7 DAYS AS NEEDED FOR COUGH 30 Cap 0    furosemide (LASIX) 20 mg tablet Take 2 Tabs by mouth daily. 90 Tab 3    spironolactone (ALDACTONE) 50 mg tablet Take 2 Tabs by mouth daily. 90 Tab 3    fluticasone propionate (FLONASE) 50 mcg/actuation nasal spray 2 Sprays by Both Nostrils route daily. (Patient taking differently: 2 Sprays by Both Nostrils route as needed.) 1 Bottle 5    levothyroxine (SYNTHROID) 25 mcg tablet Alternate 1 with 2 every other day 60 Tab 5    ondansetron hcl (ZOFRAN) 8 mg tablet Take 1 Tab by mouth every eight (8) hours as needed for Nausea. 30 Tab 6    zoledronic acid (ZOMETA) 4 mg/100 mL pgbk infusion 4 mg by IntraVENous route every three (3) months.  cholecalciferol (VITAMIN D3) 1,000 unit tablet Take 1,000 Units by mouth daily.       chlorhexidine (PERIDEX) 0.12 % solution RINSE BID  0       Social History     Socioeconomic History    Marital status:      Spouse name: Not on file    Number of children: Not on file    Years of education: Not on file    Highest education level: Not on file   Tobacco Use    Smoking status: Never Smoker    Smokeless tobacco: Never Used   Substance and Sexual Activity    Alcohol use: Not Currently     Alcohol/week: 10.0 standard drinks     Types: 10 Glasses of wine per week     Comment: ocassionally    Drug use: No    Sexual activity: Yes     Partners: Female     Comment:  has 2 children       Family History   Problem Relation Age of Onset    Cancer Mother         leukemia    Stroke Father     Cancer Brother         bladder     ROS  As reviewed under HP    Emotional well being addressed and patient is coping well    Physical Examination:   Visit Vitals  /76   Pulse 71   Temp 98.4 °F (36.9 °C)   Ht 6' 1\" (1.854 m)   Wt 135 lb (61.2 kg)   SpO2 92%   BMI 17.81 kg/m²     ECOG 1  General appearance - alert, frail, and in no distress  Mental status - oriented to person, place, and time  Mouth - mucous membranes moist, pharynx normal without lesions. Neck - supple, no significant adenopathy  Lymphatics - no palpable lymphadenopathy, no hepatosplenomegaly  Resp-CTAB, normal respiratory effort  CV-2+ pedal edema  Abdomen - soft, nontender, distended  Neurological - normal speech  Skin: No rashes    LABS  Lab Results   Component Value Date/Time    WBC 6.0 08/28/2019 11:42 AM    HGB 8.4 (L) 08/28/2019 11:42 AM    HCT 28.7 (L) 08/28/2019 11:42 AM    PLATELET 69 (L) 26/13/2609 11:42 AM    MCV 98.6 08/28/2019 11:42 AM    ABS. NEUTROPHILS 4.6 08/28/2019 11:42 AM     Lab Results   Component Value Date/Time    Sodium 138 08/28/2019 11:42 AM    Potassium 4.2 08/28/2019 11:42 AM    Chloride 100 08/28/2019 11:42 AM    CO2 35 (H) 08/28/2019 11:42 AM    Glucose 100 08/28/2019 11:42 AM    BUN 20 08/28/2019 11:42 AM    Creatinine 0.84 08/28/2019 11:42 AM    GFR est AA >60 08/28/2019 11:42 AM    GFR est non-AA >60 08/28/2019 11:42 AM    Calcium 9.0 08/28/2019 11:42 AM     Lab Results   Component Value Date/Time    AST (SGOT) 55 (H) 08/28/2019 11:42 AM    Alk.  phosphatase 354 (H) 08/28/2019 11:42 AM    Protein, total 5.7 (L) 08/28/2019 11:42 AM    Albumin 2.1 (L) 08/28/2019 11:42 AM    Globulin 3.6 08/28/2019 11:42 AM    A-G Ratio 0.6 (L) 08/28/2019 11:42 AM     CT 8/26/19  LUNGS/PLEURA: Chronic right hemithorax volume loss. Necrotic mass/masslike  atelectasis with soft tissue density in the right lower lobe without definite  change repeat measurements, conglomerate. Loculated associated pleural effusion.     3-41 37 x 56 mm in maximal dimension. .      Small amount of pleural fluid on the right with pleural thickening/enhancement  similar to prior study. Slightly increased moderate left pleural effusion. Right  middle lobe and upper lobe atelectasis. Left upper lobe  atelectasis/consolidation.     Obscured left lung pulmonary nodule     LIVER: Unchanged cirrhotic morphology of the liver, metastatic foci  demonstrated.     Segment 4A 3-71 17 x 22 mm in size prior 12 mm     Segment 3 3-76 12 x 14 mm  GALLBLADDER: Unremarkable. SPLEEN: Splenic hypodensity measures 14 x 16 mm 3-73. PANCREAS: No mass or ductal dilatation. ADRENALS: No mass. KIDNEYS: Renal cyst. Nonobstructive left renal calculus. Judithe Cheyenne STOMACH, COLON AND SMALL BOWEL: No dilatation or wall thickening. There is no  obstruction or free intraperitoneal air. There is no evidence of incarceration  or obstruction. No mesenteric adenopathy. No retroperitoneal adenopathy. Fecal  stasis. APPENDIX: Unremarkable. URINARY BLADDER: No mass or calculus. LYMPH NODES: None enlarged. REPRODUCTIVE ORGANS: Enlarged prostate. FREE FLUID: Large amount. BONES: Sclerotic metastases involving the L2 vertebral body, S2 segment, right  ischium and bilateral iliac wings . Diffuse osseous metastases are demonstrated. No significant change in several sclerotic osseous metastases, involving the C7,  T1, T2 and T5 vertebral bodies. Right second and fifth rib metastases again  evident. Scoliotic change. Progression compared 6/25/2019 exam.     IMPRESSION  IMPRESSION     Lung cancer stage IV with osseous and hepatic metastases.     MRI brain 8/26/19    IMPRESSION  IMPRESSION:     1. Interval development of approximately 8 new intra-axial enhancing lesions  consistent with metastases, each of which has mild surrounding edema but no  significant mass effect, hemorrhage, or hydrocephalus. 2. Two areas of probable acute/subacute infarction bilateral corona radiata as  above though close follow-up is recommended given mild enhancement. Metastatic  disease felt to be less likely given the imaging characteristics. 3. Diminished marrow signal upper cervical spine and calvarium suspicious for  osseous metastatic involvement    Bone scan 8/19  IMPRESSION  IMPRESSION:   1. There has been improvement in the activity in the upper and mid thoracic  spine and mid sacrum. Lizeth Orlin Activity in the lumbar spine and ischium is stable.     There is slight progression of activity right superior iliac wing, left anterior  superior iliac spine, lower thoracic spine. Activity in the ribs has not changed significantly except for 2 tiny foci now  noted in lower left ribs. ASSESSMENT AND PLAN  Mr. Jr Peng is a 70 y.o. male with:    1. Stage IV NSCLC ([id number])  With R lung mass, mediastinal adenopathy, malignant R pleural effusion, multiple bone metastasis and liver metastasis. Progressed in Tarceva, Osimertinib, Poorly tolerated 4 cycles of Carboplatin+ Alimta and Keytruda due to cytopenias and fatigue and eventually progressed on it. Resistant to any additional chemotherapy which we had recommended clinical trials vs Docetaxel+ Ramicurimab  Was being screened for [facility] and found to have a pathogenic BRCA somatic mutation but unfortunately not eligible for Rucaparib due to thrombocytopenia and brain metastasis. His most recent MRI shows numerous asymptomatic Brain metastasis. He is frail but maintains a PS of 2. With all these considerations I discussed the following palliative options    A) Off label Rucaparib or Niraparib. Viola Jovani has more CNS penetration.  Efficacy in NSCLC not known  B) Docetaxel at 55 mg/m2 with Ramicurimab if tolerated. Will be a challenge with his cytopenias from cirrhosis. C) Afatinib + Cetuximab ( Phase I data suggests a 24% RR and a 4.7 month PFS). Has CNS penetration  D) Hospice- declined this    He will review these options with his friend and let us know by 9/3/19. My recommendation is option C    Continue Zometa q3 months     2. Kanwal 6 prostate cancer on active surveillance      3. Bone pain:  secondary to osseous metastasis s/p XRT to R ischium and L2. S/P 30 Gy to Rt axilla and T spine  Completed 3/13/19  Now with right hip/thigh pain with worsening metastasis to the sacrum, ilium and new lesion in the femur  Status post 10 sessions of radiation to the right hip  Following with palliative care, on oxycodone 10mg q4 hours which controls pain  Oxycodone no longer helps with pain  He prefers morphine  Hence will switch to Morphine IR 15 mg every 4 hours as needed   Will follow with palliative    4. HTN. Now normotensive. 5. Brain metastasis  New asymptomatic  Numerous  MRI reviewed  GK with Dr Yoselin Whitt next week  Continue decadron 2 mg TID    6. Recurrent ascites  S/P Paracentesis on 3/22, 4/10 and 4/19, 4/30  Large volume  Non malignant  Due to portal HTN, there is no cirrhosis on liver biopsy and this is thought to be due to drug related injury  I am uncertain of the prognosis of his liver disease   It does make palliative treatments for #1 harder to tolerate and in that respect worsens his overall prognosis    Diuretics and management of liver disease per Dr. Mariel Sesay.      7) Anemia  Secondary to bone metastasis  Hgb stable today  Will transfuse if < 8 g/dl    8) Thrombocytopenia   Secondary to bone metastasis, RT , Cirrhosis and prior chemotherapy  Continue to monitor     RTC to be determined  > 50% of this 40 min visit spent in counseling and care co Lisset Hyatt MD

## 2019-08-30 NOTE — LETTER
9/23/19 Patient: Marissa Bonilla YOB: 1950 Date of Visit: 8/30/2019 Alok Kelly DO 
[address] [place] [address] [id number] VIA In Basket Dear Alok Kelly DO, Thank you for referring Mr. Clair Farah to [facility] for evaluation. My notes for this consultation are attached. If you have questions, please do not hesitate to call me. I look forward to following your patient along with you. Sincerely, Kavitha Hanna MD

## 2019-08-30 NOTE — PROGRESS NOTES
Chief Complaint   Patient presents with    Follow-up     Visit Vitals  /70 (BP 1 Location: Right arm, BP Patient Position: Sitting)   Pulse 69   Temp 97.1 °F (36.2 °C) (Tympanic)   Ht 6' 1\" (1.854 m)   Wt 137 lb (62.1 kg)   SpO2 95%   BMI 18.07 kg/m²         3 most recent PHQ Screens 8/20/2019   Little interest or pleasure in doing things Not at all   Feeling down, depressed, irritable, or hopeless Not at all   Total Score PHQ 2 0     Abuse Screening Questionnaire 8/20/2019   Do you ever feel afraid of your partner? N   Are you in a relationship with someone who physically or mentally threatens you? N   Is it safe for you to go home? Y     1. Have you been to the ER, urgent care clinic since your last visit? Hospitalized since your last visit? No    2. Have you seen or consulted any other health care providers outside of the [address] [place] [address] since your last visit? Include any pap smears or colon screening.  No  Learning Assessment 7/31/2019   PRIMARY LEARNER Patient   BARRIERS PRIMARY LEARNER -   CO-LEARNER CAREGIVER -   PRIMARY LANGUAGE ENGLISH   LEARNER PREFERENCE PRIMARY DEMONSTRATION     LISTENING   ANSWERED BY self   RELATIONSHIP SELF

## 2019-08-30 NOTE — PROGRESS NOTES
[address] [facility], MD, Cy Bolus, MD Nelia Dunlap, NANCY Grier-BC     April Bonifacio Hart, ALVERTO West NP [address]    at [address], [address] [place] [address], [facility] 22.    [phone]    FAX: [address] [place] [address]    at [address], [address], [address]    [phone]    FAX: [phone]       Patient Care Team:  Noris Castillo DO as PCP - General (Internal Medicine)  Pat Bright MD as Surgeon (Thoracic (non-cardiac) Surgery)  Candi Aguilar MD (Pulmonary Disease)  Kartik Moy MD (Urology)  Geno Genao MD (Hematology and Oncology)      Problem List  Date Reviewed: 8/29/2019          Codes Class Noted    Brain metastases [facility]) ICD-10-CM: C79.31  ICD-9-CM: 198.3  8/29/2019        Thrombocytopenia ([facility] ICD-10-CM: D69.6  ICD-9-CM: 287.5  8/20/2019        Ascites ICD-10-CM: R18.8  ICD-9-CM: 789.59  3/25/2019        Elevated liver enzymes ICD-10-CM: R74.8  ICD-9-CM: 790.5  11/8/2017        Adenocarcinoma of lung, stage 4, right ([facility] ICD-10-CM: C34.91  ICD-9-CM: 162.9  11/8/2017        Bone metastases ([facility] ICD-10-CM: C79.51  ICD-9-CM: 198.5  5/18/2017        Pleural effusion ICD-10-CM: J90  ICD-9-CM: 511.9  5/14/2017        Prostate cancer ([facility] ICD-10-CM: C61  ICD-9-CM: 328  3/11/2016        Acquired hypothyroidism ICD-10-CM: E03.9  ICD-9-CM: 244.9  1/19/2016        Vitamin D deficiency ICD-10-CM: E55.9  ICD-9-CM: 268.9  1/19/2016        Essential hypertension ICD-10-CM: I10  ICD-9-CM: 401.9  12/22/2015        Thoracic scoliosis ICD-10-CM: M41.9  ICD-9-CM: 737.30  12/22/2015              [facility]cal[facility] of non-cirrhotic portal hypertension. The active problem list, all pertinent past medical history, medications, liver histology, radiologic findings and laboratory findings related to the liver disorder were reviewed with the patient. The patient is a 70 y.o.  male without any history of liver disease. The patient was found to have adenocarcinoma of the lung in 5/2017. He developed metastasis to the liver, bones and brain. He has received several courses of chemotherapy and XRT to the right hip in 8/2018. He has now developed brain mets and is scheduled for treatment with gamma knife surgery on 9/4. He has also developed right shoulder pain, and found to have mets there as well. Pain meds have given some relief. He recieves intermittent blood transfusions for anemia. He developed refractory ascites for the first time in 3/2019. A transjugular liver biopsy with hepatic venous pressure measurements was performed in 5/2019. The biopsy demonstrates no evidence for fibrosis/cirrhosis. Hepatic vein pressures were. RA 3, free hepatic vein 3, wedge hepatic vein 20. HVPG = 17. Ascites has been well controlled on step 2 diuretics therapy. He has mild lower extremity edema and no ascites. The patient is now taking the diuretics only as needed     The patient notes fatigue, and ascites is fully resolved. The patient has not experienced fevers, or chills. The patient has limitations in functional activities which can be attributed to ascites and to other medical problems that are not related to the liver disease. ASSESSMENT AND PLAN:  Ascites   Ascites secondary to Nodular Regenerative Hyperplasia. The liver appears nodular on imaging. The SAAG from 3/2019 was 2.3 - 0.9 = 1.4   The hepatic venous pressure gradient is elevated at 17 mm Hg  The liver biopsy does not show cirrhosis. Only mild portal fibrosis.     He had been taking step 2 diuretics Ascites has resolved. He is now taking diuretics only intermittently for lower edema. Ascites has not recurred     Lower extremity edema  Intermittent lower extremity edema well controlled on step 2-3 diuretic therapy. He will continue this approach. Thrombocytopenia   This is secondary to chemotherapy and bone marrow suppression. Neutropenia   This is secondary to chemotherapy and bone marrow suppression    Anemia   This is due to chemotherapy and bone marrow repression. 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 This can be addressed by Oncology. ALLERGIES  No Known Allergies    MEDICATIONS  Current Outpatient Medications   Medication Sig    morphine IR (MS IR) 15 mg tablet Take 1 Tab by mouth every four (4) hours as needed for Pain for up to 30 days. Max Daily Amount: 90 mg.    dexAMETHasone (DECADRON) 2 mg tablet Take 1 tab (2mg) three times daily    rucaparib (RUBRACA) 300 mg tab tablet Take 1 Tab by mouth daily.  pantoprazole (PROTONIX) 40 mg tablet Take 1 Tab by mouth daily.  benzonatate (TESSALON) 100 mg capsule TAKE 1 CAPSULE BY MOUTH THREE TIMES DAILY FOR UP TO 7 DAYS AS NEEDED FOR COUGH    furosemide (LASIX) 20 mg tablet Take 2 Tabs by mouth daily.  spironolactone (ALDACTONE) 50 mg tablet Take 2 Tabs by mouth daily.  fluticasone propionate (FLONASE) 50 mcg/actuation nasal spray 2 Sprays by Both Nostrils route daily. (Patient taking differently: 2 Sprays by Both Nostrils route as needed.)    levothyroxine (SYNTHROID) 25 mcg tablet Alternate 1 with 2 every other day    ondansetron hcl (ZOFRAN) 8 mg tablet Take 1 Tab by mouth every eight (8) hours as needed for Nausea.  chlorhexidine (PERIDEX) 0.12 % solution RINSE BID    zoledronic acid (ZOMETA) 4 mg/100 mL pgbk infusion 4 mg by IntraVENous route every three (3) months.     cholecalciferol (VITAMIN D3) 1,000 unit tablet Take 1,000 Units by mouth daily. No current facility-administered medications for this visit. SYSTEM REVIEW NOT RELATED TO LIVER DISEASE OR REVIEWED ABOVE:  Constitution systems: Negative for fever, chills, weight gain, weight loss. Eyes: Negative for visual changes. ENT: Negative for sore throat, painful swallowing. Respiratory: Negative for cough, hemoptysis, SOB. Cardiology: Negative for chest pain, palpitations. GI:  Negative for constipation or diarrhea. : Negative for urinary frequency, dysuria, hematuria, nocturia. Skin: Negative for rash. Hematology: Negative for easy bruising, blood clots. Musculo-skeletal: Negative for back pain, muscle pain, weakness. Right hip pain. Right shoulder pain  Neurologic: Negative for headaches, dizziness, vertigo, memory problems not related to HE. Psychology: Negative for anxiety, depression. FAMILY HISTORY:  The father  of leukemia. The mother  at age 80 years. There is no family history of liver disease. SOCIAL HISTORY:  The patient is . The patient has 2 children, and 4 grandchildren. The patient has never used tobacco products. The patient consumes 1-2 alcoholic beverages per day   The patient used to work in business management. The patient retired in . PHYSICAL EXAMINATION:  Visit Vitals  /70 (BP 1 Location: Right arm, BP Patient Position: Sitting)   Pulse 69   Temp 97.1 °F (36.2 °C) (Tympanic)   Ht 6' 1\" (1.854 m)   Wt 137 lb (62.1 kg)   SpO2 95%   BMI 18.07 kg/m²     General: Ill appearing. Eyes: Sclera anicteric. ENT: No oral lesions. Thyroid normal.  Nodes: No adenopathy. Skin: No spider angiomata. No jaundice. No palmar erythema. Respiratory: Lungs clear to auscultation. Cardiovascular: Regular heart rate. No murmurs. No JVD. Abdomen: Distended with obvious ascites. Extremities: 3+ pitting edema, Muscle wasting. Neurologic: Alert and oriented. Cranial nerves grossly intact.   No asterixis. LABORATORY STUDIES:  Liver [place] of [address] Units 2019   WBC 4.1 - 11.1 K/uL 6.0 4.7   ANC 1.8 - 8.0 K/UL 4.6 3.6   HGB 12.1 - 17.0 g/dL 8.4 (L) 8.3 (L)    - 400 K/uL 69 (L) 41 (LL)   INR 0.8 - 1.2     AST 15 - 37 U/L 55 (H) 56 (H)   ALT 12 - 78 U/L 15 16   Alk Phos 45 - 117 U/L 354 (H) 318 (H)   Bili, Total 0.2 - 1.0 MG/DL 1.6 (H) 1.4 (H)   Bili, Direct 0.00 - 0.40 mg/dL     Albumin 3.5 - 5.0 g/dL 2.1 (L) 2.1 (L)   BUN 6 - 20 MG/DL 20 33 (H)   Creat 0.70 - 1.30 MG/DL 0.84 0.76   Na 136 - 145 mmol/L 138 145   K 3.5 - 5.1 mmol/L 4.2 4.1   Cl 97 - 108 mmol/L 100 108   CO2 21 - 32 mmol/L 35 (H) 31   Glucose 65 - 100 mg/dL 100 86   Magnesium 1.6 - 2.4 mg/dL         SEROLOGIES:  Serologies Latest Ref Rng & Units 2019   Hep B Surface Ag Negative Negative   Hep C Ab 0.0 - 0.9 s/co ratio <0.1   Ferritin 30 - 400 ng/mL 329   Iron % Saturation 15 - 55 % 11 (L)   WHIT, IFA  Negative   C-ANCA Neg:<1:20 titer <1:20   P-ANCA Neg:<1:20 titer <1:20   ANCA Neg:<1:20 titer <1:20   ASMCA 0 - 19 Units 17   M2 Ab 0.0 - 20.0 Units <20.0   Alpha-1 antitrypsin level 90 - 200 mg/dL 279 (H)     LIVER HISTOLOGY:  2019. Biopsy of liver mass. Adenocarcinoma. 2019. Slides reviewed by MLS. Minimal inflammation in some portal tracts. No lobular inflammation. No steatosis. Focal portal fibrosis. ENDOSCOPIC PROCEDURES:  Not available or performed    RADIOLOGY:  2019. Several enlarging liver nodules measuring 1.3 x 1.8 cm in the right lobe. No mention of surface nodularity or varices consistent with portal HTN or cirrhosis. Asites. 2019. CT scan abdomen with IV contrast.  Changes consistent with cirrhosis. No liver mass lesions. No dilated bile ducts. Moderate ascites. OTHER TESTIN2018. ECHO heart. LVEF 60%,  RV normal.  No TR.    2019. HVP measurements.   RA 3,  Free HV 3, Wedged HV 20, HVPG=17    FOLLOW-UP:  All of the issues listed above in the Assessment and Plan were discussed with the patient. All questions were answered. The patient expressed a clear understanding of the above. [address] [address] as needed. The ascites and edema appear to be well controlled on current diuretic therapy. Rossy Mcknight NP  [facility] [id number] Adeel Thomas, 2000 [facility] 22.  7800 Zulema Beltran MD  [address] [place] [address] A, 2000 [facility] 22.  [phone]  [address] [place] [address]

## 2019-09-03 NOTE — PROGRESS NOTES
[facility] Lab Visit:          4082 Pt arrived ambulatory to [facility] in stable condition, labs drawn an sent for processing. Departed [facility] ambulatory and in no distress. Visit Vitals  /84 (BP 1 Location: Left arm, BP Patient Position: Sitting)   Pulse 64   Temp 98.1 °F (36.7 °C)   Resp 18   SpO2 93%       Labs available in CC once resulted.

## 2019-09-24 NOTE — PROGRESS NOTES
[facility] Lab Visit: 
 
 
 
3976 Pt arrived ambulatory to [facility] in stable condition, for lab draw. Labs drawn peripherally and sent for processing. Tolerated well. Visit Vitals /73 (BP 1 Location: Right arm, BP Patient Position: Sitting) Pulse 61 Temp 98.2 °F (36.8 °C) Resp 18 SpO2 95% 0945 No new concerns voiced. D/cd home ambulatory in no distress. Pt aware of next [facility] appointment schedule. Labs available in CC once resulted.

## 2019-09-27 NOTE — TELEPHONE ENCOUNTER
Called patient to advise/confirm upcoming appt with Dr. Trinidad Weir on   10/1/19   at 10:30am at [facility]. Pt confirmed. Also advised to please bring in your Drivers License and Insurance Card with you to appointment as well as any prescription pain medication in the original container with you to appt.

## 2019-10-01 NOTE — PATIENT INSTRUCTIONS
Dear Noralyn Litten ,    It was a pleasure seeing you today at [facility] office    We will see you again in 6 weeks  If labs or imaging tests have been ordered for you today, please call the office  at [phone] 48 hours after completion to obtain the results. Your stated goal:   - Better pain control    This is the plan we talked about:    1. Right hip pain and new left-sided chest wall pain radiating to the back  - This is cancer related, you completed RT to the hip with some improvement in pain.  -You were switched back to morphine 15 mg from oxycodone as you felt morphine worked better. At this time, you are having increased pain in all your bones and especially on the left chest wall radiating to your back. You are also having neuropathy down your left arm. We reviewed your scans and the pain is likely coming from the bony metastases. -I think you will benefit from adding a long-acting pain medication as well. Given your kidney function, I am worried about adding more morphine to your system because morphine is renally cleared and if kidney function is worsening there is a risk that morphine will stay in your system longer. Therefore let us start OxyContin 15 mg 2 times a day which will allow for better pain control and you can still take morphine 15 mg every 4-6 hours as needed for breakthrough pain. - You are off steroids.  - Continue Protonix 40 mg daily in the morning with breakfast to help reduce heart burn. Neuropathy-start gabapentin 100 mg at bedtime. 2.  Mental slowness and forgetfulness  -You seem very slow today but still able to recall things fairly well and explained yourself. You have not been taking dexamethasone as prescribed by Dr. Edmond Schilling.  -Please start taking dexamethasone 2 mg 3 times a day immediately when you return home. You should not be driving given your multiple brain mets and the slowness you are struggling with.     3. Constipation  -Constipation is a common side effect of pain medications as they slow your bowels. - You are consitpated  - You ran out of Senna and taking only Colace. You did not try Miralax  - Please take Miralax today and purchase colace+senna take 2 tabs 2 times daily. 4.  Goals of care  -I am glad I had a chance to meet with your wife today. We spent a long time talking about your cancer and what options we have with the help of a [facility]  so your wife can fully understand.  -We talked about hospice at length so she will understand what it means when it is time to enlist hospice.  -She is in shock to fully comprehend the extent of your disease for the first time today. Your daughters are visiting you from out of town this weekend and you want to talk as a family to discuss what is the best option for you.  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 You have enlisted the help of an assistant which is helpful for you given you not able to do a lot of physical movements yourself to show your students.  -Your wife wants to think about which she and your daughters want to accomplish with you as well.  -I am aware what a shock it must be for your wife to here for the first time that you have a life limiting illness even though she did not know that something was seriously wrong. Please to call us so we can support you through this difficult time. I am happy to talk with your daughters and explain your disease and options in detail as well.  -I would like to see you both in 2 weeks so to make sure that your pain is well controlled and to answer any questions your wife might have. I will try to get a [facility]  in person to help us if not we will get an  through the phone like we did today. What    5. ACP  - You have an AMD, please bring us a copy during next visit.  You want your wife and oldest daughter as your mPOA. - We discussed DNR in detail and signed DDNR. This is what you have shared with us about Advance Care Planning:      Advance Care Planning 10/1/2019   Patient's Healthcare Decision Maker is: Named in scanned ACP document   Confirm Advance Directive Yes, on file   Patient Would Like to Complete Advance Directive -   Does the patient have other document types Do Not Resuscitate           The Palliative Medicine Team is here to support you and your family.          Sincerely,      Kosta Ceja MD and the Palliative Medicine Team

## 2019-10-01 NOTE — PROGRESS NOTES
Palliative Medicine Office Visit  Palliative Medicine Nurse Check In  [phone]    Patient Name: Noralyn Litten  YOB: 1950      Date of Office Visit: 10/1/2019      Patient states:follow up no issues    From Check In Sheet (scanned in Media):  Has a medical provider talked with you about cardiopulmonary resuscitation (CPR)? [x] Yes   [] No   [] Unable to obtain    Nurse reminder to complete or update ACP FlowSheet:    Is ACP on the Problem List?    [x] Yes    [] No  IF ACP Document is ON FILE; Nurse to place ACP on Problem List     Is there an ACP Note in Chart Review/Note? [x] Yes    [] No   If NO: [address] Planning 8/20/2019   Patient's Healthcare Decision Maker is: Named in scanned ACP document   Confirm Advance Directive Yes, on file   Patient Would Like to Complete Advance Directive -   Does the patient have other document types Do Not Resuscitate        Primary Decision MakerAntonella Mercado - [phone]    Secondary Decision Maker: Amira Faustaleksandramora - Daughter - [phone]  Advance Care Planning 10/1/2019   Patient's Healthcare Decision Maker is: Named in scanned ACP document   Confirm Advance Directive Yes, on file   Patient Would Like to Complete Advance Directive -   Does the patient have other document types Do Not Resuscitate         Is there anything that we should know about you as a person in order to provide you the best care possible? Have you been to the ER, urgent care clinic since your last visit? [] Yes   [x] No   [] Unable to obtain    Have you been hospitalized since your last visit? [] Yes   [x] No   [] Unable to obtain    Have you seen or consulted any other health care providers outside of the [address] [place] [address] since your last visit? [] Yes   [x] No   [] Unable to obtain    Functional status (describe):     Last BM:  3 days ago     accessed (date):      Bottle review (for opioid pain medication):  Medication 1: No medications  Date filled:   Directions:   # filled:   # left:   # pills taking per day:  Last dose taken:    Medication 2:   Date filled:   Directions:   # filled:   # left:   # pills taking per day:  Last dose taken:    Medication 3:   Date filled:   Directions:   # filled:   # left:   # pills taking per day:  Last dose taken:    Medication 4:   Date filled:   Directions:   # filled:   # left:   # pills taking per day:  Last dose taken:

## 2019-10-02 NOTE — PROGRESS NOTES
Call to patient to determine if he has taken medication Dexamethasone 2 mg tab, once daily, patient reports he has taken the medication , However he has taken the Doxycline 4 mg tab instead of the 2 mg tabs, patient informs he does not have another bottle of 2 mg tabs in the home , Per dr Celia Dumont rx to local pharmacy for the 2mg tabs TID,

## 2019-10-03 NOTE — TELEPHONE ENCOUNTER
Call placed to patient and informed [employer] will be reaching out to patient today and coming to home for chest xray, patient also informed that oxygen would be ordered for him , patient informs wife is not home currently to also relay this information to , advised lpn would call back on tomorrow morning to f/u

## 2019-10-03 NOTE — TELEPHONE ENCOUNTER
Ms. Chelsy Rothman nurse with [address] is calling to speak to nurse. Message left on voicemail at 1:25 pm  Ms. Chelsy Rothman is with patient now.

## 2019-10-03 NOTE — TELEPHONE ENCOUNTER
His O2 sats 85% sounds like fluid on right side of lungs , wound care 2 cm pressure ulcer on his bottom , 2 plus pitting edema , had not been taking lasix and put it in his box,    Per dr Divina Anderson [facility] out to home for chest xray and O2 in the home

## 2019-10-03 NOTE — PROGRESS NOTES
10/3/2019  1215: called patient and confirmed x2 identifiers   Informed patient that do to the delay in obtaining the oral chemo medication, MD Sánchez would like to see him sometime week to discuss other options   Patient agreed and phone call transfer to front office for scheduling appointment for next week

## 2019-10-03 NOTE — TELEPHONE ENCOUNTER
Call to [employer] they have already been dispatched out to patients home and aware of memory issues with patient and wife with  language barrier who is patients current caregiver

## 2019-10-04 NOTE — TELEPHONE ENCOUNTER
Call to patient to inform of Rx Levaquin pneumonia and pleural effusion at local pharmacy for  , patients wife informs she will go pick it up in an hour, patient also has a scheduled f/u with Dr Zoey Braga on next week Tuesday, no further questions or concerns presented

## 2019-10-04 NOTE — ED PROVIDER NOTES
The patient presents to the ED with complaint of tongue laceration. The patient reports that he bit his tongue in his sleep at approximately 3:30 AM.  He had a small amount of bleeding. However, his wife became concerned because she noticed a blood clot on his tongue. He has no bleeding at this time. He denies any pain. He denies any difficulty swallowing. He has no other acute complaints at this time. He is not on any blood thinners. He does have metastatic lung cancer. He reports low oxygen saturations at baseline. The history is provided by the patient and the spouse. Past Medical History:  
Diagnosis Date  Ascites 02/2019  Cancer ([facility] [address] 2017  
 lung ca  Hypertension Past Surgical History:  
Procedure Laterality Date  HX ORTHOPAEDIC    
 reconstruction of left little finger  IR BX LIVER PERCUTANEOUS  2/22/2019  IR BX TRANSCATHETER  5/6/2019  IR INSERT TUNL CVC W PORT OVER 5 YEARS  2/22/2019  IR PARACENTESIS ABD INIT  03/2019  
 6 total to date, 5/13/2019  IR PARACENTESIS ABD W IMAGE  5/6/2019 Family History:  
Problem Relation Age of Onset  Cancer Mother   
     leukemia  Stroke Father  Cancer Brother   
     bladder Social History Socioeconomic History  Marital status:  Spouse name: Not on file  Number of children: Not on file  Years of education: Not on file  Highest education level: Not on file Occupational History  Not on file Social Needs  Financial resource strain: Not on file  Food insecurity:  
  Worry: Not on file Inability: Not on file  Transportation needs:  
  Medical: Not on file Non-medical: Not on file Tobacco Use  Smoking status: Never Smoker  Smokeless tobacco: Never Used Substance and Sexual Activity  Alcohol use: Not Currently Alcohol/week: 10.0 standard drinks Types: 10 Glasses of wine per week Comment: ocassionally  Drug use:  No  
  Sexual activity: Yes  
  Partners: Female Comment:  has 2 children Lifestyle  Physical activity:  
  Days per week: Not on file Minutes per session: Not on file  Stress: Not on file Relationships  Social connections:  
  Talks on phone: Not on file Gets together: Not on file Attends [employer] service: Not on file Active member of club or organization: Not on file Attends meetings of clubs or organizations: Not on file Relationship status: Not on file  Intimate partner violence:  
  Fear of current or ex partner: Not on file Emotionally abused: Not on file Physically abused: Not on file Forced sexual activity: Not on file Other Topics Concern  Not on file Social History Narrative  Not on file ALLERGIES: Patient has no known allergies. Review of Systems Constitutional: Negative for appetite change and fever. HENT: Negative for congestion, nosebleeds and sore throat. Tongue laceration and bleeding. Eyes: Negative for discharge and visual disturbance. Respiratory: Negative for cough and shortness of breath. Cardiovascular: Positive for leg swelling (chronic per patient. ). Negative for chest pain. Gastrointestinal: Negative for abdominal pain, diarrhea, nausea and vomiting. Genitourinary: Negative for dysuria. Musculoskeletal: Negative. Skin: Negative for rash. Neurological: Negative for weakness and headaches. Hematological: Negative for adenopathy. Psychiatric/Behavioral: Negative. All other systems reviewed and are negative. Vitals:  
 10/04/19 8472 10/04/19 0206 BP:  101/64 Pulse: (!) 56 (!) 56 Resp:  18 Temp:  97.8 °F (36.6 °C) SpO2: (!) 87% 92% Weight:  64 kg (141 lb 1.5 oz) Height:  6' 1\" (1.854 m) Physical Exam  
Constitutional: He is oriented to person, place, and time. He appears well-developed and well-nourished. Very thin. Chronically ill appearing. HENT:  
Head: Normocephalic and atraumatic. Blood blister 0.5 cm noted on R anterior tongue. No visible laceration or bleeding at this time. Eyes: Pupils are equal, round, and reactive to light. Conjunctivae and EOM are normal. Scleral icterus is present. Neck: Normal range of motion. Neck supple. Cardiovascular: Normal rate, regular rhythm and normal heart sounds. Pulmonary/Chest: Effort normal.  
Coarse throughout. Abdominal: Soft. Bowel sounds are normal. There is no tenderness. Musculoskeletal: Normal range of motion. He exhibits no edema or tenderness. Neurological: He is alert and oriented to person, place, and time. Skin: Skin is warm and dry. Psychiatric: He has a normal mood and affect. His behavior is normal.  
Nursing note and vitals reviewed. MDM Procedures A/P: 
1. Tongue laceration - bleeding stopped prior to arrival. Symptomatic care. Patient's results have been reviewed with them. Patient and/or family have verbally conveyed their understanding and agreement of the patient's signs, symptoms, diagnosis, treatment and prognosis and additionally agree to follow up as recommended or return to the Emergency Room should their condition change prior to follow-up. Discharge instructions have also been provided to the patient with some educational information regarding their diagnosis as well a list of reasons why they would want to return to the ER prior to their follow-up appointment should their condition change.

## 2019-10-04 NOTE — PROGRESS NOTES
Chest x-ray through dynamic mobile reviewed    Impression,  Moderate patchy bilateral densities, left greater than right, compatible with pneumonia.   Small bilateral pleural effusions      -Start patient on Levaquin 500 mg daily for 5 days  -Follow-up on home oxygen  -Patient needs follow-up with Dr. Reg Marin as soon as possible

## 2019-10-04 NOTE — DISCHARGE INSTRUCTIONS
- If your tongue begins to bleed, you may apply ice and pressure.  - Return to ED for any concerns. Patient Education     Cut in the Mouth: Care Instructions  Your Care Instructions  A cut in the mouth may be on your lips. It could also be inside your mouth. Many times, the cut is left open and stitches are not needed. But sometimes stitches help with healing or to stop bleeding. In some cases, the doctor will want to do some tests to check for other problems, like a tooth injury. These tests include imaging tests like an X-ray or a CT scan. If you have stitches, they will often dissolve on their own. But sometimes a doctor needs to take them out. Stitches are usually removed in about 5 days, but it may depend on the type of cut you have. The doctor has checked you carefully, but problems can develop later. If you notice any problems or new symptoms, get medical treatment right away. Follow-up care is a key part of your treatment and safety. Be sure to make and go to all appointments, and call your doctor if you are having problems. It's also a good idea to know your test results and keep a list of the medicines you take. How can you care for yourself at home? · If your doctor prescribed antibiotics, take them as directed. Do not stop taking them just because you feel better. You need to take the full course of antibiotics. · If you have pain, take an over-the-counter pain medicine, such as acetaminophen (Tylenol), ibuprofen (Advil, Motrin), or naproxen (Aleve). Be safe with medicines. Read and follow all instructions on the label. · It may help to cool the inside of your mouth with a piece of ice or a flavored ice pop. · If the cut is inside your mouth:  ¨ Rinse your mouth with warm salt water right after meals. Saltwater rinses may help healing. To make a saltwater solution for rinsing the mouth, mix 1 tsp of salt in 1 cup of warm water. ¨ Eat soft foods that are easy to swallow.   ¨ Avoid foods that might sting. These include salty or spicy foods, citrus fruits or juices, and tomatoes. ¨ Try using a topical medicine, such as Orabase, to reduce mouth pain. When should you call for help? Call 911 anytime you think you may need emergency care. For example, call if:  · You have trouble breathing. Call your doctor now or seek immediate medical care if:  · You have problems swallowing. · The cut starts to bleed. Oozing small amounts of blood is normal.  · You have symptoms of infection, such as:  ¨ Increased pain, swelling, warmth, or redness around the cut. ¨ Red streaks leading from the cut. ¨ Pus draining from the cut. ¨ A fever. Watch closely for changes in your health, and be sure to contact your doctor if:  · You notice a new problem like a tooth injury. · You do not get better as expected. Where can you learn more? Go to [employer]  Enter V486 in the search box to learn more about \"Cut in the Mouth: Care Instructions. \"   © 2619-8302 [employer]. Care instructions adapted under license by [employer] (which disclaims liability or warranty for this information). This care instruction is for use with your licensed healthcare professional. If you have questions about a medical condition or this instruction, always ask your healthcare professional. [place] [address] any warranty or liability for your use of this information.   Content Version: 95.0.231042; Current as of: November 20, 2015

## 2019-10-04 NOTE — TELEPHONE ENCOUNTER
Jeffmadeleine Gill w/ [facility] is calling asking for the:    1) oxygen qualifying testing      Please fax to [phone].

## 2019-10-04 NOTE — ED NOTES
Pt provided with discharge instructions. Verbalizes understanding. Left ambulatory with steady gait, all belongings, and stable VS.

## 2019-10-04 NOTE — PROGRESS NOTES
Palliative Medicine Outpatient Services  [place]: [phone] ([phone])    Patient Name: Sury Atkinson  YOB: 1950    Date of Current Visit: 10/04/19  Location of Current Visit:    [x] [facility] Office  [] [facility] Office  [] [facility] Office  [] Home  [] Other:      Date of Initial Visit: 7/9/2019  Referral from: Dr. Chani Rios  Primary Care Physician: Adriel Shields DO      SUMMARY:   Sury Atkinson is a 70y.o. year old with a  history of Kanwal 6 prostate cancer on active surveillance, now with stage IV adenocarcinoma of the lung with progression of disease on Tarceva and poor tolerance to combination chemotherapy with Keytruda, recurrent nonmalignant ascites from portal hypertension requiring therapeutic taps followed by Dr. Ruchi Cabral, who was referred to Palliative Medicine by Dr. Chani Rios for management of symptoms and psychosocial support. The patients social history includes retired, lived in [facility] for a long time where he met his wife, his wife teaches [employer] for living, they have 2 daughters, one daughter lives in [place] and 1 in [place]. Palliative Medicine is addressing the following current patient/family concerns: Intractable right hip pain    Interim history- New brain mets identified in Aug MRI, not a candidate for LUNGMAP. Fewer palliative intent options given liver damage. Wants to start Afatinib + Cetuximab but awaiting insurance approval   PALLIATIVE DIAGNOSES:       ICD-10-CM ICD-9-CM    1. Right hip pain M25.551 719.45 oxyCODONE ER (XTAMPZA ER) 9 mg capsule      morphine IR (MS IR) 15 mg tablet   2. Left-sided chest wall pain R07.89 786.52    3. Drug-induced constipation K59.03 564.09      E980.5    4. Malignant neoplasm of lung, unspecified laterality, unspecified part of lung (HCC) C34.90 162.9 oxyCODONE ER (XTAMPZA ER) 9 mg capsule      morphine IR (MS IR) 15 mg tablet      gabapentin (NEURONTIN) 100 mg capsule   5. Goals of care, counseling/discussion Z71.89 V65.49    6. Neuropathy G62.9 355.9           PLAN:   Patient Instructions     Dear Zane Smith ,    It was a pleasure seeing you today at [facility] office    We will see you again in 6 weeks  If labs or imaging tests have been ordered for you today, please call the office  at [phone] 48 hours after completion to obtain the results. Your stated goal:   - Better pain control    This is the plan we talked about:    1. Right hip pain and new left-sided chest wall pain radiating to the back  - This is cancer related, you completed RT to the hip with some improvement in pain.  -You were switched back to morphine 15 mg from oxycodone as you felt morphine worked better. At this time, you are having increased pain in all your bones and especially on the left chest wall radiating to your back. You are also having neuropathy down your left arm. We reviewed your scans and the pain is likely coming from the bony metastases. -I think you will benefit from adding a long-acting pain medication as well. Given your kidney function, I am worried about adding more morphine to your system because morphine is renally cleared and if kidney function is worsening there is a risk that morphine will stay in your system longer. Therefore let us start OxyContin 15 mg 2 times a day which will allow for better pain control and you can still take morphine 15 mg every 4-6 hours as needed for breakthrough pain. - You are off steroids.  - Continue Protonix 40 mg daily in the morning with breakfast to help reduce heart burn. Neuropathy-start gabapentin 100 mg at bedtime. 2.  Mental slowness and forgetfulness  -You seem very slow today but still able to recall things fairly well and explained yourself. You have not been taking dexamethasone as prescribed by Dr. Lynn Michel.  -Please start taking dexamethasone 2 mg 3 times a day immediately when you return home.   You should not be driving given your multiple brain mets and the slowness you are struggling with. 3. Constipation  -Constipation is a common side effect of pain medications as they slow your bowels. - You are consitpated  - You ran out of Senna and taking only Colace. You did not try Miralax  - Please take Miralax today and purchase colace+senna take 2 tabs 2 times daily. 4.  Goals of care  -I am glad I had a chance to meet with your wife today. We spent a long time talking about your cancer and what options we have with the help of a [facility]  so your wife can fully understand.  -We talked about hospice at length so she will understand what it means when it is time to enlist hospice.  -She is in shock to fully comprehend the extent of your disease for the first time today. Your daughters are visiting you from out of town this weekend and you want to talk as a family to discuss what is the best option for you.  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 You have enlisted the help of an assistant which is helpful for you given you not able to do a lot of physical movements yourself to show your students.  -Your wife wants to think about which she and your daughters want to accomplish with you as well.  -I am aware what a shock it must be for your wife to here for the first time that you have a life limiting illness even though she did not know that something was seriously wrong. Please to call us so we can support you through this difficult time. I am happy to talk with your daughters and explain your disease and options in detail as well.  -I would like to see you both in 2 weeks so to make sure that your pain is well controlled and to answer any questions your wife might have. I will try to get a [facility]  in person to help us if not we will get an  through the phone like we did today. What    5.  ACP  - You have an AMD, please bring us a copy during next visit. You want your wife and oldest daughter as your mPOA. - We discussed DNR in detail and signed DDNR. This is what you have shared with us about Advance Care Planning:      Advance Care Planning 10/1/2019   Patient's Healthcare Decision Maker is: Named in scanned ACP document   Confirm Advance Directive Yes, on file   Patient Would Like to Complete Advance Directive -   Does the patient have other document types Do Not Resuscitate           The Palliative Medicine Team is here to support you and your family. Sincerely,      Gabino Carrasquillo MD and the Palliative Medicine Team       GOALS OF CARE / TREATMENT PREFERENCES:   [====Goals of Care====]  GOALS OF CARE:  Patient / health care proxy stated goals: See Patient Instructions / Summary    TREATMENT PREFERENCES:   Code Status:  [] Attempt Resuscitation       [x] Do Not Attempt Resuscitation    Advance Care Planning:  [x] The [facility] Interdisciplinary Team has updated the ACP Navigator with Decision Maker and Patient Capacity      Advance Care Planning 10/1/2019   Patient's Healthcare Decision Maker is: Named in scanned ACP document   Confirm Advance Directive Yes, on file   Patient Would Like to Complete Advance Directive -   Does the patient have other document types Do Not Resuscitate       Other:  (If patient appropriate for POST, consider using PALLPOST smart phrase here)    The palliative care team has discussed with patient / health care proxy about goals of care / treatment preferences for patient.  [====Goals of Care====]     PRESCRIPTIONS GIVEN:     Medications Ordered Today   Medications    oxyCODONE ER (XTAMPZA ER) 9 mg capsule     Sig: Take 1 Cap by mouth every twelve (12) hours for 30 days. Max Daily Amount: 18 mg. Dispense:  60 Cap     Refill:  0    morphine IR (MS IR) 15 mg tablet     Sig: Take 1 Tab by mouth every six (6) hours as needed for Pain for up to 15 days. Max Daily Amount: 60 mg. Dispense:  60 Tab     Refill:  0    gabapentin (NEURONTIN) 100 mg capsule     Sig: Take 1 Cap by mouth nightly for 15 days. Max Daily Amount: 100 mg. Dispense:  15 Cap     Refill:  0           FOLLOW UP:     Future Appointments   Date Time Provider Malika Huang   10/10/2019  3:00 PM Gustavo Diaz MD [id number]   10/15/2019  9:30 AM [facility] CHAIR 3 [facility]   10/15/2019  2:30 PM Cathi Shaw MD [address] [facility]   11/12/2019  9:45 AM Kortney Sarmiento DO [facility] EMILY [facility]           PHYSICIANS INVOLVED IN CARE:   Patient Care Team:  Kortney Sarmiento DO as PCP - General (Internal Medicine)  Vicenta Myers MD as Surgeon (Thoracic (non-cardiac) Surgery)  Michelle Shelton MD (Pulmonary Disease)  Leighton Fraga MD (Urology)  Gustavo Diaz MD (Hematology and Oncology)       HISTORY:   Reviewed patient-completed ESAS and advance care planning form. Reviewed patient record in prescription monitoring program.    CHIEF COMPLAINT: No chief complaint on file. HPI/SUBJECTIVE:    The patient is: [x] Verbal / [] Nonverbal     Patient here  with his wife. He appears quite weak and tired today. He is very slow to speak and react but does have good memory and recall, no other neurological deficits. He has not been taking dexamethasone as prescribed by Dr. Mary Torres. New left chest wall pain radiating to the back along with neuropathy down his left arm. Morphine 15 mg is helping but not enough for him to be able to do the things he wants to do. He always wakes up with excruciating left-sided pain and morphine takes a few hours to kick in. 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 He has hired an assistant to do the movements as he is unable to do anything physically but he can certainly instruct the students.     Goals of care-we spent over 65 minutes today talking about his disease and goals of care, when I started talking about it I soon realized that wife who is [place] speaking is able to read and write English and say simple sentences but she is not able to follow conversational English. Wife also shares that patient insisted on her speaking [place] when they moved to the [place] so she never really learned how to converse in [place]. She admits that she does not even know her 's diagnosis other than he has \"cancer\". We used a telephonic Somali interpret Skip Arellano through [employer]. We talked about his diagnosis, extent of disease, the treatments that he has had so far and the options he has now. All this was explained in simple layman terms and I kept the options at this point to the experimental oral chemotherapy which patient wants to try but insurance is not approved yet and hospice. Wife is not familiar with the concept of hospice and so we spent a good amount of time talking about what hospice means and what dying at home means. She was very stoic throughout all of this but was very thankful that she was has been finally informed. Patient felt very guilty that he had not done a very good job explaining all of this to his family and he himself was not fully aware that he has only months to live. 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 His 2 daughters are visiting this weekend and wife took down copious notes to discuss with family. She wants me to have a family meeting with their daughters as well which I am happy to do. We talked about his forgetfulness and my worries about the same. He started putting reminders on his phone and decided that he will try that before we enlist the help of home health. He continues to be quite stoic.   After a long time, he shared that he is starting to have some \"unusual thoughts \"which seems more like normal emotions given his clinical condition. He admits that he has been a very stoic/constrictive person all his life and never discussed his feelings and this is very difficult for him to be able to talk about his feelings. He admits that he does not discuss any of this with his wife. He does not want to talk about that at this time however he understands clearly that the current clinical trial may be his last option. We talked about hospice very briefly but he did not want to pursue that conversation. He had some complaints about  Poor vision while driving and is willing to see an optometrist.    He is concerned about excessive weight loss. He has been eating fairly well and we went over what he is been eating for the last few days and he seems to be consuming enough calories. We talked about protein supplements. He attributes the excessive weight loss to Lasix for his leg edema. Today we will place an order for home and portable oxygen , today your O2 Sats where 85% at rest on room air.     ----  Initial visit    Patient here alone. He appears quite uncomfortable due to pain. He drove himself. Right hip pain-started in early June had has worsened but he seems to have gotten used to the intense pain. He is fairly comfortable while sitting or laying down but he has excruciating pain with weightbearing and walking. His physical activity is significantly limited because of this. He was started on morphine 15 mg tablet last week but he only took half a tablet and it made him more sleepy and did not do any thing for the pain so he decided not to take any. However he took 1 tablet today over the last few days because of severe pain. He was also started on dexamethasone 2 mg 2 times a day but he did not take but 1 tablet/day. He is trying to process progression of disease with a recent scans. He wants to do a clinical trial and does not have any questions about this at this time.   He wants to start radiation as soon as possible to help with the pain. He has some constipation, not on any regular bowel regimen. Very poor appetite. Abdominal distention every now and then makes eating more difficult. He is able to sleep. Clinical Pain Assessment (nonverbal scale for nonverbal patients):   [++++ Clinical Pain Assessment++++]  [++++Pain Severity++++]: Pain: 5  [++++Pain Character++++]: sharp, excruciating  [++++Pain Duration++++]: weeks  [++++Pain Effect++++]: functional  [++++Pain Factors++++]: weightbearing, walking  [++++Pain Frequency++++]: weightbearing, walking  [++++Pain Location++++]: right hip, thigh  [++++ Clinical Pain Assessment++++]       FUNCTIONAL ASSESSMENT:     Palliative Performance Scale (PPS):  PPS: 80       PSYCHOSOCIAL/SPIRITUAL SCREENING:     Any spiritual / Zoroastrianism concerns:  [] Yes /  [x] No    Caregiver Burnout:  [] Yes /  [x] No /  [] No Caregiver Present      Anticipatory grief assessment:   [x] Normal  / [] Maladaptive       ESAS Anxiety: Anxiety: 0    ESAS Depression: Depression: 0       REVIEW OF SYSTEMS:     The following systems were [x] reviewed / [] unable to be reviewed  Systems: constitutional, ears/nose/mouth/throat, respiratory, gastrointestinal, genitourinary, musculoskeletal, integumentary, neurologic, psychiatric, endocrine. Positive findings noted below. Modified ESAS Completed by: provider   Fatigue: 8 Drowsiness: 8   Depression: 0 Pain: 5   Anxiety: 0 Nausea: 6   Anorexia: 6 Dyspnea: 6   Best Well-Bein Constipation: Yes   Other Problem (Comment): 0          PHYSICAL EXAM:     Wt Readings from Last 3 Encounters:   10/04/19 141 lb 1.5 oz (64 kg)   10/01/19 141 lb 14.4 oz (64.4 kg)   19 137 lb (62.1 kg)     Blood pressure 99/62, pulse 73, temperature 97.9 °F (36.6 °C), temperature source Oral, resp. rate 16, height 6' 1\" (1.854 m), weight 141 lb 14.4 oz (64.4 kg).   Last bowel movement: See Nursing Note    Constitutional: Very thin, alert and oriented  Eyes: pupils equal, anicteric  ENMT: no nasal discharge, moist mucous membranes  Cardiovascular: regular rhythm, distal pulses intact  Respiratory: breathing not labored, symmetric  Gastrointestinal: soft non-tender, +bowel sounds  Musculoskeletal: no deformity, tenderness over right hip palpation  Skin: warm, dry  Neurologic: following commands, moving all extremities  Psychiatric: full affect, no hallucinations  Other:       HISTORY:     Past Medical History:   Diagnosis Date    Ascites 02/2019    Cancer ([address] 2017    lung ca    Hypertension       Past Surgical History:   Procedure Laterality Date    HX ORTHOPAEDIC      reconstruction of left little finger    IR BX LIVER PERCUTANEOUS  2/22/2019    IR BX TRANSCATHETER  5/6/2019    IR INSERT TUNL CVC W PORT OVER 5 YEARS  2/22/2019    IR PARACENTESIS ABD INIT  03/2019    6 total to date, 5/13/2019    IR PARACENTESIS ABD W IMAGE  5/6/2019      Family History   Problem Relation Age of Onset    Cancer Mother         leukemia    Stroke Father     Cancer Brother         bladder      History reviewed, no pertinent family history. Social History     Tobacco Use    Smoking status: Never Smoker    Smokeless tobacco: Never Used   Substance Use Topics    Alcohol use: Not Currently     Alcohol/week: 10.0 standard drinks     Types: 10 Glasses of wine per week     Comment: ocassionally     No Known Allergies   Current Outpatient Medications   Medication Sig    oxyCODONE ER (XTAMPZA ER) 9 mg capsule Take 1 Cap by mouth every twelve (12) hours for 30 days. Max Daily Amount: 18 mg.    morphine IR (MS IR) 15 mg tablet Take 1 Tab by mouth every six (6) hours as needed for Pain for up to 15 days. Max Daily Amount: 60 mg.    gabapentin (NEURONTIN) 100 mg capsule Take 1 Cap by mouth nightly for 15 days. Max Daily Amount: 100 mg.    ondansetron hcl (ZOFRAN) 8 mg tablet Take 1 Tab by mouth every eight (8) hours as needed for Nausea.     pantoprazole (PROTONIX) 40 mg tablet Take 1 Tab by mouth daily.  furosemide (LASIX) 20 mg tablet Take 2 Tabs by mouth daily.  spironolactone (ALDACTONE) 50 mg tablet Take 2 Tabs by mouth daily.  fluticasone propionate (FLONASE) 50 mcg/actuation nasal spray 2 Sprays by Both Nostrils route daily. (Patient taking differently: 2 Sprays by Both Nostrils route as needed.)    levothyroxine (SYNTHROID) 25 mcg tablet Alternate 1 with 2 every other day    cholecalciferol (VITAMIN D3) 1,000 unit tablet Take 1,000 Units by mouth daily.  dexAMETHasone (DECADRON) 2 mg tablet Take 1 tab (2mg) three times daily    afatinib (GILOTRIF) 40 mg chemo tablet Take 1 Tab by mouth daily. Should be taken at least one hour before or two hours after a meal. *Use Chemotherapy precautions*    dexAMETHasone (DECADRON) 4 mg tablet Take 5 tabs (20mg) the night before chemo infusion    doxycycline (MONODOX) 100 mg capsule Take 1 Cap by mouth two (2) times a day.  rucaparib (RUBRACA) 300 mg tab tablet Take 1 Tab by mouth daily.  benzonatate (TESSALON) 100 mg capsule TAKE 1 CAPSULE BY MOUTH THREE TIMES DAILY FOR UP TO 7 DAYS AS NEEDED FOR COUGH    chlorhexidine (PERIDEX) 0.12 % solution RINSE BID    zoledronic acid (ZOMETA) 4 mg/100 mL pgbk infusion 4 mg by IntraVENous route every three (3) months. No current facility-administered medications for this visit.            LAB DATA REVIEWED:     Lab Results   Component Value Date/Time    WBC 5.0 09/24/2019 10:03 AM    HGB 8.4 (L) 09/24/2019 10:03 AM    PLATELET 35 (LL) 36/48/9823 10:03 AM     Lab Results   Component Value Date/Time    Sodium 144 09/24/2019 10:03 AM    Potassium 4.2 09/24/2019 10:03 AM    Chloride 109 (H) 09/24/2019 10:03 AM    CO2 29 09/24/2019 10:03 AM    BUN 35 (H) 09/24/2019 10:03 AM    Creatinine 1.01 09/24/2019 10:03 AM    Calcium 8.7 09/24/2019 10:03 AM    Magnesium 2.1 09/24/2019 10:03 AM    Phosphorus 2.6 10/04/2018 02:21 PM      Lab Results   Component Value Date/Time    AST (SGOT) 54 (H) 09/24/2019 10:03 AM    Alk. phosphatase 404 (H) 09/24/2019 10:03 AM    Protein, total 5.5 (L) 09/24/2019 10:03 AM    Albumin 2.0 (L) 09/24/2019 10:03 AM    Globulin 3.5 09/24/2019 10:03 AM     Lab Results   Component Value Date/Time    INR 1.1 05/29/2019 09:15 AM    Prothrombin time 10.9 05/29/2019 09:15 AM      Lab Results   Component Value Date/Time    Iron 29 (L) 07/25/2019 12:00 AM    TIBC 237 (L) 07/25/2019 12:00 AM    Iron % saturation 12 (L) 07/25/2019 12:00 AM    Ferritin 749 (H) 07/25/2019 12:00 AM      MRI- r hip, 6/25/19    IMPRESSION:   1. Multiple osseous metastatic lesions, many new since 2018. Some are new, more  conspicuous, or increased since April. No pathologic fracture. The metastatic  lesion between the right ischium and the right acetabulum is most likely the  cause of the right hip pain. No pathologic fracture. 2. Nondisplaced degeneration of the right acetabular labrum. 3. Ascites. 4. Nonspecific muscle edema. EPIC email sent to Dr. Zulma Samuel at the time of the dictation. CY A/P- 6/25/19  IMPRESSION:     1. Chronic right lung volume loss with no definite change in right lower lobe  masslike lesion and right infrahilar lymph node. Pleural thickening and pleural  fluid in the right lung base also unchanged. 2. Increased small to moderate left pleural effusion. Unchanged 6 mm left lower  lobe pulmonary nodule. 3. Increased, now large volume ascites. Cirrhotic morphology of the liver with  no definite change in 2 hypodense lesions as above. Mild splenomegaly.  4.  Osseous metastatic disease, unchanged     CONTROLLED SUBSTANCES SAFETY ASSESSMENT (IF ON CONTROLLED SUBSTANCES):     Reviewed opioid safety handout:  [] Yes   [] No  24 hour opioid dose >150mg morphine equivalent/day:  [] Yes   [] No  Benzodiazepines:  [] Yes   [] No  Sleep apnea:  [] Yes   [] No  Urine Toxicology Testing within last 6 months:  [] Yes   [] No  History of or new aberrant medication taking behaviors:  [] Yes   [] No  Has Narcan been prescribed [] Yes   [] No          Total time: 70m  Counseling / coordination time:   > 50% counseling / coordination?:

## 2019-10-04 NOTE — ED NOTES
Quick Look:  \"I bit my tongue and my sleep and I have a blood clot on it. My wife is just freaking out. \"  No active bleeding. Not on blood thinners. Pt's saturations are low, but this is baseline, per patient. In NAD. Found pt taking all his morning medications and drinking water. Instructed patient not to take anything else until seen by doctor

## 2019-10-07 NOTE — PROGRESS NOTES
Palliative Medicine Outpatient Services  Sumeet: [phone] ([phone])    Patient Name: Ruth Ann Coy  YOB: 1950    Date of Current Visit: 10/07/19  Location of Current Visit:    [x] [facility] Office  [] [facility] Office  [] [facility] Office  [] Home  [] Other:      Date of Initial Visit: 7/9/2019  Referral from: Dr. Wicho Anderson  Primary Care Physician: Francesca Valencia DO      SUMMARY:   Ruth Ann Coy is a 70y.o. year old with a  history of Kanwal 6 prostate cancer on active surveillance, now with stage IV adenocarcinoma of the lung with progression of disease on Tarceva and poor tolerance to combination chemotherapy with Keytruda, recurrent nonmalignant ascites from portal hypertension requiring therapeutic taps followed by Dr. Niall Dan, who was referred to Palliative Medicine by Dr. Wicho Anderson for management of symptoms and psychosocial support. The patients social history includes retired, lived in [place] for a long time where he met his wife, his wife teaches [employer] for living, they have 2 daughters, one daughter lives in [place] and 1 in [place]. Palliative Medicine is addressing the following current patient/family concerns: Intractable right hip pain    Interim history- New brain mets identified in Aug MRI, not a candidate for LUNGMAP. Fewer palliative intent options given liver damage. Wants to start Afatinib + Cetuximab but awaiting insurance approval   PALLIATIVE DIAGNOSES:       ICD-10-CM ICD-9-CM    1. Right hip pain M25.551 719.45 oxyCODONE ER (XTAMPZA ER) 9 mg capsule      morphine IR (MS IR) 15 mg tablet   2. Left-sided chest wall pain R07.89 786.52    3. Drug-induced constipation K59.03 564.09      E980.5    4. Malignant neoplasm of lung, unspecified laterality, unspecified part of lung (HCC) C34.90 162.9 oxyCODONE ER (XTAMPZA ER) 9 mg capsule      morphine IR (MS IR) 15 mg tablet      gabapentin (NEURONTIN) 100 mg capsule   5. Goals of care, counseling/discussion Z71.89 V65.49    6. Neuropathy G62.9 355.9           PLAN:   Patient Instructions     Dear Aaron Rayo ,    It was a pleasure seeing you today at [facility] office    We will see you again in 6 weeks  If labs or imaging tests have been ordered for you today, please call the office  at [phone] 48 hours after completion to obtain the results. Your stated goal:   - Better pain control    This is the plan we talked about:    1. Right hip pain and new left-sided chest wall pain radiating to the back  - This is cancer related, you completed RT to the hip with some improvement in pain.  -You were switched back to morphine 15 mg from oxycodone as you felt morphine worked better. At this time, you are having increased pain in all your bones and especially on the left chest wall radiating to your back. You are also having neuropathy down your left arm. We reviewed your scans and the pain is likely coming from the bony metastases. -I think you will benefit from adding a long-acting pain medication as well. Given your kidney function, I am worried about adding more morphine to your system because morphine is renally cleared and if kidney function is worsening there is a risk that morphine will stay in your system longer. Therefore let us start OxyContin 15 mg 2 times a day which will allow for better pain control and you can still take morphine 15 mg every 4-6 hours as needed for breakthrough pain. - You are off steroids.  - Continue Protonix 40 mg daily in the morning with breakfast to help reduce heart burn. Neuropathy-start gabapentin 100 mg at bedtime. 2.  Mental slowness and forgetfulness  -You seem very slow today but still able to recall things fairly well and explained yourself. You have not been taking dexamethasone as prescribed by Dr. Mitchel Koo.  -Please start taking dexamethasone 2 mg 3 times a day immediately when you return home.   You should not be driving given your multiple brain mets and the slowness you are struggling with. 3. Constipation  -Constipation is a common side effect of pain medications as they slow your bowels. - You are consitpated  - You ran out of Senna and taking only Colace. You did not try Miralax  - Please take Miralax today and purchase colace+senna take 2 tabs 2 times daily. 4.  Goals of care  -I am glad I had a chance to meet with your wife today. We spent a long time talking about your cancer and what options we have with the help of a [facility]  so your wife can fully understand.  -We talked about hospice at length so she will understand what it means when it is time to enlist hospice.  -She is in shock to fully comprehend the extent of your disease for the first time today. Your daughters are visiting you from out of town this weekend and you want to talk as a family to discuss what is the best option for you.  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 You have enlisted the help of an assistant which is helpful for you given you not able to do a lot of physical movements yourself to show your students.  -Your wife wants to think about which she and your daughters want to accomplish with you as well.  -I am aware what a shock it must be for your wife to here for the first time that you have a life limiting illness even though she did not know that something was seriously wrong. Please to call us so we can support you through this difficult time. I am happy to talk with your daughters and explain your disease and options in detail as well.  -I would like to see you both in 2 weeks so to make sure that your pain is well controlled and to answer any questions your wife might have. I will try to get a [facility]  in person to help us if not we will get an  through the phone like we did today. What    5.  ACP  - You have an AMD, please bring us a copy during next visit. You want your wife and oldest daughter as your mPOA. - We discussed DNR in detail and signed DDNR. This is what you have shared with us about Advance Care Planning:      Advance Care Planning 10/1/2019   Patient's Healthcare Decision Maker is: Named in scanned ACP document   Confirm Advance Directive Yes, on file   Patient Would Like to Complete Advance Directive -   Does the patient have other document types Do Not Resuscitate           The Palliative Medicine Team is here to support you and your family. Sincerely,      Gabino Carrasquillo MD and the Palliative Medicine Team       GOALS OF CARE / TREATMENT PREFERENCES:   [====Goals of Care====]  GOALS OF CARE:  Patient / health care proxy stated goals: See Patient Instructions / Summary    TREATMENT PREFERENCES:   Code Status:  [] Attempt Resuscitation       [x] Do Not Attempt Resuscitation    Advance Care Planning:  [x] The [employer] [facility] Interdisciplinary Team has updated the ACP Navigator with Decision Maker and Patient Capacity      Advance Care Planning 10/1/2019   Patient's Healthcare Decision Maker is: Named in scanned ACP document   Confirm Advance Directive Yes, on file   Patient Would Like to Complete Advance Directive -   Does the patient have other document types Do Not Resuscitate       Other:  (If patient appropriate for POST, consider using PALLPOST smart phrase here)    The palliative care team has discussed with patient / health care proxy about goals of care / treatment preferences for patient.  [====Goals of Care====]     PRESCRIPTIONS GIVEN:     Medications Ordered Today   Medications    oxyCODONE ER (XTAMPZA ER) 9 mg capsule     Sig: Take 1 Cap by mouth every twelve (12) hours for 30 days. Max Daily Amount: 18 mg. Dispense:  60 Cap     Refill:  0    morphine IR (MS IR) 15 mg tablet     Sig: Take 1 Tab by mouth every six (6) hours as needed for Pain for up to 15 days. Max Daily Amount: 60 mg. Dispense:  60 Tab     Refill:  0    gabapentin (NEURONTIN) 100 mg capsule     Sig: Take 1 Cap by mouth nightly for 15 days. Max Daily Amount: 100 mg.      Dispense:  15 Cap     Refill:  0           FOLLOW UP:     Future Appointments   Date Time Provider Malika Huang   10/8/2019 To Be Determined Enrique BOB [address]   10/10/2019  3:00 PM Denys Adame MD [facility] [address]   10/11/2019 To Be Determined Enrique Jamison [facility]   10/15/2019  9:30 AM [facility]   10/15/2019 To Be Determined Enrique Jamison [facility]   10/15/2019  2:30 PM Evon Preston MD [facility]   10/18/2019 To Be Determined Enrique Jamison [facility]   10/22/2019 To Be Determined Enrique Jamison [facility]   10/25/2019 To Be Determined Enriquecherelle Jamison [facility]   10/29/2019 To Be Determined Enrique Jamison [facility]   11/1/2019 To Be Determined Enriquecherelle Jamison [facility]   11/5/2019 To Be Determined Enriquecherelle Jamison [facility]   11/8/2019 To Be Determined Enriquecherelle Jamisno [facility]   11/12/2019  9:45 AM Brant Roberts DO [facility] SCHED   11/12/2019 To Be Determined Enrique Jamison [place] [address]   11/15/2019 To Be Determined Enrique Jamison [facility]   11/19/2019 To Be Determined Enrique Jamison [place] [facility] [address] [facility] [address]   11/22/2019 To Be Determined [facility]echerelle [facility] [address]   11/26/2019 To Be Determined Enrique  [facility] [address]   11/29/2019 To Be Determined [facility]riquecherelle [facility]ison [place] [facility]           PHYSICIANS INVOLVED IN CARE:   Patient Care Team:  Brant Roberts DO as PCP - General (Internal Medicine)  Lucy Funes MD as Surgeon (Thoracic (non-cardiac) Surgery)  Ondina Peña MD (Pulmonary Disease)  Esha Blanton MD (Urology)  Denys Adame MD (Hematology and Oncology)       HISTORY:   Reviewed patient-completed ESAS and advance care planning form.  Reviewed patient record in prescription monitoring program.    CHIEF COMPLAINT: No chief complaint on file. HPI/SUBJECTIVE:    The patient is: [x] Verbal / [] Nonverbal     Patient here  with his wife. He appears quite weak and tired today. He is very slow to speak and react but does have good memory and recall, no other neurological deficits. He has not been taking dexamethasone as prescribed by Dr. Scott Castellano. New left chest wall pain radiating to the back along with neuropathy down his left arm. Morphine 15 mg is helping but not enough for him to be able to do the things he wants to do. He always wakes up with excruciating left-sided pain and morphine takes a few hours to kick in. 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 He has hired an assistant to do the movements as he is unable to do anything physically but he can certainly instruct the students. Goals of care-we spent over 65 minutes today talking about his disease and goals of care, when I started talking about it I soon realized that wife who is [place] speaking is able to read and write English and say simple sentences but she is not able to follow conversational English. Wife also shares that patient insisted on her speaking [place] when they moved to the [place] so she never really learned how to converse in [place]. She admits that she does not even know her 's diagnosis other than he has \"cancer\". We used a telephonic Latvian interpret Constantine Adler through [employer]. We talked about his diagnosis, extent of disease, the treatments that he has had so far and the options he has now. All this was explained in simple layman terms and I kept the options at this point to the experimental oral chemotherapy which patient wants to try but insurance is not approved yet and hospice.   Wife is not familiar with the concept of hospice and so we spent a good amount of time talking about what hospice means and what dying at home means. She was very stoic throughout all of this but was very thankful that she was has been finally informed. Patient felt very guilty that he had not done a very good job explaining all of this to his family and he himself was not fully aware that he has only months to live. 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 His 2 daughters are visiting this weekend and wife took down copious notes to discuss with family. She wants me to have a family meeting with their daughters as well which I am happy to do. We talked about his forgetfulness and my worries about the same. He started putting reminders on his phone and decided that he will try that before we enlist the help of home health. He continues to be quite stoic. After a long time, he shared that he is starting to have some \"unusual thoughts \"which seems more like normal emotions given his clinical condition. He admits that he has been a very stoic/constrictive person all his life and never discussed his feelings and this is very difficult for him to be able to talk about his feelings. He admits that he does not discuss any of this with his wife. He does not want to talk about that at this time however he understands clearly that the current clinical trial may be his last option. We talked about hospice very briefly but he did not want to pursue that conversation. He had some complaints about  Poor vision while driving and is willing to see an optometrist.    He is concerned about excessive weight loss. He has been eating fairly well and we went over what he is been eating for the last few days and he seems to be consuming enough calories. We talked about protein supplements.   He attributes the excessive weight loss to Lasix for his leg edema. Today we will place an order for home and portable oxygen , today your O2 Sats where 85% at rest on room air.   Room Air at rest 85%  Room air on Exertion 84%  2 liters at rest 97 %  2 liters on exertion 98%    ----  Initial visit    Patient here alone. He appears quite uncomfortable due to pain. He drove himself. Right hip pain-started in early June had has worsened but he seems to have gotten used to the intense pain. He is fairly comfortable while sitting or laying down but he has excruciating pain with weightbearing and walking. His physical activity is significantly limited because of this. He was started on morphine 15 mg tablet last week but he only took half a tablet and it made him more sleepy and did not do any thing for the pain so he decided not to take any. However he took 1 tablet today over the last few days because of severe pain. He was also started on dexamethasone 2 mg 2 times a day but he did not take but 1 tablet/day. He is trying to process progression of disease with a recent scans. He wants to do a clinical trial and does not have any questions about this at this time. He wants to start radiation as soon as possible to help with the pain. He has some constipation, not on any regular bowel regimen. Very poor appetite. Abdominal distention every now and then makes eating more difficult. He is able to sleep.     Clinical Pain Assessment (nonverbal scale for nonverbal patients):   [++++ Clinical Pain Assessment++++]  [++++Pain Severity++++]: Pain: 5  [++++Pain Character++++]: sharp, excruciating  [++++Pain Duration++++]: weeks  [++++Pain Effect++++]: functional  [++++Pain Factors++++]: weightbearing, walking  [++++Pain Frequency++++]: weightbearing, walking  [++++Pain Location++++]: right hip, thigh  [++++ Clinical Pain Assessment++++]       FUNCTIONAL ASSESSMENT:     Palliative Performance Scale (PPS):  PPS: 80       PSYCHOSOCIAL/SPIRITUAL SCREENING:     Any spiritual / Church concerns:  [] Yes /  [x] No    Caregiver Burnout:  [] Yes /  [x] No /  [] No Caregiver Present      Anticipatory grief assessment:   [x] Normal  / [] Maladaptive       ESAS Anxiety: Anxiety: 0    ESAS Depression: Depression: 0       REVIEW OF SYSTEMS:     The following systems were [x] reviewed / [] unable to be reviewed  Systems: constitutional, ears/nose/mouth/throat, respiratory, gastrointestinal, genitourinary, musculoskeletal, integumentary, neurologic, psychiatric, endocrine. Positive findings noted below. Modified ESAS Completed by: provider   Fatigue: 8 Drowsiness: 8   Depression: 0 Pain: 5   Anxiety: 0 Nausea: 6   Anorexia: 6 Dyspnea: 6   [employer]in Constipation: Yes   Other Problem (Comment): 0          PHYSICAL EXAM:     Wt Readings from Last 3 Encounters:   10/04/19 141 lb 1.5 oz (64 kg)   10/01/19 141 lb 14.4 oz (64.4 kg)   19 137 lb (62.1 kg)     Blood pressure 99/62, pulse 73, temperature 97.9 °F (36.6 °C), temperature source Oral, resp. rate 16, height 6' 1\" (1.854 m), weight 141 lb 14.4 oz (64.4 kg).   Last bowel movement: See Nursing Note  Room Air at rest 85%  Room air on Exertion 84%  2 liters at rest 97 %  2 liters on exertion 98%    Constitutional: Very thin, alert and oriented  Eyes: pupils equal, anicteric  ENMT: no nasal discharge, moist mucous membranes  Cardiovascular: regular rhythm, distal pulses intact  Respiratory: breathing not labored, symmetric  Gastrointestinal: soft non-tender, +bowel sounds  Musculoskeletal: no deformity, tenderness over right hip palpation  Skin: warm, dry  Neurologic: following commands, moving all extremities  Psychiatric: full affect, no hallucinations  Other:       HISTORY:     Past Medical History:   Diagnosis Date    Ascites 2019    Cancer ([facility] [address] 2017    lung ca    Hypertension       Past Surgical History:   Procedure Laterality Date    HX ORTHOPAEDIC      reconstruction of left little finger    IR BX LIVER PERCUTANEOUS  2/22/2019    IR BX TRANSCATHETER  5/6/2019    IR INSERT TUNL CVC W PORT OVER 5 YEARS  2/22/2019    IR PARACENTESIS ABD INIT  03/2019    6 total to date, 5/13/2019    IR PARACENTESIS ABD W IMAGE  5/6/2019      Family History   Problem Relation Age of Onset    Cancer Mother         leukemia    Stroke Father     Cancer Brother         bladder      History reviewed, no pertinent family history. Social History     Tobacco Use    Smoking status: Never Smoker    Smokeless tobacco: Never Used   Substance Use Topics    Alcohol use: Not Currently     Alcohol/week: 10.0 standard drinks     Types: 10 Glasses of wine per week     Comment: ocassionally     No Known Allergies   Current Outpatient Medications   Medication Sig    oxyCODONE ER (XTAMPZA ER) 9 mg capsule Take 1 Cap by mouth every twelve (12) hours for 30 days. Max Daily Amount: 18 mg.    morphine IR (MS IR) 15 mg tablet Take 1 Tab by mouth every six (6) hours as needed for Pain for up to 15 days. Max Daily Amount: 60 mg.    gabapentin (NEURONTIN) 100 mg capsule Take 1 Cap by mouth nightly for 15 days. Max Daily Amount: 100 mg.    ondansetron hcl (ZOFRAN) 8 mg tablet Take 1 Tab by mouth every eight (8) hours as needed for Nausea.  pantoprazole (PROTONIX) 40 mg tablet Take 1 Tab by mouth daily.  furosemide (LASIX) 20 mg tablet Take 2 Tabs by mouth daily.  spironolactone (ALDACTONE) 50 mg tablet Take 2 Tabs by mouth daily.  fluticasone propionate (FLONASE) 50 mcg/actuation nasal spray 2 Sprays by Both Nostrils route daily. (Patient taking differently: 2 Sprays by Both Nostrils route as needed.)    levothyroxine (SYNTHROID) 25 mcg tablet Alternate 1 with 2 every other day    cholecalciferol (VITAMIN D3) 1,000 unit tablet Take 1,000 Units by mouth daily.  levoFLOXacin (LEVAQUIN) 500 mg tablet Take 1 Tab by mouth daily for 5 days.     dexAMETHasone (DECADRON) 2 mg tablet Take 1 tab (2mg) three times daily    afatinib (GILOTRIF) 40 mg chemo tablet Take 1 Tab by mouth daily. Should be taken at least one hour before or two hours after a meal. *Use Chemotherapy precautions*    dexAMETHasone (DECADRON) 4 mg tablet Take 5 tabs (20mg) the night before chemo infusion    doxycycline (MONODOX) 100 mg capsule Take 1 Cap by mouth two (2) times a day.  rucaparib (RUBRACA) 300 mg tab tablet Take 1 Tab by mouth daily.  benzonatate (TESSALON) 100 mg capsule TAKE 1 CAPSULE BY MOUTH THREE TIMES DAILY FOR UP TO 7 DAYS AS NEEDED FOR COUGH    chlorhexidine (PERIDEX) 0.12 % solution RINSE BID    zoledronic acid (ZOMETA) 4 mg/100 mL pgbk infusion 4 mg by IntraVENous route every three (3) months. No current facility-administered medications for this visit. LAB DATA REVIEWED:     Lab Results   Component Value Date/Time    WBC 5.0 09/24/2019 10:03 AM    HGB 8.4 (L) 09/24/2019 10:03 AM    PLATELET 35 (LL) 59/02/3107 10:03 AM     Lab Results   Component Value Date/Time    Sodium 144 09/24/2019 10:03 AM    Potassium 4.2 09/24/2019 10:03 AM    Chloride 109 (H) 09/24/2019 10:03 AM    CO2 29 09/24/2019 10:03 AM    BUN 35 (H) 09/24/2019 10:03 AM    Creatinine 1.01 09/24/2019 10:03 AM    Calcium 8.7 09/24/2019 10:03 AM    Magnesium 2.1 09/24/2019 10:03 AM    Phosphorus 2.6 10/04/2018 02:21 PM      Lab Results   Component Value Date/Time    AST (SGOT) 54 (H) 09/24/2019 10:03 AM    Alk.  phosphatase 404 (H) 09/24/2019 10:03 AM    Protein, total 5.5 (L) 09/24/2019 10:03 AM    Albumin 2.0 (L) 09/24/2019 10:03 AM    Globulin 3.5 09/24/2019 10:03 AM     Lab Results   Component Value Date/Time    INR 1.1 05/29/2019 09:15 AM    Prothrombin time 10.9 05/29/2019 09:15 AM      Lab Results   Component Value Date/Time    Iron 29 (L) 07/25/2019 12:00 AM    TIBC 237 (L) 07/25/2019 12:00 AM    Iron % saturation 12 (L) 07/25/2019 12:00 AM    Ferritin 749 (H) 07/25/2019 12:00 AM      MRI- r hip, 6/25/19    IMPRESSION:   1. Multiple osseous metastatic lesions, many new since 2018. Some are new, more  conspicuous, or increased since April. No pathologic fracture. The metastatic  lesion between the right ischium and the right acetabulum is most likely the  cause of the right hip pain. No pathologic fracture. 2. Nondisplaced degeneration of the right acetabular labrum. 3. Ascites. 4. Nonspecific muscle edema. EPIC email sent to Dr. Cira Elias at the time of the dictation. CY A/P- 6/25/19  IMPRESSION:     1. Chronic right lung volume loss with no definite change in right lower lobe  masslike lesion and right infrahilar lymph node. Pleural thickening and pleural  fluid in the right lung base also unchanged. 2. Increased small to moderate left pleural effusion. Unchanged 6 mm left lower  lobe pulmonary nodule. 3. Increased, now large volume ascites. Cirrhotic morphology of the liver with  no definite change in 2 hypodense lesions as above. Mild splenomegaly.  4.  Osseous metastatic disease, unchanged     CONTROLLED SUBSTANCES SAFETY ASSESSMENT (IF ON CONTROLLED SUBSTANCES):     Reviewed opioid safety handout:  [] Yes   [] No  24 hour opioid dose >150mg morphine equivalent/day:  [] Yes   [] No  Benzodiazepines:  [] Yes   [] No  Sleep apnea:  [] Yes   [] No  Urine Toxicology Testing within last 6 months:  [] Yes   [] No  History of or new aberrant medication taking behaviors:  [] Yes   [] No  Has Narcan been prescribed [] Yes   [] No          Total time: 70m  Counseling / coordination time:   > 50% counseling / coordination?:

## 2019-10-10 NOTE — PROGRESS NOTES
Chapo Hernandez is a 70 y.o. male  Chief Complaint   Patient presents with    Lung Cancer     stage IV    Follow-up     1. Have you been to the ER, urgent care clinic since your last visit? Hospitalized since your last visit? No    2. Have you seen or consulted any other health care providers outside of the [address] [place] [address] since your last visit? Include any pap smears or colon screening.   Orthopedic surgeon - Dr. Lorenzo Alexander and Facial - 10/9/919 - phone [phone]

## 2019-10-10 NOTE — PROGRESS NOTES
Palliative Medicine Outpatient Services  [place]: [phone] ([phone])    Patient Name: Arianna Samano  YOB: 1950    Date of Current Visit: 10/10/19  Location of Current Visit:    [x] [facility] Office  [] [facility] Office  [] [facility] Office  [] Home  [] Other:      Date of Initial Visit: 7/9/2019  Referral from: Dr. Emily Pena  Primary Care Physician: Alejandra Carrasco DO      SUMMARY:   Arianna Samano is a 70y.o. year old with a  history of [place] 6 prostate cancer on active surveillance, now with stage IV adenocarcinoma of the lung with progression of disease on Tarceva and poor tolerance to combination chemotherapy with Keytruda, recurrent nonmalignant ascites from portal hypertension requiring therapeutic taps followed by Dr. Alexa King, who was referred to Palliative Medicine by Dr. Emily Pena for management of symptoms and psychosocial support. The patients social history includes retired, lived in [place] for a long time where he met his wife, his wife teaches [employer] for living, they have 2 daughters, one daughter lives in [place] and 1 in [place]. Palliative Medicine is addressing the following current patient/family concerns: Intractable right hip pain    Interim history- New brain mets identified in Aug MRI, not a candidate for LUNGMAP. Fewer palliative intent options given liver damage. Wants to start Afatinib + Cetuximab but awaiting insurance approval   PALLIATIVE DIAGNOSES:       ICD-10-CM ICD-9-CM    1. Right hip pain M25.551 719.45 oxyCODONE ER (XTAMPZA ER) 9 mg capsule      morphine IR (MS IR) 15 mg tablet   2. Left-sided chest wall pain R07.89 786.52    3. Drug-induced constipation K59.03 564.09      E980.5    4. Malignant neoplasm of lung, unspecified laterality, unspecified part of lung (HCC) C34.90 162.9 oxyCODONE ER (XTAMPZA ER) 9 mg capsule      morphine IR (MS IR) 15 mg tablet      gabapentin (NEURONTIN) 100 mg capsule   5. Goals of care, counseling/discussion Z71.89 V65.49    6. Neuropathy G62.9 355.9           PLAN:   Patient Instructions     Dear Reyes Romo ,    It was a pleasure seeing you today at [facility] office    We will see you again in 6 weeks  If labs or imaging tests have been ordered for you today, please call the office  at [phone] 48 hours after completion to obtain the results. Your stated goal:   - Better pain control    This is the plan we talked about:    1. Right hip pain and new left-sided chest wall pain radiating to the back  - This is cancer related, you completed RT to the hip with some improvement in pain.  -You were switched back to morphine 15 mg from oxycodone as you felt morphine worked better. At this time, you are having increased pain in all your bones and especially on the left chest wall radiating to your back. You are also having neuropathy down your left arm. We reviewed your scans and the pain is likely coming from the bony metastases. -I think you will benefit from adding a long-acting pain medication as well. Given your kidney function, I am worried about adding more morphine to your system because morphine is renally cleared and if kidney function is worsening there is a risk that morphine will stay in your system longer. Therefore let us start OxyContin 15 mg 2 times a day which will allow for better pain control and you can still take morphine 15 mg every 4-6 hours as needed for breakthrough pain. - You are off steroids.  - Continue Protonix 40 mg daily in the morning with breakfast to help reduce heart burn. Neuropathy-start gabapentin 100 mg at bedtime. 2.  Mental slowness and forgetfulness  -You seem very slow today but still able to recall things fairly well and explained yourself. You have not been taking dexamethasone as prescribed by Dr. Flores Booker.  -Please start taking dexamethasone 2 mg 3 times a day immediately when you return home.   You should not be driving given your multiple brain mets and the slowness you are struggling with. 3. Constipation  -Constipation is a common side effect of pain medications as they slow your bowels. - You are consitpated  - You ran out of Senna and taking only Colace. You did not try Miralax  - Please take Miralax today and purchase colace+senna take 2 tabs 2 times daily. 4.  Goals of care  -I am glad I had a chance to meet with your wife today. We spent a long time talking about your cancer and what options we have with the help of a [facility]  so your wife can fully understand.  -We talked about hospice at length so she will understand what it means when it is time to enlist hospice.  -She is in shock to fully comprehend the extent of your disease for the first time today. Your daughters are visiting you from out of town this weekend and you want to talk as a family to discuss what is the best option for you.  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 You have enlisted the help of an assistant which is helpful for you given you not able to do a lot of physical movements yourself to show your students.  -Your wife wants to think about which she and your daughters want to accomplish with you as well.  -I am aware what a shock it must be for your wife to here for the first time that you have a life limiting illness even though she did not know that something was seriously wrong. Please to call us so we can support you through this difficult time. I am happy to talk with your daughters and explain your disease and options in detail as well.  -I would like to see you both in 2 weeks so to make sure that your pain is well controlled and to answer any questions your wife might have. I will try to get a [facility]  in person to help us if not we will get an  through the phone like we did today. What    5.  ACP  - You have an AMD, please bring us a copy during next visit. You want your wife and oldest daughter as your mPOA. - We discussed DNR in detail and signed DDNR. This is what you have shared with us about Advance Care Planning:      Advance Care Planning 10/1/2019   Patient's Healthcare Decision Maker is: Named in scanned ACP document   Confirm Advance Directive Yes, on file   Patient Would Like to Complete Advance Directive -   Does the patient have other document types Do Not Resuscitate           The Palliative Medicine Team is here to support you and your family. Sincerely,      Michelle Duque MD and the Palliative Medicine Team       GOALS OF CARE / TREATMENT PREFERENCES:   [====Goals of Care====]  GOALS OF CARE:  Patient / health care proxy stated goals: See Patient Instructions / Summary    TREATMENT PREFERENCES:   Code Status:  [] Attempt Resuscitation       [x] Do Not Attempt Resuscitation    Advance Care Planning:  [x] The [facility] Interdisciplinary Team has updated the ACP Navigator with Decision Maker and Patient Capacity      Advance Care Planning 10/1/2019   Patient's Healthcare Decision Maker is: Named in scanned ACP document   Confirm Advance Directive Yes, on file   Patient Would Like to Complete Advance Directive -   Does the patient have other document types Do Not Resuscitate       Other:  (If patient appropriate for POST, consider using PALLPOST smart phrase here)    The palliative care team has discussed with patient / health care proxy about goals of care / treatment preferences for patient.  [====Goals of Care====]     PRESCRIPTIONS GIVEN:     Medications Ordered Today   Medications    oxyCODONE ER (XTAMPZA ER) 9 mg capsule     Sig: Take 1 Cap by mouth every twelve (12) hours for 30 days. Max Daily Amount: 18 mg. Dispense:  60 Cap     Refill:  0    morphine IR (MS IR) 15 mg tablet     Sig: Take 1 Tab by mouth every six (6) hours as needed for Pain for up to 15 days. Max Daily Amount: 60 mg. Dispense:  60 Tab     Refill:  0    gabapentin (NEURONTIN) 100 mg capsule     Sig: Take 1 Cap by mouth nightly for 15 days. Max Daily Amount: 100 mg.      Dispense:  15 Cap     Refill:  0           FOLLOW UP:     Future Appointments   Date Time Provider Malika Reina   10/10/2019  3:00 PM Sonido Martines MD [facility] 6199   10/11/2019 To Be Determined Saige Flavors [place] [address] [facility]   10/11/2019 To Be Determined Kenith Alex [place] [address] [facility]   [facility]15[facility]2019  [facility]   10/15/2019 To Be Determined Saige Flavors [place] [address] [facility]   10/15/2019  2:30 PM Micky Calderon MD [facility]   10/15/2019 To Be Determined Kenith Landrum [place] [address] [facility]   10/16/2019 To Be Determined Bhargavi Rho, PT [place] [address] [facility]   10/17/2019 To Be Determined Kenith Alex [place] [address] [facility]   10/18/2019 To Be Determined Bhargavi Rho, PT [place] [address] [facility]   10/18/2019 To Be Determined Saige Flavors [place] [address] [facility]   10/18/2019 To Be Determined Saige Flavors [place] [address] [facility]   10/21/2019 To Be Determined Bhargavi Rho, PT [place] [address] [facility]   10/22/2019 To Be Determined Saige Flavors [place] [address] [facility]   10/22/2019 To Be Determined Gaye Dickersonield [facility] [address]   10/23/2019 To Be Determined Bhargavi Rho, PT [place] [address] [facility]   10/24/2019 To Be Determined Kenith Landrum [place] [address] [facility]   10/25/2019 To Be Determined Saige Flavors [place] [address] [facility]   10/28/2019 [place] [address] [place] [facility]   10/29/2019 To Be Determined Saige Flavors [place] [address] [facility]   10/29/2019 To Be Determined Kenith Landrum [place] [address] [facility]   10/30/2019 To Be Determined [address]   10/31/2019 To Be Determined Gaye Moraffield [facility]   11/1/2019 To Be Determined Saige Flavors [place] [address] [facility]   11/1/2019 To Be Determined Saige Flavors [place] [address] [facility]   11/4/2019 To Be Determined [facility]argavi [facility], PT [address] [facility] [address]   11/5/2019 To Be Determined [place]adi [place] [address]   11/5/2019 To Be Determined Deetta Rudder [place] [address]   11/6/2019 To Be Determined [facility] [place] [address]   11/7/2019 To Be Determined Ladi [place] [address]   11/8/2019 To Be Determined Deetta Rudder [place] [address]   11/11/2019 To Be Determined Melly Novoa PT [facility] [place] [address]   11/12/2019  9:45 AM Behzad Mayers DO [facility]   11/12/2019 To Be Determined Deetta Rudder [place] [address]   11/12/2019 To Be Determined Ladi Alicia [place] [facility]   11/13/2019 To Be Determined Melly Novoa PT [facility] [place] [address]   11/14/2019 To Be Determined Ladi Alicia [place] [address]   11/15/2019 To Be Determined Deetta Rudder [place] [address]   11/15/2019 To Be Determined De[place] [facility] [address]   11/18/2019 To Be Determined [facility] [place] [address]   11/19/2019 To Be Determined Ladi [place] [address]   11/19/2019 To Be Determined Deetta Rudder [place] [address]   11/20/2019 To Be Determined [facility]   11/21/2019 To Be Determined Ladi [place] [address]   11/22/2019 To Be Determined Deetta Rudder [place] [address]   11/26/2019 To Be Determined Ladi [place] [facility]   11/26/2019 To Be Determined Deetta Rudder [place] [address]   11/28/2019 To Be Determined Ladi [place] [facility] [address]   11/29/2019 To Be Determined Deetta Rudder [facility] [address]   11/29/2019 To Be Determined Deetta Rudder [facility]           PHYSICIANS INVOLVED IN CARE:   Patient Care Team:  Behzad Mayers DO as PCP - General (Internal Medicine)  Ryder Huitron MD as Surgeon (Thoracic (non-cardiac) Surgery)  Rock Ángel MD (Pulmonary Disease)  Ninoska Iniguez MD (Urology)  Angelika Berrios MD (Hematology and Oncology)       HISTORY:   Reviewed patient-completed ESAS and advance care planning form.   Reviewed patient record in prescription monitoring program.    CHIEF COMPLAINT: No chief complaint on file. HPI/SUBJECTIVE:    The patient is: [x] Verbal / [] Nonverbal     Patient here  with his wife. He appears quite weak and tired today. He is very slow to speak and react but does have good memory and recall, no other neurological deficits. He has not been taking dexamethasone as prescribed by Dr. Hadley Avery. New left chest wall pain radiating to the back along with neuropathy down his left arm. Morphine 15 mg is helping but not enough for him to be able to do the things he wants to do. He always wakes up with excruciating left-sided pain and morphine takes a few hours to kick in. 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 He has hired an assistant to do the movements as he is unable to do anything physically but he can certainly instruct the students. Goals of care-we spent over 65 minutes today talking about his disease and goals of care, when I started talking about it I soon realized that wife who is [place] speaking is able to read and write English and say simple sentences but she is not able to follow conversational English. Wife also shares that patient insisted on her speaking [place] when they moved to the [place] so she never really learned how to converse in [place]. She admits that she does not even know her 's diagnosis other than he has \"cancer\". We used a telephonic Japanese interpret Deberah Dakin through [employer]. We talked about his diagnosis, extent of disease, the treatments that he has had so far and the options he has now. All this was explained in simple layman terms and I kept the options at this point to the experimental oral chemotherapy which patient wants to try but insurance is not approved yet and hospice.   Wife is not familiar with the concept of hospice and so we spent a good amount of time talking about what hospice means and what dying at home means. She was very stoic throughout all of this but was very thankful that she was has been finally informed. Patient felt very guilty that he had not done a very good job explaining all of this to his family and he himself was not fully aware that he has only months to live. 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 His 2 daughters are visiting this weekend and wife took down copious notes to discuss with family. She wants me to have a family meeting with their daughters as well which I am happy to do. We talked about his forgetfulness and my worries about the same. He started putting reminders on his phone and decided that he will try that before we enlist the help of home health. He continues to be quite stoic. After a long time, he shared that he is starting to have some \"unusual thoughts \"which seems more like normal emotions given his clinical condition. He admits that he has been a very stoic/constrictive person all his life and never discussed his feelings and this is very difficult for him to be able to talk about his feelings. He admits that he does not discuss any of this with his wife. He does not want to talk about that at this time however he understands clearly that the current clinical trial may be his last option. We talked about hospice very briefly but he did not want to pursue that conversation. He had some complaints about  Poor vision while driving and is willing to see an optometrist.    He is concerned about excessive weight loss. He has been eating fairly well and we went over what he is been eating for the last few days and he seems to be consuming enough calories. We talked about protein supplements. He attributes the excessive weight loss to Lasix for his leg edema.   Today we will place an order for home and portable oxygen , today your O2 Sats where 85% at rest on room air.   Room Air at rest 85%  Room air on Exertion 84%  2 liters at rest 97 %  2 liters on exertion 98%    DME Recommendation - today we will place an order for a semi electric Hospital bed as you require changes in positioning not feasible with an ordinary bed , pillows, cushions or wedges. You also require head of be to be elevated 30 degrees  to prevent aspiration.      ----  Initial visit    Patient here alone. He appears quite uncomfortable due to pain. He drove himself. Right hip pain-started in early June had has worsened but he seems to have gotten used to the intense pain. He is fairly comfortable while sitting or laying down but he has excruciating pain with weightbearing and walking. His physical activity is significantly limited because of this. He was started on morphine 15 mg tablet last week but he only took half a tablet and it made him more sleepy and did not do any thing for the pain so he decided not to take any. However he took 1 tablet today over the last few days because of severe pain. He was also started on dexamethasone 2 mg 2 times a day but he did not take but 1 tablet/day. He is trying to process progression of disease with a recent scans. He wants to do a clinical trial and does not have any questions about this at this time. He wants to start radiation as soon as possible to help with the pain. He has some constipation, not on any regular bowel regimen. Very poor appetite. Abdominal distention every now and then makes eating more difficult. He is able to sleep.     Clinical Pain Assessment (nonverbal scale for nonverbal patients):   [++++ Clinical Pain Assessment++++]  [++++Pain Severity++++]: Pain: 5  [++++Pain Character++++]: sharp, excruciating  [++++Pain Duration++++]: weeks  [++++Pain Effect++++]: functional  [++++Pain Factors++++]: weightbearing, walking  [++++Pain Frequency++++]: weightbearing, walking  [++++Pain Location++++]: right hip, thigh  [++++ Clinical Pain Assessment++++]       FUNCTIONAL ASSESSMENT:     Palliative Performance Scale (PPS):  PPS: 80       PSYCHOSOCIAL/SPIRITUAL SCREENING:     Any spiritual / Islam concerns:  [] Yes /  [x] No    Caregiver Burnout:  [] Yes /  [x] No /  [] No Caregiver Present      Anticipatory grief assessment:   [x] Normal  / [] Maladaptive       ESAS Anxiety: Anxiety: 0    ESAS Depression: Depression: 0       REVIEW OF SYSTEMS:     The following systems were [x] reviewed / [] unable to be reviewed  Systems: constitutional, ears/nose/mouth/throat, respiratory, gastrointestinal, genitourinary, musculoskeletal, integumentary, neurologic, psychiatric, endocrine. Positive findings noted below. Modified ESAS Completed by: provider   Fatigue: 8 Drowsiness: 8   Depression: 0 Pain: 5   Anxiety: 0 Nausea: 6   Anorexia: 6 Dyspnea: 6   [facility] Well[employer]Bein Constipation: Yes   Other Problem (Comment): 0          PHYSICAL EXAM:     Wt Readings from Last 3 Encounters:   10/09/19 140 lb (63.5 kg)   10/04/19 141 lb 1.5 oz (64 kg)   10/01/19 141 lb 14.4 oz (64.4 kg)     Blood pressure 99/62, pulse 73, temperature 97.9 °F (36.6 °C), temperature source Oral, resp. rate 16, height 6' 1\" (1.854 m), weight 141 lb 14.4 oz (64.4 kg).   Last bowel movement: See Nursing Note  Room Air at rest 85%  Room air on Exertion 84%  2 liters at rest 97 %  2 liters on exertion 98%    Constitutional: Very thin, alert and oriented  Eyes: pupils equal, anicteric  ENMT: no nasal discharge, moist mucous membranes  Cardiovascular: regular rhythm, distal pulses intact  Respiratory: breathing not labored, symmetric  Gastrointestinal: soft non-tender, +bowel sounds  Musculoskeletal: no deformity, tenderness over right hip palpation  Skin: warm, dry  Neurologic: following commands, moving all extremities  Psychiatric: full affect, no hallucinations  Other:       HISTORY:     Past Medical History:   Diagnosis Date    Ascites 02/2019    Cancer ([address] 2017    lung ca    Hypertension       Past Surgical History:   Procedure Laterality Date    HX ORTHOPAEDIC      reconstruction of left little finger    IR BX LIVER PERCUTANEOUS  2/22/2019    IR BX TRANSCATHETER  5/6/2019    IR INSERT TUNL CVC W PORT OVER 5 YEARS  2/22/2019    IR PARACENTESIS ABD INIT  03/2019    6 total to date, 5/13/2019    IR PARACENTESIS ABD W IMAGE  5/6/2019      Family History   Problem Relation Age of Onset    Cancer Mother         leukemia    Stroke Father     Cancer Brother         bladder      History reviewed, no pertinent family history. Social History     Tobacco Use    Smoking status: Never Smoker    Smokeless tobacco: Never Used   Substance Use Topics    Alcohol use: Not Currently     Alcohol/week: 10.0 standard drinks     Types: 10 Glasses of wine per week     Comment: ocassionally     No Known Allergies   Current Outpatient Medications   Medication Sig    oxyCODONE ER (XTAMPZA ER) 9 mg capsule Take 1 Cap by mouth every twelve (12) hours for 30 days. Max Daily Amount: 18 mg.    morphine IR (MS IR) 15 mg tablet Take 1 Tab by mouth every six (6) hours as needed for Pain for up to 15 days. Max Daily Amount: 60 mg.    gabapentin (NEURONTIN) 100 mg capsule Take 1 Cap by mouth nightly for 15 days. Max Daily Amount: 100 mg.    ondansetron hcl (ZOFRAN) 8 mg tablet Take 1 Tab by mouth every eight (8) hours as needed for Nausea.  pantoprazole (PROTONIX) 40 mg tablet Take 1 Tab by mouth daily.  furosemide (LASIX) 20 mg tablet Take 2 Tabs by mouth daily.  spironolactone (ALDACTONE) 50 mg tablet Take 2 Tabs by mouth daily.  fluticasone propionate (FLONASE) 50 mcg/actuation nasal spray 2 Sprays by Both Nostrils route daily.  (Patient taking differently: 2 Sprays by Both Nostrils route as needed.)    levothyroxine (SYNTHROID) 25 mcg tablet Alternate 1 with 2 every other day    cholecalciferol (VITAMIN D3) 1,000 unit tablet Take 1,000 Units by mouth daily.  OXYGEN-AIR DELIVERY SYSTEMS 2 L by Nasal route continuous.  dexAMETHasone (DECADRON) 2 mg tablet Take 1 tab (2mg) three times daily    afatinib (GILOTRIF) 40 mg chemo tablet Take 1 Tab by mouth daily. Should be taken at least one hour before or two hours after a meal. *Use Chemotherapy precautions*    dexAMETHasone (DECADRON) 4 mg tablet Take 5 tabs (20mg) the night before chemo infusion    doxycycline (MONODOX) 100 mg capsule Take 1 Cap by mouth two (2) times a day.  rucaparib (RUBRACA) 300 mg tab tablet Take 1 Tab by mouth daily.  benzonatate (TESSALON) 100 mg capsule TAKE 1 CAPSULE BY MOUTH THREE TIMES DAILY FOR UP TO 7 DAYS AS NEEDED FOR COUGH    chlorhexidine (PERIDEX) 0.12 % solution RINSE BID    zoledronic acid (ZOMETA) 4 mg/100 mL pgbk infusion 4 mg by IntraVENous route every three (3) months. No current facility-administered medications for this visit. LAB DATA REVIEWED:     Lab Results   Component Value Date/Time    WBC 5.0 09/24/2019 10:03 AM    HGB 8.4 (L) 09/24/2019 10:03 AM    PLATELET 35 (LL) 98/29/0396 10:03 AM     Lab Results   Component Value Date/Time    Sodium 144 09/24/2019 10:03 AM    Potassium 4.2 09/24/2019 10:03 AM    Chloride 109 (H) 09/24/2019 10:03 AM    CO2 29 09/24/2019 10:03 AM    BUN 35 (H) 09/24/2019 10:03 AM    Creatinine 1.01 09/24/2019 10:03 AM    Calcium 8.7 09/24/2019 10:03 AM    Magnesium 2.1 09/24/2019 10:03 AM    Phosphorus 2.6 10/04/2018 02:21 PM      Lab Results   Component Value Date/Time    AST (SGOT) 54 (H) 09/24/2019 10:03 AM    Alk.  phosphatase 404 (H) 09/24/2019 10:03 AM    Protein, total 5.5 (L) 09/24/2019 10:03 AM    Albumin 2.0 (L) 09/24/2019 10:03 AM    Globulin 3.5 09/24/2019 10:03 AM     Lab Results   Component Value Date/Time    INR 1.1 05/29/2019 09:15 AM    Prothrombin time 10.9 05/29/2019 09:15 AM      Lab Results   Component Value Date/Time    Iron 29 (L) 07/25/2019 12:00 AM    TIBC 237 (L) 07/25/2019 12:00 AM    Iron % saturation 12 (L) 07/25/2019 12:00 AM    Ferritin 749 (H) 07/25/2019 12:00 AM      MRI- r hip, 6/25/19    IMPRESSION:   1. Multiple osseous metastatic lesions, many new since 2018. Some are new, more  conspicuous, or increased since April. No pathologic fracture. The metastatic  lesion between the right ischium and the right acetabulum is most likely the  cause of the right hip pain. No pathologic fracture. 2. Nondisplaced degeneration of the right acetabular labrum. 3. Ascites. 4. Nonspecific muscle edema. EPIC email sent to Dr. Lul Newman at the time of the dictation. CY A/P- 6/25/19  IMPRESSION:     1. Chronic right lung volume loss with no definite change in right lower lobe  masslike lesion and right infrahilar lymph node. Pleural thickening and pleural  fluid in the right lung base also unchanged. 2. Increased small to moderate left pleural effusion. Unchanged 6 mm left lower  lobe pulmonary nodule. 3. Increased, now large volume ascites. Cirrhotic morphology of the liver with  no definite change in 2 hypodense lesions as above. Mild splenomegaly.  4.  Osseous metastatic disease, unchanged     CONTROLLED SUBSTANCES SAFETY ASSESSMENT (IF ON CONTROLLED SUBSTANCES):     Reviewed opioid safety handout:  [] Yes   [] No  24 hour opioid dose >150mg morphine equivalent/day:  [] Yes   [] No  Benzodiazepines:  [] Yes   [] No  Sleep apnea:  [] Yes   [] No  Urine Toxicology Testing within last 6 months:  [] Yes   [] No  History of or new aberrant medication taking behaviors:  [] Yes   [] No  Has Narcan been prescribed [] Yes   [] No          Total time: 70m  Counseling / coordination time:   > 50% counseling / coordination?:

## 2019-10-10 NOTE — PROGRESS NOTES
Hematology/Oncology progress note    REASON FOR VISIT: Stage IV EGFR mutated NSCLC    HISTORY OF PRESENT ILLNESS: Mr. Collin Feliciano is a 70 y.o. male with prostate cancer who has stage IV NSCLC- adenocarcinoma (EGFR mutated , PD-L1 low) in May 2017. He progressed on Tarceva and then Osimertinib. Started Carboplatin+ Alimta+ Keytruda but felt poorly after cycle 1. He has had recurrent non malignant ascites requiring therapeutic taps and also saw [facility] for evaluation of potential clinical studies. He did not qualify for LUNGMAP. He comes today for follow-up. Unfortunately Afatinib is still in process and unsure how long approval will take. He comes today very weak, with progressive pain, and on oxygen. He is taking oxycodone ER and morphine as needed which makes pain tolerable. Pain is in right shoulder and down his spine. He has numbness/tingling down right arm. He is now on gabapentin per palliative care. On oxygen his breathing is comfortable. He is in a wheelchair today. He feels his brain is \"scrambeled eggs. \" Comes with his supportive wife. Oncologic history   Mr. Collin Feliciano noticed a new cough and fevers back in March of 2017. He went to Urgent Care and received antibiotics and cough medication. He states this worked for a while, but he began to notice his cough return. This was intermittent. He had some tightness across his anterior chest which he related to the infection. Chest x-ray was abnormal and he was told to proceed to the Emergency Department. Tobias Owens had been under surveillance for right lower lobe mass that was initially noted in 2015. Bronch at that time was negative for malignancy. He was evaluated by Dr. Danelle Spence with Thoracic Surgery in 2015 for possible surgical resection. CT chest in November of 2016 with mass size unchanged but some right basilar pleural thickening. CT chest obtained 5/14/17 however showed a new large right pleural effusion with right middle lobe and right lower lobe collapse. Irregular spiculated right lower lobe mass 3.8cm and stable precarinal enlarged lymph nodes were noted. New right posterior second rib lytic lesion and new right hepatic hypodensity consistent with mets. He underwent a therapeutic thoracentesis on 5/15/17 with removal of 1500 cc of serosanguinous fluid. Pathology showed adenocarcinoma. PET CT showed pleural, bone, liver and flor metastasis. MRI brain 5/20/17: No metastasis. Needed repeat R sided thoracentesis on 5/25/17, had some post procedure hydropneumothorax. He was seen by Dr. Henok Piedra at [address] [place] [address] for Pleurx catheter.      CT guided biopsy of R lung mass done 5/25 which showed adenocarcinoma. EGFR mutation detected Exon 18 and 21    Treatment  6/26/17: Tarceva. Monthly Xgeva. Palliative XRT to R hip   CT CAP 10/2/17: Response  Ct 1/23/18: CT with some growth of LLL mass but stable by RECIST. CT 3/15/18 and Bone scan stable though he had worsening left back pain. MRI results showed extensive disease at L2, S2 and additional lesions as previously known. Received palliative XRT that he completed 4/18/18 5/26/18: Started Osimertinib as he had a T790M mutation. Stopped 6/20/18 due to platelets of 48B  Resumed at 80 mg every other day on 7/6/18   Started 40mg daily on 7/12/18 2/19: Progressive disease with new liver metastases and progression of thoracic disease  2/22/19: liver biopsy pathology shows metastatic adenocarcinoma, consistent with lung primary- foundation sent  2/26/19: Cycle 1 of Carboplatin+ Alimta+ Keytruda.   3/6/19- 3/13/19: 30 Gy to Rt axilla and T spine    4/30/19: CT chest abdomen and pelvis stable  6/2019: CT and MRI with disease progression- evaluation for [facility]    Past Medical History:   Diagnosis Date    Ascites 02/2019    Cancer ([address] 2017    lung ca    Hypertension        Past Surgical History:   Procedure Laterality Date    HX ORTHOPAEDIC      reconstruction of left little finger    IR BX LIVER PERCUTANEOUS  2/22/2019    IR BX TRANSCATHETER  5/6/2019    IR INSERT TUNL CVC W PORT OVER 5 YEARS  2/22/2019    IR PARACENTESIS ABD INIT  03/2019    6 total to date, 5/13/2019    IR PARACENTESIS ABD W IMAGE  5/6/2019       No Known Allergies    Current Outpatient Medications   Medication Sig Dispense Refill    OXYGEN-AIR DELIVERY SYSTEMS 2 L by Nasal route continuous.  dexAMETHasone (DECADRON) 2 mg tablet Take 1 tab (2mg) three times daily 84 Tab 0    oxyCODONE ER (XTAMPZA ER) 9 mg capsule Take 1 Cap by mouth every twelve (12) hours for 30 days. Max Daily Amount: 18 mg. 60 Cap 0    gabapentin (NEURONTIN) 100 mg capsule Take 1 Cap by mouth nightly for 15 days. Max Daily Amount: 100 mg. 15 Cap 0    pantoprazole (PROTONIX) 40 mg tablet Take 1 Tab by mouth daily. 30 Tab 4    furosemide (LASIX) 20 mg tablet Take 2 Tabs by mouth daily. 90 Tab 3    spironolactone (ALDACTONE) 50 mg tablet Take 2 Tabs by mouth daily. 90 Tab 3    fluticasone propionate (FLONASE) 50 mcg/actuation nasal spray 2 Sprays by Both Nostrils route daily. (Patient taking differently: 2 Sprays by Both Nostrils route as needed.) 1 Bottle 5    levothyroxine (SYNTHROID) 25 mcg tablet Alternate 1 with 2 every other day 60 Tab 5    chlorhexidine (PERIDEX) 0.12 % solution RINSE BID  0    cholecalciferol (VITAMIN D3) 1,000 unit tablet Take 1,000 Units by mouth daily.  morphine IR (MS IR) 15 mg tablet Take 1 Tab by mouth every six (6) hours as needed for Pain for up to 15 days. Max Daily Amount: 60 mg. 60 Tab 0    afatinib (GILOTRIF) 40 mg chemo tablet Take 1 Tab by mouth daily. Should be taken at least one hour before or two hours after a meal. *Use Chemotherapy precautions* 30 Tab 3    dexAMETHasone (DECADRON) 4 mg tablet Take 5 tabs (20mg) the night before chemo infusion 30 Tab 3    doxycycline (MONODOX) 100 mg capsule Take 1 Cap by mouth two (2) times a day.  60 Cap 3    ondansetron hcl (ZOFRAN) 8 mg tablet Take 1 Tab by mouth every eight (8) hours as needed for Nausea. 30 Tab 6    rucaparib (RUBRACA) 300 mg tab tablet Take 1 Tab by mouth daily. 30 Tab 3    benzonatate (TESSALON) 100 mg capsule TAKE 1 CAPSULE BY MOUTH THREE TIMES DAILY FOR UP TO 7 DAYS AS NEEDED FOR COUGH 30 Cap 0    zoledronic acid (ZOMETA) 4 mg/100 mL pgbk infusion 4 mg by IntraVENous route every three (3) months.          Social History     Socioeconomic History    Marital status:      Spouse name: Not on file    Number of children: Not on file    Years of education: Not on file    Highest education level: Not on file   Tobacco Use    Smoking status: Never Smoker    Smokeless tobacco: Never Used   Substance and Sexual Activity    Alcohol use: Not Currently     Alcohol/week: 10.0 standard drinks     Types: 10 Glasses of wine per week     Comment: ocassionally    Drug use: No    Sexual activity: Yes     Partners: Female     Comment:  has 2 children       Family History   Problem Relation Age of Onset    Cancer Mother         leukemia    Stroke Father     Cancer Brother         bladder     ROS  As reviewed under HP    Emotional well being addressed and patient is coping well    Physical Examination:   Visit Vitals  /71 (BP 1 Location: Left arm, BP Patient Position: Sitting)   Pulse 71   Temp 97.4 °F (36.3 °C) (Oral)   Resp 20   Ht 6' 1\" (1.854 m)   Wt 137 lb (62.1 kg)   SpO2 96%   BMI 18.07 kg/m²     ECOG 1  General appearance - alert, frail, and in no distress, appears weak  Mental status - oriented to person, place, and time  Mouth - OP clear  Neck - supple, no significant adenopathy  Lymphatics - no palpable lymphadenopathy, no hepatosplenomegaly  Resp-CTAB, normal respiratory effort, on NC  CV-2+ pedal edema  Abdomen - soft, nontender, distended  Neurological - normal speech  Skin: No rashes    LABS  Lab Results   Component Value Date/Time    WBC 5.0 09/24/2019 10:03 AM    HGB 8.4 (L) 09/24/2019 10:03 AM    HCT 28.6 (L) 09/24/2019 10:03 AM    PLATELET 35 (LL) 16/24/3770 10:03 AM    MCV 99.0 09/24/2019 10:03 AM    ABS. NEUTROPHILS 4.0 09/24/2019 10:03 AM     Lab Results   Component Value Date/Time    Sodium 144 09/24/2019 10:03 AM    Potassium 4.2 09/24/2019 10:03 AM    Chloride 109 (H) 09/24/2019 10:03 AM    CO2 29 09/24/2019 10:03 AM    Glucose 100 09/24/2019 10:03 AM    BUN 35 (H) 09/24/2019 10:03 AM    Creatinine 1.01 09/24/2019 10:03 AM    GFR est AA >60 09/24/2019 10:03 AM    GFR est non-AA >60 09/24/2019 10:03 AM    Calcium 8.7 09/24/2019 10:03 AM     Lab Results   Component Value Date/Time    AST (SGOT) 54 (H) 09/24/2019 10:03 AM    Alk. phosphatase 404 (H) 09/24/2019 10:03 AM    Protein, total 5.5 (L) 09/24/2019 10:03 AM    Albumin 2.0 (L) 09/24/2019 10:03 AM    Globulin 3.5 09/24/2019 10:03 AM    A-G Ratio 0.6 (L) 09/24/2019 10:03 AM     CT 8/26/19  LUNGS/PLEURA: Chronic right hemithorax volume loss. Necrotic mass/masslike  atelectasis with soft tissue density in the right lower lobe without definite  change repeat measurements, conglomerate. Loculated associated pleural effusion.     3-41 37 x 56 mm in maximal dimension. .      Small amount of pleural fluid on the right with pleural thickening/enhancement  similar to prior study. Slightly increased moderate left pleural effusion. Right  middle lobe and upper lobe atelectasis. Left upper lobe  atelectasis/consolidation.     Obscured left lung pulmonary nodule     LIVER: Unchanged cirrhotic morphology of the liver, metastatic foci  demonstrated.     Segment 4A 3-71 17 x 22 mm in size prior 12 mm     Segment 3 3-76 12 x 14 mm  GALLBLADDER: Unremarkable. SPLEEN: Splenic hypodensity measures 14 x 16 mm 3-73. PANCREAS: No mass or ductal dilatation. ADRENALS: No mass. KIDNEYS: Renal cyst. Nonobstructive left renal calculus. Marie Ask STOMACH, COLON AND SMALL BOWEL: No dilatation or wall thickening. There is no  obstruction or free intraperitoneal air. There is no evidence of incarceration  or obstruction. No mesenteric adenopathy.  No retroperitoneal adenopathy. Fecal  stasis. APPENDIX: Unremarkable. URINARY BLADDER: No mass or calculus. LYMPH NODES: None enlarged. REPRODUCTIVE ORGANS: Enlarged prostate. FREE FLUID: Large amount. BONES: Sclerotic metastases involving the L2 vertebral body, S2 segment, right  ischium and bilateral iliac wings . Diffuse osseous metastases are demonstrated. No significant change in several sclerotic osseous metastases, involving the C7,  T1, T2 and T5 vertebral bodies. Right second and fifth rib metastases again  evident. Scoliotic change. Progression compared 6/25/2019 exam.     IMPRESSION  IMPRESSION     Lung cancer stage IV with osseous and hepatic metastases. MRI brain 8/26/19    IMPRESSION  IMPRESSION:     1. Interval development of approximately 8 new intra-axial enhancing lesions  consistent with metastases, each of which has mild surrounding edema but no  significant mass effect, hemorrhage, or hydrocephalus. 2. Two areas of probable acute/subacute infarction bilateral corona radiata as  above though close follow-up is recommended given mild enhancement. Metastatic  disease felt to be less likely given the imaging characteristics. 3. Diminished marrow signal upper cervical spine and calvarium suspicious for  osseous metastatic involvement    Bone scan 8/19  IMPRESSION  IMPRESSION:   1. There has been improvement in the activity in the upper and mid thoracic  spine and mid sacrum. Noah Salvador Activity in the lumbar spine and ischium is stable.     There is slight progression of activity right superior iliac wing, left anterior  superior iliac spine, lower thoracic spine. Activity in the ribs has not changed significantly except for 2 tiny foci now  noted in lower left ribs. ASSESSMENT AND PLAN  Mr. Laci Heart is a 70 y.o. male with:    1. Stage IV NSCLC ([id number])  With R lung mass, mediastinal adenopathy, malignant R pleural effusion, multiple bone metastasis and liver metastasis.  Progressed in Tarceva, Osimertinib, Poorly tolerated 4 cycles of Carboplatin+ Alimta and Keytruda due to cytopenias and fatigue and eventually progressed on it. Resistant to any additional chemotherapy which we had recommended clinical trials vs Docetaxel+ Ramicurimab  Was being screened for [facility] and found to have a pathogenic BRCA somatic mutation but unfortunately not eligible for Rucaparib due to thrombocytopenia and brain metastasis. His most recent MRI shows numerous asymptomatic Brain metastasis. He has since undergone gamma knife. We have been trying to obtain approval for Afatinib in hopes to initiate this with Cetuximab however approval for drug has been difficult and he comes today with progressive pain, new oxygen requirement, and EGOG of 3. He is no longer a candidate for systemic chemotherapy and we have recomended hospice. He agrees. 2. Kanwal 6 prostate cancer on active surveillance    3. Bone pain:  secondary to osseous metastasis s/p XRT to R ischium and L2. S/P 30 Gy to Rt axilla and T spine  Completed 3/13/19  Now with right hip/thigh pain with worsening metastasis to the sacrum, ilium and new lesion in the femur  Status post 10 sessions of radiation to the right hip  Following with palliative care  On Morphine and Oxycontin per palliative care    4. HTN. Now normotensive. 5. Brain metastasis  New asymptomatic  Numerous  MRI reviewed  Underwent GK with Dr Anna Adame  Continue decadron 2 mg TID    6. Recurrent ascites  S/P Paracentesis on 3/22, 4/10 and 4/19, 4/30  Large volume  Non malignant  Due to portal HTN, there is no cirrhosis on liver biopsy and this is thought to be due to drug related injury  I am uncertain of the prognosis of his liver disease   It does make palliative treatments for #1 harder to tolerate and in that respect worsens his overall prognosis  Diuretics and management of liver disease per Dr. Tank Rocha.      7) Anemia  Secondary to bone metastasis    8) Thrombocytopenia   Secondary to bone metastasis, RT , Cirrhosis and prior chemotherapy    Hospice consult placed. D/w Palliative care. Patient will be admitted to hospice tomorrow morning.      Hemant Luz MD

## 2019-10-10 NOTE — PROGRESS NOTES
[facility] Lab Visit: 
 
[id number]:  Pt arrived ambulatory and in no distress, labs drawn per Fatimah Hernandez RN. Departed [facility] ambulatory and in no distress. Visit Vitals BP (P) 110/72 (BP 1 Location: Left arm, BP Patient Position: Sitting) Pulse (!) (P) 54 Temp (P) 97.8 °F (36.6 °C) Resp (P) 20 Recent Results (from the past 12 hour(s)) CBC WITH AUTOMATED DIFF Collection Time: 10/10/19  4:36 PM  
Result Value Ref Range WBC 8.6 4.1 - 11.1 K/uL  
 RBC 3.06 (L) 4.10 - 5.70 M/uL HGB 9.1 (L) 12.1 - 17.0 g/dL HCT 30.9 (L) 36.6 - 50.3 % .0 (H) 80.0 - 99.0 FL  
 MCH 29.7 26.0 - 34.0 PG  
 MCHC 29.4 (L) 30.0 - 36.5 g/dL RDW 21.1 (H) 11.5 - 14.5 % PLATELET 14 (LL) 009 - 400 K/uL NRBC 0.0 0  WBC ABSOLUTE NRBC 0.00 0.00 - 0.01 K/uL NEUTROPHILS 88 (H) 32 - 75 % LYMPHOCYTES 5 (L) 12 - 49 % MONOCYTES 6 5 - 13 % EOSINOPHILS 0 0 - 7 % BASOPHILS 0 0 - 1 % IMMATURE GRANULOCYTES 1 (H) 0.0 - 0.5 % ABS. NEUTROPHILS 7.6 1.8 - 8.0 K/UL  
 ABS. LYMPHOCYTES 0.4 (L) 0.8 - 3.5 K/UL  
 ABS. MONOCYTES 0.5 0.0 - 1.0 K/UL  
 ABS. EOSINOPHILS 0.0 0.0 - 0.4 K/UL  
 ABS. BASOPHILS 0.0 0.0 - 0.1 K/UL  
 ABS. IMM. GRANS. 0.1 (H) 0.00 - 0.04 K/UL  
 DF SMEAR SCANNED    
 RBC COMMENTS NORMOCYTIC, NORMOCHROMIC    
AMMONIA Collection Time: 10/10/19  4:36 PM  
Result Value Ref Range  Ammonia 18 <32 UMOL/L

## 2019-10-10 NOTE — TELEPHONE ENCOUNTER
Patient's daughter called. She would like some help trying to get patient a hospital bed. Please call her with advise.

## 2019-10-10 NOTE — Clinical Note
Silverio! We saw Mr. Antoina Millan today. Unfortunately we have no other options for him and are recommending hospice. I have placed an urgent consult. Calir/Rosario are you able to assist getting this set up ASAP? My nurse is out until Monday.  Thank you, Edith Quinonez

## 2019-10-10 NOTE — TELEPHONE ENCOUNTER
V/m left for Daughter informing orders have been faxed over to [facility] for TOMAS GAUTHIER Bed

## 2019-10-17 NOTE — TELEPHONE ENCOUNTER
V/m left for Delmar Pillow to return doreen lpatient is not on hospice and was given O2 focr lung cancer Dx

## 2019-10-17 NOTE — TELEPHONE ENCOUNTER
Giovany Siegel w/ [facility] is calling to speak to nurse. States code used was G34.90 and they need us to be more specific. Must be G34.91 or G34.92. Advised nurse would call him back to advise.

## 2019-12-09 NOTE — PROGRESS NOTES
He is concerned about rapid development of HTN with severe elevation at diagnosis. No cortisol from before high dose steroids available but suspect he may have had some component of AI masking HTN and now that adequately replaced have seen rise in BP  BP at goal today. Continue to follow with cardiology and digital HTN program   Hematology/Oncology progress note    REASON FOR VISIT: Stage IV EGFR mutated NSCLC    HISTORY OF PRESENT ILLNESS: Mr. Wade York is a 70 y.o. male with prostate cancer who was diagnosed with stage IV NSCLC- adenocarcinoma (EGFR mutated , PD-L1 low) in May 2017. Recently progressed on Osimertinib and is weaning himself off. Saw Dr. Brandyn Anna for palliative RT and is to start soon. Comes in today for cycle 1 of Carbo + Alimta, and Keytruda. His upper mid back pain and right shoulder pain are not well controlled with Tylenol. He is hesitant about narcotics as these make him very sleepy. Otherwise, he feels well. Denies SOB, CP. Has a dry cough. No fever/chills. No bleeding. Breathing is unchanged. Oncologic history   Mr. Wade York noticed a new cough and fevers back in March of 2017. He went to Urgent Care and received antibiotics and cough medication. He states this worked for a while, but he began to notice his cough return. This was intermittent. He had some tightness across his anterior chest which he related to the infection. Chest x-ray was abnormal and he was told to proceed to the Emergency Department. Rosendo Springer had been under surveillance for right lower lobe mass that was initially noted in 2015. Bronch at that time was negative for malignancy. He was evaluated by Dr. Gómez Vera with Thoracic Surgery in 2015 for possible surgical resection. CT chest in November of 2016 with mass size unchanged but some right basilar pleural thickening. CT chest obtained 5/14/17 however showed a new large right pleural effusion with right middle lobe and right lower lobe collapse. Irregular spiculated right lower lobe mass 3.8cm and stable precarinal enlarged lymph nodes were noted. New right posterior second rib lytic lesion and new right hepatic hypodensity consistent with mets. He underwent a therapeutic thoracentesis on 5/15/17 with removal of 1500 cc of serosanguinous fluid. Pathology showed adenocarcinoma.  PET CT showed pleural, bone, liver and lfor metastasis. MRI brain 5/20/17: No metastasis. Needed repeat R sided thoracentesis on 5/25/17, had some post procedure hydropneumothorax. He was seen by Dr. Jerris Sever at [address] [place] [address] for Pleurx catheter.      CT guided biopsy of R lung mass done 5/25 which showed adenocarcinoma. EGFR mutation detected Exon 18 and 21    Treatment  6/26/17: Tarceva. Monthly Xgeva. Palliative XRT to R hip   CT CAP 10/2/17: Response  Ct 1/23/18: CT with some growth of LLL mass but stable by RECIST. CT 3/15/18 and Bone scan stable though he had worsening left back pain. MRI results showed extensive disease at L2, S2 and additional lesions as previously known. Received palliative XRT that he completed 4/18/18 5/26/18: Started Osimertinib as he had a T790M mutation. Stopped 6/20/18 due to platelets of 93Y  Resumed at 80 mg every other day on 7/6/18   Started 40mg daily on 7/12/18 2/19: Progressive disease with new liver metastases and progression of thoracic disease  2/22/19: liver biopsy pathology shows metastatic adenocarcinoma, consistent with lung primary- foundation sent  2/26/19: Cycle 1 of Carboplatin+ Alimta+ Keytruda    Past Medical History:   Diagnosis Date    Cancer ([address] 2017    lung ca    Hypertension        Past Surgical History:   Procedure Laterality Date    HX ORTHOPAEDIC      reconstruction of left little finger    IR BX LIVER PERCUTANEOUS  2/22/2019    IR INSERT TUNL CVC W PORT OVER 5 YEARS  2/22/2019       No Known Allergies    Current Outpatient Medications   Medication Sig Dispense Refill    ondansetron hcl (ZOFRAN) 8 mg tablet Take 1 Tab by mouth every eight (8) hours as needed for Nausea. 30 Tab 6    dexamethasone (DECADRON) 4 mg tablet Take 4 mg by mouth See Admin Instructions.  Take one tab by mouth on day 2 of chemotherapy 18 Tab 0    amLODIPine (NORVASC) 10 mg tablet TAKE 1 TABLET BY MOUTH EVERY DAY 30 Tab 0    levothyroxine (SYNTHROID) 75 mcg tablet TAKE 1/2 TABLET BY MOUTH DAILY BEFORE BREAKFAST 30 Tab 5    zoledronic acid (ZOMETA) 4 mg/100 mL pgbk infusion 4 mg by IntraVENous route every three (3) months.  cholecalciferol (VITAMIN D3) 1,000 unit tablet Take 1,000 Units by mouth daily.  chlorhexidine (PERIDEX) 0.12 % solution RINSE BID  0    amlodipine besylate (AMLODIPINE PO) Take  by mouth. Social History     Socioeconomic History    Marital status:      Spouse name: Not on file    Number of children: Not on file    Years of education: Not on file    Highest education level: Not on file   Tobacco Use    Smoking status: Never Smoker    Smokeless tobacco: Never Used   Substance and Sexual Activity    Alcohol use: Yes     Alcohol/week: 6.0 oz     Types: 10 Glasses of wine per week     Comment: ocassionally    Drug use: No    Sexual activity: Yes     Partners: Female     Comment:  has 2 children       Family History   Problem Relation Age of Onset    Cancer Mother         leukemia    Stroke Father     Cancer Brother         bladder       ROS  As reviewed under [facility]  ECOG PS is 0    Emotional well being addressed and patient is coping well    Physical Examination:   Visit Vitals  /75 (BP 1 Location: Right arm, BP Patient Position: Sitting)   Pulse 61   Temp 97.7 °F (36.5 °C) (Oral)   Resp 16   Ht 6' 1\" (1.854 m)   Wt 154 lb (69.9 kg)   SpO2 97%   BMI 20.32 kg/m²     General appearance - alert, frail, and in no distress  Mental status - oriented to person, place, and time  Mouth - mucous membranes moist, pharynx normal without lesions.   Neck - supple, no significant adenopathy  Lymphatics - no palpable lymphadenopathy, no hepatosplenomegaly  Chest -clear to auscultation  Heart - normal rate, regular rhythm, normal S1, S2, no murmurs, rubs, clicks or gallops; edema has resolved  Abdomen - soft, nontender, nondistended, no masses or organomegaly, bowel sounds present  Neurological - normal speech  Skin: No rashes  MSK- no LE edema    LABS  Lab Results Component Value Date/Time    WBC 5.5 02/26/2019 10:12 AM    HGB 9.4 (L) 02/26/2019 10:12 AM    HCT 29.9 (L) 02/26/2019 10:12 AM    PLATELET 82 (L) 05/19/9339 10:12 AM    MCV 93.7 02/26/2019 10:12 AM    ABS. NEUTROPHILS 4.5 02/26/2019 10:12 AM     Lab Results   Component Value Date/Time    Sodium 142 02/26/2019 10:12 AM    Potassium 3.8 02/26/2019 10:12 AM    Chloride 109 (H) 02/26/2019 10:12 AM    CO2 25 02/26/2019 10:12 AM    Glucose 91 02/26/2019 10:12 AM    BUN 27 (H) 02/26/2019 10:12 AM    Creatinine 0.64 (L) 02/26/2019 10:12 AM    GFR est AA >60 02/26/2019 10:12 AM    GFR est non-AA >60 02/26/2019 10:12 AM    Calcium 9.1 02/26/2019 10:12 AM     Lab Results   Component Value Date/Time    AST (SGOT) 64 (H) 02/26/2019 10:12 AM    Alk. phosphatase 313 (H) 02/26/2019 10:12 AM    Protein, total 6.6 02/26/2019 10:12 AM    Albumin 2.8 (L) 02/26/2019 10:12 AM    Globulin 3.8 02/26/2019 10:12 AM    A-G Ratio 0.7 (L) 02/26/2019 10:12 AM       Guardant 360 results under media- T790M present     2/9/19    Thoracic spine MRI  IMPRESSION:  1. At T5 there has been interval progression of metastatic disease with  extension on the left into the epidural space and left T5-6 foramen. 2. Interval progression at T8 of extraosseous tumor on the left with extension  into the left T8-9 foramen and adjacent posterior elements extending out along  the proximal intercostal space. 3. Stable findings at T1.  4. No abnormal intradural enhancement with normal signal in the spinal cord. Cervical spine MRI  IMPRESSION:  1. Accentuated cervical lordosis. 2. Chronic degenerative changes at the odontoid and C1.  3. Sclerosis right lateral mass C1 may represent metastasis. 4. Congenital segmentation anomaly/fusion C4 and C5.  5. Chronic degenerative changes C3-4, C5-6 and to lesser extent C6-7 with  minimal to mild stenosis and no cord compression. Variable foramina stenosis in  association with facet arthrosis.   6. Abnormal marrow signal T1 consistent with chronic metastasis to this  Vertebra. Ct cap 2/9/19    IMPRESSION:   1. Stable right lower lobe lung mass and adjacent subcentimeter satellite  nodule. Right pleural nodularity and a chronic small right pleural effusion are  unchanged.     2. New groundglass attenuation in the lingula may represent nonspecific  infection or inflammation. Attention on follow-up CT is suggested.     3. Several low-density liver lesions are more conspicuous on the current exam  suspicious for metastatic disease. The largest in the right liver measures 1.3 x  1.8 cm.      4. Stable subcentimeter left adrenal nodule.      5. Stable scattered bony metastases. Extra osseous progression at T5 and T8 are  better seen on the concurrent MR thoracic spine.     6. New splenomegaly measuring 13.9 cm in length.     7. Increased small amount of intraperitoneal ascites. ASSESSMENT AND PLAN  Mr. Hurshel Saint is a 70 y.o. male with:    1. Stage IV NSCLC   With R lung mass, mediastinal adenopathy, malignant R pleural effusion, multiple asymptomatic bone metastasis and possibly liver metastasis. EGFR mutated, PD-L1 5%. Found to have T790M mutation    Switched to Osimertininb- 80 mg daily on 5/26/18, dose reduced to 40 mg due to grade 3 thrombocytopenia. Scans reviewed from 8/28/18 and show evidence of response. Most recent CT scans and MRI of the thorax reviewed in cancer conference. He does have a new liver metastases and has progressive disease in the spine. He is symptomatic from this. Hence he has progressive disease as per RECIST. We discussed options down the road that include clinical trials such as the biomarker driven LUNGMAP study that we have available with us, versus chemotherapy plus immunotherapy versus other clinical trials. IT appears that due to a h/o Prostate cancer he may not be eligible for LUNGMAP.      Underwent a tissue biopsy of his liver and pathology shows metastatic adenocarcinoma and foundation one analysis pending    However I suggest we move forward with plans to start palliative Carboplatin+ Alimta and Keytruda as his disease is indeed aggressive. Today is cycle 1.     · Proceed today with carboplatin (AUC 4) + Alimta (dose reduced 25% to 375mg/m2 for grade 1 thrombocytopenia) + Keytruda given every 3 weeks. Plan for 4 cycles followed by maintenance. · B12 shot today. Continue Folic acid  · Zofran PRN  · Decadron on day 2  · Start after completion of Palliative RT  · Zometa q3 months    2. [place] 6 prostate cancer on active surveillance    3. Bone pain:  secondary to osseous metastasis s/p XRT to R ischium and L2. Now with moderate pain at upper back radiating to the right. This is likely from lesions at T5-T8. Does not have any symptoms of cord compression      Follows with Dr. Daiana Hanson and plan is palliative radiation x 10 sessions to start in the coming weeks  Will hold cycle #2 until 7 days after radiation    4. HTN. On amlodipine. 5. Diarrhea- resolved    6. Edema- bilateral  Dopplers ordered and negative for DVT  ECHO ordered and reviewed - EF 60%  Resolved    7. Elevated liver enzymes  Grade 1 Secondary to Osimertinib  Will continue to monitor. 8. Thrombocytopenia  Osimertininb was previously DR due to gr 3 thrombocytopenia. Plts 82K today. Will dose reduce Alimta 25% to 325mg/m2 and Carbo AUC 4. Proceed with cycle #1 today and hold cycle #2 until 7 days after radiaiton.      Amy Heaton MD

## 2024-08-15 NOTE — TELEPHONE ENCOUNTER
Call to [phone] [employer]. Requesting appropriate fax number to process change in tagrisso frequency.   [phone] [facility] Phone Call      Data: Received voicemail from pt requesting a call back.  Intervention: Writer spoke with patient.   Assessment: Pt inquiring about any available funding for cost of upcoming [facility] stay. Writer told patient that we will offer 750 dollars toward the total cost of pt's stay. Writer told patient this is the maximum funding available. Pt asked whether she could request a one bedroom apartment instead of two. Writer said she would follow up with patient regarding this.    Plan:  Writer will follow up with patient regarding one vs two bedrooms at [facility]. Confirmed with patient that 750 dollars will be offered to patient toward the cost of pt's rent at [facility].       KOKO Barrera, [facility]  Outpatient Kidney/Pancreas/[facility] Transplant Program   Ph: [phone]